# Patient Record
Sex: FEMALE | Race: WHITE
[De-identification: names, ages, dates, MRNs, and addresses within clinical notes are randomized per-mention and may not be internally consistent; named-entity substitution may affect disease eponyms.]

---

## 2017-06-29 ENCOUNTER — APPOINTMENT (OUTPATIENT)
Dept: DERMATOLOGY | Facility: CLINIC | Age: 67
End: 2017-06-29

## 2018-02-19 ENCOUNTER — EMERGENCY (EMERGENCY)
Facility: HOSPITAL | Age: 68
LOS: 1 days | Discharge: HOME | End: 2018-02-19
Attending: STUDENT IN AN ORGANIZED HEALTH CARE EDUCATION/TRAINING PROGRAM
Payer: MEDICARE

## 2018-02-19 VITALS
DIASTOLIC BLOOD PRESSURE: 68 MMHG | RESPIRATION RATE: 18 BRPM | OXYGEN SATURATION: 97 % | TEMPERATURE: 99 F | HEART RATE: 88 BPM | SYSTOLIC BLOOD PRESSURE: 115 MMHG

## 2018-02-19 VITALS — WEIGHT: 203.93 LBS | HEIGHT: 64 IN

## 2018-02-19 LAB
ALBUMIN SERPL ELPH-MCNC: 3.7 G/DL — SIGNIFICANT CHANGE UP (ref 3.3–5.2)
ALP SERPL-CCNC: 90 U/L — SIGNIFICANT CHANGE UP (ref 40–120)
ALT FLD-CCNC: 22 U/L — SIGNIFICANT CHANGE UP
ANION GAP SERPL CALC-SCNC: 17 MMOL/L — SIGNIFICANT CHANGE UP (ref 5–17)
AST SERPL-CCNC: 39 U/L — HIGH
BILIRUB SERPL-MCNC: 0.3 MG/DL — LOW (ref 0.4–2)
BUN SERPL-MCNC: 8 MG/DL — SIGNIFICANT CHANGE UP (ref 8–20)
CALCIUM SERPL-MCNC: 9.5 MG/DL — SIGNIFICANT CHANGE UP (ref 8.6–10.2)
CHLORIDE SERPL-SCNC: 93 MMOL/L — LOW (ref 98–107)
CK SERPL-CCNC: 113 U/L — SIGNIFICANT CHANGE UP (ref 25–170)
CO2 SERPL-SCNC: 24 MMOL/L — SIGNIFICANT CHANGE UP (ref 22–29)
CREAT SERPL-MCNC: 0.84 MG/DL — SIGNIFICANT CHANGE UP (ref 0.5–1.3)
D DIMER BLD IA.RAPID-MCNC: 275 NG/ML DDU — HIGH
EOSINOPHIL NFR BLD AUTO: 1 % — SIGNIFICANT CHANGE UP (ref 0–5)
FLUAV H1 2009 PAND RNA SPEC QL NAA+PROBE: DETECTED
GLUCOSE SERPL-MCNC: 116 MG/DL — HIGH (ref 70–115)
HCT VFR BLD CALC: 41.4 % — SIGNIFICANT CHANGE UP (ref 37–47)
HGB BLD-MCNC: 13.8 G/DL — SIGNIFICANT CHANGE UP (ref 12–16)
LYMPHOCYTES # BLD AUTO: 8 % — LOW (ref 20–55)
MCHC RBC-ENTMCNC: 31.5 PG — HIGH (ref 27–31)
MCHC RBC-ENTMCNC: 33.3 G/DL — SIGNIFICANT CHANGE UP (ref 32–36)
MCV RBC AUTO: 94.5 FL — SIGNIFICANT CHANGE UP (ref 81–99)
MONOCYTES NFR BLD AUTO: 11 % — HIGH (ref 3–10)
NEUTROPHILS NFR BLD AUTO: 79 % — HIGH (ref 37–73)
PLATELET # BLD AUTO: 230 K/UL — SIGNIFICANT CHANGE UP (ref 150–400)
POTASSIUM SERPL-MCNC: 5.5 MMOL/L — HIGH (ref 3.5–5.3)
POTASSIUM SERPL-SCNC: 5.5 MMOL/L — HIGH (ref 3.5–5.3)
PROT SERPL-MCNC: 8.2 G/DL — SIGNIFICANT CHANGE UP (ref 6.6–8.7)
RAPID RVP RESULT: DETECTED
RBC # BLD: 4.38 M/UL — LOW (ref 4.4–5.2)
RBC # FLD: 13.8 % — SIGNIFICANT CHANGE UP (ref 11–15.6)
SODIUM SERPL-SCNC: 134 MMOL/L — LOW (ref 135–145)
TROPONIN T SERPL-MCNC: <0.01 NG/ML — SIGNIFICANT CHANGE UP (ref 0–0.06)
WBC # BLD: 6.3 K/UL — SIGNIFICANT CHANGE UP (ref 4.8–10.8)
WBC # FLD AUTO: 6.3 K/UL — SIGNIFICANT CHANGE UP (ref 4.8–10.8)

## 2018-02-19 PROCEDURE — 87633 RESP VIRUS 12-25 TARGETS: CPT

## 2018-02-19 PROCEDURE — 82550 ASSAY OF CK (CPK): CPT

## 2018-02-19 PROCEDURE — 99285 EMERGENCY DEPT VISIT HI MDM: CPT

## 2018-02-19 PROCEDURE — 93010 ELECTROCARDIOGRAM REPORT: CPT

## 2018-02-19 PROCEDURE — 96374 THER/PROPH/DIAG INJ IV PUSH: CPT

## 2018-02-19 PROCEDURE — 71275 CT ANGIOGRAPHY CHEST: CPT | Mod: 26

## 2018-02-19 PROCEDURE — 71046 X-RAY EXAM CHEST 2 VIEWS: CPT

## 2018-02-19 PROCEDURE — 71046 X-RAY EXAM CHEST 2 VIEWS: CPT | Mod: 26

## 2018-02-19 PROCEDURE — 36415 COLL VENOUS BLD VENIPUNCTURE: CPT

## 2018-02-19 PROCEDURE — 93005 ELECTROCARDIOGRAM TRACING: CPT

## 2018-02-19 PROCEDURE — 87798 DETECT AGENT NOS DNA AMP: CPT

## 2018-02-19 PROCEDURE — 99284 EMERGENCY DEPT VISIT MOD MDM: CPT | Mod: 25

## 2018-02-19 PROCEDURE — 80053 COMPREHEN METABOLIC PANEL: CPT

## 2018-02-19 PROCEDURE — 87581 M.PNEUMON DNA AMP PROBE: CPT

## 2018-02-19 PROCEDURE — 84484 ASSAY OF TROPONIN QUANT: CPT

## 2018-02-19 PROCEDURE — 87486 CHLMYD PNEUM DNA AMP PROBE: CPT

## 2018-02-19 PROCEDURE — 85027 COMPLETE CBC AUTOMATED: CPT

## 2018-02-19 PROCEDURE — 85379 FIBRIN DEGRADATION QUANT: CPT

## 2018-02-19 PROCEDURE — 71275 CT ANGIOGRAPHY CHEST: CPT

## 2018-02-19 RX ORDER — ACETAMINOPHEN 500 MG
975 TABLET ORAL ONCE
Qty: 0 | Refills: 0 | Status: COMPLETED | OUTPATIENT
Start: 2018-02-19 | End: 2018-02-19

## 2018-02-19 RX ORDER — KETOROLAC TROMETHAMINE 30 MG/ML
30 SYRINGE (ML) INJECTION ONCE
Qty: 0 | Refills: 0 | Status: DISCONTINUED | OUTPATIENT
Start: 2018-02-19 | End: 2018-02-19

## 2018-02-19 RX ADMIN — Medication 75 MILLIGRAM(S): at 21:42

## 2018-02-19 RX ADMIN — Medication 30 MILLIGRAM(S): at 17:56

## 2018-02-19 RX ADMIN — Medication 975 MILLIGRAM(S): at 19:42

## 2018-02-19 NOTE — ED PROVIDER NOTE - PROGRESS NOTE DETAILS
as per sign-out from Dr. Patel patient with flu like illness presenting also wth pleuritic chest pain. Patient awaiting CT angio of chest. as per sign-out from Dr. Patel patient with flu like illness presenting also with pleuritic chest pain. Patient awaiting CT angio of chest. ct is as noted. ct is as noted. Discussed management of condition and concerning symptoms with patient and her best friends (over 40 yrs) at bedside

## 2018-02-19 NOTE — ED ADULT NURSE NOTE - OBJECTIVE STATEMENT
Patient A&OX3, c/o SOB, chest pain and upper back pain. Patient A&OX3, c/o SOB, chest pain and upper back pain. CM in place. NSR.

## 2018-02-19 NOTE — ED PROVIDER NOTE - OBJECTIVE STATEMENT
This patient is a 67 year old woman who presents to the ER c/o cough, chest pain, back pain, and myalgias.  The symptoms began 2 days ago with cough her symptoms worsened yesterday.   The back pain, chest pain, and body aches are worse with movement.  She denies fever, sick contacts, and recent travel.  Patient reports that her symptoms are similar to when she had pneumonia in the past.

## 2018-02-19 NOTE — ED ADULT NURSE REASSESSMENT NOTE - NS ED NURSE REASSESS COMMENT FT1
MD at pt bedside, test results explained, pt to be discharged, discharge instructions given and explained, including discharge medication purpose, dose, frequency and s/e, pt verbalized understanding of instructions, questions encouraged and answered appropriately, PIV d/c'd per facility protocol, pt tolerated well, DCD in place, pt safely discharged home.

## 2018-02-19 NOTE — ED ADULT NURSE REASSESSMENT NOTE - NS ED NURSE REASSESS COMMENT FT1
Bedside report recvd from off going RN, pt recvd lying on stretcher, A&Ox3, VSS, family at bedside providing support, pt c/o of back pain, reports chronic condition, worsened since cough started, POC discussed with pt, pt awaiting test at this time, pt and family verbalize understanding of plan and agree, pt safety maintained. Findings reported to MD.

## 2018-02-19 NOTE — ED PROVIDER NOTE - MEDICAL DECISION MAKING DETAILS
Cough, and myalgias temperature 100.8 in the ER.  Likely viral etiology will r/o PNA.  Will give tylenol, toradol, and check CXR.

## 2018-02-19 NOTE — ED PROVIDER NOTE - MUSCULOSKELETAL, MLM
Spine appears normal, range of motion is not limited, no muscle or joint tenderness.  No CVA tenderness.

## 2018-05-11 ENCOUNTER — APPOINTMENT (OUTPATIENT)
Dept: ORTHOPEDIC SURGERY | Facility: CLINIC | Age: 68
End: 2018-05-11
Payer: MEDICARE

## 2018-05-11 VITALS
DIASTOLIC BLOOD PRESSURE: 78 MMHG | HEART RATE: 91 BPM | HEIGHT: 62 IN | WEIGHT: 216 LBS | SYSTOLIC BLOOD PRESSURE: 128 MMHG | BODY MASS INDEX: 39.75 KG/M2

## 2018-05-11 PROCEDURE — 99214 OFFICE O/P EST MOD 30 MIN: CPT | Mod: 25

## 2018-05-11 PROCEDURE — 20610 DRAIN/INJ JOINT/BURSA W/O US: CPT | Mod: 50

## 2018-05-11 RX ORDER — QUETIAPINE FUMARATE 300 MG/1
300 TABLET ORAL
Qty: 30 | Refills: 0 | Status: ACTIVE | COMMUNITY
Start: 2018-01-29

## 2018-05-11 RX ORDER — CODEINE PHOSPHATE AND GUAIFENESIN 10; 100 MG/5ML; MG/5ML
100-10 LIQUID ORAL
Qty: 120 | Refills: 0 | Status: ACTIVE | COMMUNITY
Start: 2018-01-03

## 2018-05-11 RX ORDER — OMEPRAZOLE 40 MG/1
40 CAPSULE, DELAYED RELEASE ORAL
Qty: 30 | Refills: 0 | Status: ACTIVE | COMMUNITY
Start: 2018-04-09

## 2018-05-11 RX ORDER — DULOXETINE HYDROCHLORIDE 60 MG/1
60 CAPSULE, DELAYED RELEASE PELLETS ORAL
Qty: 30 | Refills: 0 | Status: ACTIVE | COMMUNITY
Start: 2017-12-27

## 2018-05-11 RX ORDER — METHYLPREDNISOLONE 4 MG/1
4 TABLET ORAL
Qty: 21 | Refills: 0 | Status: ACTIVE | COMMUNITY
Start: 2018-02-26

## 2018-05-11 RX ORDER — FLUTICASONE PROPIONATE 50 UG/1
50 SPRAY, METERED NASAL
Qty: 16 | Refills: 0 | Status: ACTIVE | COMMUNITY
Start: 2018-01-02

## 2018-05-11 RX ORDER — ALBUTEROL SULFATE 90 UG/1
108 (90 BASE) AEROSOL, METERED RESPIRATORY (INHALATION)
Qty: 8 | Refills: 0 | Status: ACTIVE | COMMUNITY
Start: 2018-01-02

## 2018-09-06 PROBLEM — F32.9 MAJOR DEPRESSIVE DISORDER, SINGLE EPISODE, UNSPECIFIED: Chronic | Status: ACTIVE | Noted: 2018-02-19

## 2018-09-06 PROBLEM — F41.9 ANXIETY DISORDER, UNSPECIFIED: Chronic | Status: ACTIVE | Noted: 2018-02-19

## 2018-09-06 PROBLEM — C50.919 MALIGNANT NEOPLASM OF UNSPECIFIED SITE OF UNSPECIFIED FEMALE BREAST: Chronic | Status: ACTIVE | Noted: 2018-02-19

## 2018-10-01 ENCOUNTER — APPOINTMENT (OUTPATIENT)
Dept: ORTHOPEDIC SURGERY | Facility: CLINIC | Age: 68
End: 2018-10-01
Payer: MEDICARE

## 2018-10-01 VITALS
HEART RATE: 81 BPM | WEIGHT: 216 LBS | BODY MASS INDEX: 39.75 KG/M2 | DIASTOLIC BLOOD PRESSURE: 70 MMHG | SYSTOLIC BLOOD PRESSURE: 125 MMHG | HEIGHT: 62 IN

## 2018-10-01 PROCEDURE — 99214 OFFICE O/P EST MOD 30 MIN: CPT | Mod: 25

## 2018-10-01 PROCEDURE — 20610 DRAIN/INJ JOINT/BURSA W/O US: CPT | Mod: 50

## 2018-10-15 RX ORDER — MELOXICAM 7.5 MG/1
7.5 TABLET ORAL
Qty: 30 | Refills: 0 | Status: ACTIVE | COMMUNITY
Start: 2018-04-18

## 2018-12-03 ENCOUNTER — APPOINTMENT (OUTPATIENT)
Dept: DERMATOLOGY | Facility: CLINIC | Age: 68
End: 2018-12-03

## 2019-10-25 ENCOUNTER — APPOINTMENT (OUTPATIENT)
Dept: DERMATOLOGY | Facility: CLINIC | Age: 69
End: 2019-10-25
Payer: MEDICARE

## 2019-10-25 PROCEDURE — 99214 OFFICE O/P EST MOD 30 MIN: CPT

## 2019-12-06 ENCOUNTER — APPOINTMENT (OUTPATIENT)
Dept: DERMATOLOGY | Facility: CLINIC | Age: 69
End: 2019-12-06
Payer: MEDICARE

## 2019-12-06 PROCEDURE — 99213 OFFICE O/P EST LOW 20 MIN: CPT | Mod: 25

## 2019-12-06 PROCEDURE — 17110 DESTRUCTION B9 LES UP TO 14: CPT

## 2020-01-01 ENCOUNTER — OUTPATIENT (OUTPATIENT)
Dept: OUTPATIENT SERVICES | Facility: HOSPITAL | Age: 70
LOS: 1 days | End: 2020-01-01

## 2020-01-29 ENCOUNTER — EMERGENCY (EMERGENCY)
Facility: HOSPITAL | Age: 70
LOS: 1 days | Discharge: HOME | End: 2020-01-29
Attending: EMERGENCY MEDICINE
Payer: MEDICARE

## 2020-01-29 VITALS
OXYGEN SATURATION: 96 % | WEIGHT: 220.02 LBS | HEART RATE: 86 BPM | TEMPERATURE: 97 F | RESPIRATION RATE: 18 BRPM | HEIGHT: 62 IN | DIASTOLIC BLOOD PRESSURE: 82 MMHG | SYSTOLIC BLOOD PRESSURE: 138 MMHG

## 2020-01-29 PROCEDURE — 99284 EMERGENCY DEPT VISIT MOD MDM: CPT

## 2020-01-29 PROCEDURE — 99284 EMERGENCY DEPT VISIT MOD MDM: CPT | Mod: 25

## 2020-01-29 PROCEDURE — 93971 EXTREMITY STUDY: CPT

## 2020-01-29 PROCEDURE — 96372 THER/PROPH/DIAG INJ SC/IM: CPT

## 2020-01-29 PROCEDURE — 93971 EXTREMITY STUDY: CPT | Mod: 26,RT

## 2020-01-29 RX ORDER — METHOCARBAMOL 500 MG/1
1500 TABLET, FILM COATED ORAL ONCE
Refills: 0 | Status: COMPLETED | OUTPATIENT
Start: 2020-01-29 | End: 2020-01-29

## 2020-01-29 RX ORDER — IBUPROFEN 200 MG
1 TABLET ORAL
Qty: 15 | Refills: 0
Start: 2020-01-29 | End: 2020-02-02

## 2020-01-29 RX ORDER — METHOCARBAMOL 500 MG/1
1 TABLET, FILM COATED ORAL
Qty: 15 | Refills: 0
Start: 2020-01-29 | End: 2020-02-02

## 2020-01-29 RX ORDER — KETOROLAC TROMETHAMINE 30 MG/ML
30 SYRINGE (ML) INJECTION ONCE
Refills: 0 | Status: DISCONTINUED | OUTPATIENT
Start: 2020-01-29 | End: 2020-01-29

## 2020-01-29 RX ADMIN — METHOCARBAMOL 1500 MILLIGRAM(S): 500 TABLET, FILM COATED ORAL at 16:27

## 2020-01-29 RX ADMIN — Medication 30 MILLIGRAM(S): at 16:27

## 2020-01-29 NOTE — ED ADULT TRIAGE NOTE - CHIEF COMPLAINT QUOTE
Patient arrived ambulatory to ED, awake alert, and oriented times 3, breathing unlabored.  Patient complaining of pain to right hip which started 1 month ago.  Patient states ortho was told to come to ED due to cyst to right hip.

## 2020-01-29 NOTE — ED STATDOCS - CLINICAL SUMMARY MEDICAL DECISION MAKING FREE TEXT BOX
Pt presenting with chronic right sided sciatic like pain, Pt now complaining of 5 days of right thigh pain. no CP no SOB pt concern for possible blood clot will get US and treat pain and reevaluate

## 2020-01-29 NOTE — ED STATDOCS - ATTENDING CONTRIBUTION TO CARE
I, Dr. Solitario, performed the initial face to face bedside interview with this patient regarding history of present illness, review of symptoms and relevant past medical, social and family history.  I completed an independent physical examination.  I was the initial provider who evaluated this patient. I have signed out the follow up of any pending tests (i.e. labs, radiological studies) to the ACP.  I have communicated the patient’s plan of care and disposition with the ACP.

## 2020-01-29 NOTE — ED STATDOCS - PROGRESS NOTE DETAILS
Pt moved form intake Room. Pt seen and evaluated by intake Physician. HPI, Physical examination performed by intake Physician . Note reviewed and followup examination performed by me consistent with initial assessment. Agrees with intake Physician plan and tests. Pt US normal and Pt made aware of result and will continue wit Mediation as discussed. F/U with PCP. Pt states that she feels a lot better with the Medication in the ED.

## 2020-01-29 NOTE — ED STATDOCS - CARE PROVIDER_API CALL
Maulik Verdugo)  Orthopaedic Surgery  [address], [facility] [address]  [place] [address]  Phone: [phone]  Fax: [phone]  Follow Up Time: 7-10 Days

## 2020-01-29 NOTE — ED STATDOCS - OBJECTIVE STATEMENT
68 y/o F pt with significant PMHx of anxiety, and sciatica presents to the ED c/o Chronic sciatic pain and increasing pain in her right thigh for the last 5 days(no trauma). She states that the vein looks swollen and is painful to the touch. Pt states that shes had severe sciatica pain that started a month ago and went to a pain management doctor where she received an epidural 3 weeks ago. She states that she went to another doctor who said she might have a blood clot. Pt takes Naproxen, Dilaudid, Cymbalta, clonopin. She also reported that she has a cyst on right hip.

## 2020-01-29 NOTE — ED STATDOCS - SKIN, MLM
TTP right medial aspect of thigh, no swelling or erythema no significant engorgement of vein noted. No calf swelling.

## 2020-01-29 NOTE — ED STATDOCS - PATIENT PORTAL LINK FT
You can access the FollowMyHealth Patient Portal offered by [facility] by registering at the following website: [email or web address]. By joining [employer]’s FollowMyHealth portal, you will also be able to view your health information using other applications (apps) compatible with our system.

## 2020-01-31 DIAGNOSIS — Z71.89 OTHER SPECIFIED COUNSELING: ICD-10-CM

## 2020-02-07 ENCOUNTER — APPOINTMENT (OUTPATIENT)
Dept: ORTHOPEDIC SURGERY | Facility: CLINIC | Age: 70
End: 2020-02-07
Payer: MEDICARE

## 2020-02-07 VITALS
BODY MASS INDEX: 39.75 KG/M2 | WEIGHT: 216 LBS | DIASTOLIC BLOOD PRESSURE: 81 MMHG | HEIGHT: 62 IN | SYSTOLIC BLOOD PRESSURE: 138 MMHG | HEART RATE: 99 BPM

## 2020-02-07 DIAGNOSIS — M16.11 UNILATERAL PRIMARY OSTEOARTHRITIS, RIGHT HIP: ICD-10-CM

## 2020-02-07 DIAGNOSIS — M17.0 BILATERAL PRIMARY OSTEOARTHRITIS OF KNEE: ICD-10-CM

## 2020-02-07 PROCEDURE — 73562 X-RAY EXAM OF KNEE 3: CPT | Mod: RT

## 2020-02-07 PROCEDURE — 73502 X-RAY EXAM HIP UNI 2-3 VIEWS: CPT | Mod: RT

## 2020-02-07 PROCEDURE — 99214 OFFICE O/P EST MOD 30 MIN: CPT

## 2020-02-07 NOTE — HISTORY OF PRESENT ILLNESS
[Worsening] : worsening [de-identified] : 69 year old female here for evaluation of right hip and right knee pain.   patient has known history of right knee OA, has had injections in past.  patient states developed right hip pain in December.  Patient was seeing pain management, had epidural x 3 with no relief. No hip injections yet. patient states WB and ROM increases pain.  patient states on Robaxin and Motrin for pain with minimal relief. Patient also taking Tramadol. She has attempted hyaluronic acid injections for her knee with relief.

## 2020-02-07 NOTE — DISCUSSION/SUMMARY
[Medication Risks Reviewed] : Medication risks reviewed [Surgical risks reviewed] : Surgical risks reviewed [de-identified] : 69 year old  female with severe bone-on-bone medial compartment osteoarthritis of the right knee with significant varus deformity and endstage right hip osteoarthritis. Based on imaging she is a candidate for right BEATRIZ and right TKA. I recommended that she pursue right BEATRIZ first. We discussed total hip replacement in detail, including the risks and anticipated recovery period. She can schedule right BEATRIZ.\par With regards to her right knee she will continue with conservative treatment. I ordered hyaluronic acid injections for her right knee. She will F/U when hyaluronic acid injections are available in office. \par \par Total hip arthroplasty: A hip replacement means a resurfacing of both surfaces of the hip, with metal, ceramic and/or plastic parts. The prosthetic parts are usually press fit into bone, and only rarely cemented into position. Patients are allowed to weight bear as tolerated in most cases. The postoperative motion is determined by multiple factors the most important of which is preoperative motion. In general, the better the motion pre operatively, the better the motion post operatively. The operation, pending medical clearance, generally requires a hospitalization of 3-4 days. In general, we prefer to perform the procedure under epidural or general anesthesia. Preoperatively we institute a nomogram for blood management which may include the administration of procrit. The operative procedure takes approximately 1-2 hours. The operation requires an oblique incision anywhere from 4-6 inches over the posterolateral hip region. Post operatively the patient is walking the day of or the day after surgery. The first couple of days are very painful and the pain medication will alleviate but not eliminate the pain. Thus the patient must really push hard to get their mobility back. Our goal for having the person go home is that they can walk with a walker or a cane. The walking aide is to be dispensed with once the patient is secure enough. In general, there is no cast or braces required with a routine hip replacement. In the long term, we do not encourage our patients to run for the sake of running; although, pending their pre operative status, we often allow patients to play doubles tennis or comparable activities. We also allow them to do gentle intermediate downhill skiing if they are truly an expert skier. Biking is encouraged as well as swimming. The follow up periods are usually at 3 weeks, 6 weeks, 3 months, and yearly intervals. Potential complications with total hip replacements include anaesthetic complications and death, infection (less than 1%), nerve damage, and in the case of a sciatic nerve injury, a permanent foot drop, (a flapping foot with ambulation that requires bracing). There are always areas of skin numbness but this is not an untoward effect nor do we consider it a complication. Other potential complications include dislocation of the hip component (less than 5%). In cases of recurrent dislocation revision surgery may be required. The loosening of either the acetabular or femoral component is much more infrequent. The components may wear, creating particulate debris, loosening and systemic complications. Specific concerns exist with all bearing surfaces such as metal sensitivity with metal on metal components, and the risk of fracture and squeaking with ceramic components. Major blood vessel damage is also extremely rare. Theoretically because of the anatomic proximity of the femoral artery, it could be lacerated with subsequent repairs required. Albeit unlikely, a disruption of the femoral artery could theoretically result in an amputation. Similarly, infection could theoretically result in an amputation if one were to grow out an organism that cannot be controlled with antibiotics. Most infections require removal of the prosthesis, placement of an antibiotic spacer, IV antibiotics for eradication, prior to reimplantation. General medical complications include phlebitis for which we prophylactically anticoagulate but it could still occur and fatal pulmonary embolus has been reported. Cardiovascular problems, such as a heart attack or ischemia are always a concern with such hemodynamic changes in the blood vascular system. There is a risk of leg length discrepancy and the need for a shoe lift. Other general complications are very rare but anything in medicine could theoretically happen. I think the patient understands the risk benefit ratio of total hip replacement and will think about whether they would like to pursue an operative or non-operative option.  I reviewed the plan of care as well as a model of a total hip implant equivalent to the one that will be used for their total hip joint replacement. The patient agreed to the plan of care as well as the use of implants for their hip total joint replacement.\par  \par A knee replacement means resurfacing of all 3 surfaces of the patella, the femur, and tibia with metal and plastic parts. The prosthetic parts are usually cemented into position and well outpatient range of motion from full extension to about 120° of flexion. The postoperative motion, however, is determined by multiple factors, the most important of which is preoperative motion. In general, the better motion preoperatively, the better the motion postoperatively. The operation, pending medical clearance, gently requires hospitalization of 1 to 2 days for one knee, 2 to 4 days for bilateral knee replacements. In general, we prefer to perform the procedure under spinal anesthesia with femoral nerve block and occasional single shot sciatic nerve block. We may ask a patient to give 2 units of blood for bilateral total knee arthroplasties, for one knee we institute a normogram which may include administration of preoperative Procrit. The operative procedure takes probably 1-2 hours. The operation requires a straight incision anywhere from 5-7 inches down from the knee. Post-operatively the patient will be walking the day of surgery. The first couple days are very painful and the pain medication will alleviate, but not eliminate the pain. The patient must really push hard to get range of motion. Our goal for having a person go home as that the range of motion is approximately 0-90° of flexion and that they can walk with a walker or cane. A walking aid is to be dispensed once the patient is secure enough. In general there, there is no cast or brace required with routine knee replacement. In the long term, we do not encourage our patients to run for the sake of running, although pending their preoperative status, we often allow patient to play doubles tennis or comparative activities. We also allowed them to do gentle intermediate downhill skiing if they are truly an expert skier. Biking is encouraged as well swimming. The followup periods are usually 3 weeks, 6 weeks, 3 months, and yearly intervals. Potential complications with total knee replacement included anesthetic complications and death, infection around 1%, nerve damage, by which means peroneal nerve palsy, footdrop or flapping foot with ambulation. This is particularly more apt to occur in the patient with a valgus or knock-knee deformity. The incidence can be quite high in this particular patient population. There will be areas of skin numbness, but this is not an untoward effect nor do we consider it a complication. Other potential complications include dislocation of the patella component, usually less than 2%; loosening of the tibial or femoral component is much more infrequent. Most often this occurs with infection or long-term use. Patient of extreme risk including markedly overweight patient's may be more prone to prosthetic wear. Major blood vessel damage is also extremely rare. Directly because of the anatomic proximity of the popliteal artery this could be lacerated with subsequent repair required. Be it unlikely, disruption of the popliteal artery could theoretically result in amputation. Similarly, infection could theoretically result in amputation if one were to grow out of an organism that cannot be controlled with antibiotics. General medical complications include phlebitis, for which we would prophylactically anticoagulate patients, but could still occur, and fatal pulmonary embolus which has been reported. Cardiovascular problems, such as heart attack or ischemia are always a concern with such hemodynamic changes in the blood vascular system. Other General complications are rare, but anything medicine could theoretically happen. I think the patient understands the risk benefit ratio of total knee replacement and will think about whether there like to pursue with an operation or nonoperative treatment program. I reviewed the plan of care as well as a model of a total knee implant equivalent to the one that will be used for their total knee joint replacement. The patient agreed to the plan of care as well as the use of implants for their knee total joint replacement.

## 2020-02-07 NOTE — ADDENDUM
[FreeTextEntry1] : I, Papo Ovalles, acted solely as a scribe for Dr. Maulik Verdugo on this date 02/07/2020.

## 2020-02-07 NOTE — PHYSICAL EXAM
[Antalgic] : antalgic [LE] : Sensory: Intact in bilateral lower extremities [Normal] : Alert and in no acute distress [ALL] : Biceps, brachioradialis, triceps, patellar, ankle and plantar 2+ and symmetric bilaterally [Obese] : obese [Acute Distress] : not in acute distress [Poor Appearance] : well-appearing [de-identified] : GENERAL APPEARANCE: Well nourished and hydrated, pleasant, alert, and oriented x 3. Appears their stated age. \par HEENT: Normocephalic, extraocular eye motion intact. Nasal septum midline. Oral cavity clear. External auditory canal clear. \par RESPIRATORY: Breath sounds clear and audible in all lobes. No wheezing, No accessory muscle use.\par CARDIOVASCULAR: No apparent abnormalities. No lower leg edema. No varicosities. Pedal pulses are palpable.\par NEUROLOGIC: Sensation is normal, no muscle weakness in the upper or lower extremities.\par DERMATOLOGIC: No apparent skin lesions, moist, warm, no rash.\par SPINE: Cervical spine appears normal and moves freely; thoracic spine appears normal and moves freely; lumbosacral spine appears normal and moves freely, normal, nontender.\par MUSCULOSKELETAL: Hands, wrists, and elbows are normal and move freely, shoulders are normal and move freely.  [de-identified] : Right hip exam shows able to straight leg raise with pain and difficulty, ROM is reduced,  60 degrees of flexion, she has pain with internal and external rotation.\par Right knee exam shows medial joint line tenderness, varus deformity, no effusion, flexion contracture present.  [de-identified] : 3V Xray of the right hip and pelvis done in office today and reviewed by Dr. Maulik Verdugo demonstrates endstage right hip osteoarthritis. \par 3V Xray of the right knee done in office today and reviewed by Dr. Maulik Verdugo demonstrates severe bone-on-bone medial compartment osteoarthritis of the right knee with significant varus deformity and periarticular osteophytes.

## 2020-02-24 ENCOUNTER — APPOINTMENT (OUTPATIENT)
Dept: ORTHOPEDIC SURGERY | Facility: CLINIC | Age: 70
End: 2020-02-24

## 2020-03-02 ENCOUNTER — APPOINTMENT (OUTPATIENT)
Dept: ORTHOPEDIC SURGERY | Facility: CLINIC | Age: 70
End: 2020-03-02

## 2020-03-09 ENCOUNTER — APPOINTMENT (OUTPATIENT)
Dept: ORTHOPEDIC SURGERY | Facility: CLINIC | Age: 70
End: 2020-03-09

## 2020-05-19 ENCOUNTER — APPOINTMENT (OUTPATIENT)
Dept: ORTHOPEDIC SURGERY | Facility: HOSPITAL | Age: 70
End: 2020-05-19

## 2020-06-10 ENCOUNTER — APPOINTMENT (OUTPATIENT)
Dept: ORTHOPEDIC SURGERY | Facility: CLINIC | Age: 70
End: 2020-06-10

## 2020-06-24 ENCOUNTER — EMERGENCY (EMERGENCY)
Facility: HOSPITAL | Age: 70
LOS: 1 days | Discharge: HOME | End: 2020-06-24
Attending: EMERGENCY MEDICINE
Payer: MEDICARE

## 2020-06-24 VITALS
TEMPERATURE: 98 F | OXYGEN SATURATION: 98 % | WEIGHT: 270.07 LBS | SYSTOLIC BLOOD PRESSURE: 139 MMHG | HEART RATE: 92 BPM | RESPIRATION RATE: 20 BRPM | HEIGHT: 67 IN | DIASTOLIC BLOOD PRESSURE: 58 MMHG

## 2020-06-24 LAB
ALBUMIN SERPL ELPH-MCNC: 3.6 G/DL — SIGNIFICANT CHANGE UP (ref 3.3–5.2)
ALP SERPL-CCNC: 156 U/L — HIGH (ref 40–120)
ALT FLD-CCNC: 47 U/L — HIGH
ANION GAP SERPL CALC-SCNC: 18 MMOL/L — HIGH (ref 5–17)
APPEARANCE UR: CLEAR — SIGNIFICANT CHANGE UP
AST SERPL-CCNC: 42 U/L — HIGH
BACTERIA # UR AUTO: ABNORMAL
BASOPHILS # BLD AUTO: 0.04 K/UL — SIGNIFICANT CHANGE UP (ref 0–0.2)
BASOPHILS NFR BLD AUTO: 0.3 % — SIGNIFICANT CHANGE UP (ref 0–2)
BILIRUB SERPL-MCNC: 0.5 MG/DL — SIGNIFICANT CHANGE UP (ref 0.4–2)
BILIRUB UR-MCNC: NEGATIVE — SIGNIFICANT CHANGE UP
BUN SERPL-MCNC: 14 MG/DL — SIGNIFICANT CHANGE UP (ref 8–20)
CALCIUM SERPL-MCNC: 9.5 MG/DL — SIGNIFICANT CHANGE UP (ref 8.6–10.2)
CHLORIDE SERPL-SCNC: 97 MMOL/L — LOW (ref 98–107)
CO2 SERPL-SCNC: 23 MMOL/L — SIGNIFICANT CHANGE UP (ref 22–29)
COLOR SPEC: YELLOW — SIGNIFICANT CHANGE UP
CREAT SERPL-MCNC: 0.72 MG/DL — SIGNIFICANT CHANGE UP (ref 0.5–1.3)
DIFF PNL FLD: NEGATIVE — SIGNIFICANT CHANGE UP
EOSINOPHIL # BLD AUTO: 0.4 K/UL — SIGNIFICANT CHANGE UP (ref 0–0.5)
EOSINOPHIL NFR BLD AUTO: 3.4 % — SIGNIFICANT CHANGE UP (ref 0–6)
EPI CELLS # UR: SIGNIFICANT CHANGE UP
GLUCOSE SERPL-MCNC: 101 MG/DL — HIGH (ref 70–99)
GLUCOSE UR QL: NEGATIVE MG/DL — SIGNIFICANT CHANGE UP
HCT VFR BLD CALC: 39.6 % — SIGNIFICANT CHANGE UP (ref 34.5–45)
HGB BLD-MCNC: 13.4 G/DL — SIGNIFICANT CHANGE UP (ref 11.5–15.5)
IMM GRANULOCYTES NFR BLD AUTO: 0.8 % — SIGNIFICANT CHANGE UP (ref 0–1.5)
KETONES UR-MCNC: ABNORMAL
LACTATE BLDV-MCNC: 1.3 MMOL/L — SIGNIFICANT CHANGE UP (ref 0.5–2)
LEUKOCYTE ESTERASE UR-ACNC: ABNORMAL
LIDOCAIN IGE QN: 12 U/L — LOW (ref 22–51)
LYMPHOCYTES # BLD AUTO: 2.78 K/UL — SIGNIFICANT CHANGE UP (ref 1–3.3)
LYMPHOCYTES # BLD AUTO: 23.6 % — SIGNIFICANT CHANGE UP (ref 13–44)
MCHC RBC-ENTMCNC: 31.5 PG — SIGNIFICANT CHANGE UP (ref 27–34)
MCHC RBC-ENTMCNC: 33.8 GM/DL — SIGNIFICANT CHANGE UP (ref 32–36)
MCV RBC AUTO: 93.2 FL — SIGNIFICANT CHANGE UP (ref 80–100)
MONOCYTES # BLD AUTO: 0.95 K/UL — HIGH (ref 0–0.9)
MONOCYTES NFR BLD AUTO: 8.1 % — SIGNIFICANT CHANGE UP (ref 2–14)
NEUTROPHILS # BLD AUTO: 7.54 K/UL — HIGH (ref 1.8–7.4)
NEUTROPHILS NFR BLD AUTO: 63.8 % — SIGNIFICANT CHANGE UP (ref 43–77)
NITRITE UR-MCNC: NEGATIVE — SIGNIFICANT CHANGE UP
PH UR: 8 — SIGNIFICANT CHANGE UP (ref 5–8)
PLATELET # BLD AUTO: 323 K/UL — SIGNIFICANT CHANGE UP (ref 150–400)
POTASSIUM SERPL-MCNC: 4.9 MMOL/L — SIGNIFICANT CHANGE UP (ref 3.5–5.3)
POTASSIUM SERPL-SCNC: 4.9 MMOL/L — SIGNIFICANT CHANGE UP (ref 3.5–5.3)
PROT SERPL-MCNC: 8 G/DL — SIGNIFICANT CHANGE UP (ref 6.6–8.7)
PROT UR-MCNC: NEGATIVE MG/DL — SIGNIFICANT CHANGE UP
RBC # BLD: 4.25 M/UL — SIGNIFICANT CHANGE UP (ref 3.8–5.2)
RBC # FLD: 13.3 % — SIGNIFICANT CHANGE UP (ref 10.3–14.5)
RBC CASTS # UR COMP ASSIST: NEGATIVE /HPF — SIGNIFICANT CHANGE UP (ref 0–4)
SODIUM SERPL-SCNC: 138 MMOL/L — SIGNIFICANT CHANGE UP (ref 135–145)
SP GR SPEC: 1.01 — SIGNIFICANT CHANGE UP (ref 1.01–1.02)
TROPONIN T SERPL-MCNC: <0.01 NG/ML — SIGNIFICANT CHANGE UP (ref 0–0.06)
TROPONIN T SERPL-MCNC: <0.01 NG/ML — SIGNIFICANT CHANGE UP (ref 0–0.06)
UROBILINOGEN FLD QL: NEGATIVE MG/DL — SIGNIFICANT CHANGE UP
WBC # BLD: 11.8 K/UL — HIGH (ref 3.8–10.5)
WBC # FLD AUTO: 11.8 K/UL — HIGH (ref 3.8–10.5)
WBC UR QL: SIGNIFICANT CHANGE UP

## 2020-06-24 PROCEDURE — 74177 CT ABD & PELVIS W/CONTRAST: CPT | Mod: 26

## 2020-06-24 PROCEDURE — 90792 PSYCH DIAG EVAL W/MED SRVCS: CPT

## 2020-06-24 PROCEDURE — 71275 CT ANGIOGRAPHY CHEST: CPT | Mod: 26

## 2020-06-24 PROCEDURE — 99285 EMERGENCY DEPT VISIT HI MDM: CPT | Mod: CS

## 2020-06-24 PROCEDURE — 93010 ELECTROCARDIOGRAM REPORT: CPT

## 2020-06-24 RX ORDER — SODIUM CHLORIDE 9 MG/ML
1000 INJECTION INTRAMUSCULAR; INTRAVENOUS; SUBCUTANEOUS ONCE
Refills: 0 | Status: COMPLETED | OUTPATIENT
Start: 2020-06-24 | End: 2020-06-24

## 2020-06-24 RX ORDER — ONDANSETRON 8 MG/1
4 TABLET, FILM COATED ORAL ONCE
Refills: 0 | Status: COMPLETED | OUTPATIENT
Start: 2020-06-24 | End: 2020-06-24

## 2020-06-24 RX ADMIN — ONDANSETRON 4 MILLIGRAM(S): 8 TABLET, FILM COATED ORAL at 19:14

## 2020-06-24 RX ADMIN — Medication 1 MILLIGRAM(S): at 19:14

## 2020-06-24 RX ADMIN — SODIUM CHLORIDE 1000 MILLILITER(S): 9 INJECTION INTRAMUSCULAR; INTRAVENOUS; SUBCUTANEOUS at 19:15

## 2020-06-24 NOTE — ED PROVIDER NOTE - OBJECTIVE STATEMENT
70 year old female with PMH anxiety, depression, and recent hip replacement surgery at [place] presents with diarrhea. The pt states that she has had poor PO intake after discharge from her surgery due to both nausea and the fact that she is walking very slowly with her walker and she is concerned about wetting herself. She reports that starting 2 days ago she began to have multiple episodes per day of waterry, diffuse diarrhea. She denies blood in the BMs. She does endorse mild lower abd cramping, not related to eating. Pt states that today she started to feel very ervous and anxious regarding her diarrhea, and started to feel her heart race. She denies any chest pain, SOB, cough, hemoptysis, fever, chills.

## 2020-06-24 NOTE — ED PROVIDER NOTE - CLINICAL SUMMARY MEDICAL DECISION MAKING FREE TEXT BOX
Pt with c/o diarrhea, but none here and CT neg. She reported palpitations no PE, surgical site is clean, NSR, it is likely anxiety. Pt medically cleared but will need psych eval

## 2020-06-24 NOTE — ED PROVIDER NOTE - PATIENT PORTAL LINK FT
You can access the FollowMyHealth Patient Portal offered by [facility] by registering at the following website: [email or web address]. By joining [employer]’s FollowMyHealth portal, you will also be able to view your health information using other applications (apps) compatible with our system.

## 2020-06-24 NOTE — ED ADULT NURSE NOTE - OBJECTIVE STATEMENT
Patient with diarrhea and abdominal pain since having soup last night.  Recent surgery for right hip repair.  Patient is highly anxious, hyperventilating and tearful.  Provided reassurance and emotional support. Patient reports multiple episodes of non bloody diarrhea.

## 2020-06-24 NOTE — ED ADULT NURSE NOTE - CHIEF COMPLAINT QUOTE
"I have diarrhea and belly pains and my heart is racing from my anxiety" c/o apolonia lower quadrant abd pain x2 days +diarrhea +nausea. Reports recent sx on Right hip this passed Tuesday at [place], denies sx complications denies fevers. MAEx4. Hx anxiety, pt appears anxious, able to be deescalated, RR even and unlabored

## 2020-06-24 NOTE — ED PROVIDER NOTE - CONSTITUTIONAL, MLM
normal... anxious appearing, awake, alert, oriented to person, place, time/situation and in no apparent distress.

## 2020-06-24 NOTE — ED PROVIDER NOTE - NSFOLLOWUPINSTRUCTIONS_ED_ALL_ED_FT
- Follow up with your doctor within 2-3 days.   - Follow up with your psychiatrist, call today to make an appointment.   - Bring results with you to the appointment.   - Return to the ED for any new or worsening symptoms.     Anxiety    Generalized anxiety disorder (MINDY) is a mental disorder. It is defined as anxiety that is not necessarily related to specific events or activities or is out of proportion to said events. Symptoms include restlessness, fatigue, difficulty concentrations, irritability and difficulty concentrating. It may interfere with life functions, including relationships, work, and school. If you were started on a medication, make sure to take exactly as prescribed and follow up with a psychiatrist.    SEEK IMMEDIATE MEDICAL CARE IF YOU HAVE ANY OF THE FOLLOWING SYMPTOMS: thoughts about hurting killing yourself, thoughts about hurting or killing somebody else, hallucinations, or worsening depression.

## 2020-06-24 NOTE — ED PROVIDER NOTE - PROGRESS NOTE DETAILS
Jose Juan: pt cleared medically. However upon planning for discharge the  contacted and stated that he did not feel comfortable with the pt's discharge. He states that she has always been an anxious woman, but that ever since her dc from her hip surgery she has been intermittently crying and aving recurrent panic attacks. He is requesting a psych eval before the pt returns home Ashley PASCUAL: patient seen by psychiatry and cleared by psychiatry. Has a psychiatrist outpatient, ready for dc. Discussed with , can come  the patient.

## 2020-06-24 NOTE — ED ADULT NURSE REASSESSMENT NOTE - NS ED NURSE REASSESS COMMENT FT1
assumed care of patient at this time. as per off-going RN and MD patient is medically cleared however  does not wish to have her come home due to a "chemical imbalance" and wishes for her to be kept at hospital. as per MD patient will stay overnight for a Psych consult in AM. patient sleeping comfortably at this time.

## 2020-06-24 NOTE — ED PROVIDER NOTE - CARE PLAN
Principal Discharge DX:	Anxiety  Secondary Diagnosis:	Diarrhea, unspecified type  Secondary Diagnosis:	Palpitations

## 2020-06-24 NOTE — ED ADULT TRIAGE NOTE - CHIEF COMPLAINT QUOTE
"I have diarrhea and belly pains and my heart is racing from my anxiety" c/o apolonia lower quadrant abd pain x2 days +diarrhea +nausea. Reports recent sx on Right hip this passed Tuesday at [place], denies sx complications denies fevers. MAEx4. Hx anxiety, pt appears anxious, able to be deescalated, RR even and unlabored

## 2020-06-25 VITALS
SYSTOLIC BLOOD PRESSURE: 125 MMHG | RESPIRATION RATE: 18 BRPM | DIASTOLIC BLOOD PRESSURE: 78 MMHG | HEART RATE: 86 BPM | OXYGEN SATURATION: 98 %

## 2020-06-25 DIAGNOSIS — F31.9 BIPOLAR DISORDER, UNSPECIFIED: ICD-10-CM

## 2020-06-25 DIAGNOSIS — F48.9 NONPSYCHOTIC MENTAL DISORDER, UNSPECIFIED: ICD-10-CM

## 2020-06-25 PROCEDURE — 71275 CT ANGIOGRAPHY CHEST: CPT

## 2020-06-25 PROCEDURE — 84484 ASSAY OF TROPONIN QUANT: CPT

## 2020-06-25 PROCEDURE — 83605 ASSAY OF LACTIC ACID: CPT

## 2020-06-25 PROCEDURE — 93005 ELECTROCARDIOGRAM TRACING: CPT

## 2020-06-25 PROCEDURE — 96375 TX/PRO/DX INJ NEW DRUG ADDON: CPT

## 2020-06-25 PROCEDURE — 36415 COLL VENOUS BLD VENIPUNCTURE: CPT

## 2020-06-25 PROCEDURE — 96374 THER/PROPH/DIAG INJ IV PUSH: CPT

## 2020-06-25 PROCEDURE — 81001 URINALYSIS AUTO W/SCOPE: CPT

## 2020-06-25 PROCEDURE — 84443 ASSAY THYROID STIM HORMONE: CPT

## 2020-06-25 PROCEDURE — 85027 COMPLETE CBC AUTOMATED: CPT

## 2020-06-25 PROCEDURE — 83690 ASSAY OF LIPASE: CPT

## 2020-06-25 PROCEDURE — 99284 EMERGENCY DEPT VISIT MOD MDM: CPT | Mod: 25

## 2020-06-25 PROCEDURE — 80053 COMPREHEN METABOLIC PANEL: CPT

## 2020-06-25 PROCEDURE — 84436 ASSAY OF TOTAL THYROXINE: CPT

## 2020-06-25 PROCEDURE — 74177 CT ABD & PELVIS W/CONTRAST: CPT

## 2020-06-25 RX ORDER — CLONAZEPAM 1 MG
1 TABLET ORAL ONCE
Refills: 0 | Status: DISCONTINUED | OUTPATIENT
Start: 2020-06-25 | End: 2020-06-25

## 2020-06-25 RX ORDER — QUETIAPINE FUMARATE 200 MG/1
100 TABLET, FILM COATED ORAL ONCE
Refills: 0 | Status: COMPLETED | OUTPATIENT
Start: 2020-06-25 | End: 2020-06-25

## 2020-06-25 RX ADMIN — QUETIAPINE FUMARATE 100 MILLIGRAM(S): 200 TABLET, FILM COATED ORAL at 08:43

## 2020-06-25 RX ADMIN — Medication 1 MILLIGRAM(S): at 08:43

## 2020-06-25 NOTE — ED ADULT NURSE REASSESSMENT NOTE - NS ED NURSE REASSESS COMMENT FT1
Pt. sleeping comfortably at this time, in NAD. Breathing spontaneous and unlabored. Primafit in place draining clear, yellow urine. Side rails up x 2 for safety. Call bell within reach. Pt. awaiting psych consult in AM. Pt. safety and comfort measures maintained.

## 2020-06-25 NOTE — ED BEHAVIORAL HEALTH ASSESSMENT NOTE - NS ED BHA MED ROS GASTROINTESTINAL
No complaints
I will STOP taking the medications listed below when I get home from the hospital:  None

## 2020-06-25 NOTE — ED ADULT NURSE REASSESSMENT NOTE - NS ED NURSE REASSESS COMMENT FT1
Pt. stated she needed to have a bowel movement, requesting bedpan. Pt. assisted onto bedpan. Pt. unable to have BM. No noted episodes of diarrhea in ED as of this time. Vital signs stable and as charted.

## 2020-06-25 NOTE — ED BEHAVIORAL HEALTH ASSESSMENT NOTE - DESCRIPTION
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

## 2020-06-25 NOTE — ED BEHAVIORAL HEALTH ASSESSMENT NOTE - SUMMARY
71 yo white  female, lives alone with adult child who lives in [place], PPH Bipoar Disorder, approx 4 prior psych admissions, last at [facility] for verenice, in tx at [facility] clinic with Dr Stein, last seen 3 weeks ago, no h/o suicide attempt, denies drug or alcohol use, med compliant, PMH THR surgery last Tuesday at [place], nodules on thyroid, elevated BMI bib EMS with c/o abdominal pain.  Pt endorsing anxiety and depression since just before surgery on Tuesday last (THR).  Pt c/o palpitations and anxiety, no precipitating factors reported other than surgey anxiety.  Pt denies SI/HI and no h/o suicide attempt or aggression.  Pt is fearful but does not know why.  Denies paranoia, manic symptoms, and sleeps well with meds.  Pt at max for age of Cymbalta 90 mg and takes Klonopin 1 mg tid and Seroquel 300 hs.  Pt noted to have multiple nodules on CT which may contribute to anxiety.  Spoke with therapist who reports Pt is chronically anxious and nervous with h/o catastrophizing.   No safety concerns reported.  No acute condition requiring psych admissions.  May take one extra Klonopin 1 mg prn and follow up with psychiatrist.

## 2020-06-25 NOTE — ED ADULT NURSE REASSESSMENT NOTE - NS ED NURSE REASSESS COMMENT FT1
Assumed pt care at 0730. pt tearful reporting she is scared and depressed, pt given scheduled 0000 meds this am as per MD verbal order, no acute s/s of respiratory distress noted or reported at this time, will continue to monitor

## 2020-06-25 NOTE — ED BEHAVIORAL HEALTH ASSESSMENT NOTE - HPI (INCLUDE ILLNESS QUALITY, SEVERITY, DURATION, TIMING, CONTEXT, MODIFYING FACTORS, ASSOCIATED SIGNS AND SYMPTOMS)
71 yo white  female, lives alone with adult child who lives in [place], PPH Bipoar Disorder, approx 4 prior psych admissions, last at [facility] for verenice, in tx at [facility] clinic with Dr Stein, last seen 3 weeks ago, no h/o suicide attempt, denies drug or alcohol use, med compliant, PMH THR surgery last Tuesday at [place], nodules on thyroid, elevated BMI bib EMS with c/o abdominal pain.  Pt endorsing anxiety and depression since just before surgery on Tuesday last (THR).  Pt c/o palpitations and anxiety, no precipitating factors reported other than surgey anxiety.  Pt denies SI/HI and no h/o suicide attempt or aggression.  Pt is fearful but does not know why.  Denies paranoia, manic symptoms, and sleeps well with meds.  Pt at max for age of Cymbalta 90 mg and takes Klonopin 1 mg tid and Seroquel 300 hs.  Pt noted to have multiple nodules on CT which may contribute to anxiety.  Spoke with therapist who reports Pt is chronically anxious and nervous with h/o catastrophizing.   No safety concerns reported.

## 2020-06-25 NOTE — ED BEHAVIORAL HEALTH ASSESSMENT NOTE - MEDICATIONS (PRESCRIPTIONS, DIRECTIONS)
Initial Dietitian Evaluation 2/2 to extended length of stay. 66M hx COPD, HTN, HLD, bx proven adenocarcinoma of the l lung p/w progressively worsening shortness of breath. Patient was admitted 1/12/19 with cough, SOB, found to have pleural effusion. 1L of effusion drained 3 weeks ago with inconclusive pathology. He then had US guided bx which showed adenocarcinoma of the lungs with PDL1 70% positive.Patient now presents with worsening shortness of breath on exertion and pain worse with deep inspiration at the left lower chest, left upper abdomen. Pt remains with fair/good po. No reported chewing, swallowing difficulties or any nausea, vomiting, diarrhea, constipation. Noted Pt's weight in previous 1/19 admit at 207# and current weight 193#. Family was unable to state weight prior to admission, however, feels Pt is likely losing. Pt is being followed up by out patient RD who has been encouraging liberalized diet to help facilitate weight maintenance.
same

## 2020-07-07 ENCOUNTER — APPOINTMENT (OUTPATIENT)
Dept: ORTHOPEDIC SURGERY | Facility: CLINIC | Age: 70
End: 2020-07-07

## 2020-08-12 ENCOUNTER — APPOINTMENT (OUTPATIENT)
Dept: ORTHOPEDIC SURGERY | Facility: CLINIC | Age: 70
End: 2020-08-12

## 2021-02-23 ENCOUNTER — APPOINTMENT (OUTPATIENT)
Dept: DERMATOLOGY | Facility: CLINIC | Age: 71
End: 2021-02-23

## 2021-07-09 ENCOUNTER — APPOINTMENT (OUTPATIENT)
Dept: ENDOCRINOLOGY | Facility: CLINIC | Age: 71
End: 2021-07-09
Payer: MEDICARE

## 2021-07-09 VITALS
DIASTOLIC BLOOD PRESSURE: 68 MMHG | OXYGEN SATURATION: 100 % | SYSTOLIC BLOOD PRESSURE: 124 MMHG | HEART RATE: 95 BPM | WEIGHT: 224 LBS | BODY MASS INDEX: 41.22 KG/M2 | HEIGHT: 62 IN

## 2021-07-09 DIAGNOSIS — R63.5 ABNORMAL WEIGHT GAIN: ICD-10-CM

## 2021-07-09 DIAGNOSIS — K59.00 CONSTIPATION, UNSPECIFIED: ICD-10-CM

## 2021-07-09 DIAGNOSIS — Z13.1 ENCOUNTER FOR SCREENING FOR DIABETES MELLITUS: ICD-10-CM

## 2021-07-09 DIAGNOSIS — Z86.79 PERSONAL HISTORY OF OTHER DISEASES OF THE CIRCULATORY SYSTEM: ICD-10-CM

## 2021-07-09 PROCEDURE — 99203 OFFICE O/P NEW LOW 30 MIN: CPT

## 2021-07-09 RX ORDER — MELOXICAM 15 MG/1
15 TABLET ORAL DAILY
Qty: 30 | Refills: 1 | Status: DISCONTINUED | COMMUNITY
Start: 2018-10-15 | End: 2021-07-09

## 2021-07-09 RX ORDER — AMOXICILLIN 500 MG/1
500 CAPSULE ORAL
Qty: 21 | Refills: 0 | Status: DISCONTINUED | COMMUNITY
Start: 2018-07-23 | End: 2021-07-09

## 2021-07-09 RX ORDER — CYCLOSPORINE 0.5 MG/ML
0.05 EMULSION OPHTHALMIC
Qty: 16 | Refills: 0 | Status: DISCONTINUED | COMMUNITY
Start: 2018-08-01 | End: 2021-07-09

## 2021-07-09 RX ORDER — AMOXICILLIN 500 MG/1
500 TABLET, FILM COATED ORAL
Qty: 15 | Refills: 0 | Status: DISCONTINUED | COMMUNITY
Start: 2018-08-06 | End: 2021-07-09

## 2021-07-09 RX ORDER — SULFAMETHOXAZOLE AND TRIMETHOPRIM 800; 160 MG/1; MG/1
800-160 TABLET ORAL
Qty: 10 | Refills: 0 | Status: DISCONTINUED | COMMUNITY
Start: 2018-04-09 | End: 2021-07-09

## 2021-07-09 RX ORDER — CEFDINIR 300 MG/1
300 CAPSULE ORAL
Qty: 20 | Refills: 0 | Status: DISCONTINUED | COMMUNITY
Start: 2018-01-02 | End: 2021-07-09

## 2021-07-09 RX ORDER — OSELTAMIVIR PHOSPHATE 75 MG/1
75 CAPSULE ORAL
Qty: 9 | Refills: 0 | Status: DISCONTINUED | COMMUNITY
Start: 2018-02-20 | End: 2021-07-09

## 2021-07-09 RX ORDER — NITROFURANTOIN (MONOHYDRATE/MACROCRYSTALS) 25; 75 MG/1; MG/1
100 CAPSULE ORAL
Qty: 14 | Refills: 0 | Status: DISCONTINUED | COMMUNITY
Start: 2017-12-01 | End: 2021-07-09

## 2021-07-09 RX ORDER — AZITHROMYCIN 250 MG/1
250 TABLET, FILM COATED ORAL
Qty: 6 | Refills: 0 | Status: DISCONTINUED | COMMUNITY
Start: 2018-02-26 | End: 2021-07-09

## 2021-07-09 RX ORDER — CEPHALEXIN 500 MG/1
500 CAPSULE ORAL
Qty: 14 | Refills: 0 | Status: DISCONTINUED | COMMUNITY
Start: 2018-08-09 | End: 2021-07-09

## 2021-07-09 RX ORDER — HYALURONATE SODIUM 20 MG/2 ML
20 SYRINGE (ML) INTRAARTICULAR
Qty: 1 | Refills: 0 | Status: DISCONTINUED | OUTPATIENT
Start: 2020-02-07 | End: 2021-07-09

## 2021-07-09 RX ORDER — IBUPROFEN 800 MG/1
800 TABLET, FILM COATED ORAL 3 TIMES DAILY
Qty: 60 | Refills: 0 | Status: DISCONTINUED | COMMUNITY
Start: 2020-02-07 | End: 2021-07-09

## 2021-07-09 RX ORDER — LEFLUNOMIDE 20 MG/1
20 TABLET, FILM COATED ORAL
Qty: 90 | Refills: 0 | Status: ACTIVE | COMMUNITY
Start: 2021-07-09

## 2021-07-09 RX ORDER — MIRABEGRON 25 MG/1
25 TABLET, FILM COATED, EXTENDED RELEASE ORAL
Qty: 30 | Refills: 0 | Status: DISCONTINUED | COMMUNITY
Start: 2017-12-05 | End: 2021-07-09

## 2021-07-09 NOTE — ED BEHAVIORAL HEALTH ASSESSMENT NOTE - NS ED BHA DEMOGRAPHICS MEDICAL RECORD REVIEWED YES RECORDS
4H: impaired joint mobility, motor function, muscle performance, and range of motion associated with joint arthroplasty
Hospital chart

## 2021-07-09 NOTE — HISTORY OF PRESENT ILLNESS
[FreeTextEntry1] : New patient presents from PCP because her neck is enlarged.\par \par PMH: being cared for by GYN by "something wrong with my ovaries", pain in ovarian area/constipate\par \par C.O hair loss, eyebrows falling out, constipated, bloated, has gained 15-20 lbs in one year\par Denies dysphagia, dysphonia, neck pain\par \par No updated labs\par \par No updated sonogram but states in the past she had a thyroid biopsy for an enlarged nodule , approx 10 years ago\par \par No FH of thyroid disease, diabetes

## 2021-07-09 NOTE — PHYSICAL EXAM
[Alert] : alert [Well Nourished] : well nourished [No Acute Distress] : no acute distress [Normal Sclera/Conjunctiva] : normal sclera/conjunctiva [Normal Hearing] : hearing was normal [No Accessory Muscle Use] : no accessory muscle use [Clear to Auscultation] : lungs were clear to auscultation bilaterally [Normal S1, S2] : normal S1 and S2 [Normal Rate] : heart rate was normal [No Edema] : no peripheral edema [Normal Gait] : normal gait [No Rash] : no rash [No Tremors] : no tremors [Oriented x3] : oriented to person, place, and time [de-identified] : neck enlarged

## 2021-07-09 NOTE — REVIEW OF SYSTEMS
[Fatigue] : fatigue [Recent Weight Gain (___ Lbs)] : recent weight gain: [unfilled] lbs [Constipation] : constipation [Polyuria] : polyuria [Pelvic Pain] : pelvic pain [Hair Loss] : hair loss [Dysphagia] : no dysphagia [Neck Pain] : no neck pain [Dysphonia] : no dysphonia [Chest Pain] : no chest pain [Shortness Of Breath] : no shortness of breath [Polydipsia] : no polydipsia

## 2021-07-09 NOTE — ASSESSMENT
[FreeTextEntry1] : Labs to be done to with TFTS, a1c, and hormonal panel to rule out hypothyroid, DM2, and high testosterone levels\par \par Thyroid sonogram also needed, pt has had an FNA in past, and no follow up for nodule in 10 years\par \par Unsure frequency of pt follow up at this time, will base off lab results/thyroid sonogram

## 2021-07-20 ENCOUNTER — NON-APPOINTMENT (OUTPATIENT)
Age: 71
End: 2021-07-20

## 2021-09-01 ENCOUNTER — APPOINTMENT (OUTPATIENT)
Dept: ENDOCRINOLOGY | Facility: CLINIC | Age: 71
End: 2021-09-01

## 2022-01-24 ENCOUNTER — INPATIENT (INPATIENT)
Facility: HOSPITAL | Age: 72
LOS: 3 days | Discharge: HOME | DRG: 204 | End: 2022-01-28
Attending: INTERNAL MEDICINE | Admitting: INTERNAL MEDICINE
Payer: MEDICARE

## 2022-01-24 VITALS
HEART RATE: 93 BPM | TEMPERATURE: 98 F | SYSTOLIC BLOOD PRESSURE: 115 MMHG | DIASTOLIC BLOOD PRESSURE: 86 MMHG | OXYGEN SATURATION: 96 % | RESPIRATION RATE: 18 BRPM | HEIGHT: 67 IN

## 2022-01-24 LAB
ALBUMIN SERPL ELPH-MCNC: 4 G/DL — SIGNIFICANT CHANGE UP (ref 3.3–5.2)
ALP SERPL-CCNC: 130 U/L — HIGH (ref 40–120)
ALT FLD-CCNC: 30 U/L — SIGNIFICANT CHANGE UP
ANION GAP SERPL CALC-SCNC: 18 MMOL/L — HIGH (ref 5–17)
AST SERPL-CCNC: 39 U/L — HIGH
BASOPHILS # BLD AUTO: 0.03 K/UL — SIGNIFICANT CHANGE UP (ref 0–0.2)
BASOPHILS NFR BLD AUTO: 0.2 % — SIGNIFICANT CHANGE UP (ref 0–2)
BILIRUB SERPL-MCNC: 0.5 MG/DL — SIGNIFICANT CHANGE UP (ref 0.4–2)
BUN SERPL-MCNC: 23.1 MG/DL — HIGH (ref 8–20)
CALCIUM SERPL-MCNC: 9.6 MG/DL — SIGNIFICANT CHANGE UP (ref 8.6–10.2)
CHLORIDE SERPL-SCNC: 98 MMOL/L — SIGNIFICANT CHANGE UP (ref 98–107)
CO2 SERPL-SCNC: 24 MMOL/L — SIGNIFICANT CHANGE UP (ref 22–29)
CREAT SERPL-MCNC: 1.69 MG/DL — HIGH (ref 0.5–1.3)
EOSINOPHIL # BLD AUTO: 0.02 K/UL — SIGNIFICANT CHANGE UP (ref 0–0.5)
EOSINOPHIL NFR BLD AUTO: 0.1 % — SIGNIFICANT CHANGE UP (ref 0–6)
GLUCOSE SERPL-MCNC: 134 MG/DL — HIGH (ref 70–99)
HCT VFR BLD CALC: 41.1 % — SIGNIFICANT CHANGE UP (ref 34.5–45)
HGB BLD-MCNC: 13.2 G/DL — SIGNIFICANT CHANGE UP (ref 11.5–15.5)
IMM GRANULOCYTES NFR BLD AUTO: 0.6 % — SIGNIFICANT CHANGE UP (ref 0–1.5)
LYMPHOCYTES # BLD AUTO: 1.31 K/UL — SIGNIFICANT CHANGE UP (ref 1–3.3)
LYMPHOCYTES # BLD AUTO: 8.7 % — LOW (ref 13–44)
MAGNESIUM SERPL-MCNC: 2.5 MG/DL — SIGNIFICANT CHANGE UP (ref 1.8–2.6)
MCHC RBC-ENTMCNC: 31.1 PG — SIGNIFICANT CHANGE UP (ref 27–34)
MCHC RBC-ENTMCNC: 32.1 GM/DL — SIGNIFICANT CHANGE UP (ref 32–36)
MCV RBC AUTO: 96.9 FL — SIGNIFICANT CHANGE UP (ref 80–100)
MONOCYTES # BLD AUTO: 1 K/UL — HIGH (ref 0–0.9)
MONOCYTES NFR BLD AUTO: 6.7 % — SIGNIFICANT CHANGE UP (ref 2–14)
NEUTROPHILS # BLD AUTO: 12.58 K/UL — HIGH (ref 1.8–7.4)
NEUTROPHILS NFR BLD AUTO: 83.7 % — HIGH (ref 43–77)
NT-PROBNP SERPL-SCNC: 45 PG/ML — SIGNIFICANT CHANGE UP (ref 0–300)
PLATELET # BLD AUTO: 249 K/UL — SIGNIFICANT CHANGE UP (ref 150–400)
POTASSIUM SERPL-MCNC: 3.7 MMOL/L — SIGNIFICANT CHANGE UP (ref 3.5–5.3)
POTASSIUM SERPL-SCNC: 3.7 MMOL/L — SIGNIFICANT CHANGE UP (ref 3.5–5.3)
PROT SERPL-MCNC: 7 G/DL — SIGNIFICANT CHANGE UP (ref 6.6–8.7)
RBC # BLD: 4.24 M/UL — SIGNIFICANT CHANGE UP (ref 3.8–5.2)
RBC # FLD: 13.7 % — SIGNIFICANT CHANGE UP (ref 10.3–14.5)
SODIUM SERPL-SCNC: 140 MMOL/L — SIGNIFICANT CHANGE UP (ref 135–145)
TROPONIN T SERPL-MCNC: <0.01 NG/ML — SIGNIFICANT CHANGE UP (ref 0–0.06)
WBC # BLD: 15.03 K/UL — HIGH (ref 3.8–10.5)
WBC # FLD AUTO: 15.03 K/UL — HIGH (ref 3.8–10.5)

## 2022-01-24 PROCEDURE — 99220: CPT

## 2022-01-24 PROCEDURE — 71045 X-RAY EXAM CHEST 1 VIEW: CPT | Mod: 26

## 2022-01-24 RX ORDER — CLONAZEPAM 1 MG
0.5 TABLET ORAL ONCE
Refills: 0 | Status: DISCONTINUED | OUTPATIENT
Start: 2022-01-24 | End: 2022-01-24

## 2022-01-24 RX ORDER — ONDANSETRON 8 MG/1
4 TABLET, FILM COATED ORAL ONCE
Refills: 0 | Status: COMPLETED | OUTPATIENT
Start: 2022-01-24 | End: 2022-01-24

## 2022-01-24 RX ORDER — MECLIZINE HCL 12.5 MG
25 TABLET ORAL ONCE
Refills: 0 | Status: COMPLETED | OUTPATIENT
Start: 2022-01-24 | End: 2022-01-24

## 2022-01-24 RX ORDER — SODIUM CHLORIDE 9 MG/ML
500 INJECTION INTRAMUSCULAR; INTRAVENOUS; SUBCUTANEOUS ONCE
Refills: 0 | Status: COMPLETED | OUTPATIENT
Start: 2022-01-24 | End: 2022-01-24

## 2022-01-24 RX ORDER — SODIUM CHLORIDE 9 MG/ML
1000 INJECTION INTRAMUSCULAR; INTRAVENOUS; SUBCUTANEOUS ONCE
Refills: 0 | Status: COMPLETED | OUTPATIENT
Start: 2022-01-24 | End: 2022-01-24

## 2022-01-24 RX ADMIN — Medication 1 MILLIGRAM(S): at 21:05

## 2022-01-24 RX ADMIN — Medication 25 MILLIGRAM(S): at 21:02

## 2022-01-24 RX ADMIN — ONDANSETRON 4 MILLIGRAM(S): 8 TABLET, FILM COATED ORAL at 20:20

## 2022-01-24 RX ADMIN — SODIUM CHLORIDE 1000 MILLILITER(S): 9 INJECTION INTRAMUSCULAR; INTRAVENOUS; SUBCUTANEOUS at 21:19

## 2022-01-24 RX ADMIN — SODIUM CHLORIDE 500 MILLILITER(S): 9 INJECTION INTRAMUSCULAR; INTRAVENOUS; SUBCUTANEOUS at 20:20

## 2022-01-24 RX ADMIN — Medication 0.5 MILLIGRAM(S): at 20:20

## 2022-01-24 NOTE — ED PROVIDER NOTE - OBJECTIVE STATEMENT
72 yo female history of HLD, Anxiety, Depression, Fibromyalgia presents s/p syncopal episode earlier today. She states that she "passed out gradually" when walking into her bedroom. She states that she did not hit her head during the incident. Patient states that she experienced a chest twinge during the incident along with palpitations, denies cp at this time. She endorses mild nausea, denies abd pain and vomiting. Patient states that she lost her best friend this week and has been experiencing a lot of stress and anxiety. Patient denies fever/chills, sore throat, cough, sob, LE swelling, dizziness, focal weakness/numbness/tingling in extremities.

## 2022-01-24 NOTE — ED PROVIDER NOTE - CLINICAL SUMMARY MEDICAL DECISION MAKING FREE TEXT BOX
70 yo female s/p syncopal episode. will obtain cardiac workup, check orthostatic vital signs. Symptom control w IVF, Zofran, Klonopin (patient's home medication for anxiety). Monitor and reassess.

## 2022-01-24 NOTE — ED PROVIDER NOTE - ATTENDING CONTRIBUTION TO CARE
The patient seen and examined    Dizziness  UTI    I, Cabrera Jones, performed the initial face to face bedside interview with this patient regarding history of present illness, review of symptoms and relevant past medical, social and family history.  I completed an independent physical examination.  I was the initial provider who evaluated this patient. I have signed out the follow up of any pending tests (i.e. labs, radiological studies) to the resident.  I have communicated the patient’s plan of care and disposition with the resident

## 2022-01-24 NOTE — ED ADULT NURSE NOTE - OBJECTIVE STATEMENT
PT reports to the ED for syncope earlier today, states she felt lightheaded, dizzy and chest discomfort during episode. PT states she did not hit her head, states her  caught her. Pt states she has been experiencing a lot of stress this week due to the loss of her best friend. PT denies chest pain at this time but states she is nauseous.

## 2022-01-24 NOTE — ED ADULT TRIAGE NOTE - CHIEF COMPLAINT QUOTE
BIBEMS from home s/p near syncopal episode that pt believes was brought on by anxiety. Pt states that she recently lost a friend and she has been feeling anxious and depressed. Got upset tonight and began to pain and feel light headed and almost synopsized at home. As per EMS, initial BP at home was 80s/50s. Also complaining of some nausea. AAOx4, FONTAINE in triage. 20G IV to left arm by EMS with IVF which improved BP to 115s/80s.

## 2022-01-25 LAB
ANION GAP SERPL CALC-SCNC: 12 MMOL/L — SIGNIFICANT CHANGE UP (ref 5–17)
APPEARANCE UR: CLEAR — SIGNIFICANT CHANGE UP
BACTERIA # UR AUTO: ABNORMAL
BILIRUB UR-MCNC: ABNORMAL
BUN SERPL-MCNC: 23.6 MG/DL — HIGH (ref 8–20)
CALCIUM SERPL-MCNC: 8.5 MG/DL — LOW (ref 8.6–10.2)
CHLORIDE SERPL-SCNC: 105 MMOL/L — SIGNIFICANT CHANGE UP (ref 98–107)
CO2 SERPL-SCNC: 20 MMOL/L — LOW (ref 22–29)
COLOR SPEC: YELLOW — SIGNIFICANT CHANGE UP
CREAT SERPL-MCNC: 0.96 MG/DL — SIGNIFICANT CHANGE UP (ref 0.5–1.3)
D DIMER BLD IA.RAPID-MCNC: 5090 NG/ML DDU — HIGH
DIFF PNL FLD: ABNORMAL
EPI CELLS # UR: SIGNIFICANT CHANGE UP
FLUAV AG NPH QL: SIGNIFICANT CHANGE UP
FLUBV AG NPH QL: SIGNIFICANT CHANGE UP
GLUCOSE SERPL-MCNC: 152 MG/DL — HIGH (ref 70–99)
GLUCOSE UR QL: NEGATIVE — SIGNIFICANT CHANGE UP
HCT VFR BLD CALC: 36.7 % — SIGNIFICANT CHANGE UP (ref 34.5–45)
HGB BLD-MCNC: 11.9 G/DL — SIGNIFICANT CHANGE UP (ref 11.5–15.5)
KETONES UR-MCNC: ABNORMAL
LACTATE BLDV-MCNC: 1.2 MMOL/L — SIGNIFICANT CHANGE UP (ref 0.5–2)
LEUKOCYTE ESTERASE UR-ACNC: ABNORMAL
MCHC RBC-ENTMCNC: 30.9 PG — SIGNIFICANT CHANGE UP (ref 27–34)
MCHC RBC-ENTMCNC: 32.4 GM/DL — SIGNIFICANT CHANGE UP (ref 32–36)
MCV RBC AUTO: 95.3 FL — SIGNIFICANT CHANGE UP (ref 80–100)
NITRITE UR-MCNC: POSITIVE
NT-PROBNP SERPL-SCNC: 230 PG/ML — SIGNIFICANT CHANGE UP (ref 0–300)
PH UR: 5 — SIGNIFICANT CHANGE UP (ref 5–8)
PLATELET # BLD AUTO: 231 K/UL — SIGNIFICANT CHANGE UP (ref 150–400)
POTASSIUM SERPL-MCNC: 4.1 MMOL/L — SIGNIFICANT CHANGE UP (ref 3.5–5.3)
POTASSIUM SERPL-SCNC: 4.1 MMOL/L — SIGNIFICANT CHANGE UP (ref 3.5–5.3)
PROT UR-MCNC: 100
RBC # BLD: 3.85 M/UL — SIGNIFICANT CHANGE UP (ref 3.8–5.2)
RBC # FLD: 13.7 % — SIGNIFICANT CHANGE UP (ref 10.3–14.5)
RBC CASTS # UR COMP ASSIST: ABNORMAL /HPF (ref 0–4)
RSV RNA NPH QL NAA+NON-PROBE: SIGNIFICANT CHANGE UP
SARS-COV-2 RNA SPEC QL NAA+PROBE: SIGNIFICANT CHANGE UP
SODIUM SERPL-SCNC: 136 MMOL/L — SIGNIFICANT CHANGE UP (ref 135–145)
SP GR SPEC: 1.02 — SIGNIFICANT CHANGE UP (ref 1.01–1.02)
TROPONIN T SERPL-MCNC: <0.01 NG/ML — SIGNIFICANT CHANGE UP (ref 0–0.06)
TROPONIN T SERPL-MCNC: <0.01 NG/ML — SIGNIFICANT CHANGE UP (ref 0–0.06)
UROBILINOGEN FLD QL: 8
WBC # BLD: 19.08 K/UL — HIGH (ref 3.8–10.5)
WBC # FLD AUTO: 19.08 K/UL — HIGH (ref 3.8–10.5)
WBC UR QL: ABNORMAL /HPF (ref 0–5)

## 2022-01-25 PROCEDURE — 70551 MRI BRAIN STEM W/O DYE: CPT | Mod: 26,MD

## 2022-01-25 PROCEDURE — 93010 ELECTROCARDIOGRAM REPORT: CPT

## 2022-01-25 PROCEDURE — 93880 EXTRACRANIAL BILAT STUDY: CPT | Mod: 26

## 2022-01-25 PROCEDURE — 71045 X-RAY EXAM CHEST 1 VIEW: CPT | Mod: 26

## 2022-01-25 PROCEDURE — 70450 CT HEAD/BRAIN W/O DYE: CPT | Mod: 26,MA

## 2022-01-25 RX ORDER — DULOXETINE HYDROCHLORIDE 30 MG/1
30 CAPSULE, DELAYED RELEASE ORAL DAILY
Refills: 0 | Status: DISCONTINUED | OUTPATIENT
Start: 2022-01-25 | End: 2022-01-26

## 2022-01-25 RX ORDER — ATORVASTATIN CALCIUM 80 MG/1
20 TABLET, FILM COATED ORAL AT BEDTIME
Refills: 0 | Status: DISCONTINUED | OUTPATIENT
Start: 2022-01-25 | End: 2022-01-28

## 2022-01-25 RX ORDER — KETOROLAC TROMETHAMINE 30 MG/ML
15 SYRINGE (ML) INJECTION ONCE
Refills: 0 | Status: DISCONTINUED | OUTPATIENT
Start: 2022-01-25 | End: 2022-01-25

## 2022-01-25 RX ORDER — QUETIAPINE FUMARATE 200 MG/1
400 TABLET, FILM COATED ORAL DAILY
Refills: 0 | Status: DISCONTINUED | OUTPATIENT
Start: 2022-01-25 | End: 2022-01-28

## 2022-01-25 RX ORDER — CEPHALEXIN 500 MG
500 CAPSULE ORAL EVERY 12 HOURS
Refills: 0 | Status: DISCONTINUED | OUTPATIENT
Start: 2022-01-25 | End: 2022-01-25

## 2022-01-25 RX ORDER — CEFTRIAXONE 500 MG/1
1000 INJECTION, POWDER, FOR SOLUTION INTRAMUSCULAR; INTRAVENOUS ONCE
Refills: 0 | Status: COMPLETED | OUTPATIENT
Start: 2022-01-25 | End: 2022-01-25

## 2022-01-25 RX ORDER — CLONAZEPAM 1 MG
0.5 TABLET ORAL
Refills: 0 | Status: DISCONTINUED | OUTPATIENT
Start: 2022-01-25 | End: 2022-01-26

## 2022-01-25 RX ORDER — MECLIZINE HCL 12.5 MG
25 TABLET ORAL EVERY 8 HOURS
Refills: 0 | Status: DISCONTINUED | OUTPATIENT
Start: 2022-01-25 | End: 2022-01-25

## 2022-01-25 RX ORDER — ONDANSETRON 8 MG/1
4 TABLET, FILM COATED ORAL ONCE
Refills: 0 | Status: COMPLETED | OUTPATIENT
Start: 2022-01-25 | End: 2022-01-25

## 2022-01-25 RX ORDER — CEFPODOXIME PROXETIL 100 MG
200 TABLET ORAL EVERY 12 HOURS
Refills: 0 | Status: DISCONTINUED | OUTPATIENT
Start: 2022-01-25 | End: 2022-01-26

## 2022-01-25 RX ORDER — ACETAMINOPHEN 500 MG
650 TABLET ORAL ONCE
Refills: 0 | Status: COMPLETED | OUTPATIENT
Start: 2022-01-25 | End: 2022-01-25

## 2022-01-25 RX ADMIN — Medication 0.5 MILLIGRAM(S): at 17:35

## 2022-01-25 RX ADMIN — Medication 200 MILLIGRAM(S): at 17:35

## 2022-01-25 RX ADMIN — Medication 15 MILLIGRAM(S): at 09:08

## 2022-01-25 RX ADMIN — ONDANSETRON 4 MILLIGRAM(S): 8 TABLET, FILM COATED ORAL at 09:09

## 2022-01-25 RX ADMIN — Medication 15 MILLIGRAM(S): at 09:20

## 2022-01-25 RX ADMIN — CEFTRIAXONE 100 MILLIGRAM(S): 500 INJECTION, POWDER, FOR SOLUTION INTRAMUSCULAR; INTRAVENOUS at 03:21

## 2022-01-25 RX ADMIN — ATORVASTATIN CALCIUM 20 MILLIGRAM(S): 80 TABLET, FILM COATED ORAL at 23:01

## 2022-01-25 RX ADMIN — Medication 650 MILLIGRAM(S): at 10:20

## 2022-01-25 RX ADMIN — Medication 650 MILLIGRAM(S): at 09:09

## 2022-01-25 NOTE — ED ADULT NURSE REASSESSMENT NOTE - NS ED NURSE REASSESS COMMENT FT1
Assumed care of the patient @1610 from RN Evelyn. Pt A&Ox4. Pt denies pain at this time. VSS afebrile. Pt awaiting MRI.  Patient in understanding of plan of care. Patient with no further questions for the RN. Resting in comfort. Call bell within reach and encouraged to use when assistance needed. Will continue to monitor.

## 2022-01-25 NOTE — ED CDU PROVIDER INITIAL DAY NOTE - OBJECTIVE STATEMENT
70 yo female history of HLD, Anxiety, Depression, Fibromyalgia, thyroid nodules, rheumatic fever as child, MVP, recent loss of best friend with increased stress lately, presenting to the ER with reports of onset of dizziness later in the afternoon yesterday 1/24 described as feeling like she herself was spinning, no better or worse with movements associated with nausea and episode of fainting. states that she dd not hit her head, felt slight chest discomfort and palpitations that resolved. no current chest pain or sob, reporting continued dizziness sensation. no associated numbness tingling to extremities. found to have uti on labs with elevated CRT, reports decreased po intake at home as of lately due to loss of friend, and some mild lower abd discomfort. no dysuria or increased frequency.   reports that she may have had vertigo years ago no hx of stroke no family hx of stroke   family hx CAD y. Patient denies fever/chills, sore throat, cough, sob, LE swelling, dizziness, focal weakness/numbness/tingling in extremities.  non smoker no illicit drug use   on serquel, klonipin and cymbalta.   minimal relief of symptoms with medications given in the ED   cardiologist  kirill in [facility], last visit 1 year ago with nst and echo

## 2022-01-25 NOTE — ED CDU PROVIDER INITIAL DAY NOTE - PHYSICAL EXAMINATION
Gen: tearful, anxious  Head: NC/AT  EYES no nystagmus MABLE   Neck: trachea midline  Resp:  No distress CTA   CARD RR no peripheral edema   abd soft nd nttp no rebound or guarding   Ext: no deformities  Neuro:  A&O appears non focal, unstable with ambulation   Skin:  Warm and dry as visualized  Psych:  Normal affect and mood

## 2022-01-25 NOTE — ED ADULT NURSE REASSESSMENT NOTE - NS ED NURSE REASSESS COMMENT FT1
Pt is alert and oriented. Pt denies sob, chest pain, nausea, vomiting, dizziness and pain. Pt resp are even and unlabored, skin color fahad for race. Pt updated on plan of care. pt educated on plan of care, pt able to successfully teach back plan of care to RN, RN will continue to reeducate pt during hospital stay.

## 2022-01-25 NOTE — ED CDU PROVIDER INITIAL DAY NOTE - ATTENDING CONTRIBUTION TO CARE
70 yo female with dizziness. I personally saw the patient with the PA, and completed the key components of the history and physical exam. I then discussed the management plan with the PA.

## 2022-01-25 NOTE — ED ADULT NURSE REASSESSMENT NOTE - NS ED NURSE REASSESS COMMENT FT1
vs taken after pt arrived from mri. Pt is alert and oriented. Pt o2 sat is 88% on RA. Pt denies sob or chest pain. FRANCIS Grey and  at bedside. Pt placed on 2 L NC. EKG performed as per  and given to provider. pt educated on plan of care, pt able to successfully teach back plan of care to RN, RN will continue to reeducate pt during hospital stay. Pt resting on cm with pulse ox as per provider.

## 2022-01-25 NOTE — ED ADULT NURSE REASSESSMENT NOTE - NS ED NURSE REASSESS COMMENT FT1
Patient received at 0730; awake; alert and oriented x4. Denies SOB, dizziness, cheat pain at the moment. No distress noted. VSS. Respirations unlabored. Report received at bedside. Cardiac monitor in place. Rhythm reviewed and report given to monitor tech. Call bell and personal items in reach. Continue to monitor patient and maintain safety IV patent and flushing without difiuclty, no signs of infiltration. Patient received at 0730; awake; alert and oriented x4. Denies SOB, dizziness, cheat pain at the moment. No distress noted. VS discussed with FRANCIS Dasilva, awaiting furhter orders Respirations unlabored. Report received at bedside. Cardiac monitor in place. Rhythm reviewed and report given to monitor tech. Call bell and personal items in reach. Continue to monitor patient and maintain safety IV patent and flushing without difficulty, no signs of infiltration.

## 2022-01-26 DIAGNOSIS — R42 DIZZINESS AND GIDDINESS: ICD-10-CM

## 2022-01-26 DIAGNOSIS — Z90.49 ACQUIRED ABSENCE OF OTHER SPECIFIED PARTS OF DIGESTIVE TRACT: Chronic | ICD-10-CM

## 2022-01-26 LAB
ALBUMIN SERPL ELPH-MCNC: 2.8 G/DL — LOW (ref 3.3–5.2)
ALP SERPL-CCNC: 115 U/L — SIGNIFICANT CHANGE UP (ref 40–120)
ALT FLD-CCNC: 31 U/L — SIGNIFICANT CHANGE UP
ANION GAP SERPL CALC-SCNC: 14 MMOL/L — SIGNIFICANT CHANGE UP (ref 5–17)
AST SERPL-CCNC: 51 U/L — HIGH
BASE EXCESS BLDA CALC-SCNC: 0.3 MMOL/L — SIGNIFICANT CHANGE UP (ref -2–3)
BILIRUB SERPL-MCNC: 0.6 MG/DL — SIGNIFICANT CHANGE UP (ref 0.4–2)
BLOOD GAS COMMENTS ARTERIAL: SIGNIFICANT CHANGE UP
BUN SERPL-MCNC: 17.9 MG/DL — SIGNIFICANT CHANGE UP (ref 8–20)
CALCIUM SERPL-MCNC: 8.5 MG/DL — LOW (ref 8.6–10.2)
CHLORIDE SERPL-SCNC: 104 MMOL/L — SIGNIFICANT CHANGE UP (ref 98–107)
CO2 SERPL-SCNC: 18 MMOL/L — LOW (ref 22–29)
CREAT SERPL-MCNC: 0.61 MG/DL — SIGNIFICANT CHANGE UP (ref 0.5–1.3)
GAS PNL BLDA: SIGNIFICANT CHANGE UP
GLUCOSE SERPL-MCNC: 131 MG/DL — HIGH (ref 70–99)
HCO3 BLDA-SCNC: 25 MMOL/L — SIGNIFICANT CHANGE UP (ref 21–28)
HCT VFR BLD CALC: 33.8 % — LOW (ref 34.5–45)
HGB BLD-MCNC: 10.9 G/DL — LOW (ref 11.5–15.5)
HOROWITZ INDEX BLDA+IHG-RTO: SIGNIFICANT CHANGE UP
MCHC RBC-ENTMCNC: 31.1 PG — SIGNIFICANT CHANGE UP (ref 27–34)
MCHC RBC-ENTMCNC: 32.2 GM/DL — SIGNIFICANT CHANGE UP (ref 32–36)
MCV RBC AUTO: 96.6 FL — SIGNIFICANT CHANGE UP (ref 80–100)
PCO2 BLDA: 37 MMHG — HIGH (ref 32–35)
PH BLDA: 7.43 — SIGNIFICANT CHANGE UP (ref 7.35–7.45)
PLATELET # BLD AUTO: 202 K/UL — SIGNIFICANT CHANGE UP (ref 150–400)
PO2 BLDA: 59 MMHG — LOW (ref 83–108)
POTASSIUM SERPL-MCNC: 4.8 MMOL/L — SIGNIFICANT CHANGE UP (ref 3.5–5.3)
POTASSIUM SERPL-SCNC: 4.8 MMOL/L — SIGNIFICANT CHANGE UP (ref 3.5–5.3)
PROT SERPL-MCNC: 6.3 G/DL — LOW (ref 6.6–8.7)
RBC # BLD: 3.5 M/UL — LOW (ref 3.8–5.2)
RBC # FLD: 14.1 % — SIGNIFICANT CHANGE UP (ref 10.3–14.5)
SAO2 % BLDA: 88.9 % — LOW (ref 94–98)
SODIUM SERPL-SCNC: 136 MMOL/L — SIGNIFICANT CHANGE UP (ref 135–145)
TSH SERPL-MCNC: 0.46 UIU/ML — SIGNIFICANT CHANGE UP (ref 0.27–4.2)
WBC # BLD: 17.77 K/UL — HIGH (ref 3.8–10.5)
WBC # FLD AUTO: 17.77 K/UL — HIGH (ref 3.8–10.5)

## 2022-01-26 PROCEDURE — 71275 CT ANGIOGRAPHY CHEST: CPT | Mod: 26,MD

## 2022-01-26 PROCEDURE — 99217: CPT

## 2022-01-26 PROCEDURE — 99223 1ST HOSP IP/OBS HIGH 75: CPT

## 2022-01-26 PROCEDURE — 76536 US EXAM OF HEAD AND NECK: CPT | Mod: 26

## 2022-01-26 PROCEDURE — 93970 EXTREMITY STUDY: CPT | Mod: 26

## 2022-01-26 RX ORDER — ONDANSETRON 8 MG/1
4 TABLET, FILM COATED ORAL EVERY 8 HOURS
Refills: 0 | Status: DISCONTINUED | OUTPATIENT
Start: 2022-01-26 | End: 2022-01-27

## 2022-01-26 RX ORDER — ENOXAPARIN SODIUM 100 MG/ML
40 INJECTION SUBCUTANEOUS DAILY
Refills: 0 | Status: DISCONTINUED | OUTPATIENT
Start: 2022-01-26 | End: 2022-01-28

## 2022-01-26 RX ORDER — AZITHROMYCIN 500 MG/1
500 TABLET, FILM COATED ORAL ONCE
Refills: 0 | Status: COMPLETED | OUTPATIENT
Start: 2022-01-26 | End: 2022-01-26

## 2022-01-26 RX ORDER — AZITHROMYCIN 500 MG/1
TABLET, FILM COATED ORAL
Refills: 0 | Status: DISCONTINUED | OUTPATIENT
Start: 2022-01-26 | End: 2022-01-27

## 2022-01-26 RX ORDER — AZITHROMYCIN 500 MG/1
500 TABLET, FILM COATED ORAL EVERY 24 HOURS
Refills: 0 | Status: DISCONTINUED | OUTPATIENT
Start: 2022-01-27 | End: 2022-01-27

## 2022-01-26 RX ORDER — LANOLIN ALCOHOL/MO/W.PET/CERES
3 CREAM (GRAM) TOPICAL AT BEDTIME
Refills: 0 | Status: DISCONTINUED | OUTPATIENT
Start: 2022-01-26 | End: 2022-01-28

## 2022-01-26 RX ORDER — DULOXETINE HYDROCHLORIDE 30 MG/1
90 CAPSULE, DELAYED RELEASE ORAL DAILY
Refills: 0 | Status: DISCONTINUED | OUTPATIENT
Start: 2022-01-26 | End: 2022-01-28

## 2022-01-26 RX ORDER — QUETIAPINE FUMARATE 200 MG/1
400 TABLET, FILM COATED ORAL ONCE
Refills: 0 | Status: COMPLETED | OUTPATIENT
Start: 2022-01-26 | End: 2022-01-26

## 2022-01-26 RX ORDER — CLONAZEPAM 1 MG
1 TABLET ORAL THREE TIMES A DAY
Refills: 0 | Status: DISCONTINUED | OUTPATIENT
Start: 2022-01-26 | End: 2022-01-28

## 2022-01-26 RX ORDER — CEFTRIAXONE 500 MG/1
1000 INJECTION, POWDER, FOR SOLUTION INTRAMUSCULAR; INTRAVENOUS EVERY 24 HOURS
Refills: 0 | Status: DISCONTINUED | OUTPATIENT
Start: 2022-01-26 | End: 2022-01-28

## 2022-01-26 RX ORDER — ACETAMINOPHEN 500 MG
650 TABLET ORAL EVERY 6 HOURS
Refills: 0 | Status: DISCONTINUED | OUTPATIENT
Start: 2022-01-26 | End: 2022-01-28

## 2022-01-26 RX ORDER — ALBUTEROL 90 UG/1
2.5 AEROSOL, METERED ORAL ONCE
Refills: 0 | Status: COMPLETED | OUTPATIENT
Start: 2022-01-26 | End: 2022-01-26

## 2022-01-26 RX ADMIN — Medication 0.5 MILLIGRAM(S): at 05:55

## 2022-01-26 RX ADMIN — ALBUTEROL 2.5 MILLIGRAM(S): 90 AEROSOL, METERED ORAL at 05:55

## 2022-01-26 RX ADMIN — Medication 200 MILLIGRAM(S): at 05:55

## 2022-01-26 RX ADMIN — AZITHROMYCIN 255 MILLIGRAM(S): 500 TABLET, FILM COATED ORAL at 14:20

## 2022-01-26 RX ADMIN — Medication 1 MILLIGRAM(S): at 14:35

## 2022-01-26 RX ADMIN — QUETIAPINE FUMARATE 400 MILLIGRAM(S): 200 TABLET, FILM COATED ORAL at 02:46

## 2022-01-26 RX ADMIN — DULOXETINE HYDROCHLORIDE 90 MILLIGRAM(S): 30 CAPSULE, DELAYED RELEASE ORAL at 14:35

## 2022-01-26 RX ADMIN — ATORVASTATIN CALCIUM 20 MILLIGRAM(S): 80 TABLET, FILM COATED ORAL at 22:35

## 2022-01-26 RX ADMIN — Medication 1 MILLIGRAM(S): at 21:42

## 2022-01-26 RX ADMIN — QUETIAPINE FUMARATE 400 MILLIGRAM(S): 200 TABLET, FILM COATED ORAL at 22:35

## 2022-01-26 NOTE — ED CDU PROVIDER SUBSEQUENT DAY NOTE - PROGRESS NOTE DETAILS
advised on results   resting comfortably in ed   abd soft nd nttp   no respiratory distress   still on 2L NC

## 2022-01-26 NOTE — ED CDU PROVIDER SUBSEQUENT DAY NOTE - HISTORY
hypoxia to 87-86%ra   cta without PE + atelectasis on CT and colitis without Gi losses and afebrile   placed on 2L NC and saturating 94% repeat trop negative   continue to monitor at this time

## 2022-01-26 NOTE — CONSULT NOTE ADULT - SUBJECTIVE AND OBJECTIVE BOX
[facility]                                   [address] [place] [address]                                                      PHONE: [phone]                                                         FAX: [phone]  [email or web address]  ---------------------------------------------------------------------------------------------------------------------------------    Reason for Consult: dizziness     HPI:  FAYE VILLANUEVA is an 71y Female with history of HLD, Anxiety, Depression, Fibromyalgia  who presents to [facility] due to dizziness.  Patient states that when walking into her bedroom, she felt very dizziness fall without any LOC. She states that she did not hit her head during the incident. Patient states that she experienced a dyspnea cough for couple of weeks without any chest pain. Work up thus far, MRI of brain negative for acute CVA, Carotid US stable, CTA negative for PE bibasilar streaky atelectasis, trop negative x three and BNP 45.  Patient very anxious currently and awaiting to be elevated by Psych.      PAST MEDICAL & SURGICAL HISTORY:  Depressed    Anxiety    Breast CA    HLD (hyperlipidemia)    S/P cholecystectomy        Cipro (Stomach Upset; Vomiting)  Demerol HCl (Stomach Upset; Vomiting)      MEDICATIONS  (STANDING):  atorvastatin 20 milliGRAM(s) Oral at bedtime  azithromycin  IVPB      azithromycin  IVPB 500 milliGRAM(s) IV Intermittent once  cefTRIAXone   IVPB 1000 milliGRAM(s) IV Intermittent every 24 hours  clonazePAM  Tablet 1 milliGRAM(s) Oral three times a day  DULoxetine 90 milliGRAM(s) Oral daily  QUEtiapine 400 milliGRAM(s) Oral daily    MEDICATIONS  (PRN):  acetaminophen     Tablet .. 650 milliGRAM(s) Oral every 6 hours PRN Temp greater or equal to 38C (100.4F), Mild Pain (1 - 3)  aluminum hydroxide/magnesium hydroxide/simethicone Suspension 30 milliLiter(s) Oral every 4 hours PRN Dyspepsia  melatonin 3 milliGRAM(s) Oral at bedtime PRN Insomnia  ondansetron Injectable 4 milliGRAM(s) IV Push every 8 hours PRN Nausea and/or Vomiting      Social History:  Cigarettes:      none               Alchohol:    none              Illicit Drug Abuse:  none     ROS: Negative other than as mentioned in HPI.    Vital Signs Last 24 Hrs  T(C): 37 (2022 11:03), Max: 37.7 (2022 15:30)  T(F): 98.6 (2022 11:03), Max: 99.8 (2022 15:30)  HR: 104 (2022 11:03) (90 - 109)  BP: 114/64 (2022 11:03) (114/64 - 166/73)  BP(mean): --  RR: 20 (2022 11:03) (17 - 20)  SpO2: 93% (2022 11:03) (86% - 99%)  ICU Vital Signs Last 24 Hrs  FAYE VILLANUEVA  I&O's Detail    I&O's Summary    Drug Dosing Weight  FAYE VILLANUEVA      PHYSICAL EXAM:  General: Appears well developed, alert and cooperative.  HEENT: Head; normocephalic, atraumatic.  Eyes: Pupils reactive, cornea wnl.  Neck: Supple, no nodes adenopathy, no NVD or carotid bruit or thyromegaly.  CARDIOVASCULAR: Normal S1 and S2, 1/6 murmur, rub, gallop or lift.   LUNGS: Decrease BS B/L at the lower bases   ABDOMEN: Soft, nontender without mass or organomegaly. bowel sounds normoactive.  EXTREMITIES: No clubbing, cyanosis or edema. Distal pulses wnl.   SKIN: warm and dry with normal turgor.  NEURO: Alert/oriented x 3/normal motor exam. No pathologic reflexes.    PSYCH: normal affect.        LABS:                        10.9   17.77 )-----------( 202      ( 2022 06:30 )             33.8     -    136  |  104  |  17.9  ----------------------------<  131<H>  4.8   |  18.0<L>  |  0.61    Ca    8.5<L>      2022 06:30  Mg     2.5         TPro  6.3<L>  /  Alb  2.8<L>  /  TBili  0.6  /  DBili  x   /  AST  51<H>  /  ALT  31  /  AlkPhos  115      FAYE VILLANUEVA  CARDIAC MARKERS ( 2022 20:52 )  x     / <0.01 ng/mL / x     / x     / x      CARDIAC MARKERS ( 2022 05:38 )  x     / <0.01 ng/mL / x     / x     / x      CARDIAC MARKERS ( 2022 20:20 )  x     / <0.01 ng/mL / x     / x     / x            Urinalysis Basic - ( 2022 00:57 )    Color: Yellow / Appearance: Clear / S.025 / pH: x  Gluc: x / Ketone: Small  / Bili: Moderate / Urobili: 8   Blood: x / Protein: 100 / Nitrite: Positive   Leuk Esterase: Moderate / RBC: 6-10 /HPF / WBC 6-10 /HPF   Sq Epi: x / Non Sq Epi: Few / Bacteria: Moderate        RADIOLOGY & ADDITIONAL STUDIES:    INTERPRETATION OF TELEMETRY (personally reviewed):    ECG:  < from: 12 Lead ECG (22 @ 19:28) >    Diagnosis Line *** Poor data quality, interpretation may be adversely affected  Normal sinus rhythm  Nonspecific T wave abnormality  Abnormal ECG    Confirmed by TAYLOR BRADLEY ([id number]) on 2022 5:42:07 AM    < end of copied text >      ECHO: Pending     < from: MR Head No Cont (22 @ 19:31) >  IMPRESSION: No evidence of acute hemorrhage mass mass effect or acute   territorial infarcts seen.    < end of copied text >        < from: US Duplex Carotid Arteries Complete, Bilateral (22 @ 06:48) >  IMPRESSION: No significant hemodynamic stenosis of either internal   carotid artery.    Measurement of carotid stenosis is based on velocity parameters that   correlate the residual internal carotid diameter with that of the more   distal vessel in accordance with a method such as the North American   Symptomatic Carotid Endarterectomy Trial (NASCET).    --- End of Report ---    < end of copied text >      < from: CT Angio Chest PE Protocol w/ IV Cont (22 @ 00:17) >  FINDINGS:    LUNGS AND AIRWAYS: Patent central airways.  Bibasilar streaky   atelectasis. A 2 mm pulmonary nodule in the right upper lobe.  PLEURA: No pleural effusion.  MEDIASTINUM AND RAMIN: No lymphadenopathy.  VESSELS: Pulmonary total opacification is adequate. There is no evidence   of pulmonary embolism. No aortic dissection. The great vessels are not   dilated. Mild atherosclerotic calcification.  HEART: Heart size is normal. No pericardial effusion.  CHEST WALL AND LOWER NECK: Redemonstration of an enlarged multinodular   thyroid goiter.  VISUALIZED UPPER ABDOMEN: Cholecystectomy. There is fat stranding in the   left upper quadrant centered around the colon suggesting colitis.    BONES: Within normal limits.    IMPRESSION:    No pulmonary embolism.  Bibasilar streaky atelectasis.    In the left upper quadrant, there is inflammatory change around the colon   suggesting colitis.  Redemonstration of an enlarged heterogeneous multinodular thyroid goiter.    < end of copied text >        Assessment and Plan:  In summary, FAYE VILLANUEVA is an 71y Female with past medical history significant for HLD, Anxiety, Depression, Fibromyalgia  who presents to [facility] due to dizziness.  Patient states that when walking into her bedroom, she felt very dizziness fall without any LOC. She states that she did not hit her head during the incident. Patient states that she experienced a dyspnea cough for couple of weeks without any chest pain. Work up thus far, MRI of brain negative for acute CVA, Carotid US stable, CTA negative for PE bibasilar streaky atelectasis, trop negative x three and BNP 45.  Patient very anxious currently and awaiting to be elevated by Psych.      Awaiting TTE to evaluate LV EF and rule out any significant valvular disease   Monitor on TELE   ABx for UTI/PNA   Awaiting lower extremely doppler   Awaiting US of thyroid

## 2022-01-26 NOTE — ED ADULT NURSE REASSESSMENT NOTE - NS ED NURSE REASSESS COMMENT FT1
Assumed care of the patient @0530. Pt A&Ox4. Pt on 2L NC 94% on RA 88%. Cardiac monitor in place NSR. Pt encouraged coughing and deep breathing.  Patient in understanding of plan of care. Patient with no further questions for the RN. Resting in comfort. Call bell within reach and encouraged to use when assistance needed. Will continue to monitor.

## 2022-01-26 NOTE — ED CDU PROVIDER SUBSEQUENT DAY NOTE - ATTENDING CONTRIBUTION TO CARE
Pt. awake and alert. abd soft nd nttp   no respiratory distress. Will continue to monitor the patient. I, Dr. Solitario, performed a face to face bedside interview with this patient regarding history of present illness, review of symptoms and relevant past medical, social and family history.  I completed an independent physical examination.  I have also reviewed the ACP's note(s) and discussed the plan with the ACP.

## 2022-01-26 NOTE — H&P ADULT - ASSESSMENT
70 yo female history of HLD, Anxiety, Depression, Fibromyalgia, presented to ED with syncopal vs near syncopal episode associated with dizziness and placed in CDU for serial trops, MR head/CThead, and carotid US, all WNL per report. Overnight pt developed an episode of hypoxia and found to have an elevated d-dimer. Pt has CTA chest w/o evidence of PE but showing atelectasis. Pt with persistent hypoxia this AM and (85%-89%) on RA and ABG showing O2 88%. RVP negative. Pt sating upper 90s on 2L NC. Pt to be admitted to hospitalist service for further management. In addition started on abx for UTI.     1) Acute hypoxic repository distress  - admit to medicine  - has atelectasis on CT, PE was ruled out  - on oxygen and continues pulse ox  - broaden abx to Rocephin and azithromycin to cover for PNA since has WBC 17 as well  - 2decho ordered to eval for possible pulmonary HTN    2) Syncope vs near syncope  - ct head/MRI both report negative  - placed on tele  - cardio Dr Crenshaw consulted  - 2D Echo ordered    3) Elevated D-dimer  - No PE per CT chest report  - LE u/s ordered to rule out DVT    4) UTI  - on abx  - urine cx ordered    5) Leukocytosis   - on abx for UTI/PNA  - urine cx and blood cx ordered  - monitor level  - pt denies any n/v or diarrhea or abdominal pain  - on exam abdomen is soft, nontender and active BS    6) Multinodular thyroid  - check TSH level  - thyroid u/s ordred    7) Depression with anxiety  - on Seroquel duloxetine & Klonopin    8) HLD  - on statin    9) Prophylactic measure  - DVT ppx: Lovenox SQ    above plan discussed with pt, understands and agrees with above plan.     home med list obtained from [facility] in [place].

## 2022-01-26 NOTE — ED CDU PROVIDER SUBSEQUENT DAY NOTE - MEDICAL DECISION MAKING DETAILS
hypoxia earlier evening with negative PE study + atelectasis on CT and colitis, afebrile. saturating well on 2L NC, no hx of HOMERO noted   will continue to monitor  anxiety component?

## 2022-01-26 NOTE — H&P ADULT - HISTORY OF PRESENT ILLNESS
70 yo female history of HLD, Anxiety, Depression, Fibromyalgia, presented to ED with syncopal vs near syncopal episode associated with dizziness and placed in CDU for serial trops, MR head/CThead, and carotid US, all WNL per report. Overnight pt developed an episode of hypoxia and found to have an elevated d-dimer. Pt has CTA chest w/o evidence of PE but showing atelectasis. Pt with persistent hypoxia this AM and (85%-89%) on RA and ABG showing O2 88%. RVP negative. Pt sating upper 90s on 2L NC. Pt to be admitted to hospitalist service for further management. In addition started on abx for UTI.

## 2022-01-26 NOTE — H&P ADULT - NSHPPHYSICALEXAM_GEN_ALL_CORE
PHYSICAL EXAM:  Vital Signs Last 24 Hrs  T(C): 37 (26 Jan 2022 11:03), Max: 37.7 (25 Jan 2022 15:30)  T(F): 98.6 (26 Jan 2022 11:03), Max: 99.8 (25 Jan 2022 15:30)  HR: 104 (26 Jan 2022 11:03) (90 - 109)  BP: 114/64 (26 Jan 2022 11:03) (114/64 - 166/73)  BP(mean): --  RR: 20 (26 Jan 2022 11:03) (17 - 20)  SpO2: 93% (26 Jan 2022 11:03) (86% - 99%)    GENERAL: NAD, well-groomed, well-developed  HEAD:  Atraumatic, Normocephalic  EYES: EOMI, PERRLA, conjunctiva and sclera clear  ENMT: No tonsillar erythema, exudates, or enlargement; Moist mucous membranes  NERVOUS SYSTEM:  Alert & Oriented X3, Good concentration; Motor Strength 5/5 B/L upper and lower extremities  CHEST/LUNG: decreased BS at bilaterally; No rales, rhonchi, wheezing  HEART: Regular rate and rhythm; No murmurs  ABDOMEN: Soft, Nontender, Nondistended; Bowel sounds present  EXTREMITIES:  2+ Peripheral Pulses, No clubbing, cyanosis, or edema

## 2022-01-26 NOTE — ED CDU PROVIDER DISPOSITION NOTE - ATTENDING CONTRIBUTION TO CARE
Pt. awake and alert. Pt with persistent hypoxia this AM and (85%-89%) and ABG showing O2 88%. RVP negative. Pt sating upper 90s on 2L NC. Pt to be admitted to hospitalist service for further management. I, Dr. Solitario, performed a face to face bedside interview with this patient regarding history of present illness, review of symptoms and relevant past medical, social and family history.  I completed an independent physical examination.  I have also reviewed the ACP's note(s) and discussed the plan with the ACP.

## 2022-01-26 NOTE — ED CDU PROVIDER DISPOSITION NOTE - CLINICAL COURSE
72 yo female history of HLD, Anxiety, Depression, Fibromyalgia, presented to ED with syncopal episode associated with dizziness and placed in CDU for serial trops, MR head, and carroitd US. No emergent findings on MR / US. Overnight pt developed an episode of hypoxia and found to have a d-dimer. Pt has CTA chest w/o evidence of PE but showing atelectasis. Pt with persistent hypoxia this AM and (85%-89%) and ABG showing O2 88%. RVP negative. Pt sating upper 90s on 2L NC. Pt to be admitted to hospitalist service for further management.

## 2022-01-26 NOTE — ED ADULT NURSE REASSESSMENT NOTE - COMFORT CARE
plan of care explained/po fluids offered/repositioned/side rails up/wait time explained/warm blanket provided

## 2022-01-27 LAB
ANION GAP SERPL CALC-SCNC: 12 MMOL/L — SIGNIFICANT CHANGE UP (ref 5–17)
BUN SERPL-MCNC: 13 MG/DL — SIGNIFICANT CHANGE UP (ref 8–20)
CALCIUM SERPL-MCNC: 8.5 MG/DL — LOW (ref 8.6–10.2)
CHLORIDE SERPL-SCNC: 106 MMOL/L — SIGNIFICANT CHANGE UP (ref 98–107)
CO2 SERPL-SCNC: 24 MMOL/L — SIGNIFICANT CHANGE UP (ref 22–29)
CREAT SERPL-MCNC: 0.57 MG/DL — SIGNIFICANT CHANGE UP (ref 0.5–1.3)
GLUCOSE SERPL-MCNC: 110 MG/DL — HIGH (ref 70–99)
HCT VFR BLD CALC: 31.8 % — LOW (ref 34.5–45)
HCV AB S/CO SERPL IA: 0.07 S/CO — SIGNIFICANT CHANGE UP (ref 0–0.99)
HCV AB SERPL-IMP: SIGNIFICANT CHANGE UP
HGB BLD-MCNC: 10.1 G/DL — LOW (ref 11.5–15.5)
MAGNESIUM SERPL-MCNC: 2.1 MG/DL — SIGNIFICANT CHANGE UP (ref 1.8–2.6)
MCHC RBC-ENTMCNC: 30.8 PG — SIGNIFICANT CHANGE UP (ref 27–34)
MCHC RBC-ENTMCNC: 31.8 GM/DL — LOW (ref 32–36)
MCV RBC AUTO: 97 FL — SIGNIFICANT CHANGE UP (ref 80–100)
PLATELET # BLD AUTO: 190 K/UL — SIGNIFICANT CHANGE UP (ref 150–400)
POTASSIUM SERPL-MCNC: 3.3 MMOL/L — LOW (ref 3.5–5.3)
POTASSIUM SERPL-SCNC: 3.3 MMOL/L — LOW (ref 3.5–5.3)
RBC # BLD: 3.28 M/UL — LOW (ref 3.8–5.2)
RBC # FLD: 13.9 % — SIGNIFICANT CHANGE UP (ref 10.3–14.5)
SODIUM SERPL-SCNC: 141 MMOL/L — SIGNIFICANT CHANGE UP (ref 135–145)
WBC # BLD: 14.46 K/UL — HIGH (ref 3.8–10.5)
WBC # FLD AUTO: 14.46 K/UL — HIGH (ref 3.8–10.5)

## 2022-01-27 PROCEDURE — 99232 SBSQ HOSP IP/OBS MODERATE 35: CPT

## 2022-01-27 PROCEDURE — 90792 PSYCH DIAG EVAL W/MED SRVCS: CPT

## 2022-01-27 RX ORDER — ONDANSETRON 8 MG/1
4 TABLET, FILM COATED ORAL EVERY 6 HOURS
Refills: 0 | Status: DISCONTINUED | OUTPATIENT
Start: 2022-01-27 | End: 2022-01-28

## 2022-01-27 RX ORDER — POTASSIUM CHLORIDE 20 MEQ
40 PACKET (EA) ORAL EVERY 4 HOURS
Refills: 0 | Status: COMPLETED | OUTPATIENT
Start: 2022-01-27 | End: 2022-01-27

## 2022-01-27 RX ADMIN — ONDANSETRON 4 MILLIGRAM(S): 8 TABLET, FILM COATED ORAL at 05:50

## 2022-01-27 RX ADMIN — Medication 1 MILLIGRAM(S): at 16:53

## 2022-01-27 RX ADMIN — DULOXETINE HYDROCHLORIDE 90 MILLIGRAM(S): 30 CAPSULE, DELAYED RELEASE ORAL at 12:48

## 2022-01-27 RX ADMIN — Medication 40 MILLIEQUIVALENT(S): at 18:45

## 2022-01-27 RX ADMIN — Medication 1 MILLIGRAM(S): at 05:50

## 2022-01-27 RX ADMIN — Medication 40 MILLIEQUIVALENT(S): at 10:26

## 2022-01-27 RX ADMIN — ATORVASTATIN CALCIUM 20 MILLIGRAM(S): 80 TABLET, FILM COATED ORAL at 21:45

## 2022-01-27 RX ADMIN — ONDANSETRON 4 MILLIGRAM(S): 8 TABLET, FILM COATED ORAL at 11:23

## 2022-01-27 RX ADMIN — Medication 1 MILLIGRAM(S): at 21:45

## 2022-01-27 RX ADMIN — CEFTRIAXONE 100 MILLIGRAM(S): 500 INJECTION, POWDER, FOR SOLUTION INTRAMUSCULAR; INTRAVENOUS at 05:50

## 2022-01-27 RX ADMIN — QUETIAPINE FUMARATE 400 MILLIGRAM(S): 200 TABLET, FILM COATED ORAL at 21:45

## 2022-01-27 RX ADMIN — ENOXAPARIN SODIUM 40 MILLIGRAM(S): 100 INJECTION SUBCUTANEOUS at 21:46

## 2022-01-27 NOTE — BH CONSULTATION LIAISON ASSESSMENT NOTE - SUBSTANCE USE
Pt seen and examined at bedside.      INTERVAL HPI/OVERNIGHT EVENTS:  Pt reporting HAs somewhat improved, refusing MRI    Review of Systems: 12 point review of systems otherwise negative    MEDICATIONS  (STANDING):  dexamethasone     Tablet 4 milliGRAM(s) Oral every 8 hours  dextrose 5%. 1000 milliLiter(s) (50 mL/Hr) IV Continuous <Continuous>  dextrose 50% Injectable 12.5 Gram(s) IV Push once  dextrose 50% Injectable 25 Gram(s) IV Push once  dextrose 50% Injectable 25 Gram(s) IV Push once  enoxaparin Injectable 40 milliGRAM(s) SubCutaneous every 24 hours  fluconAZOLE   Tablet 100 milliGRAM(s) Oral daily  folic acid 1 milliGRAM(s) Oral daily  insulin glargine Injectable (LANTUS) 20 Unit(s) SubCutaneous at bedtime  insulin lispro (HumaLOG) corrective regimen sliding scale   SubCutaneous Before meals and at bedtime  insulin lispro Injectable (HumaLOG) 7 Unit(s) SubCutaneous three times a day before meals  lidocaine   Patch 1 Patch Transdermal daily  pantoprazole    Tablet 40 milliGRAM(s) Oral before breakfast  senna 1 Tablet(s) Oral daily    MEDICATIONS  (PRN):  acetaminophen   Tablet 650 milliGRAM(s) Oral every 6 hours PRN For Temp greater than 38 C (100.4 F)  acetaminophen   Tablet. 650 milliGRAM(s) Oral every 6 hours PRN Mild Pain (1 - 3)  dextrose Gel 1 Dose(s) Oral once PRN Blood Glucose LESS THAN 70 milliGRAM(s)/deciliter  glucagon  Injectable 1 milliGRAM(s) IntraMuscular once PRN Glucose LESS THAN 70 milligrams/deciliter  ondansetron    Tablet 4 milliGRAM(s) Oral every 6 hours PRN Nausea and/or Vomiting  oxyCODONE    IR 5 milliGRAM(s) Oral every 4 hours PRN Breakthrough pain  polyethylene glycol 3350 17 Gram(s) Oral daily PRN Constipation      Allergies    penicillins (Hives)    Intolerances        Vital Signs Last 24 Hrs  T(C): 36.8 (01 Apr 2018 11:14), Max: 36.8 (31 Mar 2018 21:00)  T(F): 98.2 (01 Apr 2018 11:14), Max: 98.3 (31 Mar 2018 21:00)  HR: 119 (01 Apr 2018 11:14) (108 - 119)  BP: 142/87 (01 Apr 2018 11:14) (131/74 - 153/84)  BP(mean): --  RR: 16 (01 Apr 2018 11:14) (15 - 16)  SpO2: 98% (01 Apr 2018 11:14) (96% - 98%)  CAPILLARY BLOOD GLUCOSE      POCT Blood Glucose.: 225 mg/dL (01 Apr 2018 12:06)  POCT Blood Glucose.: 232 mg/dL (01 Apr 2018 08:33)  POCT Blood Glucose.: 345 mg/dL (31 Mar 2018 21:27)  POCT Blood Glucose.: 261 mg/dL (31 Mar 2018 17:30)      04-01 @ 07:01  -  04-01 @ 17:23  --------------------------------------------------------  IN: 100 mL / OUT: 0 mL / NET: 100 mL          Gen: NAD  CV: RRR, no murmurs  Pulm: CTAB  Abd: soft, NTND  Ext: no ILEANA    LABS:                        7.5    22.2  )-----------( 400      ( 01 Apr 2018 06:42 )             25.4     04-01    137  |  96  |  24<H>  ----------------------------<  230<H>  4.2   |  26  |  0.68    Ca    9.0      01 Apr 2018 06:42  Mg     1.9     04-01                RADIOLOGY & ADDITIONAL TESTS:    ---------------------------------------------------------------------------  I personally reviewed: [  ]EKG   [  ]CXR    [  ] CT    [  ]Other  --------------------------------------------------------------------------- None known

## 2022-01-27 NOTE — BH CONSULTATION LIAISON ASSESSMENT NOTE - CURRENT MEDICATION
MEDICATIONS  (STANDING):  atorvastatin 20 milliGRAM(s) Oral at bedtime  cefTRIAXone   IVPB 1000 milliGRAM(s) IV Intermittent every 24 hours  clonazePAM  Tablet 1 milliGRAM(s) Oral three times a day  DULoxetine 90 milliGRAM(s) Oral daily  enoxaparin Injectable 40 milliGRAM(s) SubCutaneous daily  potassium chloride    Tablet ER 40 milliEquivalent(s) Oral every 4 hours  QUEtiapine 400 milliGRAM(s) Oral daily    MEDICATIONS  (PRN):  acetaminophen     Tablet .. 650 milliGRAM(s) Oral every 6 hours PRN Temp greater or equal to 38C (100.4F), Mild Pain (1 - 3)  aluminum hydroxide/magnesium hydroxide/simethicone Suspension 30 milliLiter(s) Oral every 4 hours PRN Dyspepsia  melatonin 3 milliGRAM(s) Oral at bedtime PRN Insomnia  ondansetron Injectable 4 milliGRAM(s) IV Push every 6 hours PRN Nausea and/or Vomiting

## 2022-01-27 NOTE — PROGRESS NOTE ADULT - SUBJECTIVE AND OBJECTIVE BOX
[facility]                                   [address] [place] [address]                                                      PHONE: [phone]                                                         FAX: [phone]  [email or web address]  ---------------------------------------------------------------------------------------------------------------------------------    Overnight events/patient complaints:  NAD feeling well today     Cipro (Stomach Upset; Vomiting)  Demerol HCl (Stomach Upset; Vomiting)    MEDICATIONS  (STANDING):  atorvastatin 20 milliGRAM(s) Oral at bedtime  azithromycin  IVPB 500 milliGRAM(s) IV Intermittent every 24 hours  azithromycin  IVPB      cefTRIAXone   IVPB 1000 milliGRAM(s) IV Intermittent every 24 hours  clonazePAM  Tablet 1 milliGRAM(s) Oral three times a day  DULoxetine 90 milliGRAM(s) Oral daily  enoxaparin Injectable 40 milliGRAM(s) SubCutaneous daily  potassium chloride    Tablet ER 40 milliEquivalent(s) Oral every 4 hours  QUEtiapine 400 milliGRAM(s) Oral daily    MEDICATIONS  (PRN):  acetaminophen     Tablet .. 650 milliGRAM(s) Oral every 6 hours PRN Temp greater or equal to 38C (100.4F), Mild Pain (1 - 3)  aluminum hydroxide/magnesium hydroxide/simethicone Suspension 30 milliLiter(s) Oral every 4 hours PRN Dyspepsia  melatonin 3 milliGRAM(s) Oral at bedtime PRN Insomnia  ondansetron Injectable 4 milliGRAM(s) IV Push every 8 hours PRN Nausea and/or Vomiting      Vital Signs Last 24 Hrs  T(C): 36.5 (27 Jan 2022 09:30), Max: 37 (26 Jan 2022 11:03)  T(F): 97.7 (27 Jan 2022 09:30), Max: 98.6 (26 Jan 2022 11:03)  HR: 101 (27 Jan 2022 09:30) (88 - 104)  BP: 109/69 (27 Jan 2022 09:30) (105/66 - 135/68)  BP(mean): --  RR: 16 (27 Jan 2022 09:30) (16 - 20)  SpO2: 96% (27 Jan 2022 09:30) (93% - 96%)  ICU Vital Signs Last 24 Hrs  FAYE VILLANUEVA  I&O's Detail    26 Jan 2022 07:01  -  27 Jan 2022 07:00  --------------------------------------------------------  IN:    Oral Fluid: 480 mL  Total IN: 480 mL    OUT:  Total OUT: 0 mL    Total NET: 480 mL        I&O's Summary    26 Jan 2022 07:01  -  27 Jan 2022 07:00  --------------------------------------------------------  IN: 480 mL / OUT: 0 mL / NET: 480 mL      Drug Dosing Weight  FAYE VILLANUEVA      PHYSICAL EXAM:  General: Appears well developed, alert and cooperative.  HEENT: Head; normocephalic, atraumatic.  Eyes: Pupils reactive, cornea wnl.  Neck: Supple, no nodes adenopathy, no NVD or carotid bruit or thyromegaly.  CARDIOVASCULAR: Normal S1 and S2, No murmur, rub, gallop or lift.   LUNGS: + rhonchi B/L without wheeze. Normal breath sounds bilaterally.  ABDOMEN: Soft, nontender without mass or organomegaly. bowel sounds normoactive.  EXTREMITIES: No clubbing, cyanosis or edema. Distal pulses wnl.   SKIN: warm and dry with normal turgor.  NEURO: Alert/oriented x 3/normal motor exam. No pathologic reflexes.    PSYCH: normal affect.        LABS:                        10.1   14.46 )-----------( 190      ( 27 Jan 2022 05:57 )             31.8     01-27    141  |  106  |  13.0  ----------------------------<  110<H>  3.3<L>   |  24.0  |  0.57    Ca    8.5<L>      27 Jan 2022 05:57  Mg     2.1     01-27    TPro  6.3<L>  /  Alb  2.8<L>  /  TBili  0.6  /  DBili  x   /  AST  51<H>  /  ALT  31  /  AlkPhos  115  01-26    FAYE VILLANUEVA  CARDIAC MARKERS ( 25 Jan 2022 20:52 )  x     / <0.01 ng/mL / x     / x     / x                RADIOLOGY & ADDITIONAL STUDIES:    INTERPRETATION OF TELEMETRY (personally reviewed):          INTERPRETATION OF TELEMETRY (personally reviewed):    ECG:  < from: 12 Lead ECG (01.24.22 @ 19:28) >    Diagnosis Line *** Poor data quality, interpretation may be adversely affected  Normal sinus rhythm  Nonspecific T wave abnormality  Abnormal ECG    Confirmed by TAYLOR BRADLEY ([id number]) on 1/25/2022 5:42:07 AM    < end of copied text >      ECHO:       < from: TTE Echo Complete w/o Contrast w/ Doppler (01.26.22 @ 11:59) >      Summary:   1. Left ventricular ejection fraction, by visual estimation, is 55 to   60%.   2. Normal global left ventricular systolic function.   3. There is a significant pericardial fat pad present.   4. Trivial pericardial effusion.   5. Trace mitral valve regurgitation.   6. Sclerotic aortic valve with normal opening.    MD Jacinto Electronically signed on 1/26/2022 at 9:07:48 PM    < end of copied text >      < from: MR Head No Cont (01.25.22 @ 19:31) >  IMPRESSION: No evidence of acute hemorrhage mass mass effect or acute   territorial infarcts seen.    < end of copied text >        < from: US Duplex Carotid Arteries Complete, Bilateral (01.25.22 @ 06:48) >  IMPRESSION: No significant hemodynamic stenosis of either internal   carotid artery.    Measurement of carotid stenosis is based on velocity parameters that   correlate the residual internal carotid diameter with that of the more   distal vessel in accordance with a method such as the [place] American   Symptomatic Carotid Endarterectomy Trial (NASCET).    --- End of Report ---    < end of copied text >      < from: CT Angio Chest PE Protocol w/ IV Cont (01.26.22 @ 00:17) >  FINDINGS:    LUNGS AND AIRWAYS: Patent central airways.  Bibasilar streaky   atelectasis. A 2 mm pulmonary nodule in the right upper lobe.  PLEURA: No pleural effusion.  MEDIASTINUM AND RAMIN: No lymphadenopathy.  VESSELS: Pulmonary total opacification is adequate. There is no evidence   of pulmonary embolism. No aortic dissection. The great vessels are not   dilated. Mild atherosclerotic calcification.  HEART: Heart size is normal. No pericardial effusion.  CHEST WALL AND LOWER NECK: Redemonstration of an enlarged multinodular   thyroid goiter.  VISUALIZED UPPER ABDOMEN: Cholecystectomy. There is fat stranding in the   left upper quadrant centered around the colon suggesting colitis.    BONES: Within normal limits.    IMPRESSION:    No pulmonary embolism.  Bibasilar streaky atelectasis.    In the left upper quadrant, there is inflammatory change around the colon   suggesting colitis.  Redemonstration of an enlarged heterogeneous multinodular thyroid goiter.    < end of copied text >        Assessment and Plan:  In summary, FAYE VILLANUEVA is an 71y Female with past medical history significant for HLD, Anxiety, Depression, Fibromyalgia  who presents to [facility] due to dizziness.  Patient states that when walking into her bedroom, she felt very dizziness fall without any LOC. She states that she did not hit her head during the incident. Patient states that she experienced a dyspnea cough for couple of weeks without any chest pain. Work up thus far, MRI of brain negative for acute CVA, Carotid US stable, CTA negative for PE bibasilar streaky atelectasis, trop negative x three and BNP 45.  Patient very anxious currently and awaiting to be elevated by Psych.        TTE stable see above   TELE stable   Doppler negative for DVT     stable from CV standpoint       please recall if needed     
CC: Near syncope and UTI ?PNA? (27 Jan 2022 10:01)    HPI:  72 yo female history of HLD, Anxiety, Depression, Fibromyalgia, presented to ED with syncopal vs near syncopal episode associated with dizziness and placed in CDU for serial trops, MR head/CThead, and carotid US, all WNL per report. Overnight pt developed an episode of hypoxia and found to have an elevated d-dimer. Pt has CTA chest w/o evidence of PE but showing atelectasis. Pt with persistent hypoxia this AM and (85%-89%) on RA and ABG showing O2 88%. RVP negative. Pt sating upper 90s on 2L NC. Pt to be admitted to hospitalist service for further management. In addition started on abx for UTI.  (26 Jan 2022 11:28)    INTERVAL HPI/OVERNIGHT EVENTS: Patient seen and examined sitting up in the chair.  Patient complaining of GI upset and nausea with Zithromax.  Patient has intermittent dizziness.  Patient denies any headache, SOB, CP, dysuria.  Other ROS reviewed and are negative.     Vital Signs Last 24 Hrs  T(C): 36.5 (27 Jan 2022 09:30), Max: 36.7 (26 Jan 2022 22:00)  T(F): 97.7 (27 Jan 2022 09:30), Max: 98 (26 Jan 2022 22:00)  HR: 101 (27 Jan 2022 09:30) (88 - 101)  BP: 109/69 (27 Jan 2022 09:30) (105/66 - 135/68)  BP(mean): --  RR: 16 (27 Jan 2022 09:30) (16 - 20)  SpO2: 96% (27 Jan 2022 09:30) (94% - 96%)  I&O's Detail    26 Jan 2022 07:01  -  27 Jan 2022 07:00  --------------------------------------------------------  IN:    Oral Fluid: 480 mL  Total IN: 480 mL    OUT:  Total OUT: 0 mL    Total NET: 480 mL      27 Jan 2022 07:01  -  27 Jan 2022 14:34  --------------------------------------------------------  IN:    Oral Fluid: 240 mL  Total IN: 240 mL    OUT:    Voided (mL): 300 mL  Total OUT: 300 mL    Total NET: -60 mL      PHYSICAL EXAM:  GENERAL: NAD  HEAD:  Atraumatic, Normocephalic  NECK: Supple, No JVD, Normal thyroid  NERVOUS SYSTEM:  Alert & Oriented X3, Good concentration; Motor Strength 5/5 B/L upper and lower extremities  CHEST/LUNG: Clear to auscultation bilaterally  HEART: Regular rate and rhythm; No murmurs, rubs, or gallops  ABDOMEN: Soft, Nontender, Nondistended; Bowel sounds present  EXTREMITIES:  2+ Peripheral Pulses, No clubbing, cyanosis, or edema      CARDIAC MARKERS ( 25 Jan 2022 20:52 )  x     / <0.01 ng/mL / x     / x     / x                                10.1   14.46 )-----------( 190      ( 27 Jan 2022 05:57 )             31.8     27 Jan 2022 05:57    141    |  106    |  13.0   ----------------------------<  110    3.3     |  24.0   |  0.57     Ca    8.5        27 Jan 2022 05:57  Mg     2.1       27 Jan 2022 05:57    TPro  6.3    /  Alb  2.8    /  TBili  0.6    /  DBili  x      /  AST  51     /  ALT  31     /  AlkPhos  115    26 Jan 2022 06:30        LIVER FUNCTIONS - ( 26 Jan 2022 06:30 )  Alb: 2.8 g/dL / Pro: 6.3 g/dL / ALK PHOS: 115 U/L / ALT: 31 U/L / AST: 51 U/L / GGT: x               MEDICATIONS  (STANDING):  atorvastatin 20 milliGRAM(s) Oral at bedtime  cefTRIAXone   IVPB 1000 milliGRAM(s) IV Intermittent every 24 hours  clonazePAM  Tablet 1 milliGRAM(s) Oral three times a day  DULoxetine 90 milliGRAM(s) Oral daily  enoxaparin Injectable 40 milliGRAM(s) SubCutaneous daily  potassium chloride    Tablet ER 40 milliEquivalent(s) Oral every 4 hours  QUEtiapine 400 milliGRAM(s) Oral daily    MEDICATIONS  (PRN):  acetaminophen     Tablet .. 650 milliGRAM(s) Oral every 6 hours PRN Temp greater or equal to 38C (100.4F), Mild Pain (1 - 3)  aluminum hydroxide/magnesium hydroxide/simethicone Suspension 30 milliLiter(s) Oral every 4 hours PRN Dyspepsia  melatonin 3 milliGRAM(s) Oral at bedtime PRN Insomnia  ondansetron Injectable 4 milliGRAM(s) IV Push every 6 hours PRN Nausea and/or Vomiting      RADIOLOGY & ADDITIONAL TESTS:  < from: US Thyroid + Parathyroid (01.26.22 @ 13:49) >  ACC: [id number] EXAM:  US THYROID AND PARATHYROID                          PROCEDURE DATE:  01/26/2022          INTERPRETATION:  CLINICAL INFORMATION: Enlarged thyroid noted on CT   evaluation performed 1/26/2022.    COMPARISON: None available.    TECHNIQUE: Sonography of the thyroid.    FINDINGS: Heterogeneous echotexture of the thyroid parenchyma noted.    Right Lobe: 4.6 cm x  1.8 cm x 1.3 cm.  Scattered cystic and hyperechoic foci noted with a dominant 1.4 cm   complex nodule within the mid-lower pole aspect of the right thyroid   lobule.    Left Lobe: 4.4 cm x 1.6 cm x 1.5 cm. No discrete space-occupying lesion   identified.    Isthmus: 26 mm. There is a dominant 4.1 cm vascular complex nodule   identified within the isthmic region.    IMPRESSION: Heterogeneous echotexture of the thyroid parenchyma. There is   a dominant 4.1 cm vascular complex nodule identified within the isthmic   region. Additional 1.4 cm complex nodule midlower pole aspect right   thyroid lobule.        --- End of Report ---            CELESTE HORNER MD; Attending Radiologist  This document has been electronically signed. Jan 26 2022  4:17PM    < end of copied text >  
CC: Near syncope and UTI ?PNA? (27 Jan 2022 10:01)    INTERVAL HPI/OVERNIGHT EVENTS:  no acute events overnight  patient feels well  no further vertigo symptoms  doesn't feel SOB, cough, chest pain    Vital Signs Last 24 Hrs  T(C): 36.5 (27 Jan 2022 09:30), Max: 36.7 (26 Jan 2022 22:00)  T(F): 97.7 (27 Jan 2022 09:30), Max: 98 (26 Jan 2022 22:00)  HR: 101 (27 Jan 2022 09:30) (88 - 101)  BP: 109/69 (27 Jan 2022 09:30) (105/66 - 135/68)  BP(mean): --  RR: 16 (27 Jan 2022 09:30) (16 - 20)  SpO2: 96% (27 Jan 2022 09:30) (94% - 96%)    PHYSICAL EXAM:  General: in no acute distress  Eyes: PERRLA, EOMI; conjunctiva and sclera clear  Head: Normocephalic; atraumatic  ENMT: No nasal discharge; airway clear  Neck: Supple; non tender; no masses  Respiratory: No wheezes, rales or rhonchi  Cardiovascular: Regular rate and rhythm. S1 and S2 Normal; No murmurs, gallops or rubs  Gastrointestinal: Soft non-tender non-distended; Normal bowel sounds  Genitourinary: No costovertebral angle tenderness  Extremities: Normal range of motion, No clubbing, cyanosis or edema  Vascular: Peripheral pulses palpable 2+ bilaterally  Musculoskeletal: Normal gait, tone, without deformities  Psychiatric: Cooperative and appropriate    I&O's Detail    26 Jan 2022 07:01  -  27 Jan 2022 07:00  --------------------------------------------------------  IN:    Oral Fluid: 480 mL  Total IN: 480 mL    OUT:  Total OUT: 0 mL    Total NET: 480 mL    27 Jan 2022 07:01  -  27 Jan 2022 14:37  --------------------------------------------------------  IN:    Oral Fluid: 240 mL  Total IN: 240 mL    OUT:    Voided (mL): 300 mL  Total OUT: 300 mL    Total NET: -60 mL    CARDIAC MARKERS ( 25 Jan 2022 20:52 )  x     / <0.01 ng/mL / x     / x     / x                 10.1   14.46 )-----------( 190      ( 27 Jan 2022 05:57 )             31.8     27 Jan 2022 05:57    141    |  106    |  13.0   ----------------------------<  110    3.3     |  24.0   |  0.57     Ca    8.5        27 Jan 2022 05:57  Mg     2.1       27 Jan 2022 05:57    TPro  6.3    /  Alb  2.8    /  TBili  0.6    /  DBili  x      /  AST  51     /  ALT  31     /  AlkPhos  115    26 Jan 2022 06:30    CAPILLARY BLOOD GLUCOSE    LIVER FUNCTIONS - ( 26 Jan 2022 06:30 )  Alb: 2.8 g/dL / Pro: 6.3 g/dL / ALK PHOS: 115 U/L / ALT: 31 U/L / AST: 51 U/L / GGT: x           MEDICATIONS  (STANDING):  atorvastatin 20 milliGRAM(s) Oral at bedtime  cefTRIAXone   IVPB 1000 milliGRAM(s) IV Intermittent every 24 hours  clonazePAM  Tablet 1 milliGRAM(s) Oral three times a day  DULoxetine 90 milliGRAM(s) Oral daily  enoxaparin Injectable 40 milliGRAM(s) SubCutaneous daily  potassium chloride    Tablet ER 40 milliEquivalent(s) Oral every 4 hours  QUEtiapine 400 milliGRAM(s) Oral daily    MEDICATIONS  (PRN):  acetaminophen     Tablet .. 650 milliGRAM(s) Oral every 6 hours PRN Temp greater or equal to 38C (100.4F), Mild Pain (1 - 3)  aluminum hydroxide/magnesium hydroxide/simethicone Suspension 30 milliLiter(s) Oral every 4 hours PRN Dyspepsia  melatonin 3 milliGRAM(s) Oral at bedtime PRN Insomnia  ondansetron Injectable 4 milliGRAM(s) IV Push every 6 hours PRN Nausea and/or Vomiting      RADIOLOGY & ADDITIONAL TESTS:

## 2022-01-27 NOTE — PROGRESS NOTE ADULT - ASSESSMENT
72 yo female history of HLD, Anxiety, Depression, Fibromyalgia, presented to ED with syncopal vs near syncopal episode associated with dizziness and placed in CDU for serial trops, MR head/CThead, and carotid US, all WNL per report. Overnight pt developed an episode of hypoxia and found to have an elevated d-dimer. Pt has CTA chest w/o evidence of PE but showing atelectasis. Pt with persistent hypoxia this AM and (85%-89%) on RA and ABG showing O2 88%. RVP negative. Pt sating upper 90s on 2L NC. Pt to be admitted to hospitalist service for further management. In addition started on abx for UTI.     1) Acute hypoxic repository distress  - has atelectasis on CT, PE was ruled out  - on oxygen and continues pulse ox  - DC azithromycin  - echo without p HTN or heart failure  - incentive spirometer and OOB encouraged  - tapered O2    2) Syncope vs near syncope - patient reports that sypmtoms similar to vertigo  - ct head/MRI both report negative  - placed on tele  - can DC tele    3) Elevated D-dimer  - No PE per CT chest report  - LE u/s ordered to rule out DVT    4) UTI  - on abx  - continue ceftriaxone x 3 days    5) Leukocytosis   - on abx for UTI  - blood cx negative  - monitor level  - pt denies any n/v or diarrhea or abdominal pain  - on exam abdomen is soft, nontender and active BS    6) Multinodular thyroid  - outpatient ENT eval    7) Depression with anxiety  - on Seroquel duloxetine & Klonopin    8) HLD  - on statin    9) Prophylactic measure  - DVT ppx: Lovenox SQ    DISPO: plan to DC home once off O2
70 yo female history of HLD, Anxiety, Depression, Fibromyalgia, presented to ED with syncopal vs near syncopal episode associated with dizziness and placed in CDU for serial trops, MR head/CThead, and carotid US, all WNL per report. Overnight pt developed an episode of hypoxia and found to have an elevated d-dimer. Pt has CTA chest w/o evidence of PE but showing atelectasis. Pt with persistent hypoxia this AM and (85%-89%) on RA and ABG showing O2 88%. RVP negative. Pt sating upper 90s on 2L NC. Pt to be admitted to hospitalist service for further management. In addition started on abx for UTI.     1) Acute hypoxic respiratory distress  - has atelectasis on CT, PE was ruled out  - on oxygen and continous pulse ox - will wean off  - Continue Rocephin, discontinued azithromycin as no clinical PNA  - WBC improving  - TTE with preserved EF 55-60%    2) Syncope vs near syncope  - ct head/MRI both report negative  - cardio consult appreciated  - TTE with preserved EF 55-60%  - Orthostatics negative    3) Elevated D-dimer  - No PE per CT chest report  - LE dopplers negative for DVT    4) UTI  - on abx  - urine cx ordered, but not sent    5) Leukocytosis   - on abx for UTI - improving  - urine cx not sent, blood cx negative  - monitor level    6) Multinodular thyroid  - TSH level 0.46  - thyroid u/s reviewed - will need outpatient f/u with endocrine.    7) Depression with anxiety  - on Seroquel duloxetine & Klonopin    8) HLD  - on statin    9) Prophylactic measure  - DVT ppx: Lovenox SQ

## 2022-01-27 NOTE — BH CONSULTATION LIAISON ASSESSMENT NOTE - NSBHCHARTREVIEWLAB_PSY_A_CORE FT
Basic Metabolic Panel in AM (01.27.22 @ 05:57)   Sodium, Serum: 141 mmol/L   Potassium, Serum: 3.3 mmol/L   Chloride, Serum: 106 mmol/L   Carbon Dioxide, Serum: 24.0 mmol/L   Anion Gap, Serum: 12 mmol/L   Blood Urea Nitrogen, Serum: 13.0 mg/dL   Creatinine, Serum: 0.57 mg/dL   Glucose, Serum: 110 mg/dL   Calcium, Total Serum: 8.5 mg/dL   eGFR if Non : 95: Interpretative comment   The units for eGFR are mL/min/1.73M2 (normalized body surface area). The   eGFR is calculated from a serum creatinine using the CKD-EPI equation.   Other variables required for calculation are race, age and sex. Among   patients with chronic kidney disease (CKD), the eGFR is useful in   determining the stage of disease according to KDOQI CKD classification.   All eGFR results are reported numerically with the following   interpretation.

## 2022-01-27 NOTE — PATIENT PROFILE ADULT - FALL HARM RISK - HARM RISK INTERVENTIONS

## 2022-01-27 NOTE — BH CONSULTATION LIAISON ASSESSMENT NOTE - DESCRIPTION
Ventricular Rate 100 BPM    Atrial Rate 100 BPM    P-R Interval 150 ms    QRS Duration 90 ms    Q-T Interval 326 ms    QTC Calculation(Bazett) 420 ms    P Axis 52 degrees    R Axis -2 degrees    T Axis -10 degrees    Diagnosis Line *** Poor data quality, interpretation may be adversely affected  Normal sinus rhythm  Cannot rule out Anterior infarct , age undetermined  Abnormal ECG    Confirmed by TAYLOR BRADLEY ([id number]) on 1/26/2022 6:03:32 PM

## 2022-01-27 NOTE — BH CONSULTATION LIAISON ASSESSMENT NOTE - NSBHCHARTREVIEWVS_PSY_A_CORE FT
Vital Signs Last 24 Hrs  T(C): 36.5 (27 Jan 2022 09:30), Max: 36.7 (26 Jan 2022 22:00)  T(F): 97.7 (27 Jan 2022 09:30), Max: 98 (26 Jan 2022 22:00)  HR: 101 (27 Jan 2022 09:30) (88 - 101)  BP: 109/69 (27 Jan 2022 09:30) (105/66 - 135/68)  BP(mean): --  RR: 16 (27 Jan 2022 09:30) (16 - 20)  SpO2: 96% (27 Jan 2022 09:30) (94% - 96%)

## 2022-01-27 NOTE — BH CONSULTATION LIAISON ASSESSMENT NOTE - SUMMARY
72 yo wDF, lives with ex , one adult son in 50'2, disabled, PPH Bipolar with remote psych admission in her 40's at [facility], no prior suicide attempt, SIB, no drug or alcohol use abuse,  PMH HLD,  Fibromyalgia,  THR, bib EMS for   syncopal vs near syncopal episode  referred to psych for depression.  Attempted to contact PA who placed consult for details of request.  Pt reports she was seen crying and explained how her good friend  2 days ago from ovarian cancer.  Pt reports she is in tx at [facility] and has been stable on current meds and did not want them changed, Seroquel, Klonopin and Cymbalta.  Pt in therapy weekly and  recently changed providers when her former therapist retired.  Denies SI/HI and no h/o suicide attempt or aggression.  Pt denies AH/VH but claims she has poor sleep without Seroquel.  Pt is fearful however she is too sedated on Seroquel at hs and is afraid of falling.  Discussed lowering but did not want to change.  Pt advised to discussed with out pt provider for meds adjustment.  Would consider lowering dose  of Seroquel to 300hs if Pt agreeable.  No other changes are recommended.  Pt to follow with out Pt provider after discharge.

## 2022-01-27 NOTE — BH CONSULTATION LIAISON ASSESSMENT NOTE - NSBHREFERDETAILS_PSY_A_CORE_FT
History of Present Illness:  Reason for Admission: Near syncope and UTI ?PNA?  History of Present Illness:   72 yo female history of  presented to ED with syncopal vs near syncopal episode associated with dizziness and placedHLD, Anxiety, Depression, Fibromyalgia, in CDU for serial trops, MR head/CThead, and carotid US, all WNL per report. Overnight pt developed an episode of hypoxia and found to have an elevated d-dimer. Pt has CTA chest w/o evidence of PE but showing atelectasis. Pt with persistent hypoxia this AM and (85%-89%) on RA and ABG showing O2 88%. RVP negative. Pt sating upper 90s on 2L NC. Pt to be admitted to hospitalist service for further management. In addition started on abx for UTI.   Psych consult requested for depression.

## 2022-01-28 ENCOUNTER — TRANSCRIPTION ENCOUNTER (OUTPATIENT)
Age: 72
End: 2022-01-28

## 2022-01-28 VITALS
OXYGEN SATURATION: 91 % | TEMPERATURE: 98 F | DIASTOLIC BLOOD PRESSURE: 77 MMHG | HEART RATE: 108 BPM | RESPIRATION RATE: 18 BRPM | SYSTOLIC BLOOD PRESSURE: 128 MMHG

## 2022-01-28 PROCEDURE — 70450 CT HEAD/BRAIN W/O DYE: CPT | Mod: MA

## 2022-01-28 PROCEDURE — 85379 FIBRIN DEGRADATION QUANT: CPT

## 2022-01-28 PROCEDURE — 85027 COMPLETE CBC AUTOMATED: CPT

## 2022-01-28 PROCEDURE — 84443 ASSAY THYROID STIM HORMONE: CPT

## 2022-01-28 PROCEDURE — 81001 URINALYSIS AUTO W/SCOPE: CPT

## 2022-01-28 PROCEDURE — 71045 X-RAY EXAM CHEST 1 VIEW: CPT

## 2022-01-28 PROCEDURE — 80053 COMPREHEN METABOLIC PANEL: CPT

## 2022-01-28 PROCEDURE — 93306 TTE W/DOPPLER COMPLETE: CPT

## 2022-01-28 PROCEDURE — 86803 HEPATITIS C AB TEST: CPT

## 2022-01-28 PROCEDURE — 83735 ASSAY OF MAGNESIUM: CPT

## 2022-01-28 PROCEDURE — 82803 BLOOD GASES ANY COMBINATION: CPT

## 2022-01-28 PROCEDURE — 93970 EXTREMITY STUDY: CPT

## 2022-01-28 PROCEDURE — G0378: CPT

## 2022-01-28 PROCEDURE — 76536 US EXAM OF HEAD AND NECK: CPT

## 2022-01-28 PROCEDURE — 93005 ELECTROCARDIOGRAM TRACING: CPT

## 2022-01-28 PROCEDURE — 87040 BLOOD CULTURE FOR BACTERIA: CPT

## 2022-01-28 PROCEDURE — 96376 TX/PRO/DX INJ SAME DRUG ADON: CPT

## 2022-01-28 PROCEDURE — 84484 ASSAY OF TROPONIN QUANT: CPT

## 2022-01-28 PROCEDURE — 83605 ASSAY OF LACTIC ACID: CPT

## 2022-01-28 PROCEDURE — 71275 CT ANGIOGRAPHY CHEST: CPT | Mod: MD

## 2022-01-28 PROCEDURE — 87637 SARSCOV2&INF A&B&RSV AMP PRB: CPT

## 2022-01-28 PROCEDURE — 83880 ASSAY OF NATRIURETIC PEPTIDE: CPT

## 2022-01-28 PROCEDURE — 96374 THER/PROPH/DIAG INJ IV PUSH: CPT

## 2022-01-28 PROCEDURE — 99285 EMERGENCY DEPT VISIT HI MDM: CPT

## 2022-01-28 PROCEDURE — 36415 COLL VENOUS BLD VENIPUNCTURE: CPT

## 2022-01-28 PROCEDURE — 70551 MRI BRAIN STEM W/O DYE: CPT | Mod: MD

## 2022-01-28 PROCEDURE — 99239 HOSP IP/OBS DSCHRG MGMT >30: CPT

## 2022-01-28 PROCEDURE — 80048 BASIC METABOLIC PNL TOTAL CA: CPT

## 2022-01-28 PROCEDURE — 94640 AIRWAY INHALATION TREATMENT: CPT

## 2022-01-28 PROCEDURE — 85025 COMPLETE CBC W/AUTO DIFF WBC: CPT

## 2022-01-28 PROCEDURE — 96375 TX/PRO/DX INJ NEW DRUG ADDON: CPT

## 2022-01-28 PROCEDURE — 93880 EXTRACRANIAL BILAT STUDY: CPT

## 2022-01-28 RX ADMIN — CEFTRIAXONE 100 MILLIGRAM(S): 500 INJECTION, POWDER, FOR SOLUTION INTRAMUSCULAR; INTRAVENOUS at 06:18

## 2022-01-28 RX ADMIN — Medication 30 MILLILITER(S): at 10:54

## 2022-01-28 RX ADMIN — Medication 1 MILLIGRAM(S): at 06:19

## 2022-01-28 NOTE — DISCHARGE NOTE NURSING/CASE MANAGEMENT/SOCIAL WORK - PATIENT PORTAL LINK FT
You can access the FollowMyHealth Patient Portal offered by [facility] by registering at the following website: [email or web address]. By joining [employer]’s FollowMyHealth portal, you will also be able to view your health information using other applications (apps) compatible with our system.

## 2022-01-28 NOTE — DISCHARGE NOTE PROVIDER - NSDCMRMEDTOKEN_GEN_ALL_CORE_FT
dicyclomine 10 mg oral capsule: 1 cap(s) orally 3 times a day  DULoxetine 30 mg oral delayed release capsule: 3 cap(s) orally once a day  KlonoPIN 1 mg oral tablet: 1 tab(s) orally 3 times a day  Lipitor 20 mg oral tablet: 1 tab(s) orally once a day  SEROquel 400 mg oral tablet: 1 tab(s) orally once a day  tiZANidine 4 mg oral capsule: 1 cap(s) orally 3 times a day

## 2022-01-28 NOTE — DISCHARGE NOTE NURSING/CASE MANAGEMENT/SOCIAL WORK - NSDCPEFALRISK_GEN_ALL_CORE
For information on Fall & Injury Prevention, visit: [email or web address] OR  [email or web address] OR  https://www.cdc.gov/steadi/patient.html

## 2022-01-28 NOTE — DISCHARGE NOTE PROVIDER - NSDCCPCAREPLAN_GEN_ALL_CORE_FT
PRINCIPAL DISCHARGE DIAGNOSIS  Diagnosis: Dizziness  Assessment and Plan of Treatment: Follow up with priamry doctor.      SECONDARY DISCHARGE DIAGNOSES  Diagnosis: Acute UTI  Assessment and Plan of Treatment: Completed antibiotic course.    Diagnosis: Hypoxia  Assessment and Plan of Treatment: Resolved.

## 2022-01-28 NOTE — DISCHARGE NOTE PROVIDER - HOSPITAL COURSE
72 yo female history of HLD, Anxiety, Depression, Fibromyalgia, presented to ED with syncopal vs near syncopal episode associated with dizziness and placed in CDU for serial trops, MR head/CT head, and carotid US, all WNL per report. Overnight pt developed an episode of hypoxia and found to have an elevated d-dimer. Pt had CTA chest w/o evidence of PE but showing atelectasis. Pt with persistent hypoxia (85%-89%) on RA and ABG showing O2 88%. RVP negative. Pt sating upper 90s on 2L NC. Pt admitted to hospitalist service for further management. In addition started on abx for UTI. Patient had TTE with no pulm htn or heart failure noted.  Incentive spirometry encouraged.  O2 tapered off.  LE dopplers negative for DVT.  Completed 3days of antibiotics.  Blood cultures negative.  CT noted nodular thyroid.  US with the same.  Patient will need outpatient follow up with ENT.  Patient also followed by psych for anxiety and recommended outpatient follow up with primary psychiatrist.  Patient stable for discharge to home with outpatient follow up with primary doctor, ENT and psychiatrist.      Vital Signs Last 24 Hrs  T(C): 36.4 (28 Jan 2022 09:30), Max: 36.8 (28 Jan 2022 05:45)  T(F): 97.6 (28 Jan 2022 09:30), Max: 98.3 (28 Jan 2022 05:45)  HR: 108 (28 Jan 2022 09:30) (88 - 110)  BP: 139/74 (28 Jan 2022 09:30) (122/73 - 148/84)  BP(mean): --  RR: 16 (28 Jan 2022 09:30) (16 - 20)  SpO2: 96% (28 Jan 2022 09:30) (94% - 96%)    PHYSICAL EXAM:  GENERAL: NAD  HEAD:  Atraumatic, Normocephalic  NECK: Supple, No JVD, Normal thyroid  NERVOUS SYSTEM:  Alert & Oriented X3, Good concentration; Motor Strength 5/5 B/L upper and lower extremities  CHEST/LUNG: Clear to auscultation bilaterally  HEART: Regular rate and rhythm; No murmurs, rubs, or gallops  ABDOMEN: Soft, Nontender, Nondistended; Bowel sounds present  EXTREMITIES:  2+ Peripheral Pulses, No clubbing, cyanosis, or edema

## 2022-01-30 LAB
CULTURE RESULTS: SIGNIFICANT CHANGE UP
CULTURE RESULTS: SIGNIFICANT CHANGE UP
SPECIMEN SOURCE: SIGNIFICANT CHANGE UP
SPECIMEN SOURCE: SIGNIFICANT CHANGE UP

## 2022-04-20 ENCOUNTER — INPATIENT (INPATIENT)
Facility: HOSPITAL | Age: 72
LOS: 9 days | Discharge: NURSING, REHAB OR LONG-TERM CARE | DRG: 392 | End: 2022-04-30
Attending: STUDENT IN AN ORGANIZED HEALTH CARE EDUCATION/TRAINING PROGRAM | Admitting: STUDENT IN AN ORGANIZED HEALTH CARE EDUCATION/TRAINING PROGRAM
Payer: MEDICARE

## 2022-04-20 VITALS
TEMPERATURE: 98 F | HEIGHT: 67 IN | OXYGEN SATURATION: 100 % | SYSTOLIC BLOOD PRESSURE: 108 MMHG | DIASTOLIC BLOOD PRESSURE: 64 MMHG | HEART RATE: 93 BPM | WEIGHT: 207.9 LBS | RESPIRATION RATE: 18 BRPM

## 2022-04-20 DIAGNOSIS — Z90.49 ACQUIRED ABSENCE OF OTHER SPECIFIED PARTS OF DIGESTIVE TRACT: Chronic | ICD-10-CM

## 2022-04-20 DIAGNOSIS — F43.22 ADJUSTMENT DISORDER WITH ANXIETY: ICD-10-CM

## 2022-04-20 PROBLEM — E78.5 HYPERLIPIDEMIA, UNSPECIFIED: Chronic | Status: ACTIVE | Noted: 2022-01-26

## 2022-04-20 LAB
ANION GAP SERPL CALC-SCNC: 18 MMOL/L — HIGH (ref 5–17)
APTT BLD: 30.8 SEC — SIGNIFICANT CHANGE UP (ref 27.5–35.5)
BASOPHILS # BLD AUTO: 0.03 K/UL — SIGNIFICANT CHANGE UP (ref 0–0.2)
BASOPHILS NFR BLD AUTO: 0.5 % — SIGNIFICANT CHANGE UP (ref 0–2)
BUN SERPL-MCNC: 16.9 MG/DL — SIGNIFICANT CHANGE UP (ref 8–20)
CALCIUM SERPL-MCNC: 9 MG/DL — SIGNIFICANT CHANGE UP (ref 8.6–10.2)
CHLORIDE SERPL-SCNC: 99 MMOL/L — SIGNIFICANT CHANGE UP (ref 98–107)
CO2 SERPL-SCNC: 21 MMOL/L — LOW (ref 22–29)
CREAT SERPL-MCNC: 0.77 MG/DL — SIGNIFICANT CHANGE UP (ref 0.5–1.3)
EGFR: 83 ML/MIN/1.73M2 — SIGNIFICANT CHANGE UP
EOSINOPHIL # BLD AUTO: 0.01 K/UL — SIGNIFICANT CHANGE UP (ref 0–0.5)
EOSINOPHIL NFR BLD AUTO: 0.2 % — SIGNIFICANT CHANGE UP (ref 0–6)
GLUCOSE SERPL-MCNC: 112 MG/DL — HIGH (ref 70–99)
HCT VFR BLD CALC: 41.8 % — SIGNIFICANT CHANGE UP (ref 34.5–45)
HGB BLD-MCNC: 13.8 G/DL — SIGNIFICANT CHANGE UP (ref 11.5–15.5)
IMM GRANULOCYTES NFR BLD AUTO: 0.6 % — SIGNIFICANT CHANGE UP (ref 0–1.5)
INR BLD: 1.14 RATIO — SIGNIFICANT CHANGE UP (ref 0.88–1.16)
LYMPHOCYTES # BLD AUTO: 0.66 K/UL — LOW (ref 1–3.3)
LYMPHOCYTES # BLD AUTO: 10.1 % — LOW (ref 13–44)
MCHC RBC-ENTMCNC: 30.3 PG — SIGNIFICANT CHANGE UP (ref 27–34)
MCHC RBC-ENTMCNC: 33 GM/DL — SIGNIFICANT CHANGE UP (ref 32–36)
MCV RBC AUTO: 91.9 FL — SIGNIFICANT CHANGE UP (ref 80–100)
MONOCYTES # BLD AUTO: 0.87 K/UL — SIGNIFICANT CHANGE UP (ref 0–0.9)
MONOCYTES NFR BLD AUTO: 13.3 % — SIGNIFICANT CHANGE UP (ref 2–14)
NEUTROPHILS # BLD AUTO: 4.91 K/UL — SIGNIFICANT CHANGE UP (ref 1.8–7.4)
NEUTROPHILS NFR BLD AUTO: 75.3 % — SIGNIFICANT CHANGE UP (ref 43–77)
PLATELET # BLD AUTO: 237 K/UL — SIGNIFICANT CHANGE UP (ref 150–400)
POTASSIUM SERPL-MCNC: 3.5 MMOL/L — SIGNIFICANT CHANGE UP (ref 3.5–5.3)
POTASSIUM SERPL-SCNC: 3.5 MMOL/L — SIGNIFICANT CHANGE UP (ref 3.5–5.3)
PROTHROM AB SERPL-ACNC: 13.3 SEC — SIGNIFICANT CHANGE UP (ref 10.5–13.4)
RBC # BLD: 4.55 M/UL — SIGNIFICANT CHANGE UP (ref 3.8–5.2)
RBC # FLD: 13.9 % — SIGNIFICANT CHANGE UP (ref 10.3–14.5)
SODIUM SERPL-SCNC: 138 MMOL/L — SIGNIFICANT CHANGE UP (ref 135–145)
TROPONIN T SERPL-MCNC: <0.01 NG/ML — SIGNIFICANT CHANGE UP (ref 0–0.06)
WBC # BLD: 6.52 K/UL — SIGNIFICANT CHANGE UP (ref 3.8–10.5)
WBC # FLD AUTO: 6.52 K/UL — SIGNIFICANT CHANGE UP (ref 3.8–10.5)

## 2022-04-20 PROCEDURE — 99220: CPT

## 2022-04-20 PROCEDURE — 70450 CT HEAD/BRAIN W/O DYE: CPT | Mod: 26,MA

## 2022-04-20 PROCEDURE — 74177 CT ABD & PELVIS W/CONTRAST: CPT | Mod: 26,MA

## 2022-04-20 RX ORDER — ASPIRIN/CALCIUM CARB/MAGNESIUM 324 MG
81 TABLET ORAL DAILY
Refills: 0 | Status: DISCONTINUED | OUTPATIENT
Start: 2022-04-20 | End: 2022-04-30

## 2022-04-20 RX ORDER — ERYTHROMYCIN BASE 5 MG/GRAM
1 OINTMENT (GRAM) OPHTHALMIC (EYE)
Refills: 0 | Status: DISCONTINUED | OUTPATIENT
Start: 2022-04-20 | End: 2022-04-30

## 2022-04-20 RX ORDER — ATORVASTATIN CALCIUM 80 MG/1
20 TABLET, FILM COATED ORAL AT BEDTIME
Refills: 0 | Status: DISCONTINUED | OUTPATIENT
Start: 2022-04-20 | End: 2022-04-30

## 2022-04-20 RX ORDER — MECLIZINE HCL 12.5 MG
25 TABLET ORAL THREE TIMES A DAY
Refills: 0 | Status: DISCONTINUED | OUTPATIENT
Start: 2022-04-20 | End: 2022-04-30

## 2022-04-20 RX ORDER — ONDANSETRON 8 MG/1
4 TABLET, FILM COATED ORAL ONCE
Refills: 0 | Status: COMPLETED | OUTPATIENT
Start: 2022-04-20 | End: 2022-04-20

## 2022-04-20 RX ORDER — SODIUM CHLORIDE 9 MG/ML
1000 INJECTION INTRAMUSCULAR; INTRAVENOUS; SUBCUTANEOUS ONCE
Refills: 0 | Status: COMPLETED | OUTPATIENT
Start: 2022-04-20 | End: 2022-04-20

## 2022-04-20 RX ORDER — CLONAZEPAM 1 MG
1 TABLET ORAL THREE TIMES A DAY
Refills: 0 | Status: DISCONTINUED | OUTPATIENT
Start: 2022-04-20 | End: 2022-04-27

## 2022-04-20 RX ORDER — DIAZEPAM 5 MG
2 TABLET ORAL ONCE
Refills: 0 | Status: DISCONTINUED | OUTPATIENT
Start: 2022-04-20 | End: 2022-04-20

## 2022-04-20 RX ORDER — QUETIAPINE FUMARATE 200 MG/1
400 TABLET, FILM COATED ORAL AT BEDTIME
Refills: 0 | Status: DISCONTINUED | OUTPATIENT
Start: 2022-04-20 | End: 2022-04-30

## 2022-04-20 RX ORDER — ALBUTEROL 90 UG/1
2 AEROSOL, METERED ORAL EVERY 6 HOURS
Refills: 0 | Status: DISCONTINUED | OUTPATIENT
Start: 2022-04-20 | End: 2022-04-30

## 2022-04-20 RX ORDER — MECLIZINE HCL 12.5 MG
25 TABLET ORAL ONCE
Refills: 0 | Status: COMPLETED | OUTPATIENT
Start: 2022-04-20 | End: 2022-04-20

## 2022-04-20 RX ORDER — MIDAZOLAM HYDROCHLORIDE 1 MG/ML
2 INJECTION, SOLUTION INTRAMUSCULAR; INTRAVENOUS ONCE
Refills: 0 | Status: DISCONTINUED | OUTPATIENT
Start: 2022-04-20 | End: 2022-04-21

## 2022-04-20 RX ORDER — DULOXETINE HYDROCHLORIDE 30 MG/1
30 CAPSULE, DELAYED RELEASE ORAL ONCE
Refills: 0 | Status: COMPLETED | OUTPATIENT
Start: 2022-04-20 | End: 2022-04-20

## 2022-04-20 RX ORDER — DULOXETINE HYDROCHLORIDE 30 MG/1
30 CAPSULE, DELAYED RELEASE ORAL DAILY
Refills: 0 | Status: DISCONTINUED | OUTPATIENT
Start: 2022-04-20 | End: 2022-04-30

## 2022-04-20 RX ADMIN — Medication 2 MILLIGRAM(S): at 10:21

## 2022-04-20 RX ADMIN — Medication 25 MILLIGRAM(S): at 12:00

## 2022-04-20 RX ADMIN — Medication 1 MILLIGRAM(S): at 21:26

## 2022-04-20 RX ADMIN — Medication 0.5 MILLIGRAM(S): at 14:32

## 2022-04-20 RX ADMIN — QUETIAPINE FUMARATE 400 MILLIGRAM(S): 200 TABLET, FILM COATED ORAL at 21:26

## 2022-04-20 RX ADMIN — DULOXETINE HYDROCHLORIDE 30 MILLIGRAM(S): 30 CAPSULE, DELAYED RELEASE ORAL at 10:12

## 2022-04-20 RX ADMIN — SODIUM CHLORIDE 1000 MILLILITER(S): 9 INJECTION INTRAMUSCULAR; INTRAVENOUS; SUBCUTANEOUS at 12:01

## 2022-04-20 RX ADMIN — ATORVASTATIN CALCIUM 20 MILLIGRAM(S): 80 TABLET, FILM COATED ORAL at 21:26

## 2022-04-20 RX ADMIN — Medication 25 MILLIGRAM(S): at 21:26

## 2022-04-20 RX ADMIN — ONDANSETRON 4 MILLIGRAM(S): 8 TABLET, FILM COATED ORAL at 14:32

## 2022-04-20 NOTE — ED PROVIDER NOTE - PATIENT PORTAL LINK FT
You can access the FollowMyHealth Patient Portal offered by [facility] by registering at the following website: [email or web address]. By joining [employer]’s FollowMyHealth portal, you will also be able to view your health information using other applications (apps) compatible with our system.

## 2022-04-20 NOTE — ED BEHAVIORAL HEALTH ASSESSMENT NOTE - SUMMARY
Patient is a 72 year old, male; lives with her ex  ( in May 2003); single; noncaregiver;  retired on social security; with past psychiatric history of depression and anxiety, currently in treatment at [facility] with psychiatrist (Dr. Garcia?)  and therapist (Navya) ;  two prior psychiatric  hospitalizations (last time over 10 years ago) ; no known suicide attempts; no known history of violence or arrests; no active substance abuse or known history of complicated withdrawal; PMH of a arthritis, and fibromyalgia ; brought in by EMS; called by self ; presenting with dizziness, nausea and vomiting for two days.  She disclosed also feeling depressed and psychiatric evaluation was requested. Patient expressed appropriate feelings of mourning for friend who passed away in January.   She is currently engaged and in treatment.  She does not exhibit symptoms of a depressive episode. She does report anxiety related to physical condition, consistent with adjustment disorder with anxiety. She responded well to support.  She denies any S/H I/I/P, depression, or psychosis.  She is appropriate for outpatient follow up at [facility].  Will clear for outpatient follow up

## 2022-04-20 NOTE — CHART NOTE - NSCHARTNOTEFT_GEN_A_CORE
MIRIAM Note: SW met with pt and pt's ex- at bedside to discuss d/c. Pt's ex- reports he has been here from [place] assisting with pt's care, however for last few days pt unable to walk, stating he cannot take pt home in this condition. Also expressed concern for pt's psych condition, stating pt may be "over medicated". SW made pt and ex- aware of  reccs to f/u with [facility] in [facility], pt reports she is happy with the provider and does not wish to switch providers and accept intake at [facility] at this time. Pt and ex- with multiple questions/concerns in regards to medical status and why pt cannot walk. ED provider aware and report PT as well as  will be consulted once more in AM for dispo reccs. MIRIAM following

## 2022-04-20 NOTE — ED BEHAVIORAL HEALTH ASSESSMENT NOTE - OTHER PAST PSYCHIATRIC HISTORY (INCLUDE DETAILS REGARDING ONSET, COURSE OF ILLNESS, INPATIENT/OUTPATIENT TREATMENT)
Reports h/o two prior psychiatric hospitalizations due to depression. Last time was over 10 years ago. Currently engaged in treatment at [facility].

## 2022-04-20 NOTE — ED PROVIDER NOTE - NSFOLLOWUPINSTRUCTIONS_ED_ALL_ED_FT
Zofran one tablet under the tongue three times a day for nausea  Continue your other medicaitons  Call your therapist and ask for another appointment this week for your anxiety  Follow up with an Ears, nose, throat doctor for your vertigo.   Call Dr Bella's office for an appointment

## 2022-04-20 NOTE — ED CDU PROVIDER INITIAL DAY NOTE - PROGRESS NOTE DETAILS
pt's anxiety continues to escalate  ex  Sagar at bedside . Pt had diarrhea all last night - new information although abd benign on initial exam. Will check CTa/p with iv contrast and reevaluate. Received meds for dizzy, nausea, anxiety. Need repeat psych eval  BH called by me ex  Sagar

## 2022-04-20 NOTE — ED CDU PROVIDER INITIAL DAY NOTE - EYES, MLM
Alert-The patient is alert, awake and responds to voice. The patient is oriented to time, place, and person. The triage nurse is able to obtain subjective information. Clear bilaterally, pupils equal, round and reactive to light.

## 2022-04-20 NOTE — ED BEHAVIORAL HEALTH ASSESSMENT NOTE - DETAILS
NA self Please go to Nearest ER or call 911, If you notice any of the followin) Agitation, Aggression or Anxiety,    2) Suicidal or homicidal thoughts 3) Worsening of symptoms or 4) Side effects of medications

## 2022-04-20 NOTE — ED BEHAVIORAL HEALTH ASSESSMENT NOTE - RISK ASSESSMENT
Low Acute Suicide Risk Low: Patient reports loss of long time friend, but has good support, engaged in treatment, no S/H I/I/P, no prior suicide attempt, no substance use, remains future oriented,

## 2022-04-20 NOTE — ED PROVIDER NOTE - CLINICAL SUMMARY MEDICAL DECISION MAKING FREE TEXT BOX
72 year old female with dizziness, N/V. Will check, labs, CT head, EKG. give gentle IV fluid hydration, IV meds for symptomatic relief and re-assess.

## 2022-04-20 NOTE — ED ADULT NURSE NOTE - OBJECTIVE STATEMENT
patient c/o dizziness and anxiety with chest pressure for about 3 days, patient has hx of anxiety and psychiatric history.

## 2022-04-20 NOTE — ED PROVIDER NOTE - OBJECTIVE STATEMENT
73 y/o female with PMHx of anxiety, depression, fibromyalgia, breast cancer, and hyperlipidemia who presents via EMS with c/o dizziness. Pt was in usual state of health until two days ago when sudden onset of dizziness, N/V began. Pt states room was spinning when symptoms started. Symptoms somewhat improved now. Pt also c/o mild left sided CP. Denies SOB or diaphoresis.

## 2022-04-20 NOTE — ED PROVIDER NOTE - PROGRESS NOTE DETAILS
pt canme to ER c/o dizzy .n.v. CT labs neg  When I advised pt whe was safe for dc she became hysterical and stated she felt anxious and worried, but could not tell me what she was worried about.  kalyan offered and accepted. Dr Ortega saw pt and after evaluation, pt dos not require inpatient admission. Pt again advised she was safe for discharge and became very upset, hyperventilating and c/oing of nausea. Meds ordered Zofran s/l and low dose ativan.  Ex  Sagar coming up to hospital, as per sister Charline . Will await his arrival to further address pt's discharge and RN to medicate for symptomatic relief. pt's anxiety continues to escalate  ex  Sagar at bedside . Pt had diarrhea all last night - new information although abd benign on initial exam. Will check CTa/p with iv contrast and reevaluate. Received meds for dizzy, nausea, anxiety. Need repeat psych eval  BH called by me pt calmer. CT sig enteritis. Pt c/o too dizzy to walk. Still anxious. Ex  cannot manage her at home in this condition. Will place in VIRTUAL OBSERVATION for MR head tomorrow for dizziness. PT eval for functionality and SW for placement vs help at home.

## 2022-04-20 NOTE — ED CDU PROVIDER INITIAL DAY NOTE - MEDICAL DECISION MAKING DETAILS
pt with 1st c/o dizzy with NV - eval for vertigo completed > Neg CT. Nl labs recvd vailum antivert  still c/o dizzy. MR ordered  2nd complaint diarrhea since last night - NOT revealed in original hx. Ex  who  lives with her states she had diarrhea all night and today.  3rd issue - severe anxiety BH to reeval in a.m.  4th - difficulty manage at home. Dizzy so difficulty with ambulation. TULIO v Home Health Aide

## 2022-04-20 NOTE — ED BEHAVIORAL HEALTH ASSESSMENT NOTE - SAFETY PLAN ADDT'L DETAILS
Education provided regarding environmental safety / lethal means restriction/Provision of National Suicide Prevention Lifeline 1-432-106-TALK (1116)

## 2022-04-20 NOTE — ED BEHAVIORAL HEALTH ASSESSMENT NOTE - HPI (INCLUDE ILLNESS QUALITY, SEVERITY, DURATION, TIMING, CONTEXT, MODIFYING FACTORS, ASSOCIATED SIGNS AND SYMPTOMS)
Patient is a 72 year old, male; lives with her ex  ( in May 2003); single; noncaregiver;  retired on social security; with past psychiatric history of depression and anxiety, currently in treatment at [facility] with psychiatrist (Dr. Garcia?)  and therapist (Navya) ;  two prior psychiatric  hospitalizations (last time over 10 years ago) ; no known suicide attempts; no known history of violence or arrests; no active substance abuse or known history of complicated withdrawal; PMH of a arthritis, and fibromyalgia ; brought in by EMS; called by self ; presenting with dizziness, nausea and vomiting for two days.  She disclosed also feeling depressed and psychiatric evaluation was requested. Patient is a 72 year old, male; lives with her ex  ( in May 2003); single; noncaregiver;  retired on social security; with past psychiatric history of depression and anxiety, currently in treatment at [facility] with psychiatrist (Dr. Garcia?)  and therapist (Navya) ;  two prior psychiatric  hospitalizations (last time over 10 years ago) ; no known suicide attempts; no known history of violence or arrests; no active substance abuse or known history of complicated withdrawal; PMH of a arthritis, and fibromyalgia ; brought in by EMS; called by self ; presenting with dizziness, nausea and vomiting for two days.  She disclosed also feeling depressed and psychiatric evaluation was requested.     Patient reported that she has been feeling dizzy with nausea for the last couple of days which triggered her presentation to ER. CT head and labwork were unremarkable. Patient was given Meclizine and Diazepam, and expressed feeling depressed.        Patient was cooperative and pleasant during interview.  She reported that a good friend of her that she knew since age 18 had  in January and she has been grieving.  She denies consistently depressed mood. She feels she has good friends that are supportive and has been in communication with family of her friend.  She states she has not taken Valiium for years.  She has been compliant w/ her medications and treatment.   She reports problems sleeping the last two days due to her nausea and stomach pain, but no problems prior to that. Her concerns about going back home were centered around her physical symptoms the last two days.        Concerning other psychiatric symptoms, pt d continues to enjoy  pleasurable activities.  She continues shopping and socializing.  She feels that she has good rapport with her therapist. Pt denies any suicidal ideation, intent or plan as well as any aggressive or violent behavior towards others. Pt denies any episodes of bizarre happiness, unusual energy, unusual nightime excitation or other common symptoms of verenice. Pt denies hearing voices or seeing things.  No delusions were elicited.  Patient reports some anxiety related to her physical symptoms but no panic attacks.       Writer attempted to call [facility] for colllateral. Left message on voicemail to call writer back.

## 2022-04-21 DIAGNOSIS — R42 DIZZINESS AND GIDDINESS: ICD-10-CM

## 2022-04-21 LAB
ANION GAP SERPL CALC-SCNC: 17 MMOL/L — SIGNIFICANT CHANGE UP (ref 5–17)
BASOPHILS # BLD AUTO: 0.02 K/UL — SIGNIFICANT CHANGE UP (ref 0–0.2)
BASOPHILS NFR BLD AUTO: 0.3 % — SIGNIFICANT CHANGE UP (ref 0–2)
BUN SERPL-MCNC: 17.1 MG/DL — SIGNIFICANT CHANGE UP (ref 8–20)
CALCIUM SERPL-MCNC: 8.9 MG/DL — SIGNIFICANT CHANGE UP (ref 8.6–10.2)
CHLORIDE SERPL-SCNC: 100 MMOL/L — SIGNIFICANT CHANGE UP (ref 98–107)
CO2 SERPL-SCNC: 20 MMOL/L — LOW (ref 22–29)
CREAT SERPL-MCNC: 0.72 MG/DL — SIGNIFICANT CHANGE UP (ref 0.5–1.3)
EGFR: 90 ML/MIN/1.73M2 — SIGNIFICANT CHANGE UP
EOSINOPHIL # BLD AUTO: 0 K/UL — SIGNIFICANT CHANGE UP (ref 0–0.5)
EOSINOPHIL NFR BLD AUTO: 0 % — SIGNIFICANT CHANGE UP (ref 0–6)
GLUCOSE SERPL-MCNC: 136 MG/DL — HIGH (ref 70–99)
HCT VFR BLD CALC: 40.1 % — SIGNIFICANT CHANGE UP (ref 34.5–45)
HGB BLD-MCNC: 13.3 G/DL — SIGNIFICANT CHANGE UP (ref 11.5–15.5)
IMM GRANULOCYTES NFR BLD AUTO: 0.3 % — SIGNIFICANT CHANGE UP (ref 0–1.5)
LYMPHOCYTES # BLD AUTO: 1.24 K/UL — SIGNIFICANT CHANGE UP (ref 1–3.3)
LYMPHOCYTES # BLD AUTO: 21 % — SIGNIFICANT CHANGE UP (ref 13–44)
MCHC RBC-ENTMCNC: 30.6 PG — SIGNIFICANT CHANGE UP (ref 27–34)
MCHC RBC-ENTMCNC: 33.2 GM/DL — SIGNIFICANT CHANGE UP (ref 32–36)
MCV RBC AUTO: 92.4 FL — SIGNIFICANT CHANGE UP (ref 80–100)
MONOCYTES # BLD AUTO: 0.9 K/UL — SIGNIFICANT CHANGE UP (ref 0–0.9)
MONOCYTES NFR BLD AUTO: 15.2 % — HIGH (ref 2–14)
NEUTROPHILS # BLD AUTO: 3.73 K/UL — SIGNIFICANT CHANGE UP (ref 1.8–7.4)
NEUTROPHILS NFR BLD AUTO: 63.2 % — SIGNIFICANT CHANGE UP (ref 43–77)
PLATELET # BLD AUTO: 243 K/UL — SIGNIFICANT CHANGE UP (ref 150–400)
POTASSIUM SERPL-MCNC: 3.2 MMOL/L — LOW (ref 3.5–5.3)
POTASSIUM SERPL-SCNC: 3.2 MMOL/L — LOW (ref 3.5–5.3)
RAPID RVP RESULT: SIGNIFICANT CHANGE UP
RBC # BLD: 4.34 M/UL — SIGNIFICANT CHANGE UP (ref 3.8–5.2)
RBC # FLD: 14.1 % — SIGNIFICANT CHANGE UP (ref 10.3–14.5)
SARS-COV-2 RNA SPEC QL NAA+PROBE: SIGNIFICANT CHANGE UP
SODIUM SERPL-SCNC: 137 MMOL/L — SIGNIFICANT CHANGE UP (ref 135–145)
WBC # BLD: 5.91 K/UL — SIGNIFICANT CHANGE UP (ref 3.8–10.5)
WBC # FLD AUTO: 5.91 K/UL — SIGNIFICANT CHANGE UP (ref 3.8–10.5)

## 2022-04-21 PROCEDURE — 99217: CPT

## 2022-04-21 PROCEDURE — 12345: CPT | Mod: NC

## 2022-04-21 PROCEDURE — 99223 1ST HOSP IP/OBS HIGH 75: CPT

## 2022-04-21 RX ORDER — QUETIAPINE FUMARATE 200 MG/1
1 TABLET, FILM COATED ORAL
Qty: 0 | Refills: 0 | DISCHARGE

## 2022-04-21 RX ORDER — MORPHINE SULFATE 50 MG/1
2 CAPSULE, EXTENDED RELEASE ORAL EVERY 6 HOURS
Refills: 0 | Status: DISCONTINUED | OUTPATIENT
Start: 2022-04-21 | End: 2022-04-26

## 2022-04-21 RX ORDER — ONDANSETRON 8 MG/1
4 TABLET, FILM COATED ORAL EVERY 8 HOURS
Refills: 0 | Status: DISCONTINUED | OUTPATIENT
Start: 2022-04-21 | End: 2022-04-30

## 2022-04-21 RX ORDER — LANOLIN ALCOHOL/MO/W.PET/CERES
3 CREAM (GRAM) TOPICAL AT BEDTIME
Refills: 0 | Status: DISCONTINUED | OUTPATIENT
Start: 2022-04-21 | End: 2022-04-30

## 2022-04-21 RX ORDER — SODIUM CHLORIDE 9 MG/ML
1000 INJECTION, SOLUTION INTRAVENOUS
Refills: 0 | Status: DISCONTINUED | OUTPATIENT
Start: 2022-04-21 | End: 2022-04-30

## 2022-04-21 RX ORDER — ENOXAPARIN SODIUM 100 MG/ML
40 INJECTION SUBCUTANEOUS EVERY 24 HOURS
Refills: 0 | Status: DISCONTINUED | OUTPATIENT
Start: 2022-04-21 | End: 2022-04-30

## 2022-04-21 RX ORDER — ACETAMINOPHEN 500 MG
650 TABLET ORAL EVERY 6 HOURS
Refills: 0 | Status: DISCONTINUED | OUTPATIENT
Start: 2022-04-21 | End: 2022-04-30

## 2022-04-21 RX ORDER — TIZANIDINE 4 MG/1
1 TABLET ORAL
Qty: 0 | Refills: 0 | DISCHARGE

## 2022-04-21 RX ORDER — CLONAZEPAM 1 MG
1 TABLET ORAL
Qty: 0 | Refills: 0 | DISCHARGE

## 2022-04-21 RX ORDER — POTASSIUM CHLORIDE 20 MEQ
40 PACKET (EA) ORAL ONCE
Refills: 0 | Status: COMPLETED | OUTPATIENT
Start: 2022-04-21 | End: 2022-04-21

## 2022-04-21 RX ORDER — ATORVASTATIN CALCIUM 80 MG/1
1 TABLET, FILM COATED ORAL
Qty: 0 | Refills: 0 | DISCHARGE

## 2022-04-21 RX ADMIN — ONDANSETRON 4 MILLIGRAM(S): 8 TABLET, FILM COATED ORAL at 17:41

## 2022-04-21 RX ADMIN — Medication 81 MILLIGRAM(S): at 13:28

## 2022-04-21 RX ADMIN — Medication 25 MILLIGRAM(S): at 20:43

## 2022-04-21 RX ADMIN — Medication 25 MILLIGRAM(S): at 05:18

## 2022-04-21 RX ADMIN — SODIUM CHLORIDE 100 MILLILITER(S): 9 INJECTION, SOLUTION INTRAVENOUS at 05:35

## 2022-04-21 RX ADMIN — Medication 1 APPLICATION(S): at 17:15

## 2022-04-21 RX ADMIN — Medication 1 MILLIGRAM(S): at 20:43

## 2022-04-21 RX ADMIN — ONDANSETRON 4 MILLIGRAM(S): 8 TABLET, FILM COATED ORAL at 03:17

## 2022-04-21 RX ADMIN — DULOXETINE HYDROCHLORIDE 30 MILLIGRAM(S): 30 CAPSULE, DELAYED RELEASE ORAL at 13:28

## 2022-04-21 RX ADMIN — Medication 1 MILLIGRAM(S): at 13:28

## 2022-04-21 RX ADMIN — ONDANSETRON 4 MILLIGRAM(S): 8 TABLET, FILM COATED ORAL at 11:00

## 2022-04-21 RX ADMIN — Medication 40 MILLIEQUIVALENT(S): at 11:00

## 2022-04-21 RX ADMIN — MIDAZOLAM HYDROCHLORIDE 2 MILLIGRAM(S): 1 INJECTION, SOLUTION INTRAMUSCULAR; INTRAVENOUS at 00:16

## 2022-04-21 RX ADMIN — MORPHINE SULFATE 2 MILLIGRAM(S): 50 CAPSULE, EXTENDED RELEASE ORAL at 05:18

## 2022-04-21 RX ADMIN — MORPHINE SULFATE 2 MILLIGRAM(S): 50 CAPSULE, EXTENDED RELEASE ORAL at 05:50

## 2022-04-21 RX ADMIN — Medication 650 MILLIGRAM(S): at 20:44

## 2022-04-21 RX ADMIN — Medication 25 MILLIGRAM(S): at 13:27

## 2022-04-21 RX ADMIN — Medication 1 APPLICATION(S): at 05:36

## 2022-04-21 RX ADMIN — ATORVASTATIN CALCIUM 20 MILLIGRAM(S): 80 TABLET, FILM COATED ORAL at 20:44

## 2022-04-21 RX ADMIN — Medication 1 MILLIGRAM(S): at 05:17

## 2022-04-21 RX ADMIN — Medication 3 MILLIGRAM(S): at 20:44

## 2022-04-21 RX ADMIN — ENOXAPARIN SODIUM 40 MILLIGRAM(S): 100 INJECTION SUBCUTANEOUS at 05:17

## 2022-04-21 RX ADMIN — QUETIAPINE FUMARATE 400 MILLIGRAM(S): 200 TABLET, FILM COATED ORAL at 20:44

## 2022-04-21 RX ADMIN — Medication 650 MILLIGRAM(S): at 21:44

## 2022-04-21 NOTE — H&P ADULT - HISTORY OF PRESENT ILLNESS
71yo F with PMHx of anxiety, depression, fibromyalgia, breast cancer, HLD presented to ED c/o abdominal pian with associated n/v and diarrhea, dizziness x 2days. Pt states she was in a good state of health until 2 days ago symptoms onset after she had strawberry shortcake. Pt has been with generalized abdominal pain, n/v unable to eat or drink in the past 2 days. She feels lightheaded and dizzy, denies fever or chills. In the Ed afebrile, no leukocytosis, labs pertinent for AG acidosis, CTH unremarkable, CT a/p with mild generalized prominence of fluid-filled small bowel loops consistent with a nonspecific enteritis. Some fluid in the colon suggests a diarrheal state.

## 2022-04-21 NOTE — PHYSICAL THERAPY INITIAL EVALUATION ADULT - PERTINENT HX OF CURRENT PROBLEM, REHAB EVAL
73 y/o female with PMHx of anxiety, depression, fibromyalgia, breast cancer, and hyperlipidemia who presents via EMS with c/o dizziness. Pt was in usual state of health until two days ago when sudden onset of dizziness, N/V began.

## 2022-04-21 NOTE — PROGRESS NOTE ADULT - ASSESSMENT
71yo F with PMHx of anxiety, depression, fibromyalgia, breast cancer, HLD presented to ED c/o abdominal pian with associated n/v and diarrhea, dizziness x 2days.     Acute Gastroenteritis  with abdominal pain n/v, diarrhea, likely viral induced  -afebrile, no leukocytosis  -CT a/p with nonspecific enteritis with a diarrheal state. No bowel obstruction.  -clear liquid diet to full liquid  -zofran, pain control  -f/u GI PCR   -IVF hydration    AG acidosis   -likely due to dehydration from vomiting   -cont with IVF hydration   -trend    Hypokalemia in setting of diarrhea and emesis   replete    Dizziness   -vertigo vs orthostatic from dehydration  -CTH unremarkable  -MRI pending     Depression/anxiety   -cont with Seroquel, Klonopin    HLD   statin    DVT ppx  -lovenox         Dispo:   Anticipate DC in 1-2 days

## 2022-04-21 NOTE — PATIENT PROFILE ADULT - FALL HARM RISK - HARM RISK INTERVENTIONS

## 2022-04-21 NOTE — ED CDU PROVIDER DISPOSITION NOTE - CLINICAL COURSE
73 y/o female with PMHx of anxiety, depression, fibromyalgia, breast cancer, and hyperlipidemia who presents via EMS with c/o dizziness. Pt was in usual state of health until two days ago when sudden onset of dizziness, N/V began. Pt unable to walk for the past couple of days due to dizziness unable to walk here. Pt with diarrhea found to have enterities, will admit fo rfurther management

## 2022-04-21 NOTE — H&P ADULT - ASSESSMENT
71yo F with PMHx of anxiety, depression, fibromyalgia, breast cancer, HLD presented to ED c/o abdominal pian with associated n/v and diarrhea, dizziness x 2days.     Gastroenteritis  -with abdominal pain n/v, diarrhea   -likely viral induced  -afebrile, no leukocytosis  -CT a/p with nonspecific enteritis with a diarrheal state. No bowel obstruction.  -clear liquid diet  -zofran, pain control prn with morphine   -check GI PCR   -IVF hydration    AG acidosis   -likely due to dehydration from vomiting   -cont with IVF hydration   -check repeat lab    Dizziness   -vertigo vs orthostatic from dehydration  -CTH unremarkable  -MRI pending     Depression/anxiety   -cont with Seroquel, Klonopin    HLD   statin    DVT ppx  -lovenox

## 2022-04-21 NOTE — H&P ADULT - NSHPPHYSICALEXAM_GEN_ALL_CORE
T(C): 36.9 (04-21-22 @ 04:27), Max: 36.9 (04-21-22 @ 04:27)  HR: 106 (04-21-22 @ 04:27) (90 - 106)  BP: 158/91 (04-21-22 @ 04:27) (108/64 - 158/91)  RR: 18 (04-21-22 @ 04:27) (18 - 18)  SpO2: 94% (04-21-22 @ 04:27) (94% - 100%)    GENERAL: patient appears well, no acute distress, appropriate, pleasant  EYES: sclera clear, no exudates  ENMT: oropharynx clear without erythema, no exudates, moist mucous membranes  NECK: supple, soft, no thyromegaly noted  LUNGS: good air entry bilaterally, clear to auscultation, symmetric breath sounds, no wheezing or rhonchi appreciated  HEART: soft S1/S2, regular rate and rhythm, no murmurs noted, no lower extremity edema  GASTROINTESTINAL: abdomen is soft, nontender, nondistended, normoactive bowel sounds, no palpable masses  INTEGUMENT: good skin turgor, warm skin, appears well perfused  MUSCULOSKELETAL: no clubbing or cyanosis, no obvious deformity  NEUROLOGIC: awake, alert, oriented x3, good muscle tone in 4 extremities, no obvious sensory deficits  PSYCHIATRIC: mood is good, affect is congruent, linear and logical thought process  HEME/LYMPH: no palpable supraclavicular nodules, no obvious ecchymosis or petechiae

## 2022-04-21 NOTE — ED CDU PROVIDER SUBSEQUENT DAY NOTE - MEDICAL DECISION MAKING DETAILS
pt found to have enteritis, continues to have dizziness unable to ambulate, admit for further management

## 2022-04-21 NOTE — PHYSICAL THERAPY INITIAL EVALUATION ADULT - ADDITIONAL COMMENTS
Pt. reports she lives in a condo with her ex  who is at home and able to assist her as needed. [address] with one rail and no stairs inside. Pt. reports being independent PTA and owning a shower chair, SAC, and RW.

## 2022-04-22 ENCOUNTER — TRANSCRIPTION ENCOUNTER (OUTPATIENT)
Age: 72
End: 2022-04-22

## 2022-04-22 LAB
ANION GAP SERPL CALC-SCNC: 16 MMOL/L — SIGNIFICANT CHANGE UP (ref 5–17)
BASOPHILS # BLD AUTO: 0.02 K/UL — SIGNIFICANT CHANGE UP (ref 0–0.2)
BASOPHILS NFR BLD AUTO: 0.2 % — SIGNIFICANT CHANGE UP (ref 0–2)
BUN SERPL-MCNC: 13.8 MG/DL — SIGNIFICANT CHANGE UP (ref 8–20)
CALCIUM SERPL-MCNC: 8.5 MG/DL — LOW (ref 8.6–10.2)
CHLORIDE SERPL-SCNC: 104 MMOL/L — SIGNIFICANT CHANGE UP (ref 98–107)
CO2 SERPL-SCNC: 20 MMOL/L — LOW (ref 22–29)
CREAT SERPL-MCNC: 0.63 MG/DL — SIGNIFICANT CHANGE UP (ref 0.5–1.3)
EGFR: 95 ML/MIN/1.73M2 — SIGNIFICANT CHANGE UP
EOSINOPHIL # BLD AUTO: 0.06 K/UL — SIGNIFICANT CHANGE UP (ref 0–0.5)
EOSINOPHIL NFR BLD AUTO: 0.7 % — SIGNIFICANT CHANGE UP (ref 0–6)
GLUCOSE SERPL-MCNC: 106 MG/DL — HIGH (ref 70–99)
HCT VFR BLD CALC: 36.1 % — SIGNIFICANT CHANGE UP (ref 34.5–45)
HGB BLD-MCNC: 11.6 G/DL — SIGNIFICANT CHANGE UP (ref 11.5–15.5)
IMM GRANULOCYTES NFR BLD AUTO: 0.4 % — SIGNIFICANT CHANGE UP (ref 0–1.5)
LYMPHOCYTES # BLD AUTO: 1.99 K/UL — SIGNIFICANT CHANGE UP (ref 1–3.3)
LYMPHOCYTES # BLD AUTO: 24 % — SIGNIFICANT CHANGE UP (ref 13–44)
MAGNESIUM SERPL-MCNC: 2 MG/DL — SIGNIFICANT CHANGE UP (ref 1.6–2.6)
MCHC RBC-ENTMCNC: 30.2 PG — SIGNIFICANT CHANGE UP (ref 27–34)
MCHC RBC-ENTMCNC: 32.1 GM/DL — SIGNIFICANT CHANGE UP (ref 32–36)
MCV RBC AUTO: 94 FL — SIGNIFICANT CHANGE UP (ref 80–100)
MONOCYTES # BLD AUTO: 0.96 K/UL — HIGH (ref 0–0.9)
MONOCYTES NFR BLD AUTO: 11.6 % — SIGNIFICANT CHANGE UP (ref 2–14)
NEUTROPHILS # BLD AUTO: 5.24 K/UL — SIGNIFICANT CHANGE UP (ref 1.8–7.4)
NEUTROPHILS NFR BLD AUTO: 63.1 % — SIGNIFICANT CHANGE UP (ref 43–77)
PHOSPHATE SERPL-MCNC: 3.5 MG/DL — SIGNIFICANT CHANGE UP (ref 2.4–4.7)
PLATELET # BLD AUTO: 201 K/UL — SIGNIFICANT CHANGE UP (ref 150–400)
POTASSIUM SERPL-MCNC: 3.4 MMOL/L — LOW (ref 3.5–5.3)
POTASSIUM SERPL-SCNC: 3.4 MMOL/L — LOW (ref 3.5–5.3)
RBC # BLD: 3.84 M/UL — SIGNIFICANT CHANGE UP (ref 3.8–5.2)
RBC # FLD: 14 % — SIGNIFICANT CHANGE UP (ref 10.3–14.5)
SODIUM SERPL-SCNC: 140 MMOL/L — SIGNIFICANT CHANGE UP (ref 135–145)
WBC # BLD: 8.3 K/UL — SIGNIFICANT CHANGE UP (ref 3.8–10.5)
WBC # FLD AUTO: 8.3 K/UL — SIGNIFICANT CHANGE UP (ref 3.8–10.5)

## 2022-04-22 PROCEDURE — 99232 SBSQ HOSP IP/OBS MODERATE 35: CPT

## 2022-04-22 RX ORDER — POTASSIUM CHLORIDE 20 MEQ
40 PACKET (EA) ORAL EVERY 4 HOURS
Refills: 0 | Status: COMPLETED | OUTPATIENT
Start: 2022-04-22 | End: 2022-04-22

## 2022-04-22 RX ORDER — POTASSIUM CHLORIDE 20 MEQ
40 PACKET (EA) ORAL ONCE
Refills: 0 | Status: COMPLETED | OUTPATIENT
Start: 2022-04-22 | End: 2022-04-22

## 2022-04-22 RX ORDER — POTASSIUM CHLORIDE 20 MEQ
10 PACKET (EA) ORAL
Refills: 0 | Status: COMPLETED | OUTPATIENT
Start: 2022-04-22 | End: 2022-04-22

## 2022-04-22 RX ORDER — MECLIZINE HCL 12.5 MG
1 TABLET ORAL
Qty: 21 | Refills: 6
Start: 2022-04-22 | End: 2022-06-09

## 2022-04-22 RX ORDER — MECLIZINE HCL 12.5 MG
1 TABLET ORAL
Qty: 0 | Refills: 0 | DISCHARGE

## 2022-04-22 RX ADMIN — Medication 25 MILLIGRAM(S): at 13:09

## 2022-04-22 RX ADMIN — Medication 40 MILLIEQUIVALENT(S): at 10:36

## 2022-04-22 RX ADMIN — Medication 1 APPLICATION(S): at 05:21

## 2022-04-22 RX ADMIN — ATORVASTATIN CALCIUM 20 MILLIGRAM(S): 80 TABLET, FILM COATED ORAL at 21:06

## 2022-04-22 RX ADMIN — Medication 81 MILLIGRAM(S): at 13:09

## 2022-04-22 RX ADMIN — Medication 1 MILLIGRAM(S): at 13:08

## 2022-04-22 RX ADMIN — Medication 1 APPLICATION(S): at 17:40

## 2022-04-22 RX ADMIN — Medication 1 MILLIGRAM(S): at 21:07

## 2022-04-22 RX ADMIN — Medication 40 MILLIEQUIVALENT(S): at 17:40

## 2022-04-22 RX ADMIN — DULOXETINE HYDROCHLORIDE 30 MILLIGRAM(S): 30 CAPSULE, DELAYED RELEASE ORAL at 13:09

## 2022-04-22 RX ADMIN — Medication 1 MILLIGRAM(S): at 05:21

## 2022-04-22 RX ADMIN — ENOXAPARIN SODIUM 40 MILLIGRAM(S): 100 INJECTION SUBCUTANEOUS at 05:21

## 2022-04-22 RX ADMIN — Medication 25 MILLIGRAM(S): at 05:21

## 2022-04-22 RX ADMIN — Medication 40 MILLIEQUIVALENT(S): at 13:10

## 2022-04-22 RX ADMIN — QUETIAPINE FUMARATE 400 MILLIGRAM(S): 200 TABLET, FILM COATED ORAL at 22:12

## 2022-04-22 RX ADMIN — Medication 25 MILLIGRAM(S): at 21:07

## 2022-04-22 NOTE — DISCHARGE NOTE PROVIDER - CARE PROVIDER_API CALL
Primary care medical doctor,   Phone: (   )    -  Fax: (   )    -  Follow Up Time: 1 week   Primary care medical doctor,   Phone: (   )    -  Fax: (   )    -  Follow Up Time: 1 week    Saroj Minor)  Gastroenterology; Internal Medicine  [address] [facility], [place] [address]  Phone: [phone]  Fax: [phone]  Follow Up Time:

## 2022-04-22 NOTE — DISCHARGE NOTE PROVIDER - NSDCCPCAREPLAN_GEN_ALL_CORE_FT
PRINCIPAL DISCHARGE DIAGNOSIS  Diagnosis: Gastroenteritis  Assessment and Plan of Treatment: Improved, diet advanced as tolerated. Cat scan done, no obstruction seen. Please follow up with your primary care physician and GI within 1 week.      SECONDARY DISCHARGE DIAGNOSES  Diagnosis: Anxiety  Assessment and Plan of Treatment: Continue Seroquel and Klonopin as prescribed. Please follow up with your primary care physician within 1 week.    Diagnosis: Dizziness  Assessment and Plan of Treatment: Could be due to vertigo vs orthostatic from dehydration. Cat scan of the head unremarkable. Seen by PT and anticipating home with outpatient vestibular rehab. Please follow up with your primary care physician within 1 week.    Diagnosis: HLD (hyperlipidemia)  Assessment and Plan of Treatment: Continue medications as prescribed. Please follow up with your primary care physician within 1 week.    Diagnosis: History of metabolic acidosis with increased anion gap  Assessment and Plan of Treatment: Improved on IVF hydration. Please follow up with your primary care physician within 1 week.     PRINCIPAL DISCHARGE DIAGNOSIS  Diagnosis: Gastroenteritis  Assessment and Plan of Treatment: - Improved, diet advanced as tolerated  - Please follow up with your primary care physician and GI within 1 week.      SECONDARY DISCHARGE DIAGNOSES  Diagnosis: Anxiety  Assessment and Plan of Treatment: - Continue Seroquel and Klonopin as prescribed.   - Please follow up with your primary care physician within 1 week.    Diagnosis: Dizziness  Assessment and Plan of Treatment: - Improved  - Seen by PT and anticipating home with outpatient vestibular rehab. Please follow up with your primary care physician within 1 week.    Diagnosis: HLD (hyperlipidemia)  Assessment and Plan of Treatment: - Continue medications as prescribed.- Please follow up with your primary care physician within 1 week.    Diagnosis: History of metabolic acidosis with increased anion gap  Assessment and Plan of Treatment: - Improved on IVF hydration.   - Please follow up with your primary care physician within 1 week.     PRINCIPAL DISCHARGE DIAGNOSIS  Diagnosis: Gastroenteritis  Assessment and Plan of Treatment: - Improved, diet advanced as tolerated  - Please follow up with your primary care physician and GI within 1 week.      SECONDARY DISCHARGE DIAGNOSES  Diagnosis: Anxiety  Assessment and Plan of Treatment: - Continue Seroquel and Klonopin as prescribed.   - Please follow up with your primary care physician within 1 week.    Diagnosis: Dizziness  Assessment and Plan of Treatment: - Improved  - Seen by PT and anticipating home with outpatient vestibular rehab. Please follow up with your primary care physician within 1 week.    Diagnosis: HLD (hyperlipidemia)  Assessment and Plan of Treatment: - Continue medications as prescribed  - Please follow up with your primary care physician within 1 week.    Diagnosis: History of metabolic acidosis with increased anion gap  Assessment and Plan of Treatment: - Improved on IVF hydration.   - Please follow up with your primary care physician within 1 week.     PRINCIPAL DISCHARGE DIAGNOSIS  Diagnosis: Gastroenteritis  Assessment and Plan of Treatment: - Improved, diet advanced as tolerated  - Please follow up with your primary care physician and GI within 1 week.      SECONDARY DISCHARGE DIAGNOSES  Diagnosis: Anxiety  Assessment and Plan of Treatment: - Continue Seroquel and Klonopin as prescribed.   - Please follow up with your primary care physician within 1 week.    Diagnosis: Dizziness  Assessment and Plan of Treatment: - Improved  - Seen by PT and anticipating home with outpatient vestibular rehab. Please follow up with your primary care physician within 1 week.    Diagnosis: HLD (hyperlipidemia)  Assessment and Plan of Treatment: - Continue medications as prescribed  - Please follow up with your primary care physician within 1 week.    Diagnosis: History of metabolic acidosis with increased anion gap  Assessment and Plan of Treatment: - Improved on IVF hydration.   - Please follow up with your primary care physician within 1 week.    Diagnosis: Irritable bowel  Assessment and Plan of Treatment: continue Dicyclomine  follow up with GI

## 2022-04-22 NOTE — DISCHARGE NOTE PROVIDER - ATTENDING DISCHARGE PHYSICAL EXAMINATION:
VITALS:   T(C): 36.6 (04-22-22 @ 07:28), Max: 36.8 (04-21-22 @ 15:35)  HR: 91 (04-22-22 @ 07:28) (74 - 104)  BP: 166/83 (04-22-22 @ 07:28) (125/68 - 166/83)  RR: 18 (04-22-22 @ 07:28) (18 - 20)  SpO2: 94% (04-22-22 @ 07:28) (91% - 94%)    GENERAL: NAD, lying in bed comfortably  HEAD:  Atraumatic, Normocephalic  EYES: EOMI, PERRLA, conjunctiva and sclera clear  ENT: Moist mucous membranes  NECK: Supple, No JVD  CHEST/LUNG: Clear to auscultation bilaterally; No rales, rhonchi, wheezing, or rubs. Unlabored respirations  HEART: Regular rate and rhythm; No murmurs, rubs, or gallops  ABDOMEN: BSx4; Soft, nontender, nondistended  EXTREMITIES:  2+ Peripheral Pulses, brisk capillary refill. No clubbing, cyanosis, or edema  NERVOUS SYSTEM:  A&Ox3, no focal deficits   SKIN: No rashes or lesions  PSYCH: Normal affect, euthymic mood alert, not in distress  lungs: b/l air entry  cvs: regular  abdo: soft, bs+  ext: no edema, tenderness Vital Signs Last 24 Hrs  T(C): 36.9 (30 Apr 2022 04:23), Max: 36.9 (29 Apr 2022 16:33)  T(F): 98.4 (30 Apr 2022 04:23), Max: 98.4 (29 Apr 2022 16:33)  HR: 98 (30 Apr 2022 04:23) (90 - 98)  BP: 148/82 (30 Apr 2022 04:23) (136/76 - 148/82)  BP(mean): --  RR: 18 (30 Apr 2022 04:23) (18 - 18)  SpO2: 94% (30 Apr 2022 04:23) (91% - 94%)    GENERAL: laying comfortably in bed, NAD  HEENT: moist mucosa, NC/AT, clear conjunctiva, nonicteric sclera  LUNGS: CTA b/l, no wheezing, rhonchi, rales  HEART: RRR, normal s1, s2  ABDOMEN: NT/ND, +BS  EXT: no edema  PSYCH: normal affect  SKIN: no lesions

## 2022-04-22 NOTE — PROGRESS NOTE ADULT - ASSESSMENT
73yo F with PMHx of anxiety, depression, fibromyalgia, breast cancer, HLD presented to ED c/o abdominal pian with associated n/v and diarrhea, dizziness x 2days.     Acute Gastroenteritis  with abdominal pain n/v, diarrhea, likely viral induced  -afebrile, no leukocytosis  -CT a/p with nonspecific enteritis with a diarrheal state. No bowel obstruction.  -zofran, pain control  -IVF hydration  Advance diet  AG acidosis   -likely due to dehydration from vomiting   -cont with IVF hydration   -trend    Hypokalemia in setting of diarrhea and emesis   replete    Dizziness   -vertigo vs orthostatic from dehydration  -CTH unremarkable  -MRI deferred as dizziness improved, no focal neurologic deficits. Has known chronic dizziness and prescribed meclizine at home.     Depression/anxiety   -cont with Seroquel, Klonopin    HLD   statin    DVT ppx  -lovenox         Dispo:   Patient was planned for discharge given resolution of symptoms.  Ex  Jesus was contacted per request and listed numerous reasons that he believes patient should remain in hospital including that she is not taking her anxiety meds at home, was having vomiting and diarrhea, has psychiatric diagnoses, and lives on 2nd floor and believes she cannot climb the stairs or take care of herself.  CM updated regarding this conversation and will have PT re-assess.

## 2022-04-22 NOTE — DISCHARGE NOTE PROVIDER - DETAILS OF MALNUTRITION DIAGNOSIS/DIAGNOSES
This patient has been assessed with a concern for Malnutrition and was treated during this hospitalization for the following Nutrition diagnosis/diagnoses:     -  04/27/2022: Moderate protein-calorie malnutrition

## 2022-04-22 NOTE — DISCHARGE NOTE PROVIDER - HOSPITAL COURSE
Pt is a 73yo F with PMHx of anxiety, depression, fibromyalgia, breast cancer, HLD presented to ED c/o abdominal pian with associated n/v and diarrhea, dizziness x 2days. Admitted for Acute Gastroenteritis. Afebrile, no leukocytosis. CT a/p with nonspecific enteritis with a diarrheal state. No bowel obstruction. Tolerated advancement in diet. GI PCR pending. AG acidosis improved on IVF hydration. Noted to have hypokalemia and supplemented.     For dizziness which could be due to vertigo vs orthostatic from dehydration. CTH unremarkable. MRI ordered but not competed. Seen by PT and anticipating home with outpatient vestibular rehab.  Dc home if tolerating her diet       Pt is a 71yo F with PMHx of anxiety, depression, fibromyalgia, breast cancer, HLD presented to ED c/o abdominal pian with associated n/v and diarrhea, dizziness x 2days. Admitted for Acute Gastroenteritis. Afebrile, no leukocytosis. CT a/p with nonspecific enteritis with a diarrheal state. No bowel obstruction. Patient's nausea. vomittng, diarrhea resolved. Tolerated advancement in diet. GI PCR pending. AG acidosis improved on IVF hydration. Noted to have hypokalemia and supplemented.    Psychiatry was consulted in the ED for significant anxiety. Evaluated and recommended to cont home meds.    For dizziness which could be due to vertigo vs orthostatic from dehydration. CTH unremarkable. MRI ordered but not competed. Dizziness improved. No focal neurologic findings. Patient is deferring MRI brain to outpatient.  Seen by PT and anticipating home with outpatient vestibular rehab.   To follow up with PCP. Patient is medically stable for discharge.       Pt is a 71yo F with PMHx of anxiety, depression, fibromyalgia, breast cancer, HLD presented to ED c/o abdominal pain with associated n/v and diarrhea, dizziness x 2days. Pt admitted for acute gastroenteritis. Pt has remained afebrile, no leukocytosis. CTAP with nonspecific enteritis with a diarrheal state. Negative for bowel obstruction. Patient's nausea. vomittng, diarrhea resolved. Tolerated advancement in diet. AG acidosis improved on IVF hydration. Noted to have hypokalemia and supplemented. Psychiatry was consulted in the ED for significant anxiety. Evaluated and recommended to cont home meds. Pt noted to have dizziness which could be due to vertigo vs orthostatic from dehydration. CTH unremarkable. MRI negative. Dizziness improved. No focal neurologic findings. Pt given script for outpatient vestibular rehab. Pt medically stable for discharge with appropriate outpatient follow up pending CTAP.    All electrolyte abnormalities were monitored carefully and repleted as necessary during this hospitalization. At the time of discharge patient was hemodynamically stable and amenable to all terms of discharge.    Pt is a 73yo F with PMHx of anxiety, depression, fibromyalgia, breast cancer, HLD presented to ED c/o abdominal pain with associated n/v and diarrhea, dizziness x 2days. Pt admitted for acute gastroenteritis.. Pt has remained afebrile, no leukocytosis. CTAP with nonspecific enteritis with a diarrheal state. Negative for bowel obstruction. Patient's nausea. vomittng, diarrhea resolved. Tolerated advancement in diet. AG acidosis improved on IVF hydration. However hten noted with worsening abdominal pain, repeat CT a/p with SBO. Continue on IVF, having BMS. Seen by surgical team, per them no clinical findings to suggest SBO. Noted to have hypokalemia and supplemented. Psychiatry was consulted in the ED for significant anxiety. Evaluated and recommended to cont home meds. Pt noted to have dizziness which could be due to vertigo vs orthostatic from dehydration. CTH unremarkable. MRI negative. Dizziness improved. No focal neurologic findings. Pt given script for outpatient vestibular rehab. All electrolyte abnormalities were monitored carefully and repleted as necessary during this hospitalization. At the time of discharge patient was hemodynamically stable and amenable to all terms of discharge.  Pt medically stable for discharge with appropriate outpatient follow up.

## 2022-04-22 NOTE — DISCHARGE NOTE PROVIDER - NSDCMRMEDTOKEN_GEN_ALL_CORE_FT
albuterol 90 mcg/inh inhalation aerosol: 2 puff(s) inhaled every 6 hours, As Needed  atorvastatin 20 mg oral tablet: 1 tab(s) orally once a day  clonazePAM 1 mg oral tablet: 1 tab(s) orally 3 times a day LAST FILLED: 3/6/22  diclofenac 1% topical gel: Apply topically to affected area 4 times a day, As Needed for pain  dicyclomine 10 mg oral capsule: 1 cap(s) orally 4 times a day before meals and at bedtime  DULoxetine 30 mg oral delayed release capsule: 3 cap(s) orally once a day  leflunomide 10 mg oral tablet: 1 tab(s) orally once a day  meclizine 12.5 mg oral tablet: 1 tab(s) orally 3 times a day, As Needed for dizziness and nausea  meloxicam 7.5 mg oral tablet: 1 tab(s) orally once a day, As Needed for pain  QUEtiapine 400 mg oral tablet: 1 tab(s) orally once a day (at bedtime)  Restasis 0.05% ophthalmic emulsion: 1 drop(s) in each eye every 12 hours  tiZANidine 4 mg oral tablet: 1 tab(s) orally 3 times a day, As Needed   albuterol 90 mcg/inh inhalation aerosol: 2 puff(s) inhaled every 6 hours, As Needed  atorvastatin 20 mg oral tablet: 1 tab(s) orally once a day  clonazePAM 1 mg oral tablet: 1 tab(s) orally 3 times a day LAST FILLED: 3/6/22  diclofenac 1% topical gel: Apply topically to affected area 4 times a day, As Needed for pain  dicyclomine 10 mg oral capsule: 1 cap(s) orally 4 times a day before meals and at bedtime  DULoxetine 30 mg oral delayed release capsule: 3 cap(s) orally once a day  leflunomide 10 mg oral tablet: 1 tab(s) orally once a day  meclizine 12.5 mg oral tablet: 1 tab(s) orally 3 times a day, As Needed for dizziness and nausea  meloxicam 7.5 mg oral tablet: 1 tab(s) orally once a day, As Needed for pain  Physical Therapy: Vestibular Rehabilitation : Vestibular Rehabilitation for Vertigo  3 times weekly for 4 weeks  QUEtiapine 400 mg oral tablet: 1 tab(s) orally once a day (at bedtime)  Restasis 0.05% ophthalmic emulsion: 1 drop(s) in each eye every 12 hours  tiZANidine 4 mg oral tablet: 1 tab(s) orally 3 times a day, As Needed   albuterol 90 mcg/inh inhalation aerosol: 2 puff(s) inhaled every 6 hours, As Needed  atorvastatin 20 mg oral tablet: 1 tab(s) orally once a day  clonazePAM 1 mg oral tablet: 1 tab(s) orally 3 times a day LAST FILLED: 3/6/22  diclofenac 1% topical gel: Apply topically to affected area 4 times a day, As Needed for pain  dicyclomine 10 mg oral capsule: 1 cap(s) orally 3 times a day (before meals)  DULoxetine 30 mg oral delayed release capsule: 3 cap(s) orally once a day  leflunomide 10 mg oral tablet: 1 tab(s) orally once a day  meclizine 12.5 mg oral tablet: 1 tab(s) orally 3 times a day, As Needed for dizziness and nausea  meloxicam 7.5 mg oral tablet: 1 tab(s) orally once a day, As Needed for pain  Physical Therapy: Vestibular Rehabilitation : Vestibular Rehabilitation for Vertigo  3 times weekly for 4 weeks  QUEtiapine 400 mg oral tablet: 1 tab(s) orally once a day (at bedtime)  Restasis 0.05% ophthalmic emulsion: 1 drop(s) in each eye every 12 hours  tiZANidine 4 mg oral tablet: 1 tab(s) orally 3 times a day, As Needed

## 2022-04-22 NOTE — DISCHARGE NOTE PROVIDER - CARE PROVIDERS DIRECT ADDRESSES
,DirectAddress_Unknown ,DirectAddress_Unknown,[email or web address]

## 2022-04-22 NOTE — DISCHARGE NOTE PROVIDER - PROVIDER TOKENS
FREE:[LAST:[Primary care medical doctor],PHONE:[(   )    -],FAX:[(   )    -],FOLLOWUP:[1 week]] FREE:[LAST:[Primary care medical doctor],PHONE:[(   )    -],FAX:[(   )    -],FOLLOWUP:[1 week]],PROVIDER:[TOKEN:[[id number]]]

## 2022-04-23 LAB
ANION GAP SERPL CALC-SCNC: 12 MMOL/L — SIGNIFICANT CHANGE UP (ref 5–17)
BASOPHILS # BLD AUTO: 0.03 K/UL — SIGNIFICANT CHANGE UP (ref 0–0.2)
BASOPHILS NFR BLD AUTO: 0.4 % — SIGNIFICANT CHANGE UP (ref 0–2)
BUN SERPL-MCNC: 10.9 MG/DL — SIGNIFICANT CHANGE UP (ref 8–20)
CALCIUM SERPL-MCNC: 8.6 MG/DL — SIGNIFICANT CHANGE UP (ref 8.6–10.2)
CHLORIDE SERPL-SCNC: 105 MMOL/L — SIGNIFICANT CHANGE UP (ref 98–107)
CO2 SERPL-SCNC: 22 MMOL/L — SIGNIFICANT CHANGE UP (ref 22–29)
CREAT SERPL-MCNC: 0.61 MG/DL — SIGNIFICANT CHANGE UP (ref 0.5–1.3)
EGFR: 96 ML/MIN/1.73M2 — SIGNIFICANT CHANGE UP
EOSINOPHIL # BLD AUTO: 0.08 K/UL — SIGNIFICANT CHANGE UP (ref 0–0.5)
EOSINOPHIL NFR BLD AUTO: 1 % — SIGNIFICANT CHANGE UP (ref 0–6)
GLUCOSE SERPL-MCNC: 106 MG/DL — HIGH (ref 70–99)
HCT VFR BLD CALC: 35.5 % — SIGNIFICANT CHANGE UP (ref 34.5–45)
HGB BLD-MCNC: 11.4 G/DL — LOW (ref 11.5–15.5)
IMM GRANULOCYTES NFR BLD AUTO: 0.4 % — SIGNIFICANT CHANGE UP (ref 0–1.5)
LYMPHOCYTES # BLD AUTO: 2.19 K/UL — SIGNIFICANT CHANGE UP (ref 1–3.3)
LYMPHOCYTES # BLD AUTO: 28.5 % — SIGNIFICANT CHANGE UP (ref 13–44)
MAGNESIUM SERPL-MCNC: 1.9 MG/DL — SIGNIFICANT CHANGE UP (ref 1.8–2.6)
MCHC RBC-ENTMCNC: 30.5 PG — SIGNIFICANT CHANGE UP (ref 27–34)
MCHC RBC-ENTMCNC: 32.1 GM/DL — SIGNIFICANT CHANGE UP (ref 32–36)
MCV RBC AUTO: 94.9 FL — SIGNIFICANT CHANGE UP (ref 80–100)
MONOCYTES # BLD AUTO: 0.81 K/UL — SIGNIFICANT CHANGE UP (ref 0–0.9)
MONOCYTES NFR BLD AUTO: 10.5 % — SIGNIFICANT CHANGE UP (ref 2–14)
NEUTROPHILS # BLD AUTO: 4.55 K/UL — SIGNIFICANT CHANGE UP (ref 1.8–7.4)
NEUTROPHILS NFR BLD AUTO: 59.2 % — SIGNIFICANT CHANGE UP (ref 43–77)
PHOSPHATE SERPL-MCNC: 3 MG/DL — SIGNIFICANT CHANGE UP (ref 2.4–4.7)
PLATELET # BLD AUTO: 179 K/UL — SIGNIFICANT CHANGE UP (ref 150–400)
POTASSIUM SERPL-MCNC: 4 MMOL/L — SIGNIFICANT CHANGE UP (ref 3.5–5.3)
POTASSIUM SERPL-SCNC: 4 MMOL/L — SIGNIFICANT CHANGE UP (ref 3.5–5.3)
RBC # BLD: 3.74 M/UL — LOW (ref 3.8–5.2)
RBC # FLD: 13.6 % — SIGNIFICANT CHANGE UP (ref 10.3–14.5)
SODIUM SERPL-SCNC: 139 MMOL/L — SIGNIFICANT CHANGE UP (ref 135–145)
WBC # BLD: 7.69 K/UL — SIGNIFICANT CHANGE UP (ref 3.8–10.5)
WBC # FLD AUTO: 7.69 K/UL — SIGNIFICANT CHANGE UP (ref 3.8–10.5)

## 2022-04-23 PROCEDURE — 99233 SBSQ HOSP IP/OBS HIGH 50: CPT

## 2022-04-23 PROCEDURE — 70551 MRI BRAIN STEM W/O DYE: CPT | Mod: 26

## 2022-04-23 RX ORDER — ONDANSETRON 8 MG/1
4 TABLET, FILM COATED ORAL EVERY 6 HOURS
Refills: 0 | Status: DISCONTINUED | OUTPATIENT
Start: 2022-04-23 | End: 2022-04-30

## 2022-04-23 RX ORDER — MORPHINE SULFATE 50 MG/1
1 CAPSULE, EXTENDED RELEASE ORAL ONCE
Refills: 0 | Status: DISCONTINUED | OUTPATIENT
Start: 2022-04-23 | End: 2022-04-23

## 2022-04-23 RX ADMIN — Medication 1 MILLIGRAM(S): at 05:07

## 2022-04-23 RX ADMIN — Medication 1 APPLICATION(S): at 05:07

## 2022-04-23 RX ADMIN — ATORVASTATIN CALCIUM 20 MILLIGRAM(S): 80 TABLET, FILM COATED ORAL at 22:01

## 2022-04-23 RX ADMIN — Medication 25 MILLIGRAM(S): at 22:02

## 2022-04-23 RX ADMIN — QUETIAPINE FUMARATE 400 MILLIGRAM(S): 200 TABLET, FILM COATED ORAL at 22:00

## 2022-04-23 RX ADMIN — MORPHINE SULFATE 1 MILLIGRAM(S): 50 CAPSULE, EXTENDED RELEASE ORAL at 10:28

## 2022-04-23 RX ADMIN — Medication 81 MILLIGRAM(S): at 14:19

## 2022-04-23 RX ADMIN — Medication 1 MILLIGRAM(S): at 22:02

## 2022-04-23 RX ADMIN — ONDANSETRON 4 MILLIGRAM(S): 8 TABLET, FILM COATED ORAL at 02:22

## 2022-04-23 RX ADMIN — Medication 25 MILLIGRAM(S): at 05:07

## 2022-04-23 RX ADMIN — Medication 1 MILLIGRAM(S): at 14:17

## 2022-04-23 RX ADMIN — ENOXAPARIN SODIUM 40 MILLIGRAM(S): 100 INJECTION SUBCUTANEOUS at 05:07

## 2022-04-23 RX ADMIN — Medication 1 APPLICATION(S): at 19:06

## 2022-04-23 RX ADMIN — MORPHINE SULFATE 1 MILLIGRAM(S): 50 CAPSULE, EXTENDED RELEASE ORAL at 10:13

## 2022-04-23 NOTE — PROGRESS NOTE ADULT - ASSESSMENT
73yo F with PMHx of anxiety, depression, fibromyalgia, breast cancer, HLD presented to ED c/o abdominal pian with associated n/v and diarrhea, dizziness x 2days.     #Acute Gastroenteritis  - with abdominal pain n/v, diarrhea, likely viral induced  - with worsening pain today  - morphine IVx1  - check CT abdomen pelvis with IV contrast  - diet advanced yesterday - continue as tolerates    #Hypokalemia in setting of diarrhea and emesis   - repleted  - repeat in the AM    #Dizziness   - vertigo vs orthostatic from dehydration  - CTH unremarkable  - MRI deferred as dizziness improved, no focal neurologic deficits. Has known chronic dizziness and prescribed meclizine at home.     #Depression/anxiety   - cont with Seroquel, Klonopin    #HLD   - statin    #DVT ppx  - lovenox         #Dispo:   Patient was planned for discharge yesterday given resolution of symptoms.  Ex  Jesus was contacted per request and listed numerous reasons that he believes patient should remain in hospital including that she is not taking her anxiety meds at home, was having vomiting and diarrhea, has psychiatric diagnoses, and lives on 2nd floor and believes she cannot climb the stairs or take care of herself.  CM updated regarding this conversation and will have PT re-assess.

## 2022-04-24 LAB
ANION GAP SERPL CALC-SCNC: 13 MMOL/L — SIGNIFICANT CHANGE UP (ref 5–17)
BUN SERPL-MCNC: 8.7 MG/DL — SIGNIFICANT CHANGE UP (ref 8–20)
CALCIUM SERPL-MCNC: 8.8 MG/DL — SIGNIFICANT CHANGE UP (ref 8.6–10.2)
CHLORIDE SERPL-SCNC: 103 MMOL/L — SIGNIFICANT CHANGE UP (ref 98–107)
CO2 SERPL-SCNC: 24 MMOL/L — SIGNIFICANT CHANGE UP (ref 22–29)
CREAT SERPL-MCNC: 0.64 MG/DL — SIGNIFICANT CHANGE UP (ref 0.5–1.3)
EGFR: 95 ML/MIN/1.73M2 — SIGNIFICANT CHANGE UP
GLUCOSE SERPL-MCNC: 100 MG/DL — HIGH (ref 70–99)
POTASSIUM SERPL-MCNC: 3.5 MMOL/L — SIGNIFICANT CHANGE UP (ref 3.5–5.3)
POTASSIUM SERPL-SCNC: 3.5 MMOL/L — SIGNIFICANT CHANGE UP (ref 3.5–5.3)
SODIUM SERPL-SCNC: 140 MMOL/L — SIGNIFICANT CHANGE UP (ref 135–145)

## 2022-04-24 PROCEDURE — 99232 SBSQ HOSP IP/OBS MODERATE 35: CPT

## 2022-04-24 RX ADMIN — Medication 1 MILLIGRAM(S): at 22:30

## 2022-04-24 RX ADMIN — Medication 30 MILLILITER(S): at 18:11

## 2022-04-24 RX ADMIN — Medication 1 MILLIGRAM(S): at 05:07

## 2022-04-24 RX ADMIN — DULOXETINE HYDROCHLORIDE 30 MILLIGRAM(S): 30 CAPSULE, DELAYED RELEASE ORAL at 11:13

## 2022-04-24 RX ADMIN — ENOXAPARIN SODIUM 40 MILLIGRAM(S): 100 INJECTION SUBCUTANEOUS at 05:07

## 2022-04-24 RX ADMIN — Medication 25 MILLIGRAM(S): at 22:31

## 2022-04-24 RX ADMIN — Medication 81 MILLIGRAM(S): at 11:13

## 2022-04-24 RX ADMIN — Medication 1 APPLICATION(S): at 05:08

## 2022-04-24 RX ADMIN — Medication 25 MILLIGRAM(S): at 05:08

## 2022-04-24 RX ADMIN — Medication 1 APPLICATION(S): at 18:11

## 2022-04-24 RX ADMIN — Medication 25 MILLIGRAM(S): at 11:13

## 2022-04-24 RX ADMIN — QUETIAPINE FUMARATE 400 MILLIGRAM(S): 200 TABLET, FILM COATED ORAL at 22:30

## 2022-04-24 RX ADMIN — ATORVASTATIN CALCIUM 20 MILLIGRAM(S): 80 TABLET, FILM COATED ORAL at 22:30

## 2022-04-24 RX ADMIN — Medication 1 MILLIGRAM(S): at 11:13

## 2022-04-24 NOTE — PROGRESS NOTE ADULT - ASSESSMENT
72 yr old female with anxiety, depression, breast cancer, fibromyalgia, hyperlipidemia presented with complaints of abdominal pain, nausea, vomiting, diarrhea and dizziness. On admission labs revealed mild anion gap metabolic acidosis, CT abdomen with enteritis. IVF were initiated 72 yr old female with anxiety, depression, breast cancer, fibromyalgia, hyperlipidemia presented with complaints of abdominal pain, nausea, vomiting, diarrhea and dizziness. On admission labs revealed mild anion gap metabolic acidosis, CT abdomen with enteritis. IVF were initiated.    1. Abdominal pain, enteritis;  Repeat imaging with contrast pending.  continue IVF    2. Anxiety/depression:  Continue Klonopin, Duloxetine, Seroquel.    3. Hyperlipidemia:  Continue statin.    4. DVT ppx:  Continue heparin.    Discussed with patient and RN.

## 2022-04-25 LAB
ANION GAP SERPL CALC-SCNC: 15 MMOL/L — SIGNIFICANT CHANGE UP (ref 5–17)
BUN SERPL-MCNC: 12.3 MG/DL — SIGNIFICANT CHANGE UP (ref 8–20)
CALCIUM SERPL-MCNC: 9.3 MG/DL — SIGNIFICANT CHANGE UP (ref 8.6–10.2)
CHLORIDE SERPL-SCNC: 98 MMOL/L — SIGNIFICANT CHANGE UP (ref 98–107)
CO2 SERPL-SCNC: 25 MMOL/L — SIGNIFICANT CHANGE UP (ref 22–29)
CREAT SERPL-MCNC: 0.77 MG/DL — SIGNIFICANT CHANGE UP (ref 0.5–1.3)
EGFR: 83 ML/MIN/1.73M2 — SIGNIFICANT CHANGE UP
GLUCOSE SERPL-MCNC: 116 MG/DL — HIGH (ref 70–99)
HCT VFR BLD CALC: 41.7 % — SIGNIFICANT CHANGE UP (ref 34.5–45)
HGB BLD-MCNC: 13.4 G/DL — SIGNIFICANT CHANGE UP (ref 11.5–15.5)
MAGNESIUM SERPL-MCNC: 2.2 MG/DL — SIGNIFICANT CHANGE UP (ref 1.6–2.6)
MCHC RBC-ENTMCNC: 30.2 PG — SIGNIFICANT CHANGE UP (ref 27–34)
MCHC RBC-ENTMCNC: 32.1 GM/DL — SIGNIFICANT CHANGE UP (ref 32–36)
MCV RBC AUTO: 94.1 FL — SIGNIFICANT CHANGE UP (ref 80–100)
PHOSPHATE SERPL-MCNC: 3.1 MG/DL — SIGNIFICANT CHANGE UP (ref 2.4–4.7)
PLATELET # BLD AUTO: 267 K/UL — SIGNIFICANT CHANGE UP (ref 150–400)
POTASSIUM SERPL-MCNC: 4.1 MMOL/L — SIGNIFICANT CHANGE UP (ref 3.5–5.3)
POTASSIUM SERPL-SCNC: 4.1 MMOL/L — SIGNIFICANT CHANGE UP (ref 3.5–5.3)
RBC # BLD: 4.43 M/UL — SIGNIFICANT CHANGE UP (ref 3.8–5.2)
RBC # FLD: 13.4 % — SIGNIFICANT CHANGE UP (ref 10.3–14.5)
SODIUM SERPL-SCNC: 138 MMOL/L — SIGNIFICANT CHANGE UP (ref 135–145)
WBC # BLD: 8.88 K/UL — SIGNIFICANT CHANGE UP (ref 3.8–10.5)
WBC # FLD AUTO: 8.88 K/UL — SIGNIFICANT CHANGE UP (ref 3.8–10.5)

## 2022-04-25 PROCEDURE — 99221 1ST HOSP IP/OBS SF/LOW 40: CPT

## 2022-04-25 PROCEDURE — 74177 CT ABD & PELVIS W/CONTRAST: CPT | Mod: 26

## 2022-04-25 PROCEDURE — 99232 SBSQ HOSP IP/OBS MODERATE 35: CPT

## 2022-04-25 RX ADMIN — Medication 25 MILLIGRAM(S): at 21:37

## 2022-04-25 RX ADMIN — MORPHINE SULFATE 2 MILLIGRAM(S): 50 CAPSULE, EXTENDED RELEASE ORAL at 16:42

## 2022-04-25 RX ADMIN — ATORVASTATIN CALCIUM 20 MILLIGRAM(S): 80 TABLET, FILM COATED ORAL at 21:37

## 2022-04-25 RX ADMIN — Medication 1 APPLICATION(S): at 17:02

## 2022-04-25 RX ADMIN — Medication 1 MILLIGRAM(S): at 21:36

## 2022-04-25 RX ADMIN — Medication 25 MILLIGRAM(S): at 05:21

## 2022-04-25 RX ADMIN — ONDANSETRON 4 MILLIGRAM(S): 8 TABLET, FILM COATED ORAL at 16:27

## 2022-04-25 RX ADMIN — Medication 1 MILLIGRAM(S): at 05:21

## 2022-04-25 RX ADMIN — ONDANSETRON 4 MILLIGRAM(S): 8 TABLET, FILM COATED ORAL at 20:15

## 2022-04-25 RX ADMIN — QUETIAPINE FUMARATE 400 MILLIGRAM(S): 200 TABLET, FILM COATED ORAL at 21:37

## 2022-04-25 RX ADMIN — ENOXAPARIN SODIUM 40 MILLIGRAM(S): 100 INJECTION SUBCUTANEOUS at 05:21

## 2022-04-25 RX ADMIN — Medication 1 APPLICATION(S): at 05:21

## 2022-04-25 NOTE — CONSULT NOTE ADULT - SUBJECTIVE AND OBJECTIVE BOX
ACUTE CARE SURGERY CONSULT    ACS consulted for CT finding of "proximal/mid small bowel obstruction with mild mesenteric edema" in patient admitted for enteritis. HPI as below. Repeat CT performed today as patient had persisting, yet improved, abdominal pain. Patient corroborates having epigastric pain with diarrhea and vertigo a few days prior to admission. Since admission, pain has improved and now is mild. Has been tolerating a diet since admission, solids since 4/22, without any issues. Denies nausea or vomiting. Has not had BM since large diarrhea episode on day of admission. Continues to pass flatus. Of note, patient reports history of IBS and self-discontinuing dicyclomine 1-2 weeks prior to admission.         HPI:  73yo F with PMHx of anxiety, depression, fibromyalgia, breast cancer, HLD presented to ED c/o abdominal pian with associated n/v and diarrhea, dizziness x 2days. Pt states she was in a good state of health until 2 days ago symptoms onset after she had strawberry shortcake. Pt has been with generalized abdominal pain, n/v unable to eat or drink in the past 2 days. She feels lightheaded and dizzy, denies fever or chills. In the Ed afebrile, no leukocytosis, labs pertinent for AG acidosis, CTH unremarkable, CT a/p with mild generalized prominence of fluid-filled small bowel loops consistent with a nonspecific enteritis. Some fluid in the colon suggests a diarrheal state.  (21 Apr 2022 04:42)      PAST MEDICAL HISTORY:  Depressed    Anxiety    Breast CA    HLD (hyperlipidemia)        PAST SURGICAL HISTORY:  No significant past surgical history    S/P cholecystectomy        ALLERGIES:  Cipro (Stomach Upset; Vomiting)  Demerol HCl (Stomach Upset; Vomiting)      FAMILY HISTORY: Noncontributory    SOCIAL HISTORY: Denies tobacco, EtOH, illicit substance use.     HOME MEDICATIONS:    MEDICATIONS  (STANDING):  aspirin  chewable 81 milliGRAM(s) Oral daily  atorvastatin 20 milliGRAM(s) Oral at bedtime  clonazePAM  Tablet 1 milliGRAM(s) Oral three times a day  DULoxetine 30 milliGRAM(s) Oral daily  enoxaparin Injectable 40 milliGRAM(s) SubCutaneous every 24 hours  erythromycin   Ointment 1 Application(s) Both EYES two times a day  lactated ringers. 1000 milliLiter(s) (100 mL/Hr) IV Continuous <Continuous>  meclizine 25 milliGRAM(s) Oral three times a day  QUEtiapine 400 milliGRAM(s) Oral at bedtime    MEDICATIONS  (PRN):  acetaminophen     Tablet .. 650 milliGRAM(s) Oral every 6 hours PRN Temp greater or equal to 38C (100.4F), Mild Pain (1 - 3)  ALBUTerol    90 MICROgram(s) HFA Inhaler 2 Puff(s) Inhalation every 6 hours PRN Bronchospasm  aluminum hydroxide/magnesium hydroxide/simethicone Suspension 30 milliLiter(s) Oral every 4 hours PRN Dyspepsia  melatonin 3 milliGRAM(s) Oral at bedtime PRN Insomnia  morphine  - Injectable 2 milliGRAM(s) IV Push every 6 hours PRN Moderate Pain (4 - 6)  ondansetron   Disintegrating Tablet 4 milliGRAM(s) Oral every 6 hours PRN Nausea and/or Vomiting  ondansetron Injectable 4 milliGRAM(s) IV Push every 8 hours PRN Nausea and/or Vomiting      VITALS & I/Os:  Vital Signs Last 24 Hrs  T(C): 36.6 (25 Apr 2022 12:25), Max: 37.1 (24 Apr 2022 19:23)  T(F): 97.8 (25 Apr 2022 12:25), Max: 98.7 (24 Apr 2022 19:23)  HR: 103 (25 Apr 2022 12:25) (88 - 103)  BP: 117/66 (25 Apr 2022 12:25) (117/66 - 143/82)  BP(mean): --  RR: 18 (25 Apr 2022 12:25) (18 - 18)  SpO2: 91% (25 Apr 2022 12:25) (91% - 93%)  CAPILLARY BLOOD GLUCOSE          I&O's Summary    24 Apr 2022 07:01  -  25 Apr 2022 07:00  --------------------------------------------------------  IN: 0 mL / OUT: 150 mL / NET: -150 mL        GENERAL: Alert, sitting up in no acute distress  HEENT: EOMI. Trachea midline.  RESPIRATORY: Unlabored, no conversational dyspnea  CARDIOVASCULAR: RRR.   GASTROINTESTINAL: Abdomen soft,  ND, -R/-G. Mildly tender to palpation at epigastrium.   NEUROLOGIC: Cranial nerves II-XII grossly intact. No focal neurological deficits.  INTEGUMENTARY: No overt rashes or lesions, petechia or purpura. Good turgor. No edema.  MUSCULOSKELETAL: No cyanosis or clubbing. No gross deformities.       LABS:    04-24    140  |  103  |  8.7  ----------------------------<  100<H>  3.5   |  24.0  |  0.64    Ca    8.8      24 Apr 2022 05:33      Lactate: acetaminophen     Tablet .. 650 milliGRAM(s) Oral every 6 hours PRN  ALBUTerol    90 MICROgram(s) HFA Inhaler 2 Puff(s) Inhalation every 6 hours PRN  aluminum hydroxide/magnesium hydroxide/simethicone Suspension 30 milliLiter(s) Oral every 4 hours PRN  aspirin  chewable 81 milliGRAM(s) Oral daily  atorvastatin 20 milliGRAM(s) Oral at bedtime  clonazePAM  Tablet 1 milliGRAM(s) Oral three times a day  DULoxetine 30 milliGRAM(s) Oral daily  enoxaparin Injectable 40 milliGRAM(s) SubCutaneous every 24 hours  erythromycin   Ointment 1 Application(s) Both EYES two times a day  lactated ringers. 1000 milliLiter(s) IV Continuous <Continuous>  meclizine 25 milliGRAM(s) Oral three times a day  melatonin 3 milliGRAM(s) Oral at bedtime PRN  morphine  - Injectable 2 milliGRAM(s) IV Push every 6 hours PRN  ondansetron   Disintegrating Tablet 4 milliGRAM(s) Oral every 6 hours PRN  ondansetron Injectable 4 milliGRAM(s) IV Push every 8 hours PRN  QUEtiapine 400 milliGRAM(s) Oral at bedtime         IMAGING:  CT A/P:  IMPRESSION:  Proximal/mid small bowel obstruction with mild mesenteric edema, new since 4/20/2022.    CRISTO MAE MD; Attending Radiologist  This addendum was electronically signed on: Apr 25 2022 11:54AM.

## 2022-04-25 NOTE — PROGRESS NOTE ADULT - ASSESSMENT
72 yr old female with anxiety, depression, breast cancer, fibromyalgia, hyperlipidemia presented with complaints of abdominal pain, nausea, vomiting, diarrhea and dizziness. On admission labs revealed mild anion gap metabolic acidosis, CT abdomen with enteritis. IVF were initiated. Patient reported continued abdominal pain, CT abdomen was ordered, revealed small bowel obstruction and mesenteric edema.     1. Small bowel obstruction:  NPO  Monitor lytes  Surgery consult.    2. Anxiety/depression:  Continue Klonopin, Duloxetine, Seroquel.    3. Hyperlipidemia:  Continue statin.    4. DVT ppx:  Continue heparin.    Discussed with patient and RN.

## 2022-04-25 NOTE — CONSULT NOTE ADULT - ASSESSMENT
71yo F with PMHx of anxiety, depression, fibromyalgia, breast cancer, HLD, and IBS presented to ED c/o abdominal pian with associated n/v and diarrhea, dizziness x 2days, admitted for management of enteritis. Repeat CT with finding of "proximal/mid small bowel obstruction with mild mesenteric edema." Patient has mild abdominal pain which has improved since admission, has been tolerating a diet since admission, has no history of nausea or vomiting, and is passing flatus. No clinical findings to suggest SBO. No surgical intervention indicated at this time.     #Abd pain  - Mild, improved since admission. No clinical signs of obstruction. No surgical intervention indicated.  - Consider f/u with PCP regarding IBS management and medications. Patient self-discontinued dicyclomine 1-2 weeks prior to admission.  - Resume diet as tolerated.  - Re-consult as needed.     Plan discussed with Dr. Monge who agrees.  Consulted at 12:00, seen at 12:30.  71yo F with PMHx of anxiety, depression, fibromyalgia, breast cancer, HLD, and IBS presented to ED c/o abdominal pian with associated n/v and diarrhea, dizziness x 2days, admitted for management of enteritis. Repeat CT with finding of "proximal/mid small bowel obstruction with mild mesenteric edema." Patient has mild abdominal pain which has improved since admission, has been tolerating a diet since admission, has no history of nausea or vomiting, and is passing flatus. No clinical findings to suggest SBO. No surgical intervention indicated at this time.     #Abd pain  - Mild, improved since admission. No clinical signs of obstruction. No surgical intervention indicated.  - Consider f/u with PCP regarding IBS management and medications. Patient self-discontinued dicyclomine 1-2 weeks prior to admission.  - Resume diet as tolerated.  - Re-consult as needed.     Plan discussed with Dr. Fraser who agrees.  Consulted at 12:00, seen at 12:30.

## 2022-04-26 LAB
ANION GAP SERPL CALC-SCNC: 14 MMOL/L — SIGNIFICANT CHANGE UP (ref 5–17)
BASOPHILS # BLD AUTO: 0.04 K/UL — SIGNIFICANT CHANGE UP (ref 0–0.2)
BASOPHILS NFR BLD AUTO: 0.4 % — SIGNIFICANT CHANGE UP (ref 0–2)
BUN SERPL-MCNC: 11.7 MG/DL — SIGNIFICANT CHANGE UP (ref 8–20)
CALCIUM SERPL-MCNC: 9.2 MG/DL — SIGNIFICANT CHANGE UP (ref 8.6–10.2)
CHLORIDE SERPL-SCNC: 98 MMOL/L — SIGNIFICANT CHANGE UP (ref 98–107)
CO2 SERPL-SCNC: 23 MMOL/L — SIGNIFICANT CHANGE UP (ref 22–29)
CREAT SERPL-MCNC: 0.61 MG/DL — SIGNIFICANT CHANGE UP (ref 0.5–1.3)
EGFR: 96 ML/MIN/1.73M2 — SIGNIFICANT CHANGE UP
EOSINOPHIL # BLD AUTO: 0.08 K/UL — SIGNIFICANT CHANGE UP (ref 0–0.5)
EOSINOPHIL NFR BLD AUTO: 0.8 % — SIGNIFICANT CHANGE UP (ref 0–6)
GLUCOSE SERPL-MCNC: 115 MG/DL — HIGH (ref 70–99)
HCT VFR BLD CALC: 39 % — SIGNIFICANT CHANGE UP (ref 34.5–45)
HGB BLD-MCNC: 12.5 G/DL — SIGNIFICANT CHANGE UP (ref 11.5–15.5)
IMM GRANULOCYTES NFR BLD AUTO: 0.4 % — SIGNIFICANT CHANGE UP (ref 0–1.5)
LYMPHOCYTES # BLD AUTO: 2.31 K/UL — SIGNIFICANT CHANGE UP (ref 1–3.3)
LYMPHOCYTES # BLD AUTO: 23.1 % — SIGNIFICANT CHANGE UP (ref 13–44)
MAGNESIUM SERPL-MCNC: 2 MG/DL — SIGNIFICANT CHANGE UP (ref 1.6–2.6)
MCHC RBC-ENTMCNC: 30.3 PG — SIGNIFICANT CHANGE UP (ref 27–34)
MCHC RBC-ENTMCNC: 32.1 GM/DL — SIGNIFICANT CHANGE UP (ref 32–36)
MCV RBC AUTO: 94.4 FL — SIGNIFICANT CHANGE UP (ref 80–100)
MONOCYTES # BLD AUTO: 0.99 K/UL — HIGH (ref 0–0.9)
MONOCYTES NFR BLD AUTO: 9.9 % — SIGNIFICANT CHANGE UP (ref 2–14)
NEUTROPHILS # BLD AUTO: 6.55 K/UL — SIGNIFICANT CHANGE UP (ref 1.8–7.4)
NEUTROPHILS NFR BLD AUTO: 65.4 % — SIGNIFICANT CHANGE UP (ref 43–77)
PHOSPHATE SERPL-MCNC: 4.5 MG/DL — SIGNIFICANT CHANGE UP (ref 2.4–4.7)
PLATELET # BLD AUTO: 245 K/UL — SIGNIFICANT CHANGE UP (ref 150–400)
POTASSIUM SERPL-MCNC: 4.2 MMOL/L — SIGNIFICANT CHANGE UP (ref 3.5–5.3)
POTASSIUM SERPL-SCNC: 4.2 MMOL/L — SIGNIFICANT CHANGE UP (ref 3.5–5.3)
RBC # BLD: 4.13 M/UL — SIGNIFICANT CHANGE UP (ref 3.8–5.2)
RBC # FLD: 13.3 % — SIGNIFICANT CHANGE UP (ref 10.3–14.5)
SODIUM SERPL-SCNC: 135 MMOL/L — SIGNIFICANT CHANGE UP (ref 135–145)
WBC # BLD: 10.01 K/UL — SIGNIFICANT CHANGE UP (ref 3.8–10.5)
WBC # FLD AUTO: 10.01 K/UL — SIGNIFICANT CHANGE UP (ref 3.8–10.5)

## 2022-04-26 PROCEDURE — 99232 SBSQ HOSP IP/OBS MODERATE 35: CPT

## 2022-04-26 RX ADMIN — Medication 1 APPLICATION(S): at 05:48

## 2022-04-26 RX ADMIN — ENOXAPARIN SODIUM 40 MILLIGRAM(S): 100 INJECTION SUBCUTANEOUS at 05:48

## 2022-04-26 RX ADMIN — Medication 1 MILLIGRAM(S): at 05:49

## 2022-04-26 RX ADMIN — ONDANSETRON 4 MILLIGRAM(S): 8 TABLET, FILM COATED ORAL at 21:21

## 2022-04-26 RX ADMIN — Medication 25 MILLIGRAM(S): at 21:20

## 2022-04-26 RX ADMIN — DULOXETINE HYDROCHLORIDE 30 MILLIGRAM(S): 30 CAPSULE, DELAYED RELEASE ORAL at 13:02

## 2022-04-26 RX ADMIN — Medication 25 MILLIGRAM(S): at 13:02

## 2022-04-26 RX ADMIN — ONDANSETRON 4 MILLIGRAM(S): 8 TABLET, FILM COATED ORAL at 00:44

## 2022-04-26 RX ADMIN — Medication 1 MILLIGRAM(S): at 21:21

## 2022-04-26 RX ADMIN — QUETIAPINE FUMARATE 400 MILLIGRAM(S): 200 TABLET, FILM COATED ORAL at 21:21

## 2022-04-26 RX ADMIN — Medication 25 MILLIGRAM(S): at 05:49

## 2022-04-26 RX ADMIN — ONDANSETRON 4 MILLIGRAM(S): 8 TABLET, FILM COATED ORAL at 13:02

## 2022-04-26 RX ADMIN — ATORVASTATIN CALCIUM 20 MILLIGRAM(S): 80 TABLET, FILM COATED ORAL at 21:20

## 2022-04-26 RX ADMIN — MORPHINE SULFATE 2 MILLIGRAM(S): 50 CAPSULE, EXTENDED RELEASE ORAL at 00:43

## 2022-04-26 RX ADMIN — Medication 1 APPLICATION(S): at 17:31

## 2022-04-26 RX ADMIN — Medication 1 MILLIGRAM(S): at 13:02

## 2022-04-26 RX ADMIN — Medication 81 MILLIGRAM(S): at 13:02

## 2022-04-26 NOTE — PROGRESS NOTE ADULT - ASSESSMENT
72 yr old female with anxiety, depression, breast cancer, fibromyalgia, hyperlipidemia presented with complaints of abdominal pain, nausea, vomiting, diarrhea and dizziness. On admission labs revealed mild anion gap metabolic acidosis, CT abdomen with enteritis. IVF were initiated. Patient reported continued abdominal pain, CT abdomen was ordered, revealed small bowel obstruction and mesenteric edema.     1. Small bowel obstruction:  surgical eval noted  patient refusing to eat, states she has pain  exam benign  attempt clears today.  CT with no other acute pathology  prn antiemetics  no narcotis.    2. Anxiety/depression:  Continue Klonopin, Duloxetine, Seroquel.    3. Hyperlipidemia:  Continue statin.    4. DVT ppx:  Continue heparin.    Discussed with patient and RN.   Discharge planning pending when she can tolerate diet.

## 2022-04-27 LAB
ANION GAP SERPL CALC-SCNC: 12 MMOL/L — SIGNIFICANT CHANGE UP (ref 5–17)
BUN SERPL-MCNC: 11.6 MG/DL — SIGNIFICANT CHANGE UP (ref 8–20)
CALCIUM SERPL-MCNC: 8.6 MG/DL — SIGNIFICANT CHANGE UP (ref 8.6–10.2)
CHLORIDE SERPL-SCNC: 99 MMOL/L — SIGNIFICANT CHANGE UP (ref 98–107)
CO2 SERPL-SCNC: 24 MMOL/L — SIGNIFICANT CHANGE UP (ref 22–29)
CREAT SERPL-MCNC: 0.65 MG/DL — SIGNIFICANT CHANGE UP (ref 0.5–1.3)
EGFR: 95 ML/MIN/1.73M2 — SIGNIFICANT CHANGE UP
GLUCOSE SERPL-MCNC: 118 MG/DL — HIGH (ref 70–99)
POTASSIUM SERPL-MCNC: 3.7 MMOL/L — SIGNIFICANT CHANGE UP (ref 3.5–5.3)
POTASSIUM SERPL-SCNC: 3.7 MMOL/L — SIGNIFICANT CHANGE UP (ref 3.5–5.3)
SODIUM SERPL-SCNC: 135 MMOL/L — SIGNIFICANT CHANGE UP (ref 135–145)

## 2022-04-27 PROCEDURE — 99232 SBSQ HOSP IP/OBS MODERATE 35: CPT

## 2022-04-27 RX ADMIN — Medication 10 MILLIGRAM(S): at 15:24

## 2022-04-27 RX ADMIN — ONDANSETRON 4 MILLIGRAM(S): 8 TABLET, FILM COATED ORAL at 05:31

## 2022-04-27 RX ADMIN — Medication 10 MILLIGRAM(S): at 11:37

## 2022-04-27 RX ADMIN — QUETIAPINE FUMARATE 400 MILLIGRAM(S): 200 TABLET, FILM COATED ORAL at 22:10

## 2022-04-27 RX ADMIN — Medication 1 APPLICATION(S): at 15:25

## 2022-04-27 RX ADMIN — Medication 1 APPLICATION(S): at 05:31

## 2022-04-27 RX ADMIN — ATORVASTATIN CALCIUM 20 MILLIGRAM(S): 80 TABLET, FILM COATED ORAL at 22:11

## 2022-04-27 RX ADMIN — DULOXETINE HYDROCHLORIDE 30 MILLIGRAM(S): 30 CAPSULE, DELAYED RELEASE ORAL at 11:37

## 2022-04-27 RX ADMIN — Medication 81 MILLIGRAM(S): at 11:37

## 2022-04-27 RX ADMIN — Medication 1 MILLIGRAM(S): at 15:22

## 2022-04-27 RX ADMIN — Medication 25 MILLIGRAM(S): at 15:21

## 2022-04-27 RX ADMIN — ONDANSETRON 4 MILLIGRAM(S): 8 TABLET, FILM COATED ORAL at 15:26

## 2022-04-27 RX ADMIN — ENOXAPARIN SODIUM 40 MILLIGRAM(S): 100 INJECTION SUBCUTANEOUS at 05:31

## 2022-04-27 RX ADMIN — Medication 1 MILLIGRAM(S): at 05:31

## 2022-04-27 RX ADMIN — Medication 25 MILLIGRAM(S): at 22:11

## 2022-04-27 RX ADMIN — Medication 1 MILLIGRAM(S): at 22:10

## 2022-04-27 RX ADMIN — Medication 25 MILLIGRAM(S): at 05:31

## 2022-04-27 NOTE — DIETITIAN INITIAL EVALUATION ADULT - ADD RECOMMEND
1) Consider changing diet order to low fat/low fiber, as pt continues with GI distress 2) Recommend trialing Ensure Enlive 1x/day to optimize PO intake and provide an additional 350kcal, 20g protein per serving  3) Recommend Multivitamin, if no medical contradictions 4)  1) Consider changing diet order to low fat/low fiber, as pt continues with GI distress 2) Recommend trialing Ensure Enlive 1x/day to optimize PO intake and provide an additional 350kcal, 20g protein per serving  3) Recommend Multivitamin, if no medical contradictions

## 2022-04-27 NOTE — DIETITIAN INITIAL EVALUATION ADULT - ETIOLOGY
related to inability to meet sufficient protein-energy needs in setting of GI distress and persistent lack of appetite

## 2022-04-27 NOTE — DIETITIAN INITIAL EVALUATION ADULT - ORAL INTAKE PTA/DIET HISTORY
Pt seen at bedside reported GI distress 2 days PTA, she reported persistent nausea, vomiting, and diarrhea. Prior to GI distress, pt reports consuming more than 3 meals/day which consisted of pizza, candy and chinese food. Pt confirms NKFA.

## 2022-04-27 NOTE — DIETITIAN INITIAL EVALUATION ADULT - PERSON TAUGHT/METHOD
Discussed importance of consuming adequate protein intake at meals to optimize nutrition status during hospitals stay. Verbally discussed low fiber/low fat food sources. Written materials left at bedside per pt preference. Pt made aware RD remains available./verbal instruction/patient instructed

## 2022-04-27 NOTE — DIETITIAN INITIAL EVALUATION ADULT - NSFNSGIASSESSMENTFT_GEN_A_CORE
English
Pt continues with GI distress, at time of visit pt needed to receive care due to diarrhea episodes.

## 2022-04-27 NOTE — DIETITIAN INITIAL EVALUATION ADULT - NSPROEDAABILITYLEARN_GEN_A_NUR
From: Shaunna Yee  To: Franchesca Monsivais NP  Sent: 8/30/2017 8:24 AM CDT  Subject: RX refill    Tori I need my Pravastine refilled I forgot to [facility] you [facility] Thank you Shaunna  
Last seen 8-28-17  Last filled pravastatin 12-1-16 qty 90 with 2 refills  Last lipid panel done 11-30-16  Ok to fill?  
Refill x 1- How are her GI symptoms/ Can place order for fasting lipid panel Can also offer cancer lung cancer CT.  
none

## 2022-04-27 NOTE — DIETITIAN INITIAL EVALUATION ADULT - NSFNSNUTRCHEWSWALLOWFT_GEN_A_CORE
Pt currently denies difficulty chewing/ swallowing of foods at this time, however reports she is unable to tolerate foods.

## 2022-04-27 NOTE — DIETITIAN INITIAL EVALUATION ADULT - REASON
Limited NFPE performed as pt needed to receive care during time of visit. Visually noted mild orbital and temple depletion.

## 2022-04-27 NOTE — PROGRESS NOTE ADULT - ASSESSMENT
72 yr old female with anxiety, depression, breast cancer, fibromyalgia, hyperlipidemia presented with complaints of abdominal pain, nausea, vomiting, diarrhea and dizziness. On admission labs revealed mild anion gap metabolic acidosis, CT abdomen with enteritis. IVF were initiated. Patient reported continued abdominal pain, CT abdomen was ordered, revealed small bowel obstruction and mesenteric edema.     1. Small bowel obstruction, resolving:  Advance diet  Dicyclomine  Zofran prn  having BM    2. Anxiety/depression:  Continue Klonopin, Duloxetine, Seroquel.    3. Hyperlipidemia:  Continue statin.    4. DVT ppx:  Continue heparin.    Discussed with patient and RN.   Spoke with patient's ex  Jesus at bedside, concerned about her safety at home as she will be alone at home after he returns to [place]. PT follow up requested for stair assessment and for safe discharge planning. Jesus cell : [phone]

## 2022-04-27 NOTE — DIETITIAN INITIAL EVALUATION ADULT - ENERGY INTAKE
Poor (<50%) Pt currently placed on clear liquid diet, per observation consumed <25% of breakfast tray this morning (4/27). Encouraged pt to consume high protein gelatin and Ensure clear if unable to tolerate other foods, as it provides extra calories and protein. During visit pt consumed ~4oz of Ensure clear. Pt reports poor appetite and PO intake since admission, consuming <50% of meals provided in-house due to GI distress.

## 2022-04-27 NOTE — DIETITIAN INITIAL EVALUATION ADULT - NUTRITON FOCUSED PHYSICAL EXAM
What Is The Reason For Today's Visit?: Full Body Skin Examination
What Is The Reason For Today's Visit? (Being Monitored For X): concerning skin lesions on an annual basis
How Severe Are Your Spot(S)?: moderate
no...

## 2022-04-27 NOTE — DIETITIAN NUTRITION RISK NOTIFICATION - TREATMENT: THE FOLLOWING DIET HAS BEEN RECOMMENDED
Diet, DASH/TLC:   Sodium & Cholesterol Restricted  Fiber/Residue Restricted (LOWFIBER)  Supplement Feeding Modality:  Oral  Ensure Enlive Cans or Servings Per Day:  1       Frequency:  Daily (04-27-22 @ 11:24) [Active]

## 2022-04-27 NOTE — DIETITIAN INITIAL EVALUATION ADULT - OTHER INFO
As per chart "The pt is a 73yo F with PMHx of anxiety, depression, fibromyalgia, breast cancer, HLD presented to ED c/o abdominal pian with associated n/v and diarrhea, dizziness x 2days. Pt states she was in a good state of health until 2 days ago symptoms onset after she had strawberry shortcake. Pt has been with generalized abdominal pain, n/v unable to eat or drink in the past 2 days. She feels lightheaded and dizzy, denies fever or chills. In the Ed afebrile, no leukocytosis, labs pertinent for AG acidosis, CTH unremarkable, CT a/p with mild generalized prominence of fluid-filled small bowel loops consistent with a nonspecific enteritis. Some fluid in the colon suggests a diarrheal state. IVF were initiated. CT abdomen was ordered, revealed small bowel obstruction and mesenteric edema."    Weight: Pt reports a usual body weight of 208lbs, with no significant weight changes. Upon admission pt with a weight of 207.8 lbs (4/20). Will continue to monitor weights and trend as available.

## 2022-04-28 PROCEDURE — 74018 RADEX ABDOMEN 1 VIEW: CPT | Mod: 26

## 2022-04-28 PROCEDURE — 99232 SBSQ HOSP IP/OBS MODERATE 35: CPT

## 2022-04-28 RX ORDER — ALPRAZOLAM 0.25 MG
0.25 TABLET ORAL ONCE
Refills: 0 | Status: DISCONTINUED | OUTPATIENT
Start: 2022-04-28 | End: 2022-04-28

## 2022-04-28 RX ADMIN — Medication 0.25 MILLIGRAM(S): at 14:45

## 2022-04-28 RX ADMIN — QUETIAPINE FUMARATE 400 MILLIGRAM(S): 200 TABLET, FILM COATED ORAL at 22:26

## 2022-04-28 RX ADMIN — Medication 10 MILLIGRAM(S): at 18:31

## 2022-04-28 RX ADMIN — Medication 10 MILLIGRAM(S): at 10:25

## 2022-04-28 RX ADMIN — ONDANSETRON 4 MILLIGRAM(S): 8 TABLET, FILM COATED ORAL at 10:25

## 2022-04-28 RX ADMIN — Medication 10 MILLIGRAM(S): at 11:41

## 2022-04-28 RX ADMIN — Medication 1 APPLICATION(S): at 05:52

## 2022-04-28 RX ADMIN — DULOXETINE HYDROCHLORIDE 30 MILLIGRAM(S): 30 CAPSULE, DELAYED RELEASE ORAL at 14:46

## 2022-04-28 RX ADMIN — ATORVASTATIN CALCIUM 20 MILLIGRAM(S): 80 TABLET, FILM COATED ORAL at 22:27

## 2022-04-28 RX ADMIN — Medication 1 APPLICATION(S): at 18:30

## 2022-04-28 RX ADMIN — Medication 25 MILLIGRAM(S): at 22:26

## 2022-04-28 RX ADMIN — ENOXAPARIN SODIUM 40 MILLIGRAM(S): 100 INJECTION SUBCUTANEOUS at 05:52

## 2022-04-28 RX ADMIN — Medication 81 MILLIGRAM(S): at 11:41

## 2022-04-28 RX ADMIN — Medication 25 MILLIGRAM(S): at 05:52

## 2022-04-28 RX ADMIN — Medication 25 MILLIGRAM(S): at 14:48

## 2022-04-28 NOTE — PROGRESS NOTE ADULT - ASSESSMENT
72 yr old female with anxiety, depression, breast cancer, fibromyalgia, hyperlipidemia presented with complaints of abdominal pain, nausea, vomiting, diarrhea and dizziness. On admission labs revealed mild anion gap metabolic acidosis, CT abdomen with enteritis. IVF were initiated. Patient reported continued abdominal pain, CT abdomen was ordered, revealed small bowel obstruction and mesenteric edema.     1. Small bowel obstruction, resolved:  diet advanced  check abdo x ray  zofran prn  Dicyclomine.    2. Anxiety/depression:  Continue Klonopin, Duloxetine, Seroquel.    3. Hyperlipidemia:  Continue statin.    4. DVT ppx:  Continue heparin.    Discussed with patient and RN.   Discharge planning to [facility], [place].  Stable for discharge if xray wnl. 72 yr old female with anxiety, depression, breast cancer, fibromyalgia, hyperlipidemia presented with complaints of abdominal pain, nausea, vomiting, diarrhea and dizziness. On admission labs revealed mild anion gap metabolic acidosis, CT abdomen with enteritis. IVF were initiated. Patient reported continued abdominal pain, CT abdomen was ordered, revealed small bowel obstruction and mesenteric edema.     1. Small bowel obstruction, resolved:  diet advanced  check abdo x ray  zofran prn  Dicyclomine.    2. Anxiety/depression:  Continue Klonopin, Duloxetine, Seroquel.    3. Hyperlipidemia:  Continue statin.    4. DVT ppx:  Continue heparin.    Discussed with patient and RN.   Discharge planning to [facility].  Stable for discharge if xray wnl.  called to update Jesus  [phone], no answer.

## 2022-04-29 LAB — SARS-COV-2 RNA SPEC QL NAA+PROBE: SIGNIFICANT CHANGE UP

## 2022-04-29 PROCEDURE — 99232 SBSQ HOSP IP/OBS MODERATE 35: CPT

## 2022-04-29 RX ADMIN — Medication 10 MILLIGRAM(S): at 08:58

## 2022-04-29 RX ADMIN — Medication 81 MILLIGRAM(S): at 12:22

## 2022-04-29 RX ADMIN — Medication 10 MILLIGRAM(S): at 12:23

## 2022-04-29 RX ADMIN — ENOXAPARIN SODIUM 40 MILLIGRAM(S): 100 INJECTION SUBCUTANEOUS at 06:16

## 2022-04-29 RX ADMIN — QUETIAPINE FUMARATE 400 MILLIGRAM(S): 200 TABLET, FILM COATED ORAL at 22:03

## 2022-04-29 RX ADMIN — Medication 25 MILLIGRAM(S): at 06:16

## 2022-04-29 RX ADMIN — Medication 25 MILLIGRAM(S): at 22:03

## 2022-04-29 RX ADMIN — ATORVASTATIN CALCIUM 20 MILLIGRAM(S): 80 TABLET, FILM COATED ORAL at 22:03

## 2022-04-29 RX ADMIN — DULOXETINE HYDROCHLORIDE 30 MILLIGRAM(S): 30 CAPSULE, DELAYED RELEASE ORAL at 12:23

## 2022-04-29 RX ADMIN — Medication 25 MILLIGRAM(S): at 15:36

## 2022-04-29 RX ADMIN — Medication 1 APPLICATION(S): at 06:16

## 2022-04-29 NOTE — PROGRESS NOTE ADULT - NUTRITIONAL ASSESSMENT
This patient has been assessed with a concern for Malnutrition and has been determined to have a diagnosis/diagnoses of Moderate protein-calorie malnutrition.    This patient is being managed with:   Diet DASH/TLC-  Sodium & Cholesterol Restricted  Fiber/Residue Restricted (LOWFIBER)  Supplement Feeding Modality:  Oral  Ensure Enlive Cans or Servings Per Day:  1       Frequency:  Daily  Entered: Apr 27 2022 11:24AM    
This patient has been assessed with a concern for Malnutrition and has been determined to have a diagnosis/diagnoses of Moderate protein-calorie malnutrition.    This patient is being managed with:   Diet DASH/TLC-  Sodium & Cholesterol Restricted  Fiber/Residue Restricted (LOWFIBER)  Supplement Feeding Modality:  Oral  Ensure Enlive Cans or Servings Per Day:  1       Frequency:  Daily  Entered: Apr 27 2022 11:24AM

## 2022-04-29 NOTE — PROGRESS NOTE ADULT - REASON FOR ADMISSION
gastroenteritis

## 2022-04-29 NOTE — PROGRESS NOTE ADULT - ASSESSMENT
72 yr old female with anxiety, depression, breast cancer, fibromyalgia, hyperlipidemia presented with complaints of abdominal pain, nausea, vomiting, diarrhea and dizziness. On admission labs revealed mild anion gap metabolic acidosis, CT abdomen with enteritis. IVF were initiated. Patient reported continued abdominal pain, CT abdomen was ordered, revealed small bowel obstruction and mesenteric edema. No intervention per surgery. Diet advanced. She was evaluated by PT, needs [facility].     1. Small bowel obstruction, resolved:  tolerating diet  no nausea  continue Dicyclomine for IBS.  outpatient GI follow up.    2. Anxiety/depression:  Continue Klonopin, Duloxetine, Seroquel.    3. Hyperlipidemia:  Continue statin.    4. DVT ppx:  Continue heparin.    Discussed with patient and RN.   Discharge planning to [facility].   72 yr old female with anxiety, depression, breast cancer, fibromyalgia, hyperlipidemia presented with complaints of abdominal pain, nausea, vomiting, diarrhea and dizziness. On admission labs revealed mild anion gap metabolic acidosis, CT abdomen with enteritis. IVF were initiated. Patient reported continued abdominal pain, CT abdomen was ordered, revealed small bowel obstruction and mesenteric edema. No intervention per surgery. Diet advanced. She was evaluated by PT, needs [facility].     1. Small bowel obstruction, resolved:  tolerating diet  no nausea  continue Dicyclomine for IBS.  outpatient GI follow up.    2. Anxiety/depression:  Continue Klonopin, Duloxetine, Seroquel.    3. Hyperlipidemia:  Continue statin.    4. DVT ppx:  Continue heparin.    Discussed with patient and RN.   Discharge planning to [facility].  Called to update ex , Jesus, no answer.

## 2022-04-29 NOTE — PROGRESS NOTE ADULT - SUBJECTIVE AND OBJECTIVE BOX
[facility] Division of Hospital Medicine    Chief Complaint:    N/v/d. vertigo    SUBJECTIVE / OVERNIGHT EVENTS:  Patient states that she has improvement of nausea and dizziness.   Last episode of emesis or diarrhea yesterday.   Tolerating PO    Patient denies chest pain, SOB, abd pain, N/V, fever, chills, dysuria or any other complaints. All remainder ROS negative.     MEDICATIONS  (STANDING):  aspirin  chewable 81 milliGRAM(s) Oral daily  atorvastatin 20 milliGRAM(s) Oral at bedtime  clonazePAM  Tablet 1 milliGRAM(s) Oral three times a day  DULoxetine 30 milliGRAM(s) Oral daily  enoxaparin Injectable 40 milliGRAM(s) SubCutaneous every 24 hours  erythromycin   Ointment 1 Application(s) Both EYES two times a day  lactated ringers. 1000 milliLiter(s) (100 mL/Hr) IV Continuous <Continuous>  meclizine 25 milliGRAM(s) Oral three times a day  potassium chloride    Tablet ER 40 milliEquivalent(s) Oral every 4 hours  QUEtiapine 400 milliGRAM(s) Oral at bedtime    MEDICATIONS  (PRN):  acetaminophen     Tablet .. 650 milliGRAM(s) Oral every 6 hours PRN Temp greater or equal to 38C (100.4F), Mild Pain (1 - 3)  ALBUTerol    90 MICROgram(s) HFA Inhaler 2 Puff(s) Inhalation every 6 hours PRN Bronchospasm  aluminum hydroxide/magnesium hydroxide/simethicone Suspension 30 milliLiter(s) Oral every 4 hours PRN Dyspepsia  melatonin 3 milliGRAM(s) Oral at bedtime PRN Insomnia  morphine  - Injectable 2 milliGRAM(s) IV Push every 6 hours PRN Moderate Pain (4 - 6)  ondansetron Injectable 4 milliGRAM(s) IV Push every 8 hours PRN Nausea and/or Vomiting        I&O's Summary      PHYSICAL EXAM:  Vital Signs Last 24 Hrs  T(C): 36.8 (22 Apr 2022 12:02), Max: 36.8 (21 Apr 2022 15:35)  T(F): 98.2 (22 Apr 2022 12:02), Max: 98.3 (21 Apr 2022 15:35)  HR: 88 (22 Apr 2022 12:02) (74 - 104)  BP: 164/76 (22 Apr 2022 12:02) (125/68 - 166/83)  BP(mean): --  RR: 18 (22 Apr 2022 12:02) (18 - 20)  SpO2: 94% (22 Apr 2022 12:02) (91% - 94%)        CONSTITUTIONAL: NAD, well-developed  ENMT: Moist oral mucosa, no pharyngeal injection or exudates; normal dentition  RESPIRATORY: Normal respiratory effort; lungs are clear to auscultation bilaterally  CARDIOVASCULAR: Regular rate and rhythm, normal S1 and S2, no murmur/rub/gallop; Peripheral pulses are 2+ bilaterally  ABDOMEN: Nontender to palpation, normoactive bowel sounds, no rebound/guarding;   MUSCLOSKELETAL:  No clubbing or cyanosis of digits; no joint swelling or tenderness to palpation  PSYCH: A+O to person, place, and time; anxious affect  NEUROLOGY: CN 2-12 are intact and symmetric; no gross sensory deficits;   SKIN: No rashes; no palpable lesions    LABS:                        11.6   8.30  )-----------( 201      ( 22 Apr 2022 07:10 )             36.1     04-22    140  |  104  |  13.8  ----------------------------<  106<H>  3.4<L>   |  20.0<L>  |  0.63    Ca    8.5<L>      22 Apr 2022 07:10  Phos  3.5     04-22  Mg     2.0     04-22                CAPILLARY BLOOD GLUCOSE            RADIOLOGY & ADDITIONAL TESTS:  Results Reviewed:   Imaging Personally Reviewed:  Electrocardiogram Personally Reviewed:                                          
CC: gastroenteritis (22 Apr 2022 14:40)    HPI:  73yo F with PMHx of anxiety, depression, fibromyalgia, breast cancer, HLD presented to ED c/o abdominal pian with associated n/v and diarrhea, dizziness x 2days. Pt states she was in a good state of health until 2 days ago symptoms onset after she had strawberry shortcake. Pt has been with generalized abdominal pain, n/v unable to eat or drink in the past 2 days. She feels lightheaded and dizzy, denies fever or chills. In the Ed afebrile, no leukocytosis, labs pertinent for AG acidosis, CTH unremarkable, CT a/p with mild generalized prominence of fluid-filled small bowel loops consistent with a nonspecific enteritis. Some fluid in the colon suggests a diarrheal state.  (21 Apr 2022 04:42)    INTERVAL HPI/OVERNIGHT EVENTS:    Vital Signs Last 24 Hrs  T(C): 36.5 (23 Apr 2022 10:30), Max: 36.9 (22 Apr 2022 21:30)  T(F): 97.7 (23 Apr 2022 10:30), Max: 98.5 (22 Apr 2022 21:30)  HR: 91 (23 Apr 2022 10:30) (87 - 100)  BP: 139/83 (23 Apr 2022 10:30) (133/75 - 167/90)  BP(mean): --  RR: 18 (23 Apr 2022 10:30) (18 - 18)  SpO2: 93% (23 Apr 2022 10:30) (92% - 100%)  PHYSICAL EXAM:  General: in no acute distress  Eyes: PERRLA, EOMI; conjunctiva and sclera clear  Head: Normocephalic; atraumatic  ENMT: No nasal discharge; airway clear  Neck: Supple; non tender; no masses  Respiratory: No wheezes, rales or rhonchi  Cardiovascular: Regular rate and rhythm. S1 and S2 Normal; No murmurs, gallops or rubs  Gastrointestinal: Soft non-tender non-distended; Normal bowel sounds  Genitourinary: No costovertebral angle tenderness  Extremities: Normal range of motion, No clubbing, cyanosis or edema  Vascular: Peripheral pulses palpable 2+ bilaterally  Neurological: Alert and oriented x4  Skin: Warm and dry. No acute rash  Lymph Nodes: No acute cervical adenopathy  Musculoskeletal: Normal gait, tone, without deformities  Psychiatric: Cooperative and appropriate    I&O's Detail                                11.4   7.69  )-----------( 179      ( 23 Apr 2022 07:27 )             35.5     23 Apr 2022 07:27    139    |  105    |  10.9   ----------------------------<  106    4.0     |  22.0   |  0.61     Ca    8.6        23 Apr 2022 07:27  Phos  3.0       23 Apr 2022 07:27  Mg     1.9       23 Apr 2022 07:27        CAPILLARY BLOOD GLUCOSE              MEDICATIONS  (STANDING):  aspirin  chewable 81 milliGRAM(s) Oral daily  atorvastatin 20 milliGRAM(s) Oral at bedtime  clonazePAM  Tablet 1 milliGRAM(s) Oral three times a day  DULoxetine 30 milliGRAM(s) Oral daily  enoxaparin Injectable 40 milliGRAM(s) SubCutaneous every 24 hours  erythromycin   Ointment 1 Application(s) Both EYES two times a day  lactated ringers. 1000 milliLiter(s) (100 mL/Hr) IV Continuous <Continuous>  meclizine 25 milliGRAM(s) Oral three times a day  QUEtiapine 400 milliGRAM(s) Oral at bedtime    MEDICATIONS  (PRN):  acetaminophen     Tablet .. 650 milliGRAM(s) Oral every 6 hours PRN Temp greater or equal to 38C (100.4F), Mild Pain (1 - 3)  ALBUTerol    90 MICROgram(s) HFA Inhaler 2 Puff(s) Inhalation every 6 hours PRN Bronchospasm  aluminum hydroxide/magnesium hydroxide/simethicone Suspension 30 milliLiter(s) Oral every 4 hours PRN Dyspepsia  melatonin 3 milliGRAM(s) Oral at bedtime PRN Insomnia  morphine  - Injectable 2 milliGRAM(s) IV Push every 6 hours PRN Moderate Pain (4 - 6)  ondansetron   Disintegrating Tablet 4 milliGRAM(s) Oral every 6 hours PRN Nausea and/or Vomiting  ondansetron Injectable 4 milliGRAM(s) IV Push every 8 hours PRN Nausea and/or Vomiting      RADIOLOGY & ADDITIONAL TESTS:
[facility] Division of Hospital Medicine    Chief Complaint:    Nausea, vomitting, diarrhea    SUBJECTIVE / OVERNIGHT EVENTS:  Patient admitted overnight emesis and diarrhea frequency improving. still with significant general malaise and nausea.   Patient denies chest pain, SOB, abd pain, N/V, fever, chills, dysuria or any other complaints. All remainder ROS negative.     MEDICATIONS  (STANDING):  aspirin  chewable 81 milliGRAM(s) Oral daily  atorvastatin 20 milliGRAM(s) Oral at bedtime  clonazePAM  Tablet 1 milliGRAM(s) Oral three times a day  DULoxetine 30 milliGRAM(s) Oral daily  enoxaparin Injectable 40 milliGRAM(s) SubCutaneous every 24 hours  erythromycin   Ointment 1 Application(s) Both EYES two times a day  lactated ringers. 1000 milliLiter(s) (100 mL/Hr) IV Continuous <Continuous>  meclizine 25 milliGRAM(s) Oral three times a day  QUEtiapine 400 milliGRAM(s) Oral at bedtime    MEDICATIONS  (PRN):  acetaminophen     Tablet .. 650 milliGRAM(s) Oral every 6 hours PRN Temp greater or equal to 38C (100.4F), Mild Pain (1 - 3)  ALBUTerol    90 MICROgram(s) HFA Inhaler 2 Puff(s) Inhalation every 6 hours PRN Bronchospasm  aluminum hydroxide/magnesium hydroxide/simethicone Suspension 30 milliLiter(s) Oral every 4 hours PRN Dyspepsia  melatonin 3 milliGRAM(s) Oral at bedtime PRN Insomnia  morphine  - Injectable 2 milliGRAM(s) IV Push every 6 hours PRN Moderate Pain (4 - 6)  ondansetron Injectable 4 milliGRAM(s) IV Push every 8 hours PRN Nausea and/or Vomiting        I&O's Summary      PHYSICAL EXAM:  Vital Signs Last 24 Hrs  T(C): 36.5 (21 Apr 2022 11:22), Max: 36.9 (21 Apr 2022 04:27)  T(F): 97.7 (21 Apr 2022 11:22), Max: 98.5 (21 Apr 2022 04:27)  HR: 91 (21 Apr 2022 11:22) (90 - 106)  BP: 128/67 (21 Apr 2022 11:22) (122/75 - 158/91)  BP(mean): --  RR: 18 (21 Apr 2022 11:22) (18 - 18)  SpO2: 93% (21 Apr 2022 11:22) (91% - 97%)        CONSTITUTIONAL: elderly, uncomfortable appearing  ENMT: Moist oral mucosa, no pharyngeal injection or exudates; normal dentition  RESPIRATORY: Normal respiratory effort; lungs are clear to auscultation bilaterally  CARDIOVASCULAR: Regular rate and rhythm, normal S1 and S2, no murmur/rub/gallop; Peripheral pulses are 2+ bilaterally  ABDOMEN: mildly tender to palpation, normoactive bowel sounds, no rebound/guarding;   MUSCLOSKELETAL:  No clubbing or cyanosis of digits; no joint swelling or tenderness to palpation  PSYCH: A+O to person, place, and time; affect appropriate  NEUROLOGY: CN 2-12 are intact and symmetric; no gross sensory deficits;   SKIN: No rashes; no palpable lesions    LABS:                        13.3   5.91  )-----------( 243      ( 21 Apr 2022 07:23 )             40.1     04-21    137  |  100  |  17.1  ----------------------------<  136<H>  3.2<L>   |  20.0<L>  |  0.72    Ca    8.9      21 Apr 2022 07:23      PT/INR - ( 20 Apr 2022 10:18 )   PT: 13.3 sec;   INR: 1.14 ratio         PTT - ( 20 Apr 2022 10:18 )  PTT:30.8 sec  CARDIAC MARKERS ( 20 Apr 2022 10:18 )  x     / <0.01 ng/mL / x     / x     / x              CAPILLARY BLOOD GLUCOSE            RADIOLOGY & ADDITIONAL TESTS:  Results Reviewed:   Imaging Personally Reviewed:  Electrocardiogram Personally Reviewed:                                          
INTERVAL HPI/OVERNIGHT EVENTS:    CC: small bowel obstruction,   depression, anxiety, breast cancer, fibromyalgia      No overnight events  tolerating clears  having BM      Vital Signs Last 24 Hrs  T(C): 37.2 (27 Apr 2022 09:55), Max: 37.2 (27 Apr 2022 09:55)  T(F): 98.9 (27 Apr 2022 09:55), Max: 98.9 (27 Apr 2022 09:55)  HR: 99 (27 Apr 2022 09:55) (87 - 100)  BP: 121/78 (27 Apr 2022 09:55) (117/76 - 140/72)  BP(mean): --  RR: 18 (27 Apr 2022 09:55) (18 - 18)  SpO2: 91% (27 Apr 2022 09:55) (87% - 94%)    PHYSICAL EXAM:    GENERAL: alert, not in distress  CHEST/LUNG: b/l air entry  HEART: reg  ABDOMEN: soft, bs+, non tender  EXTREMITIES:  no edema, tenderness    MEDICATIONS  (STANDING):  aspirin  chewable 81 milliGRAM(s) Oral daily  atorvastatin 20 milliGRAM(s) Oral at bedtime  clonazePAM  Tablet 1 milliGRAM(s) Oral three times a day  dicyclomine 10 milliGRAM(s) Oral three times a day before meals  DULoxetine 30 milliGRAM(s) Oral daily  enoxaparin Injectable 40 milliGRAM(s) SubCutaneous every 24 hours  erythromycin   Ointment 1 Application(s) Both EYES two times a day  lactated ringers. 1000 milliLiter(s) (100 mL/Hr) IV Continuous <Continuous>  meclizine 25 milliGRAM(s) Oral three times a day  QUEtiapine 400 milliGRAM(s) Oral at bedtime    MEDICATIONS  (PRN):  acetaminophen     Tablet .. 650 milliGRAM(s) Oral every 6 hours PRN Temp greater or equal to 38C (100.4F), Mild Pain (1 - 3)  ALBUTerol    90 MICROgram(s) HFA Inhaler 2 Puff(s) Inhalation every 6 hours PRN Bronchospasm  aluminum hydroxide/magnesium hydroxide/simethicone Suspension 30 milliLiter(s) Oral every 4 hours PRN Dyspepsia  melatonin 3 milliGRAM(s) Oral at bedtime PRN Insomnia  ondansetron   Disintegrating Tablet 4 milliGRAM(s) Oral every 6 hours PRN Nausea and/or Vomiting  ondansetron Injectable 4 milliGRAM(s) IV Push every 8 hours PRN Nausea and/or Vomiting      Allergies    Cipro (Stomach Upset; Vomiting)  Demerol HCl (Stomach Upset; Vomiting)    Intolerances          LABS:                          12.5   10.01 )-----------( 245      ( 26 Apr 2022 06:47 )             39.0     04-27    135  |  99  |  11.6  ----------------------------<  118<H>  3.7   |  24.0  |  0.65    Ca    8.6      27 Apr 2022 08:54  Phos  4.5     04-26  Mg     2.0     04-26            RADIOLOGY & ADDITIONAL TESTS:  
INTERVAL HPI/OVERNIGHT EVENTS:    CC: small bowel obstruction,   depression, anxiety, breast cancer, fibromyalgia    No overnight events  reports abdominal pain  no nausea, vomiting.     Vital Signs Last 24 Hrs  T(C): 36.6 (25 Apr 2022 12:25), Max: 37.1 (24 Apr 2022 19:23)  T(F): 97.8 (25 Apr 2022 12:25), Max: 98.7 (24 Apr 2022 19:23)  HR: 103 (25 Apr 2022 12:25) (88 - 103)  BP: 117/66 (25 Apr 2022 12:25) (117/66 - 143/82)  BP(mean): --  RR: 18 (25 Apr 2022 12:25) (18 - 18)  SpO2: 91% (25 Apr 2022 12:25) (91% - 93%)    PHYSICAL EXAM:    GENERAL: alert, not in distress  CHEST/LUNG: b/l air entry  HEART: reg  ABDOMEN: soft, mild epigastric tenderness  EXTREMITIES:  no edema, tenderness    MEDICATIONS  (STANDING):  aspirin  chewable 81 milliGRAM(s) Oral daily  atorvastatin 20 milliGRAM(s) Oral at bedtime  clonazePAM  Tablet 1 milliGRAM(s) Oral three times a day  DULoxetine 30 milliGRAM(s) Oral daily  enoxaparin Injectable 40 milliGRAM(s) SubCutaneous every 24 hours  erythromycin   Ointment 1 Application(s) Both EYES two times a day  lactated ringers. 1000 milliLiter(s) (100 mL/Hr) IV Continuous <Continuous>  meclizine 25 milliGRAM(s) Oral three times a day  QUEtiapine 400 milliGRAM(s) Oral at bedtime    MEDICATIONS  (PRN):  acetaminophen     Tablet .. 650 milliGRAM(s) Oral every 6 hours PRN Temp greater or equal to 38C (100.4F), Mild Pain (1 - 3)  ALBUTerol    90 MICROgram(s) HFA Inhaler 2 Puff(s) Inhalation every 6 hours PRN Bronchospasm  aluminum hydroxide/magnesium hydroxide/simethicone Suspension 30 milliLiter(s) Oral every 4 hours PRN Dyspepsia  melatonin 3 milliGRAM(s) Oral at bedtime PRN Insomnia  morphine  - Injectable 2 milliGRAM(s) IV Push every 6 hours PRN Moderate Pain (4 - 6)  ondansetron   Disintegrating Tablet 4 milliGRAM(s) Oral every 6 hours PRN Nausea and/or Vomiting  ondansetron Injectable 4 milliGRAM(s) IV Push every 8 hours PRN Nausea and/or Vomiting      Allergies    Cipro (Stomach Upset; Vomiting)  Demerol HCl (Stomach Upset; Vomiting)    Intolerances          LABS:      04-24    140  |  103  |  8.7  ----------------------------<  100<H>  3.5   |  24.0  |  0.64    Ca    8.8      24 Apr 2022 05:33            RADIOLOGY & ADDITIONAL TESTS:  
INTERVAL HPI/OVERNIGHT EVENTS:    CC: small bowel obstruction,   depression, anxiety, breast cancer, fibromyalgia    No overnight events  tolerating diet  occasional abdominal discomfort, moving bowels  no nausea, no episodes.      Vital Signs Last 24 Hrs  T(C): 36.6 (28 Apr 2022 10:39), Max: 36.8 (28 Apr 2022 04:32)  T(F): 97.9 (28 Apr 2022 10:39), Max: 98.2 (28 Apr 2022 04:32)  HR: 99 (28 Apr 2022 10:39) (92 - 99)  BP: 106/67 (28 Apr 2022 10:39) (106/67 - 134/82)  BP(mean): --  RR: 19 (28 Apr 2022 10:39) (18 - 19)  SpO2: 90% (28 Apr 2022 10:39) (90% - 93%)    PHYSICAL EXAM:    GENERAL: alert, not in distress  CHEST/LUNG: b/l air entry  HEART: reg  ABDOMEN: soft, bs+, non tender  EXTREMITIES:  no edema, tenderness    MEDICATIONS  (STANDING):  aspirin  chewable 81 milliGRAM(s) Oral daily  atorvastatin 20 milliGRAM(s) Oral at bedtime  dicyclomine 10 milliGRAM(s) Oral three times a day before meals  DULoxetine 30 milliGRAM(s) Oral daily  enoxaparin Injectable 40 milliGRAM(s) SubCutaneous every 24 hours  erythromycin   Ointment 1 Application(s) Both EYES two times a day  lactated ringers. 1000 milliLiter(s) (100 mL/Hr) IV Continuous <Continuous>  meclizine 25 milliGRAM(s) Oral three times a day  QUEtiapine 400 milliGRAM(s) Oral at bedtime    MEDICATIONS  (PRN):  acetaminophen     Tablet .. 650 milliGRAM(s) Oral every 6 hours PRN Temp greater or equal to 38C (100.4F), Mild Pain (1 - 3)  ALBUTerol    90 MICROgram(s) HFA Inhaler 2 Puff(s) Inhalation every 6 hours PRN Bronchospasm  aluminum hydroxide/magnesium hydroxide/simethicone Suspension 30 milliLiter(s) Oral every 4 hours PRN Dyspepsia  melatonin 3 milliGRAM(s) Oral at bedtime PRN Insomnia  ondansetron   Disintegrating Tablet 4 milliGRAM(s) Oral every 6 hours PRN Nausea and/or Vomiting  ondansetron Injectable 4 milliGRAM(s) IV Push every 8 hours PRN Nausea and/or Vomiting      Allergies    Cipro (Stomach Upset; Vomiting)  Demerol HCl (Stomach Upset; Vomiting)    Intolerances          LABS:      04-27    135  |  99  |  11.6  ----------------------------<  118<H>  3.7   |  24.0  |  0.65    Ca    8.6      27 Apr 2022 08:54            RADIOLOGY & ADDITIONAL TESTS:  
INTERVAL HPI/OVERNIGHT EVENTS:    CC: small bowel obstruction,   depression, anxiety, breast cancer, fibromyalgia    Reports she was vomiting last night and has pain in her abdomen  discussed CT findings and surgical eval recs  rec clears to see if she can tolerate but she does not want to eat as she has pain.     Vital Signs Last 24 Hrs  T(C): 36.9 (26 Apr 2022 09:21), Max: 37.2 (25 Apr 2022 17:24)  T(F): 98.4 (26 Apr 2022 09:21), Max: 98.9 (25 Apr 2022 17:24)  HR: 100 (26 Apr 2022 09:21) (90 - 103)  BP: 124/76 (26 Apr 2022 09:21) (111/79 - 164/85)  BP(mean): --  RR: 18 (26 Apr 2022 09:21) (18 - 20)  SpO2: 92% (26 Apr 2022 09:21) (90% - 93%)    PHYSICAL EXAM:    GENERAL: alert, not in distress  CHEST/LUNG: b/l air entry  HEART: reg  ABDOMEN: soft, non tender, BS+  EXTREMITIES:  no edema, tenderness    MEDICATIONS  (STANDING):  aspirin  chewable 81 milliGRAM(s) Oral daily  atorvastatin 20 milliGRAM(s) Oral at bedtime  clonazePAM  Tablet 1 milliGRAM(s) Oral three times a day  DULoxetine 30 milliGRAM(s) Oral daily  enoxaparin Injectable 40 milliGRAM(s) SubCutaneous every 24 hours  erythromycin   Ointment 1 Application(s) Both EYES two times a day  lactated ringers. 1000 milliLiter(s) (100 mL/Hr) IV Continuous <Continuous>  meclizine 25 milliGRAM(s) Oral three times a day  QUEtiapine 400 milliGRAM(s) Oral at bedtime    MEDICATIONS  (PRN):  acetaminophen     Tablet .. 650 milliGRAM(s) Oral every 6 hours PRN Temp greater or equal to 38C (100.4F), Mild Pain (1 - 3)  ALBUTerol    90 MICROgram(s) HFA Inhaler 2 Puff(s) Inhalation every 6 hours PRN Bronchospasm  aluminum hydroxide/magnesium hydroxide/simethicone Suspension 30 milliLiter(s) Oral every 4 hours PRN Dyspepsia  melatonin 3 milliGRAM(s) Oral at bedtime PRN Insomnia  ondansetron   Disintegrating Tablet 4 milliGRAM(s) Oral every 6 hours PRN Nausea and/or Vomiting  ondansetron Injectable 4 milliGRAM(s) IV Push every 8 hours PRN Nausea and/or Vomiting      Allergies    Cipro (Stomach Upset; Vomiting)  Demerol HCl (Stomach Upset; Vomiting)    Intolerances          LABS:                          12.5   10.01 )-----------( 245      ( 26 Apr 2022 06:47 )             39.0     04-26    135  |  98  |  11.7  ----------------------------<  115<H>  4.2   |  23.0  |  0.61    Ca    9.2      26 Apr 2022 06:47  Phos  4.5     04-26  Mg     2.0     04-26            RADIOLOGY & ADDITIONAL TESTS:  
INTERVAL HPI/OVERNIGHT EVENTS:    CC: abdominal pain, depression, anxiety, breast cancer, fibromyalgia    Chart and course reviewed.  No diarrhea  denies nausea and vomiting  has been having some abdominal pain and CT abdomen with contrast was ordered yesterday    Vital Signs Last 24 Hrs  T(C): 36.9 (24 Apr 2022 11:06), Max: 37.1 (23 Apr 2022 20:15)  T(F): 98.5 (24 Apr 2022 11:06), Max: 98.7 (23 Apr 2022 20:15)  HR: 99 (24 Apr 2022 11:06) (85 - 99)  BP: 136/81 (24 Apr 2022 11:06) (133/80 - 161/89)  BP(mean): --  RR: 16 (24 Apr 2022 11:06) (16 - 18)  SpO2: 90% (24 Apr 2022 11:06) (90% - 94%)    PHYSICAL EXAM:    GENERAL: not in distress, anxious  CHEST/LUNG: b/l air entry  HEART: reg  ABDOMEN: soft, non tender  EXTREMITIES:  no edema, tenderness    MEDICATIONS  (STANDING):  aspirin  chewable 81 milliGRAM(s) Oral daily  atorvastatin 20 milliGRAM(s) Oral at bedtime  clonazePAM  Tablet 1 milliGRAM(s) Oral three times a day  DULoxetine 30 milliGRAM(s) Oral daily  enoxaparin Injectable 40 milliGRAM(s) SubCutaneous every 24 hours  erythromycin   Ointment 1 Application(s) Both EYES two times a day  lactated ringers. 1000 milliLiter(s) (100 mL/Hr) IV Continuous <Continuous>  meclizine 25 milliGRAM(s) Oral three times a day  QUEtiapine 400 milliGRAM(s) Oral at bedtime    MEDICATIONS  (PRN):  acetaminophen     Tablet .. 650 milliGRAM(s) Oral every 6 hours PRN Temp greater or equal to 38C (100.4F), Mild Pain (1 - 3)  ALBUTerol    90 MICROgram(s) HFA Inhaler 2 Puff(s) Inhalation every 6 hours PRN Bronchospasm  aluminum hydroxide/magnesium hydroxide/simethicone Suspension 30 milliLiter(s) Oral every 4 hours PRN Dyspepsia  melatonin 3 milliGRAM(s) Oral at bedtime PRN Insomnia  morphine  - Injectable 2 milliGRAM(s) IV Push every 6 hours PRN Moderate Pain (4 - 6)  ondansetron   Disintegrating Tablet 4 milliGRAM(s) Oral every 6 hours PRN Nausea and/or Vomiting  ondansetron Injectable 4 milliGRAM(s) IV Push every 8 hours PRN Nausea and/or Vomiting      Allergies    Cipro (Stomach Upset; Vomiting)  Demerol HCl (Stomach Upset; Vomiting)    Intolerances          LABS:                          11.4   7.69  )-----------( 179      ( 23 Apr 2022 07:27 )             35.5     04-24    140  |  103  |  8.7  ----------------------------<  100<H>  3.5   |  24.0  |  0.64    Ca    8.8      24 Apr 2022 05:33  Phos  3.0     04-23  Mg     1.9     04-23            RADIOLOGY & ADDITIONAL TESTS:  
INTERVAL HPI/OVERNIGHT EVENTS:    CC: small bowel obstruction resolved,   depression, anxiety, breast cancer, fibromyalgia, IBS    No overnight events  tolerating diet  no nausea  explained need for outpatient GI follow up for irritable bowel    Vital Signs Last 24 Hrs  T(C): 36.8 (29 Apr 2022 11:04), Max: 36.8 (28 Apr 2022 16:32)  T(F): 98.3 (29 Apr 2022 11:04), Max: 98.3 (29 Apr 2022 11:04)  HR: 90 (29 Apr 2022 11:04) (88 - 93)  BP: 138/76 (29 Apr 2022 11:04) (127/70 - 138/76)  BP(mean): --  RR: 18 (29 Apr 2022 11:04) (18 - 18)  SpO2: 91% (29 Apr 2022 11:04) (91% - 95%)    PHYSICAL EXAM:    GENERAL: alert, not in distress  CHEST/LUNG: b/l air entry  HEART: reg  ABDOMEN: soft, bs+, non tender  EXTREMITIES:  no edema, tenderness    MEDICATIONS  (STANDING):  aspirin  chewable 81 milliGRAM(s) Oral daily  atorvastatin 20 milliGRAM(s) Oral at bedtime  dicyclomine 10 milliGRAM(s) Oral three times a day before meals  DULoxetine 30 milliGRAM(s) Oral daily  enoxaparin Injectable 40 milliGRAM(s) SubCutaneous every 24 hours  erythromycin   Ointment 1 Application(s) Both EYES two times a day  lactated ringers. 1000 milliLiter(s) (100 mL/Hr) IV Continuous <Continuous>  meclizine 25 milliGRAM(s) Oral three times a day  QUEtiapine 400 milliGRAM(s) Oral at bedtime    MEDICATIONS  (PRN):  acetaminophen     Tablet .. 650 milliGRAM(s) Oral every 6 hours PRN Temp greater or equal to 38C (100.4F), Mild Pain (1 - 3)  ALBUTerol    90 MICROgram(s) HFA Inhaler 2 Puff(s) Inhalation every 6 hours PRN Bronchospasm  aluminum hydroxide/magnesium hydroxide/simethicone Suspension 30 milliLiter(s) Oral every 4 hours PRN Dyspepsia  melatonin 3 milliGRAM(s) Oral at bedtime PRN Insomnia  ondansetron   Disintegrating Tablet 4 milliGRAM(s) Oral every 6 hours PRN Nausea and/or Vomiting  ondansetron Injectable 4 milliGRAM(s) IV Push every 8 hours PRN Nausea and/or Vomiting      Allergies    Cipro (Stomach Upset; Vomiting)  Demerol HCl (Stomach Upset; Vomiting)    Intolerances          LABS:                  RADIOLOGY & ADDITIONAL TESTS:

## 2022-04-30 ENCOUNTER — TRANSCRIPTION ENCOUNTER (OUTPATIENT)
Age: 72
End: 2022-04-30

## 2022-04-30 VITALS
RESPIRATION RATE: 18 BRPM | TEMPERATURE: 99 F | DIASTOLIC BLOOD PRESSURE: 78 MMHG | SYSTOLIC BLOOD PRESSURE: 125 MMHG | OXYGEN SATURATION: 95 % | HEART RATE: 91 BPM

## 2022-04-30 PROCEDURE — 85610 PROTHROMBIN TIME: CPT

## 2022-04-30 PROCEDURE — 80048 BASIC METABOLIC PNL TOTAL CA: CPT

## 2022-04-30 PROCEDURE — 84100 ASSAY OF PHOSPHORUS: CPT

## 2022-04-30 PROCEDURE — 36415 COLL VENOUS BLD VENIPUNCTURE: CPT

## 2022-04-30 PROCEDURE — 70551 MRI BRAIN STEM W/O DYE: CPT | Mod: MA

## 2022-04-30 PROCEDURE — 74177 CT ABD & PELVIS W/CONTRAST: CPT

## 2022-04-30 PROCEDURE — 93005 ELECTROCARDIOGRAM TRACING: CPT

## 2022-04-30 PROCEDURE — 85027 COMPLETE CBC AUTOMATED: CPT

## 2022-04-30 PROCEDURE — 70450 CT HEAD/BRAIN W/O DYE: CPT | Mod: MA

## 2022-04-30 PROCEDURE — 0225U NFCT DS DNA&RNA 21 SARSCOV2: CPT

## 2022-04-30 PROCEDURE — U0003: CPT

## 2022-04-30 PROCEDURE — 99284 EMERGENCY DEPT VISIT MOD MDM: CPT

## 2022-04-30 PROCEDURE — 99239 HOSP IP/OBS DSCHRG MGMT >30: CPT

## 2022-04-30 PROCEDURE — G0378: CPT

## 2022-04-30 PROCEDURE — 84484 ASSAY OF TROPONIN QUANT: CPT

## 2022-04-30 PROCEDURE — 74018 RADEX ABDOMEN 1 VIEW: CPT

## 2022-04-30 PROCEDURE — 85025 COMPLETE CBC W/AUTO DIFF WBC: CPT

## 2022-04-30 PROCEDURE — U0005: CPT

## 2022-04-30 PROCEDURE — 97116 GAIT TRAINING THERAPY: CPT

## 2022-04-30 PROCEDURE — 83735 ASSAY OF MAGNESIUM: CPT

## 2022-04-30 PROCEDURE — 97530 THERAPEUTIC ACTIVITIES: CPT

## 2022-04-30 PROCEDURE — 85730 THROMBOPLASTIN TIME PARTIAL: CPT

## 2022-04-30 PROCEDURE — 96374 THER/PROPH/DIAG INJ IV PUSH: CPT

## 2022-04-30 RX ADMIN — Medication 81 MILLIGRAM(S): at 12:12

## 2022-04-30 RX ADMIN — Medication 25 MILLIGRAM(S): at 14:00

## 2022-04-30 RX ADMIN — DULOXETINE HYDROCHLORIDE 30 MILLIGRAM(S): 30 CAPSULE, DELAYED RELEASE ORAL at 12:12

## 2022-04-30 RX ADMIN — Medication 10 MILLIGRAM(S): at 12:12

## 2022-04-30 NOTE — DISCHARGE NOTE NURSING/CASE MANAGEMENT/SOCIAL WORK - PATIENT PORTAL LINK FT
You can access the FollowMyHealth Patient Portal offered by [facility] by registering at the following website: [email or web address]. By joining [facility]’s FollowMyHealth portal, you will also be able to view your health information using other applications (apps) compatible with our system.

## 2022-05-31 ENCOUNTER — APPOINTMENT (OUTPATIENT)
Dept: UROLOGY | Facility: CLINIC | Age: 72
End: 2022-05-31

## 2022-06-01 NOTE — PATIENT PROFILE ADULT - PATIENT'S SEXUAL ORIENTATION
Make sure you followup with your primary care physician or the referral physician which has been provided for you call for appointment     Make sure you take all prescriptions if you have been prescribed any here in this emergency department to ensure the appropriate treatment     Go to the nearest Medical Center immediately for any new or worsening symptoms or concerns   Heterosexual

## 2022-06-27 DIAGNOSIS — K56.609 UNSPECIFIED INTESTINAL OBSTRUCTION, UNSPECIFIED AS TO PARTIAL VERSUS COMPLETE OBSTRUCTION: ICD-10-CM

## 2022-06-27 RX ORDER — LEFLUNOMIDE 10 MG/1
10 TABLET, FILM COATED ORAL
Qty: 90 | Refills: 0 | Status: ACTIVE | COMMUNITY
Start: 2022-02-03

## 2022-06-27 RX ORDER — CEFPODOXIME PROXETIL 100 MG/1
100 TABLET, FILM COATED ORAL
Qty: 14 | Refills: 0 | Status: ACTIVE | COMMUNITY
Start: 2022-02-12

## 2022-06-27 RX ORDER — TIZANIDINE 4 MG/1
4 TABLET ORAL
Qty: 90 | Refills: 0 | Status: ACTIVE | COMMUNITY
Start: 2022-06-02

## 2022-06-27 RX ORDER — PREDNISONE 10 MG/1
10 TABLET ORAL
Qty: 10 | Refills: 0 | Status: ACTIVE | COMMUNITY
Start: 2022-06-24

## 2022-06-27 RX ORDER — DICLOFENAC SODIUM 1% 10 MG/G
1 GEL TOPICAL
Qty: 100 | Refills: 0 | Status: ACTIVE | COMMUNITY
Start: 2022-04-04

## 2022-06-27 RX ORDER — ATORVASTATIN CALCIUM 20 MG/1
20 TABLET, FILM COATED ORAL
Qty: 90 | Refills: 0 | Status: ACTIVE | COMMUNITY
Start: 2021-11-30

## 2022-06-27 RX ORDER — BUPROPION HYDROCHLORIDE 300 MG/1
300 TABLET, EXTENDED RELEASE ORAL
Qty: 30 | Refills: 0 | Status: ACTIVE | COMMUNITY
Start: 2022-06-15

## 2022-06-27 RX ORDER — DICYCLOMINE HYDROCHLORIDE 10 MG/1
10 CAPSULE ORAL
Qty: 90 | Refills: 0 | Status: ACTIVE | COMMUNITY
Start: 2022-06-02

## 2022-06-27 RX ORDER — MECLIZINE HYDROCHLORIDE 12.5 MG/1
12.5 TABLET ORAL
Qty: 90 | Refills: 0 | Status: ACTIVE | COMMUNITY
Start: 2022-06-02

## 2022-06-27 RX ORDER — DICLOFENAC SODIUM 20 MG/G
2 SOLUTION TOPICAL
Qty: 112 | Refills: 0 | Status: ACTIVE | COMMUNITY
Start: 2021-12-16

## 2022-06-28 ENCOUNTER — APPOINTMENT (OUTPATIENT)
Dept: GASTROENTEROLOGY | Facility: CLINIC | Age: 72
End: 2022-06-28

## 2022-07-15 ENCOUNTER — APPOINTMENT (OUTPATIENT)
Dept: DERMATOLOGY | Facility: CLINIC | Age: 72
End: 2022-07-15

## 2022-08-03 ENCOUNTER — APPOINTMENT (OUTPATIENT)
Dept: ENDOCRINOLOGY | Facility: CLINIC | Age: 72
End: 2022-08-03

## 2022-08-04 ENCOUNTER — APPOINTMENT (OUTPATIENT)
Dept: DERMATOLOGY | Facility: CLINIC | Age: 72
End: 2022-08-04

## 2022-09-02 ENCOUNTER — APPOINTMENT (OUTPATIENT)
Dept: DERMATOLOGY | Facility: CLINIC | Age: 72
End: 2022-09-02

## 2022-09-06 ENCOUNTER — APPOINTMENT (OUTPATIENT)
Dept: GASTROENTEROLOGY | Facility: CLINIC | Age: 72
End: 2022-09-06

## 2022-09-14 ENCOUNTER — APPOINTMENT (OUTPATIENT)
Dept: ENDOCRINOLOGY | Facility: CLINIC | Age: 72
End: 2022-09-14

## 2022-09-14 VITALS
HEART RATE: 95 BPM | DIASTOLIC BLOOD PRESSURE: 72 MMHG | SYSTOLIC BLOOD PRESSURE: 142 MMHG | OXYGEN SATURATION: 99 % | HEIGHT: 62 IN | BODY MASS INDEX: 38.28 KG/M2 | WEIGHT: 208 LBS

## 2022-09-14 PROCEDURE — 99215 OFFICE O/P EST HI 40 MIN: CPT

## 2022-09-21 NOTE — ED ADULT NURSE REASSESSMENT NOTE - PUPIL SIZE LEFT
2 mm
2 mm
A 64 y/o male with Pmhx of prostate cancer presenting to the ED with abdominal pain. His symptoms started 1 week ago with left sided flank pain radiating to groin area and dysuria, over the past two days he's been having suprapubic pain and difficulty urinating. Urine stream is weak with scant amount coming out. He denies any hematuria, fevers, nausea, or vomiting.
A 64 y/o male with Pmhx of prostate cancer presenting to the ED with abdominal pain. His symptoms started 1 week ago with left sided flank pain radiating to groin area and dysuria, over the past two days he's been having suprapubic pain and difficulty urinating. Urine stream is weak with scant amount coming out. He denies any hematuria, fevers, nausea, or vomiting.  reed pt with prostate ca now with urinary retention

## 2022-09-29 ENCOUNTER — FORM ENCOUNTER (OUTPATIENT)
Age: 72
End: 2022-09-29

## 2022-09-29 ENCOUNTER — APPOINTMENT (OUTPATIENT)
Dept: GASTROENTEROLOGY | Facility: CLINIC | Age: 72
End: 2022-09-29

## 2022-09-29 VITALS
WEIGHT: 207 LBS | BODY MASS INDEX: 38.09 KG/M2 | OXYGEN SATURATION: 96 % | TEMPERATURE: 98.6 F | HEIGHT: 62 IN | RESPIRATION RATE: 18 BRPM | DIASTOLIC BLOOD PRESSURE: 74 MMHG | SYSTOLIC BLOOD PRESSURE: 131 MMHG | HEART RATE: 94 BPM

## 2022-09-29 DIAGNOSIS — K29.70 GASTRITIS, UNSPECIFIED, W/OUT BLEEDING: ICD-10-CM

## 2022-09-29 DIAGNOSIS — K58.9 IRRITABLE BOWEL SYNDROME W/OUT DIARRHEA: ICD-10-CM

## 2022-09-29 DIAGNOSIS — R10.31 RIGHT LOWER QUADRANT PAIN: ICD-10-CM

## 2022-09-29 DIAGNOSIS — R19.7 DIARRHEA, UNSPECIFIED: ICD-10-CM

## 2022-09-29 PROCEDURE — 99204 OFFICE O/P NEW MOD 45 MIN: CPT

## 2022-09-29 NOTE — HISTORY OF PRESENT ILLNESS
[FreeTextEntry1] : FAYE VILLANUEVA is a 72 year year old female with a PMH significant for Asthma, high cholesterol, anxiety and depression.\par \par She presents today with complaints of RLQ pain for the last 3-4 months. She describes it as a nagging pain. Pain is sometimes relieved after having a bm. She reports she has bms regularly however notices she will have diarrhea after having some dairy products. She was prescribed Dicyclomine 10 mg by her PCP which she takes 3 times daily. She reports that her pain is relieved by the dicyclomine. Denies rectal bleeding, melena, dark stools, unintentional weight loss, nausea, vomiting, change in appetite, hematemesis, GERD, or dysphagia. Pt states she was recently hosptalized for depression.\par \par According to pt, last colonoscopy was 2 years ago and normal. We do not have those records to review. \par \par Denies any significant pulmonary or cardiac conditions.

## 2022-09-29 NOTE — ADDENDUM
[FreeTextEntry1] : I, Tawny Hathaway NP, acted as scribe for ARIS Zarate for this patient encounter.

## 2022-09-29 NOTE — PHYSICAL EXAM

## 2022-09-29 NOTE — ASSESSMENT
[FreeTextEntry1] : FAYE VILLANUEVA is a 72 year year old female with a PMH significant for Asthma, high cholesterol, anxiety and depression.\par \par Pt complains of RLQ pain for the last 3-4 months, relieved by bms, and treated with Dicyclomine with positive relief of symptoms. Most likely IBS. Given recent hospitalization for depression, symptoms could be stress related. Pt also complains of diarrhea with dairy products. \par \par Continue Dicyclomine 10mg up to 4 times daily PRN\par Can increase to 20 mg 4 times daily PRN if symptoms unrelieved\par Avoid dairy products\par Can take Lactaid 30 min before consuming dairy products\par Request colonoscopy report from Dr. Reyes for our records\par Follow up in 3 months.\par \par I evaluated this patient with my NP and agree with the above assessment and management plan.  Patient is status post recent colonoscopy and does not require another at this time as long as we can obtain a copy of her previous colonoscopy report.  We will treat her symptoms symptomatically with dicyclomine as needed and Lactaid tablets prior to dairy products.  A lot of her lower GI symptoms suggest possible lactose intolerance and a lactose restricted diet was recommended and explained to the patient today who appeared to understand the above instructions, information, and management plan.

## 2022-10-06 ENCOUNTER — NON-APPOINTMENT (OUTPATIENT)
Age: 72
End: 2022-10-06

## 2022-10-07 ENCOUNTER — NON-APPOINTMENT (OUTPATIENT)
Age: 72
End: 2022-10-07

## 2022-10-13 ENCOUNTER — NON-APPOINTMENT (OUTPATIENT)
Age: 72
End: 2022-10-13

## 2022-10-18 ENCOUNTER — NON-APPOINTMENT (OUTPATIENT)
Age: 72
End: 2022-10-18

## 2022-12-09 ENCOUNTER — APPOINTMENT (OUTPATIENT)
Dept: GASTROENTEROLOGY | Facility: CLINIC | Age: 72
End: 2022-12-09

## 2022-12-09 PROCEDURE — SNS01: CPT

## 2022-12-12 ENCOUNTER — OFFICE (OUTPATIENT)
Dept: URBAN - METROPOLITAN AREA CLINIC 94 | Facility: CLINIC | Age: 72
Setting detail: OPHTHALMOLOGY
End: 2022-12-12
Payer: MEDICARE

## 2022-12-12 DIAGNOSIS — H43.822: ICD-10-CM

## 2022-12-12 DIAGNOSIS — H35.343: ICD-10-CM

## 2022-12-12 DIAGNOSIS — H35.372: ICD-10-CM

## 2022-12-12 DIAGNOSIS — H43.813: ICD-10-CM

## 2022-12-12 DIAGNOSIS — H35.363: ICD-10-CM

## 2022-12-12 PROCEDURE — 92134 CPTRZ OPH DX IMG PST SGM RTA: CPT | Performed by: SPECIALIST

## 2022-12-12 PROCEDURE — 92014 COMPRE OPH EXAM EST PT 1/>: CPT | Performed by: SPECIALIST

## 2022-12-12 PROCEDURE — 92235 FLUORESCEIN ANGRPH MLTIFRAME: CPT | Performed by: SPECIALIST

## 2022-12-12 ASSESSMENT — KERATOMETRY
OD_AXISANGLE_DEGREES: 081
OD_K1POWER_DIOPTERS: 42.25
METHOD_AUTO_MANUAL: AUTO
OS_K1POWER_DIOPTERS: 42.00
OS_AXISANGLE_DEGREES: 003
OS_K2POWER_DIOPTERS: 42.50
OD_K2POWER_DIOPTERS: 42.50

## 2022-12-12 ASSESSMENT — SPHEQUIV_DERIVED
OD_SPHEQUIV: -1.375
OS_SPHEQUIV: 0.375

## 2022-12-12 ASSESSMENT — LID EXAM ASSESSMENTS
OS_BLEPHARITIS: 1+
OD_BLEPHARITIS: 1+

## 2022-12-12 ASSESSMENT — VISUAL ACUITY
OD_BCVA: 20/30
OS_BCVA: 20/25-1

## 2022-12-12 ASSESSMENT — REFRACTION_AUTOREFRACTION
OD_CYLINDER: -1.25
OD_AXIS: 097
OS_CYLINDER: -0.75
OD_SPHERE: -0.75
OS_SPHERE: +0.75
OS_AXIS: 088

## 2022-12-12 ASSESSMENT — SUPERFICIAL PUNCTATE KERATITIS (SPK)
OD_SPK: 1+
OS_SPK: T

## 2022-12-12 ASSESSMENT — AXIALLENGTH_DERIVED
OS_AL: 23.9091
OD_AL: 24.5806

## 2022-12-12 ASSESSMENT — TEAR BREAK UP TIME (TBUT)
OD_TBUT: 1+
OS_TBUT: 1+

## 2023-01-25 NOTE — ED BEHAVIORAL HEALTH ASSESSMENT NOTE - NS ED BHA REVIEW OF ED CHART VITAL SIGNS REVIEWED
Physician Documentation                                                                           

 [facility]                                                                 

Name: Omkar Chung                                                                                  

Age: 65 yrs                                                                                       

Sex: Male                                                                                         

: 1957                                                                                   

MRN: [id number]                                                                                   

Arrival Date: 2023                                                                          

Time: 09:00                                                                                       

Account#: [id number]                                                                            

Bed 2                                                                                             

Private MD:                                                                                       

ED Physician Javi Cheek                                                                     

HPI:                                                                                              

                                                                                             

11:32 This 65 yrs old Male presents to ER via EMS with complaints of CPR.                     rt  

11:32 Unable to obtain HPI due to patient is in a persistent vegetative state. Patient with   rt  

      history of end-stage COPD, reported stage IV colon cancer presents to the ED in             

      respiratory and cardiac arrest. The patient reportedly had hematemesis starting             

      yesterday. This is reportedly worsened. The patient was found by his wife to be             

      unresponsive, EMS was called, patient was noted to be in asystole with fixed dilated        

      pupils, CPR was initiated, patient brought for further treatment. Estimated downtime is     

      about 1 hour but is unclear how long the patient has been unresponsive for.                 

                                                                                                  

Historical:                                                                                       

- Allergies:                                                                                      

11:06 No Known Allergies;                                                                     ph  

- PMHx:                                                                                           

11:06 Cirrhosis; colon cancer; COPD; DM type 2; Hernia;                                       ph  

                                                                                                  

- Immunization history:: Adult Immunizations unknown.                                             

- Social history:: Smoking status: unknown.                                                       

- Family history:: not pertinent.                                                                 

                                                                                                  

                                                                                                  

ROS:                                                                                              

11:34 Unable to obtain ROS due to patient is in a persistent vegetative state.                rt  

                                                                                                  

Exam:                                                                                             

11:34 MS/ Extremity:  Pulses equal, no cyanosis.  Neurovascular intact.  Full, normal range   rt  

      of motion.                                                                                  

11:34 Constitutional: The patient appears Unresponsive, CPR in progress                           

11:34 Eyes: Fixed, dilated pupils, pupils are midline.                                            

11:34 ENT: Sheldon tube in place.                                                                    

11:34 Cardiovascular: Pulseless.                                                                  

11:34 Respiratory: Apneic.                                                                        

11:34 Abdomen/GI: Distended.                                                                      

11:34 Skin: Jaundiced.                                                                            

11:34 Neuro: GCS 3.                                                                               

11:34 Psych: Not assessable.                                                                      

11:34 ECG was reviewed by the Attending Physician.                                            rt  

                                                                                                  

Vital Signs:                                                                                      

08:59  / 94; Pulse 59; Resp 16 A; Temp 95.7;                                            ph  

09:15 Pulse 42; Resp 8;                                                                       ph  

09:15 unable to obtain BP or Spo2                                                             ph  

                                                                                                  

Procedures:                                                                                       

11:34 CPR: See CPR flow sheet. Initial patient assessment: unresponsive, The presenting       rt  

      cardiac rhythm is asystole. respirations assisted with BVM, Meds given: See Meds list.      

      regained rhythm.                                                                            

                                                                                                  

MDM:                                                                                              

09:03 Patient medically screened.                                                             rt  

11:34 Differential diagnosis: arrythmia, cardiac arrest, Hemorrhagic shock. Data reviewed:    rt  

      vital signs, nurses notes. I considered the following discharge prescriptions or            

      medication management in the emergency department Medications were administered in the      

      Emergency Department. See MAR. Historians other than the Patient: Spouse/Significant        

      Other: Long discussion regarding prognosis, goals of care. The wife states that the         

      patient would not wish to be placed on a ventilator or have resuscitative efforts be        

      performed. We agreed to remove Combitube, not intubate the patient. Comfort only            

      measures were initiated.. Care significantly affected by the following chronic              

      conditions: Cancer, COPD. ED course: Patient presents to the ED in cardiac arrest. The      

      patient did regain pulses. After discussion with the family, comfort only measures were     

      started. After brief period of time, the patient  peacefully. Postmortem care        

      was provided..                                                                              

                                                                                                  

                                                                                             

09:04 Order name: ABO/RH typing; Complete Time: 09:35                                         EDMS

                                                                                             

09:04 Order name: Antibody Screen; Complete Time: 09:35                                       EDMS

                                                                                             

09:16 Order name: Glucose, Ancillary Testing; Complete Time: 09:35                            EDMS

                                                                                                  

EC:34 Rate is 61 beats/min. Rhythm is regular, agonal. QRS Axis is Normal. QT interval is     rt  

      normal. No Q waves. Interpreted by me.                                                      

                                                                                                  

Administered Medications:                                                                         

09:01 Drug: ProTONIX (pantoprazole) 80 mg Route: IVP; Site: Other;                            ph  

11:11 Follow up: Response: No adverse reaction                                                ph  

                                                                                                  

                                                                                                  

Point of Care Testing:                                                                            

      Blood Glucose:                                                                              

09:04 Blood Glucose: 222 mg/dL;                                                               ph  

      Ranges:                                                                                     

      Critical Glucose Levels:Adult <50 mg/dl or >400 mg/dl  <40 mg/dl or >180 mg/dl       

Disposition:                                                                                      

11:34 Critical Care:.                                                                         rt  

                                                                                                  

Disposition Summary:                                                                              

23 09:58                                                                                    

Patient                                                                                    

      Location:  Home                                                                  rt  

      Pronouncing Physician: Javi Cheek                                                 rt  

      Time of Death: 09:54 2023                                                         rt  

      Diagnosis                                                                                   

        - Cardiopulmonary Arrest                                                              rt  

        - Hematemesis                                                                         rt  

Critical care time excluding procedures:                                                          

11:34 Critical care time: Bedside Care: 30 minutes, Family Intervention: 10 minutes. Total    rt  

      time: 40 minutes                                                                            

                                                                                                  

Signatures:                                                                                       

Dispatcher [employer] INDIRA Hudson Dania, RN                      RN   Javi Carrasquillo MD MD   rt                                                   

                                                                                                  

Corrections: (The following items were deleted from the chart)                                    

: 09:04 RBC Leukored Pheresis ordered. EDMS                                               EDMS

: 09:04 RBC Leukoreduced (Pheresis 2) ordered. EDMS                                       EDMS

                                                                                                  

************************************************************************************************** Yes

## 2023-02-17 LAB — TSH SERPL-ACNC: 0.23

## 2023-02-20 ENCOUNTER — APPOINTMENT (OUTPATIENT)
Dept: ENDOCRINOLOGY | Facility: CLINIC | Age: 73
End: 2023-02-20
Payer: MEDICARE

## 2023-02-20 ENCOUNTER — FORM ENCOUNTER (OUTPATIENT)
Age: 73
End: 2023-02-20

## 2023-02-20 VITALS
OXYGEN SATURATION: 95 % | BODY MASS INDEX: 38.64 KG/M2 | HEART RATE: 64 BPM | DIASTOLIC BLOOD PRESSURE: 76 MMHG | HEIGHT: 62 IN | SYSTOLIC BLOOD PRESSURE: 120 MMHG | WEIGHT: 210 LBS

## 2023-02-20 DIAGNOSIS — Z13.29 ENCOUNTER FOR SCREENING FOR OTHER SUSPECTED ENDOCRINE DISORDER: ICD-10-CM

## 2023-02-20 PROCEDURE — 99214 OFFICE O/P EST MOD 30 MIN: CPT

## 2023-02-24 ENCOUNTER — NON-APPOINTMENT (OUTPATIENT)
Age: 73
End: 2023-02-24

## 2023-03-03 LAB — TSH SERPL-ACNC: 1.03

## 2023-03-06 ENCOUNTER — APPOINTMENT (OUTPATIENT)
Dept: ENDOCRINOLOGY | Facility: CLINIC | Age: 73
End: 2023-03-06
Payer: MEDICARE

## 2023-03-06 VITALS
DIASTOLIC BLOOD PRESSURE: 76 MMHG | BODY MASS INDEX: 36.8 KG/M2 | WEIGHT: 200 LBS | SYSTOLIC BLOOD PRESSURE: 140 MMHG | HEIGHT: 62 IN | HEART RATE: 91 BPM

## 2023-03-06 DIAGNOSIS — E04.1 NONTOXIC SINGLE THYROID NODULE: ICD-10-CM

## 2023-03-06 PROCEDURE — 99214 OFFICE O/P EST MOD 30 MIN: CPT

## 2023-03-21 ENCOUNTER — APPOINTMENT (OUTPATIENT)
Dept: UROGYNECOLOGY | Facility: CLINIC | Age: 73
End: 2023-03-21

## 2023-05-09 ENCOUNTER — APPOINTMENT (OUTPATIENT)
Dept: UROGYNECOLOGY | Facility: CLINIC | Age: 73
End: 2023-05-09

## 2023-05-15 ENCOUNTER — OFFICE (OUTPATIENT)
Dept: URBAN - METROPOLITAN AREA CLINIC 94 | Facility: CLINIC | Age: 73
Setting detail: OPHTHALMOLOGY
End: 2023-05-15
Payer: MEDICARE

## 2023-05-15 DIAGNOSIS — H43.813: ICD-10-CM

## 2023-05-15 DIAGNOSIS — H43.822: ICD-10-CM

## 2023-05-15 DIAGNOSIS — H35.372: ICD-10-CM

## 2023-05-15 DIAGNOSIS — H35.363: ICD-10-CM

## 2023-05-15 DIAGNOSIS — H35.343: ICD-10-CM

## 2023-05-15 PROCEDURE — 92235 FLUORESCEIN ANGRPH MLTIFRAME: CPT | Performed by: SPECIALIST

## 2023-05-15 PROCEDURE — 92014 COMPRE OPH EXAM EST PT 1/>: CPT | Performed by: SPECIALIST

## 2023-05-15 PROCEDURE — 92134 CPTRZ OPH DX IMG PST SGM RTA: CPT | Performed by: SPECIALIST

## 2023-05-15 ASSESSMENT — TONOMETRY
OS_IOP_MMHG: 16
OD_IOP_MMHG: 19

## 2023-05-15 ASSESSMENT — AXIALLENGTH_DERIVED
OS_AL: 23.9091
OD_AL: 24.5806

## 2023-05-15 ASSESSMENT — REFRACTION_AUTOREFRACTION
OS_CYLINDER: -0.75
OS_SPHERE: +0.75
OD_CYLINDER: -1.25
OD_SPHERE: -0.75
OS_AXIS: 088
OD_AXIS: 097

## 2023-05-15 ASSESSMENT — VISUAL ACUITY
OS_BCVA: 20/30-1
OD_BCVA: 20/40

## 2023-05-15 ASSESSMENT — KERATOMETRY
OD_K1POWER_DIOPTERS: 42.25
OS_K1POWER_DIOPTERS: 42.00
METHOD_AUTO_MANUAL: AUTO
OD_AXISANGLE_DEGREES: 081
OS_K2POWER_DIOPTERS: 42.50
OS_AXISANGLE_DEGREES: 003
OD_K2POWER_DIOPTERS: 42.50

## 2023-05-15 ASSESSMENT — TEAR BREAK UP TIME (TBUT)
OS_TBUT: 1+
OD_TBUT: 1+

## 2023-05-15 ASSESSMENT — SUPERFICIAL PUNCTATE KERATITIS (SPK)
OD_SPK: 1+
OS_SPK: T

## 2023-05-15 ASSESSMENT — CONFRONTATIONAL VISUAL FIELD TEST (CVF)
OS_FINDINGS: FULL
OD_FINDINGS: FULL

## 2023-05-15 ASSESSMENT — SPHEQUIV_DERIVED
OD_SPHEQUIV: -1.375
OS_SPHEQUIV: 0.375

## 2023-05-15 ASSESSMENT — LID EXAM ASSESSMENTS
OS_BLEPHARITIS: 1+
OD_BLEPHARITIS: 1+

## 2023-06-02 NOTE — ED ADULT TRIAGE NOTE - NSWEIGHTCALCTOOLDRUG_GEN_A_CORE
Post Treatment: After treatment, the machine was reset to initial settings, and the applicator was removed from the skin.  used

## 2023-08-23 PROBLEM — F32.A DEPRESSION: Status: ACTIVE | Noted: 2023-08-23

## 2023-08-23 PROBLEM — E04.9 ENLARGED THYROID: Status: ACTIVE | Noted: 2023-08-23

## 2023-08-23 PROBLEM — Z78.9 CAFFEINE USE: Status: ACTIVE | Noted: 2023-08-23

## 2023-08-23 PROBLEM — Z87.09 HISTORY OF BRONCHITIS: Status: RESOLVED | Noted: 2023-08-23 | Resolved: 2023-08-23

## 2023-08-23 PROBLEM — Z82.3 FAMILY HISTORY OF CEREBROVASCULAR ACCIDENT (CVA): Status: ACTIVE | Noted: 2023-08-23

## 2023-08-23 PROBLEM — Z83.6 FAMILY HISTORY OF PNEUMONIA: Status: ACTIVE | Noted: 2023-08-23

## 2023-08-23 PROBLEM — Z87.01 HISTORY OF PNEUMONIA: Status: RESOLVED | Noted: 2023-08-23 | Resolved: 2023-08-23

## 2023-08-23 RX ORDER — OLANZAPINE 20 MG/1
TABLET ORAL
Refills: 0 | Status: ACTIVE | COMMUNITY

## 2023-08-23 NOTE — OB HISTORY
[Vaginal ___] : [unfilled] vaginal delivery(s) [Last Pap Smear ___] : date of last pap smear was on [unfilled] [Abnormal Pap Smear] : normal pap smear [Taking Estrogens] : is not taking estrogen replacement [Sexually Active] : is not sexually active

## 2023-08-23 NOTE — REASON FOR VISIT
[Questionnaire Received] : Patient questionnaire received [Urinary Incontinence] : urinary incontinence [Urinary Urgency] : urinary urgency

## 2023-08-29 ENCOUNTER — APPOINTMENT (OUTPATIENT)
Dept: UROGYNECOLOGY | Facility: CLINIC | Age: 73
End: 2023-08-29

## 2023-08-29 DIAGNOSIS — F32.A DEPRESSION, UNSPECIFIED: ICD-10-CM

## 2023-08-29 DIAGNOSIS — Z87.01 PERSONAL HISTORY OF PNEUMONIA (RECURRENT): ICD-10-CM

## 2023-08-29 DIAGNOSIS — Z83.6 FAMILY HISTORY OF OTHER DISEASES OF THE RESPIRATORY SYSTEM: ICD-10-CM

## 2023-08-29 DIAGNOSIS — Z87.09 PERSONAL HISTORY OF OTHER DISEASES OF THE RESPIRATORY SYSTEM: ICD-10-CM

## 2023-08-29 DIAGNOSIS — E04.9 NONTOXIC GOITER, UNSPECIFIED: ICD-10-CM

## 2023-08-29 DIAGNOSIS — Z78.9 OTHER SPECIFIED HEALTH STATUS: ICD-10-CM

## 2023-08-29 DIAGNOSIS — Z82.3 FAMILY HISTORY OF STROKE: ICD-10-CM

## 2023-08-30 ENCOUNTER — APPOINTMENT (OUTPATIENT)
Dept: UROLOGY | Facility: CLINIC | Age: 73
End: 2023-08-30
Payer: MEDICARE

## 2023-08-30 ENCOUNTER — NON-APPOINTMENT (OUTPATIENT)
Age: 73
End: 2023-08-30

## 2023-08-30 VITALS
HEIGHT: 62 IN | SYSTOLIC BLOOD PRESSURE: 123 MMHG | OXYGEN SATURATION: 91 % | HEART RATE: 88 BPM | WEIGHT: 210 LBS | DIASTOLIC BLOOD PRESSURE: 78 MMHG | BODY MASS INDEX: 38.64 KG/M2

## 2023-08-30 PROCEDURE — 51798 US URINE CAPACITY MEASURE: CPT

## 2023-08-30 PROCEDURE — 99205 OFFICE O/P NEW HI 60 MIN: CPT

## 2023-08-30 NOTE — ASSESSMENT
[FreeTextEntry1] : 73F w depression, bipolar disorder, HLD, and asthma presents for urinary incontinence. This is likely multifactorial and at least partially related to poor mobility and limited assistance with getting to the bathroom in time, especially at night. I explained that it is also likely affected by her consumption of tea and ginger ale as well as her constipation. We had a long discussion about management options and agreed to begin w lifestyle modifications and medication simultaneously.  -UA today -Stop tea and sweetened beverages -I recommended that we start oxybutynin (5 mg ER daily) and miralax. I will communicate this to her rehab facility and also request that she receive additional assistance with getting to the restroom at night. -RCT in 3 months

## 2023-08-30 NOTE — HISTORY OF PRESENT ILLNESS
[FreeTextEntry1] : 73F w depression, bipolar disorder, HLD, and asthma presents for urinary incontinence.  2018, began having daytime UUI. She thinks she was started on oxybutynin for this, but she is not sure. She is not taking any OAB meds currently.  7/17/23, admitted to [place] w back pain and leg weakness. Ultimately underwent back surgery for herniated disc (L 2-3). Nocturnal enuresis began during this hospitalization and actually worsened after the surgery.  8/17/23, Discharged from [place] to rehab facility where she is currently living.   Today, PVR 12 ml. She reports UUI > BRUCE and nocturnal enuresis worse than daytime enuresis. She often wakes up in the middle of the night w urgency and voids before the MA at the rehab center answers her call. Denies dysuria and hematuria. She drinks 1 cup of tea and several cans of ginger ale per day. She has 3-4 firm BMs/week.

## 2023-08-31 LAB
APPEARANCE: CLEAR
BILIRUBIN URINE: NEGATIVE
BLOOD URINE: NEGATIVE
COLOR: YELLOW
GLUCOSE QUALITATIVE U: NEGATIVE MG/DL
KETONES URINE: NEGATIVE MG/DL
LEUKOCYTE ESTERASE URINE: NEGATIVE
NITRITE URINE: NEGATIVE
PH URINE: 5.5
PROTEIN URINE: NORMAL MG/DL
SPECIFIC GRAVITY URINE: 1.03
UROBILINOGEN URINE: 0.2 MG/DL

## 2023-09-20 ENCOUNTER — APPOINTMENT (OUTPATIENT)
Dept: UROLOGY | Facility: CLINIC | Age: 73
End: 2023-09-20
Payer: MEDICARE

## 2023-09-20 VITALS — HEART RATE: 86 BPM | DIASTOLIC BLOOD PRESSURE: 81 MMHG | SYSTOLIC BLOOD PRESSURE: 131 MMHG

## 2023-09-20 PROCEDURE — 99215 OFFICE O/P EST HI 40 MIN: CPT

## 2023-09-28 NOTE — ED BEHAVIORAL HEALTH ASSESSMENT NOTE - IMPULSE CONTROL
Occupational Therapy Visit    Visit Type: Daily Treatment Note  Visit: 7  Referring Provider: Parisa Casanova PA-C  Medical Diagnosis (from order): Diagnosis Information    Diagnosis  S53.442D (ICD-10-CM) - Tear of ulnar collateral ligament of elbow, left, subsequent encounter         SUBJECTIVE                                                                                                               Patient reports her arm feels good, she has no pain.     Pain / Symptoms  - Pain rating (out of 10): Current: 0      OBJECTIVE                                                                                                                                        Treatment    Cryotherapy (05229): Cold Pack  - Location: left elbow and wrist   - Duration: 10 minutes    Results: decreased pain  Reaction: no adverse reaction to treatment      Therapeutic Exercise  Therapeutic Exercises:   Passive wrist flexion/extension and radial/ulnar deviation   Passive elbow flexion and extension   Active wrist flexion/extension and radial/ulnar deviation 2 pounds 20x   Mallet supination and pronation 2 pounds 20x   Bicep curls 25x 2 pounds   Hammer curls 2 pounds 25x      (all deferred)   Wrist extension stretch 10x 5 second hold   Wrist flexion stretch 10x 5 second hold   Active assisted range of motion elbow flexion and extension 2x10   Active assisted range of motion towel slides clockwise and counter clockwise 3x10   Active supination and pronation 15x  Wall towel slides elbow and shoulder flexion and extension 20x   Yellow flexbar supination and pronation and wrist extension/flexion 2x10            Manual Therapy   Soft tissue mobilization to the left upper extremity all aspects (avoiding steri strips). -deferred     Therapeutic Activity  Promoting functional, dynamic tasks from everyday living to improve pushing, pinching, grasping, bending, lifting, carrying, catching and throwing:    Left upper extremity:   Gentle towel  squeezes 20x-deferred    Towel walks 1x10 -deferred   Blue clothespins 2x   Pink putty squeezes 2x10   Pink putty pinching 3 point and tip pinch 3 rows   Pink putty pulls wrist pronated and supinated 1x   Pink putty item retrieval 4 minutes (8/14)     Neuromuscular Re-Education  Promoting improved quality of motion , improved posture and postural awareness, improved proprioception, improved coordination  and improved kinesthetic sense:     Left upper extremity:   Cup and washers 5 at a time 1x   Easy pegboard 1x (promoting lateral reaching) -deferred   Cone stacking and organizing- lateral reaching, cross body reaching, overhead reaching 4x  -deferred   Silver chinese balls 2 minutes   Tranquillity wrist maze 2 minutes   Active assisted elbow extension with jules 10x 5 second hold     Skilled input: verbal instruction/cues, tactile instruction/cues and as detailed above    Writer verbally educated and received verbal consent for hand placement, positioning of patient, and techniques to be performed today from patient for clothing adjustments for techniques, hand placement and palpation for techniques and therapist position for techniques as described above and how they are pertinent to the patient's plan of care.  Home Exercise Program  Patient issued written home exercise program. Patient was offered home exercise program to be given via text message and e-mail with online access providing exercise description, videos, and pictures to assist.  Patient encouraged to hold on any exercises if unsure how to complete correctly and to follow up next visit with further questions. Patient demonstrated understanding of all exercises and was given all materials necessary to complete it independently.     Access Code: [email or web address]  URL: [email or web address]  Date: 08/24/2023  Prepared by: Alessia Dooley    Program Notes  Ice elbow 2 times a day for 10 minutes Towel slides elbow flexion and extension 2x10 Towel  slides elbow circles (CW/CCW) 2x10     Exercises  - Seated Elbow Flexion and Extension AROM  - 2 x daily - 7 x weekly - 2 sets - 10 reps  - Seated Elbow Flexion Shoulder Internal Rotation AAROM at Table with Towel  - 2 x daily - 7 x weekly - 2 sets - 10 reps    Updated home exercise program: 9/14/23  - Seated Wrist Extension Stretch  - 2 x daily - 7 x weekly - 1 sets - 10 reps - 5 hold  - Seated Wrist Flexion Stretch  - 2 x daily - 7 x weekly - 1 sets - 10 reps - 5 hold    Updated home exercise program 9/19/23  - Standing Single Arm Elbow Flexion with Resistance  - 2 x daily - 7 x weekly - 2 sets - 10 reps - 5 hold  - Standing Elbow Extension with Anchored Resistance  - 2 x daily - 7 x weekly - 2 sets - 10 reps - 5 hold  - Putty Squeezes  - 2 x daily - 7 x weekly - 3 sets  - Tip Pinch with Putty  - 2 x daily - 7 x weekly - 3 sets      ASSESSMENT                                                                                                            New strengthening exercises added to treatment today. Patient with no pain while performing these exercises, facilitating ease with resisted puling, gripping, and pinching, indicating an increase in left upper extremity activity tolerance. Next session will continue to increase elbow and wrist range of motion and strengthen the extremity for functional performance.   Education:   - Results of above outlined education: Verbalizes understanding and Demonstrates understanding    PLAN                                                                                                                           Suggestions for next session as indicated: Progress per plan of care       Therapy procedure time and total treatment time can be found documented on the Time Entry flowsheet   Normal

## 2023-11-16 ENCOUNTER — APPOINTMENT (OUTPATIENT)
Dept: ENDOCRINOLOGY | Facility: CLINIC | Age: 73
End: 2023-11-16

## 2023-11-21 ENCOUNTER — EMERGENCY (EMERGENCY)
Facility: HOSPITAL | Age: 73
LOS: 1 days | Discharge: HOME | End: 2023-11-21
Attending: STUDENT IN AN ORGANIZED HEALTH CARE EDUCATION/TRAINING PROGRAM
Payer: MEDICARE

## 2023-11-21 VITALS
HEART RATE: 99 BPM | RESPIRATION RATE: 28 BRPM | DIASTOLIC BLOOD PRESSURE: 65 MMHG | WEIGHT: 214.95 LBS | OXYGEN SATURATION: 99 % | HEIGHT: 62 IN | SYSTOLIC BLOOD PRESSURE: 133 MMHG | TEMPERATURE: 98 F

## 2023-11-21 DIAGNOSIS — F41.1 GENERALIZED ANXIETY DISORDER: ICD-10-CM

## 2023-11-21 DIAGNOSIS — Z90.49 ACQUIRED ABSENCE OF OTHER SPECIFIED PARTS OF DIGESTIVE TRACT: Chronic | ICD-10-CM

## 2023-11-21 LAB
ALBUMIN SERPL ELPH-MCNC: 3.5 G/DL — SIGNIFICANT CHANGE UP (ref 3.3–5.2)
ALBUMIN SERPL ELPH-MCNC: 3.5 G/DL — SIGNIFICANT CHANGE UP (ref 3.3–5.2)
ALP SERPL-CCNC: 97 U/L — SIGNIFICANT CHANGE UP (ref 40–120)
ALP SERPL-CCNC: 97 U/L — SIGNIFICANT CHANGE UP (ref 40–120)
ALT FLD-CCNC: 18 U/L — SIGNIFICANT CHANGE UP
ALT FLD-CCNC: 18 U/L — SIGNIFICANT CHANGE UP
ANION GAP SERPL CALC-SCNC: 15 MMOL/L — SIGNIFICANT CHANGE UP (ref 5–17)
ANION GAP SERPL CALC-SCNC: 15 MMOL/L — SIGNIFICANT CHANGE UP (ref 5–17)
APTT BLD: 30.3 SEC — SIGNIFICANT CHANGE UP (ref 24.5–35.6)
APTT BLD: 30.3 SEC — SIGNIFICANT CHANGE UP (ref 24.5–35.6)
AST SERPL-CCNC: 18 U/L — SIGNIFICANT CHANGE UP
AST SERPL-CCNC: 18 U/L — SIGNIFICANT CHANGE UP
BASOPHILS # BLD AUTO: 0.06 K/UL — SIGNIFICANT CHANGE UP (ref 0–0.2)
BASOPHILS # BLD AUTO: 0.06 K/UL — SIGNIFICANT CHANGE UP (ref 0–0.2)
BASOPHILS NFR BLD AUTO: 0.6 % — SIGNIFICANT CHANGE UP (ref 0–2)
BASOPHILS NFR BLD AUTO: 0.6 % — SIGNIFICANT CHANGE UP (ref 0–2)
BILIRUB SERPL-MCNC: 0.5 MG/DL — SIGNIFICANT CHANGE UP (ref 0.4–2)
BILIRUB SERPL-MCNC: 0.5 MG/DL — SIGNIFICANT CHANGE UP (ref 0.4–2)
BUN SERPL-MCNC: 12.4 MG/DL — SIGNIFICANT CHANGE UP (ref 8–20)
BUN SERPL-MCNC: 12.4 MG/DL — SIGNIFICANT CHANGE UP (ref 8–20)
CALCIUM SERPL-MCNC: 8.2 MG/DL — LOW (ref 8.4–10.5)
CALCIUM SERPL-MCNC: 8.2 MG/DL — LOW (ref 8.4–10.5)
CHLORIDE SERPL-SCNC: 106 MMOL/L — SIGNIFICANT CHANGE UP (ref 96–108)
CHLORIDE SERPL-SCNC: 106 MMOL/L — SIGNIFICANT CHANGE UP (ref 96–108)
CK SERPL-CCNC: 121 U/L — SIGNIFICANT CHANGE UP (ref 25–170)
CK SERPL-CCNC: 121 U/L — SIGNIFICANT CHANGE UP (ref 25–170)
CO2 SERPL-SCNC: 19 MMOL/L — LOW (ref 22–29)
CO2 SERPL-SCNC: 19 MMOL/L — LOW (ref 22–29)
CREAT SERPL-MCNC: 0.73 MG/DL — SIGNIFICANT CHANGE UP (ref 0.5–1.3)
CREAT SERPL-MCNC: 0.73 MG/DL — SIGNIFICANT CHANGE UP (ref 0.5–1.3)
D DIMER BLD IA.RAPID-MCNC: 253 NG/ML DDU — HIGH
D DIMER BLD IA.RAPID-MCNC: 253 NG/ML DDU — HIGH
EGFR: 88 ML/MIN/1.73M2 — SIGNIFICANT CHANGE UP
EGFR: 88 ML/MIN/1.73M2 — SIGNIFICANT CHANGE UP
EOSINOPHIL # BLD AUTO: 0.09 K/UL — SIGNIFICANT CHANGE UP (ref 0–0.5)
EOSINOPHIL # BLD AUTO: 0.09 K/UL — SIGNIFICANT CHANGE UP (ref 0–0.5)
EOSINOPHIL NFR BLD AUTO: 1 % — SIGNIFICANT CHANGE UP (ref 0–6)
EOSINOPHIL NFR BLD AUTO: 1 % — SIGNIFICANT CHANGE UP (ref 0–6)
GLUCOSE SERPL-MCNC: 93 MG/DL — SIGNIFICANT CHANGE UP (ref 70–99)
GLUCOSE SERPL-MCNC: 93 MG/DL — SIGNIFICANT CHANGE UP (ref 70–99)
HCT VFR BLD CALC: 40.3 % — SIGNIFICANT CHANGE UP (ref 34.5–45)
HCT VFR BLD CALC: 40.3 % — SIGNIFICANT CHANGE UP (ref 34.5–45)
HGB BLD-MCNC: 13.6 G/DL — SIGNIFICANT CHANGE UP (ref 11.5–15.5)
HGB BLD-MCNC: 13.6 G/DL — SIGNIFICANT CHANGE UP (ref 11.5–15.5)
IMM GRANULOCYTES NFR BLD AUTO: 0.2 % — SIGNIFICANT CHANGE UP (ref 0–0.9)
IMM GRANULOCYTES NFR BLD AUTO: 0.2 % — SIGNIFICANT CHANGE UP (ref 0–0.9)
INR BLD: 1.07 RATIO — SIGNIFICANT CHANGE UP (ref 0.85–1.18)
INR BLD: 1.07 RATIO — SIGNIFICANT CHANGE UP (ref 0.85–1.18)
LYMPHOCYTES # BLD AUTO: 2.34 K/UL — SIGNIFICANT CHANGE UP (ref 1–3.3)
LYMPHOCYTES # BLD AUTO: 2.34 K/UL — SIGNIFICANT CHANGE UP (ref 1–3.3)
LYMPHOCYTES # BLD AUTO: 25.2 % — SIGNIFICANT CHANGE UP (ref 13–44)
LYMPHOCYTES # BLD AUTO: 25.2 % — SIGNIFICANT CHANGE UP (ref 13–44)
MCHC RBC-ENTMCNC: 30.8 PG — SIGNIFICANT CHANGE UP (ref 27–34)
MCHC RBC-ENTMCNC: 30.8 PG — SIGNIFICANT CHANGE UP (ref 27–34)
MCHC RBC-ENTMCNC: 33.7 GM/DL — SIGNIFICANT CHANGE UP (ref 32–36)
MCHC RBC-ENTMCNC: 33.7 GM/DL — SIGNIFICANT CHANGE UP (ref 32–36)
MCV RBC AUTO: 91.2 FL — SIGNIFICANT CHANGE UP (ref 80–100)
MCV RBC AUTO: 91.2 FL — SIGNIFICANT CHANGE UP (ref 80–100)
MONOCYTES # BLD AUTO: 0.97 K/UL — HIGH (ref 0–0.9)
MONOCYTES # BLD AUTO: 0.97 K/UL — HIGH (ref 0–0.9)
MONOCYTES NFR BLD AUTO: 10.5 % — SIGNIFICANT CHANGE UP (ref 2–14)
MONOCYTES NFR BLD AUTO: 10.5 % — SIGNIFICANT CHANGE UP (ref 2–14)
NEUTROPHILS # BLD AUTO: 5.79 K/UL — SIGNIFICANT CHANGE UP (ref 1.8–7.4)
NEUTROPHILS # BLD AUTO: 5.79 K/UL — SIGNIFICANT CHANGE UP (ref 1.8–7.4)
NEUTROPHILS NFR BLD AUTO: 62.5 % — SIGNIFICANT CHANGE UP (ref 43–77)
NEUTROPHILS NFR BLD AUTO: 62.5 % — SIGNIFICANT CHANGE UP (ref 43–77)
PLATELET # BLD AUTO: 314 K/UL — SIGNIFICANT CHANGE UP (ref 150–400)
PLATELET # BLD AUTO: 314 K/UL — SIGNIFICANT CHANGE UP (ref 150–400)
POTASSIUM SERPL-MCNC: 3.3 MMOL/L — LOW (ref 3.5–5.3)
POTASSIUM SERPL-MCNC: 3.3 MMOL/L — LOW (ref 3.5–5.3)
POTASSIUM SERPL-SCNC: 3.3 MMOL/L — LOW (ref 3.5–5.3)
POTASSIUM SERPL-SCNC: 3.3 MMOL/L — LOW (ref 3.5–5.3)
PROT SERPL-MCNC: 6.1 G/DL — LOW (ref 6.6–8.7)
PROT SERPL-MCNC: 6.1 G/DL — LOW (ref 6.6–8.7)
PROTHROM AB SERPL-ACNC: 11.9 SEC — SIGNIFICANT CHANGE UP (ref 9.5–13)
PROTHROM AB SERPL-ACNC: 11.9 SEC — SIGNIFICANT CHANGE UP (ref 9.5–13)
RBC # BLD: 4.42 M/UL — SIGNIFICANT CHANGE UP (ref 3.8–5.2)
RBC # BLD: 4.42 M/UL — SIGNIFICANT CHANGE UP (ref 3.8–5.2)
RBC # FLD: 13.4 % — SIGNIFICANT CHANGE UP (ref 10.3–14.5)
RBC # FLD: 13.4 % — SIGNIFICANT CHANGE UP (ref 10.3–14.5)
SODIUM SERPL-SCNC: 140 MMOL/L — SIGNIFICANT CHANGE UP (ref 135–145)
SODIUM SERPL-SCNC: 140 MMOL/L — SIGNIFICANT CHANGE UP (ref 135–145)
TROPONIN T, HIGH SENSITIVITY RESULT: 13 NG/L — SIGNIFICANT CHANGE UP (ref 0–51)
TROPONIN T, HIGH SENSITIVITY RESULT: 13 NG/L — SIGNIFICANT CHANGE UP (ref 0–51)
WBC # BLD: 9.27 K/UL — SIGNIFICANT CHANGE UP (ref 3.8–10.5)
WBC # BLD: 9.27 K/UL — SIGNIFICANT CHANGE UP (ref 3.8–10.5)
WBC # FLD AUTO: 9.27 K/UL — SIGNIFICANT CHANGE UP (ref 3.8–10.5)
WBC # FLD AUTO: 9.27 K/UL — SIGNIFICANT CHANGE UP (ref 3.8–10.5)

## 2023-11-21 PROCEDURE — 90792 PSYCH DIAG EVAL W/MED SRVCS: CPT

## 2023-11-21 PROCEDURE — 71045 X-RAY EXAM CHEST 1 VIEW: CPT | Mod: 26

## 2023-11-21 PROCEDURE — 99223 1ST HOSP IP/OBS HIGH 75: CPT

## 2023-11-21 PROCEDURE — 93010 ELECTROCARDIOGRAM REPORT: CPT

## 2023-11-21 RX ORDER — SODIUM CHLORIDE 9 MG/ML
3 INJECTION INTRAMUSCULAR; INTRAVENOUS; SUBCUTANEOUS ONCE
Refills: 0 | Status: COMPLETED | OUTPATIENT
Start: 2023-11-21 | End: 2023-11-21

## 2023-11-21 RX ORDER — CLONAZEPAM 1 MG
1 TABLET ORAL EVERY 8 HOURS
Refills: 0 | Status: DISCONTINUED | OUTPATIENT
Start: 2023-11-21 | End: 2023-11-22

## 2023-11-21 RX ORDER — CLONAZEPAM 1 MG
1 TABLET ORAL ONCE
Refills: 0 | Status: DISCONTINUED | OUTPATIENT
Start: 2023-11-21 | End: 2023-11-21

## 2023-11-21 RX ORDER — QUETIAPINE FUMARATE 200 MG/1
100 TABLET, FILM COATED ORAL AT BEDTIME
Refills: 0 | Status: DISCONTINUED | OUTPATIENT
Start: 2023-11-21 | End: 2023-11-22

## 2023-11-21 RX ADMIN — QUETIAPINE FUMARATE 100 MILLIGRAM(S): 200 TABLET, FILM COATED ORAL at 21:45

## 2023-11-21 RX ADMIN — Medication 1 MILLIGRAM(S): at 20:58

## 2023-11-21 RX ADMIN — SODIUM CHLORIDE 3 MILLILITER(S): 9 INJECTION INTRAMUSCULAR; INTRAVENOUS; SUBCUTANEOUS at 16:16

## 2023-11-21 RX ADMIN — Medication 1 MILLIGRAM(S): at 16:15

## 2023-11-21 NOTE — ED ADULT NURSE REASSESSMENT NOTE - NS ED NURSE REASSESS COMMENT FT1
Pt received laying on stretcher, A&Ox3, breathing with ease, screaming and yelling, denies pain. Md made aware, medication ordered. will continue to monitor and assess.

## 2023-11-21 NOTE — ED ADULT NURSE REASSESSMENT NOTE - NS ED NURSE REASSESS COMMENT FT1
Pt is resting in bed comfortably at this time, no apparent distress noted at this time. pt safety maintained. pt c/o right thumb pain. pt medicated as per order. pt resting in bed and elevating hand at this time.  pt updated on plan of care.

## 2023-11-21 NOTE — ED PROVIDER NOTE - OBJECTIVE STATEMENT
73-year-old female status post discharge from [facility] rehab after initially having lumbar discectomy with ? fusion  in June with 2-month hospital stay  and then transferred to [facility] in mid August.  .  Patient subsequently discharged to [facility] for rehab on August 17 (last Thursday).   patient with history of asthma and complaining of shortness of breath.  It appears the patient was on Klonopin 1 mg twice daily while she was in the rehab and subsequently not discharged on Klonopin.  As per patient's caretaker patient is supposed to be following up with psychiatry as an outpatient,  but patient is on Seroquel currently.  patient reportedly ambulatory with walker but sounds like she has been in bed for prolonged amounts of time.  Patient denies any associated chest pain, nausea, vomiting, diaphoresis or syncope.  Positive history of asthma for which patient uses albuterol MDI as needed

## 2023-11-21 NOTE — ED PROVIDER NOTE - PROGRESS NOTE DETAILS
Psych consult appreciated patient becoming anxious again with pursed lip breathing like prior presentation.  We will put patient on standing Klonopin 1 mg every 8 hours as needed and placed on observation for psych reeval in the morning as planned

## 2023-11-21 NOTE — ED BEHAVIORAL HEALTH ASSESSMENT NOTE - HPI (INCLUDE ILLNESS QUALITY, SEVERITY, DURATION, TIMING, CONTEXT, MODIFYING FACTORS, ASSOCIATED SIGNS AND SYMPTOMS)
Patient is a 73 year old, female; domiciled with her adult son and ex-;  (2003); noncaregiver; unemployed on SSI; past psychiatric history of depression and anxiety; previously under the care of Dr. Cleary at [facility]; 2 reported prior psychiatric hospitalizations at [facility] over 10 years ago; no known suicide attempts; no known history of violence or arrests; no active substance abuse or known history of complicated withdrawal; PMH of arthritis, fibromyalgia; discharged from [facility] on 11/17/23; brought in by EMS; called by ex- (with whom she lives); presenting with anxiety. Pt last seen by  at [facility] 4/20/2022 where she was cleared psychiatrically.     Upon assessment, pt presents with anxious mood and affect stating that she had a "panic attack" prior to arrival where she was "hyperventilating" without any particular triggering event. Pt states that she has suffered from anxiety and depression since 1993 when she and her  ; states she was last employed in 2020. Pt denies any SI/HI/AH/VH/paranoia and states she does not want to be in the hospital but would like to have her medications restarted. Pt states that she was previously on Klonopin prescribed by Dr. Cleary (spelling?); last rx written in July 2023. Pt now requesting Klonopin because "that's what works." Pt does not present with acute signs of psychosis, is anxious but generally well related with writer and answers questions appropriately.     Collateral collected from ex-, Sagar, (with whom pt lives) states that the pt has been suffering from acute anxiety for an extended period of time and states that today she was "out of control." Sagar states that the pt is not safe to return home tonight and adds that she has an intake appt with a new outpatient clinic on 12/18. He adds that she is being followed by a , Ria Lynch [phone] and is requesting we speak with Ria prior to making any decisions regarding her disposition.

## 2023-11-21 NOTE — ED CDU PROVIDER INITIAL DAY NOTE - OBJECTIVE STATEMENT
73-year-old female status post discharge from [facility] rehab after initially having lumbar discectomy with ? fusion  in June with 2-month hospital stay  and then transferred to [facility] in mid August.  .  Patient subsequently discharged to [facility] for rehab on August 17 (last Thursday).   patient with history of asthma and complaining of shortness of breath.  It appears the patient was on Klonopin 1 mg twice daily while she was in the rehab and subsequently not discharged on Klonopin.  As per patient's caretaker patient is supposed to be following up with psychiatry as an outpatient,  but patient is on Seroquel currently.  patient reportedly ambulatory with walker but sounds like she has been in bed for prolonged amounts of time.  Patient denies any associated chest pain, nausea, vomiting, diaphoresis or syncope.  Positive history of asthma for which patient uses albuterol MDI as needed

## 2023-11-21 NOTE — ED PROVIDER NOTE - IV ALTEPLASE EXCL ABS HIDDEN
Chief Complaint   Patient presents with     Blood Draw     Pt is here for a lab draw for her afternoon appt. Labs drawn via her right arm     Edelmira Orozco MA     show

## 2023-11-21 NOTE — ED BEHAVIORAL HEALTH ASSESSMENT NOTE - DESCRIPTION
arthritis & fibromyalgia No acute events, no stat invol meds. Pt was given 1 dose of Klonopin PO voluntarily.     ICU Vital Signs Last 24 Hrs  T(C): 37.1 (21 Nov 2023 17:02), Max: 37.1 (21 Nov 2023 17:02)  T(F): 98.8 (21 Nov 2023 17:02), Max: 98.8 (21 Nov 2023 17:02)  HR: 87 (21 Nov 2023 17:02) (87 - 99)  BP: 153/79 (21 Nov 2023 17:02) (133/65 - 153/79)  BP(mean): --  ABP: --  ABP(mean): --  RR: 20 (21 Nov 2023 17:02) (20 - 28)  SpO2: 97% (21 Nov 2023 17:02) (97% - 99%)    O2 Parameters below as of 21 Nov 2023 17:02  Patient On (Oxygen Delivery Method): nasal cannula Completed HS,  around 2003,  has one adult son who lives in [place] and is . Has worked as a hairdresser and other jobs.  Raised by both parents.  Had 3 sisters (one passed away) and one brother (passed away)

## 2023-11-21 NOTE — ED PROVIDER NOTE - CLINICAL SUMMARY MEDICAL DECISION MAKING FREE TEXT BOX
73-year-old female status post discharge from [facility] rehab after initially having lumbar discectomy with ? fusion  in June with 2-month hospital stay  and then transferred to [facility] in mid August.  .  Patient subsequently discharged to [facility] for rehab on August 17 (last Thursday).   patient with history of asthma and complaining of shortness of breath.  It appears the patient was on Klonopin 1 mg twice daily while she was in the rehab and subsequently not discharged on Klonopin.  As per patient's caretaker patient is supposed to be following up with psychiatry as an outpatient,  but patient is on Seroquel currently.  patient reportedly ambulatory with walker but sounds like she has been in bed for prolonged amounts of time.  Patient denies any associated chest pain, nausea, vomiting, diaphoresis or syncope.  Positive history of asthma for which patient uses albuterol MDI as needed. EKG sinus with no acute changes.    Patient's signs and symptoms questionable anxiety driven especially with withdrawal of Klonopin recently.  Will check labs, CXR and re-eval after meds and consult psych once medically stable

## 2023-11-21 NOTE — ED BEHAVIORAL HEALTH ASSESSMENT NOTE - RISK ASSESSMENT
RF: Acute anxiety, not connected to treatment  PF: Domiciled with supportive family, no substance abuse or aggression

## 2023-11-21 NOTE — ED ADULT NURSE REASSESSMENT NOTE - NS ED NURSE REASSESS COMMENT FT1
Assumed care of pt at 19:15 as stated in report from NATALIE foley Charting as noted. Patient A&O x4, denies pain denies CP/SOB. Updated on the plan of care. Call bell within reach, bed locked in lowest position. IV site flushed w/ NS. No redness, swelling or pain noted to site. No signs of acute distress noted, safety maintained. Pt remains on CM in NSR, report given to NATALIE North

## 2023-11-21 NOTE — ED PROVIDER NOTE - ASSOCIATED PAIN OR INJURY
no discrete location documentation necessary Purse String (Intermediate) Text: Given the location of the defect and the characteristics of the surrounding skin a purse string intermediate closure was deemed most appropriate.  Undermining was performed circumferentially around the surgical defect.  A purse string suture was then placed and tightened.

## 2023-11-21 NOTE — ED BEHAVIORAL HEALTH ASSESSMENT NOTE - SUMMARY
73 year old, female; domiciled with her adult son and ex-;  (2003); noncaregiver; unemployed on SSI; past psychiatric history of depression and anxiety; previously under the care of Dr. Cleary at [facility]; 2 reported prior psychiatric hospitalizations at [facility] and [place] over 10 years ago; no known suicide attempts; no known history of violence or arrests; no active substance abuse or known history of complicated withdrawal; PMH of arthritis, fibromyalgia; discharged from [facility] on 11/17/23; brought in by EMS; called by ex- (with whom she lives); presenting with anxiety. Pt last seen by  at [facility] 4/20/2022 where she was cleared psychiatrically.     Upon assessment, pt presents anxious, claiming she had a panic attack earlier today where she was hyperventilating but is unable to identity any triggering factor. Collateral from ex- endorses safety concerns with the pt returning home and is requesting staff speak with pt's  prior to determining disposition. Pt will be held for re-evaluation pending collateral.

## 2023-11-21 NOTE — ED BEHAVIORAL HEALTH ASSESSMENT NOTE - NSBHATTESTAPPBILLTIME_PSY_A_CORE
I attest my time as ELEANOR is greater than 50% of the total combined time spent on qualifying patient care activities. I have reviewed and verified the documentation.

## 2023-11-22 VITALS
HEART RATE: 84 BPM | SYSTOLIC BLOOD PRESSURE: 166 MMHG | RESPIRATION RATE: 18 BRPM | TEMPERATURE: 98 F | OXYGEN SATURATION: 97 % | DIASTOLIC BLOOD PRESSURE: 79 MMHG

## 2023-11-22 DIAGNOSIS — F41.9 ANXIETY DISORDER, UNSPECIFIED: ICD-10-CM

## 2023-11-22 PROCEDURE — 99213 OFFICE O/P EST LOW 20 MIN: CPT

## 2023-11-22 PROCEDURE — 80053 COMPREHEN METABOLIC PANEL: CPT

## 2023-11-22 PROCEDURE — 85730 THROMBOPLASTIN TIME PARTIAL: CPT

## 2023-11-22 PROCEDURE — 36415 COLL VENOUS BLD VENIPUNCTURE: CPT

## 2023-11-22 PROCEDURE — 93005 ELECTROCARDIOGRAM TRACING: CPT

## 2023-11-22 PROCEDURE — 99285 EMERGENCY DEPT VISIT HI MDM: CPT | Mod: 25

## 2023-11-22 PROCEDURE — 85379 FIBRIN DEGRADATION QUANT: CPT

## 2023-11-22 PROCEDURE — 84484 ASSAY OF TROPONIN QUANT: CPT

## 2023-11-22 PROCEDURE — G0378: CPT

## 2023-11-22 PROCEDURE — 99239 HOSP IP/OBS DSCHRG MGMT >30: CPT

## 2023-11-22 PROCEDURE — 71045 X-RAY EXAM CHEST 1 VIEW: CPT

## 2023-11-22 PROCEDURE — 85610 PROTHROMBIN TIME: CPT

## 2023-11-22 PROCEDURE — 85025 COMPLETE CBC W/AUTO DIFF WBC: CPT

## 2023-11-22 PROCEDURE — 82550 ASSAY OF CK (CPK): CPT

## 2023-11-22 RX ORDER — MELOXICAM 15 MG/1
1 TABLET ORAL
Qty: 0 | Refills: 0 | DISCHARGE

## 2023-11-22 RX ORDER — ALBUTEROL 90 UG/1
2 AEROSOL, METERED ORAL
Qty: 0 | Refills: 0 | DISCHARGE

## 2023-11-22 RX ORDER — TIZANIDINE 4 MG/1
1 TABLET ORAL
Qty: 0 | Refills: 0 | DISCHARGE

## 2023-11-22 RX ORDER — DULOXETINE HYDROCHLORIDE 30 MG/1
60 CAPSULE, DELAYED RELEASE ORAL
Refills: 0 | Status: DISCONTINUED | OUTPATIENT
Start: 2023-11-22 | End: 2023-11-29

## 2023-11-22 RX ORDER — CLONAZEPAM 1 MG
1 TABLET ORAL
Qty: 0 | Refills: 0 | DISCHARGE

## 2023-11-22 RX ORDER — QUETIAPINE FUMARATE 200 MG/1
1 TABLET, FILM COATED ORAL
Qty: 0 | Refills: 0 | DISCHARGE

## 2023-11-22 RX ORDER — CLONAZEPAM 1 MG
1 TABLET ORAL
Qty: 14 | Refills: 0
Start: 2023-11-22 | End: 2023-12-05

## 2023-11-22 RX ORDER — LOSARTAN POTASSIUM 100 MG/1
50 TABLET, FILM COATED ORAL DAILY
Refills: 0 | Status: DISCONTINUED | OUTPATIENT
Start: 2023-11-22 | End: 2023-11-29

## 2023-11-22 RX ORDER — CLONAZEPAM 1 MG
1 TABLET ORAL ONCE
Refills: 0 | Status: DISCONTINUED | OUTPATIENT
Start: 2023-11-22 | End: 2023-11-22

## 2023-11-22 RX ORDER — CLONAZEPAM 1 MG
1 TABLET ORAL
Qty: 14 | Refills: 0
Start: 2023-11-22

## 2023-11-22 RX ORDER — ATORVASTATIN CALCIUM 80 MG/1
1 TABLET, FILM COATED ORAL
Qty: 0 | Refills: 0 | DISCHARGE

## 2023-11-22 RX ORDER — GABAPENTIN 400 MG/1
800 CAPSULE ORAL AT BEDTIME
Refills: 0 | Status: DISCONTINUED | OUTPATIENT
Start: 2023-11-22 | End: 2023-11-29

## 2023-11-22 RX ORDER — CYCLOSPORINE 0.5 MG/ML
1 EMULSION OPHTHALMIC
Qty: 0 | Refills: 0 | DISCHARGE

## 2023-11-22 RX ORDER — DICLOFENAC SODIUM 30 MG/G
1 GEL TOPICAL
Qty: 0 | Refills: 0 | DISCHARGE

## 2023-11-22 RX ORDER — POTASSIUM CHLORIDE 20 MEQ
40 PACKET (EA) ORAL ONCE
Refills: 0 | Status: DISCONTINUED | OUTPATIENT
Start: 2023-11-22 | End: 2023-11-29

## 2023-11-22 RX ORDER — QUETIAPINE FUMARATE 200 MG/1
225 TABLET, FILM COATED ORAL AT BEDTIME
Refills: 0 | Status: DISCONTINUED | OUTPATIENT
Start: 2023-11-22 | End: 2023-11-29

## 2023-11-22 RX ORDER — DULOXETINE HYDROCHLORIDE 30 MG/1
3 CAPSULE, DELAYED RELEASE ORAL
Qty: 0 | Refills: 0 | DISCHARGE

## 2023-11-22 RX ORDER — GABAPENTIN 400 MG/1
400 CAPSULE ORAL
Refills: 0 | Status: DISCONTINUED | OUTPATIENT
Start: 2023-11-22 | End: 2023-11-29

## 2023-11-22 RX ADMIN — Medication 1 MILLIGRAM(S): at 06:54

## 2023-11-22 RX ADMIN — Medication 1 MILLIGRAM(S): at 11:10

## 2023-11-22 NOTE — CHART NOTE - NSCHARTNOTEFT_GEN_A_CORE
MIRIAM Note: SW received handoff pt is T&R, needs assist w/ transport home due to difficultly ambulating up steps. MIRIAM spoke to pt's  Sagar Browne [phone] who is requesting SW confirm pt's meds were sent to [facility] and asking for ambulette be arranged for pt via [employer], confirmed address on f/s is correct, pt has walker and wheelchair at home so crew will need to bring wheelchair and assist pt into house. MIRIAM arranged transport via [employer] res#[id number], awaiting for trip to be assigned provider at this time. MIRIAM spoke to , ROXY Salazar w/ a pt but will send meds when she is finished. MIRIAM spoke to  once more, provided him w/ [employer] res# and gave callback to  should there be any issues w/ transport this evening, No further SW services identified

## 2023-11-22 NOTE — PHARMACOTHERAPY INTERVENTION NOTE - COMMENTS
Called pharmacy to obtain medication list. Of note, patient on total 225 mg of quetiapine nightly.    Outpatient Medication Review updated.    HOME MEDICATIONS:  Albuterol (Eqv-ProAir HFA) 90 mcg/inh inhalation aerosol: 1 puff(s) inhaled every 6 hours as needed (22 Nov 2023 11:28)  DULoxetine 60 mg oral delayed release capsule: 1 cap(s) orally 2 times a day (22 Nov 2023 11:28)  gabapentin 400 mg oral tablet: 1 tab(s) orally 2 times a day (22 Nov 2023 11:28)  gabapentin 800 mg oral tablet: 1 tab(s) orally once a day (at bedtime) (22 Nov 2023 11:28)  leflunomide 10 mg oral tablet: 1 tab(s) orally once a day (22 Nov 2023 11:25)  losartan 50 mg oral tablet: 1 tab(s) orally once a day (22 Nov 2023 11:29)  QUEtiapine 100 mg oral tablet: 2 tab(s) orally once a day (at bedtime) (22 Nov 2023 11:29)  QUEtiapine 25 mg oral tablet: 1 tab(s) orally once a day (at bedtime) (22 Nov 2023 11:29)

## 2023-11-22 NOTE — ED CDU PROVIDER DISPOSITION NOTE - PATIENT PORTAL LINK FT
You can access the FollowMyHealth Patient Portal offered by [facility] by registering at the following website: [email or web address]. By joining [employer]’s FollowMyHealth portal, you will also be able to view your health information using other applications (apps) compatible with our system.

## 2023-11-22 NOTE — ED CDU PROVIDER DISPOSITION NOTE - CLINICAL COURSE
Pt BIBEMS for agitation/anxiety after not getting her klonopin prescription after being discharged from [facility]. patient seen and held by psych for observation. no acutely delirius or paranoid. no acute risk to self or others. NP clarified with  will take her back with a new RX for klonopin. has psych apt in December. patient medically cleared for DC

## 2023-11-22 NOTE — CHART NOTE - NSCHARTNOTEFT_GEN_A_CORE
MIRIAM Note:  consult being worked on. Msg left  for pts SWer @ her O/P  clinic [phone]. SW will follow

## 2023-11-22 NOTE — ED ADULT NURSE REASSESSMENT NOTE - NS ED NURSE REASSESS COMMENT FT1
Assumed care of pt at 06:15 as stated in report from NATALIE hua. Charting as noted. Patient A&O x4, denies pain/discomfort, denies CP/SOB. Updated on the plan of care. Call bell within reach, bed locked in lowest position. IV site flushed w/ NS. No redness, swelling or pain noted to site. No signs of acute distress noted, safety maintained.

## 2023-11-22 NOTE — ED ADULT NURSE REASSESSMENT NOTE - NS ED NURSE REASSESS COMMENT FT1
Pt groaning asking to use the bathroom. Pt assisted to the bathroom by RN in wheelchair. Pt also requested to make a phone to her sister, pt spoke with sister over the phone. Pt assisted back to bed, no complaints at this time. Safety maintained.

## 2023-11-22 NOTE — ED BEHAVIORAL HEALTH PROGRESS NOTE - SUMMARY
73 year old, female; domiciled with her adult son and ex-;  (2003); noncaregiver; unemployed on SSI; past psychiatric history of depression and anxiety; previously under the care of Dr. Cleary at [facility]; 2 reported prior psychiatric hospitalizations at [facility] and [place] over 10 years ago; no known suicide attempts; no known history of violence or arrests; no active substance abuse or known history of complicated withdrawal; PMH of arthritis, fibromyalgia; discharged from [facility] on 11/17/23; brought in by EMS; called by ex- (with whom she lives); presenting with anxiety. Pt last seen by  at [facility] 4/20/2022 where she was cleared psychiatrically.     Upon assessment, pt presents anxious, claiming she had a panic attack earlier today where she was hyperventilating but is unable to identity any triggering factor. Collateral from ex- endorses safety concerns with the pt returning home and is requesting staff speak with pt's  prior to determining disposition. Pt will be held for re-evaluation pending collateral.

## 2023-11-22 NOTE — ED BEHAVIORAL HEALTH PROGRESS NOTE - DETAILS:
Pt presents anxious and admits to having a panic attack which prompted an EMS call.  Pt is not aware if she had been receiving her Klonopin after leaving [facility] or when there.  Per med rec Pt was on multiple meds included Klonopin however per  Klonopin was not included.  Pt admits to having a panic attack and claims she never had one before.  She endorsed depressed mood and rates it a 5/10, denies SIIP and no h/o suicide attempt in past.  Pt6 is anxious and fearful regarding death and dying.  Stats she lost a family member ine year ago and informed me that the patient on next stretch has "Cancer".  She is worried about her sister who is sick in [place] and was praying to her mother who is  to take care of her sister.  Pt is oriented to month, year, and knows is Thanksgiving tomorrow.  Pt has no interest in getting admitted psychiatrically and does not meet criteria for involuntary psych admission.   Pt reports her  wont let her see her psychiatrist Dr Solis and now she has none.

## 2023-11-22 NOTE — ED ADULT NURSE REASSESSMENT NOTE - NS ED NURSE REASSESS COMMENT FT1
Pt discharged home with ambulanz, pt IV removed, pt discharged home in a wheelchair, pt bleeding controlled, IV catheter in tact, VS performed, pt provided a tray prior to discharge, reviewed discharge medication with patient prior to discharge, pt states understanding of education, education deemed proficient with teach back.

## 2023-11-22 NOTE — ED CDU PROVIDER DISPOSITION NOTE - NSFOLLOWUPINSTRUCTIONS_ED_ALL_ED_FT
1. Return to ED for worsening, progressive or any other concerning symptoms   2. Follow up with your primary care doctor in 2-3days   3. Continue your home medications as previously prescribed   4. Take the klonopin as prescribed   5. Follow up with your Psychiatrist as scheduled in December

## 2023-11-22 NOTE — ED CDU PROVIDER DISPOSITION NOTE - NSCDUDISPOSITION_ED_ALL_ED_DT
22-Nov-2023 15:13
Patient seen and examined at approximately 11-05-18 @ 17:00, with parents at bedside.     I have reviewed the History, Physical Exam, Assessment and Plan as written the above PGY-2. I have edited where appropriate.    In brief, this is a 5y9m previously healthy male who presents with vomiting, diarrhea, and abdominal pain for 6 days. NBNB emesis is improving from 5x/day to once today. Watery nonbloody stool output decreasing from 3-4x/day to once today. Has decrease PO intake for 2 days. Today had less than 8oz of water and only 1 urination. No fevers, sick contacts, or recent travel. Was seen on ED 6 days prior, tolerated PO and discharged home. At that time, U/A and urine culture were negative. AXR showed possible partial SBO but patient was able tolerate PO w/o emesis.     In the ED, received NS bolus x1. CBC, BMP below - mildly elevated WBC, bicarb of 16. US was negative intussusception, but there were mesenteric lymph nodes.     Physical Exam:    T(C): 36.4 (11-05-18 @ 11:32), Max: 36.4 (11-05-18 @ 11:32)  HR: 104 (11-05-18 @ 11:32) (104 - 104)  BP: --  RR: 32 (11-05-18 @ 11:32) (32 - 32)  SpO2: 100% (11-05-18 @ 11:32) (100% - 100%)    Gen: NAD, tired appearing  HEENT: NCAT, dry lips, tacky mucus membranes, EOMI, clear conjunctiva  Neck: supple, no LAD  Heart: S1S2+, RRR, no murmur, cap refill < 2 sec, 2+ peripheral pulses  Lungs: normal respiratory pattern, CTAB  Abd: soft, NT, ND, BSP, no HSM  : bilaterally descended testes  Neuro: no focal deficits, awake, alert, tired but responsive  Skin: WWP    Labs noted:              12.9                 136  | 16.0 | 16.0         17.7  >-----------< 380     ------------------------< 72                    36.8                 4.7  | 100  | 0.31                                         Ca 9.7   Mg x     Ph x        Imaging noted: No sonographic evidence of intussusception. Prominent lymph nodes in the left upper quadrant and midline, measuring up to 1.1 x 0.9 x 10.7 cm in the periumbilical region. No free fluid is present. Debris is present in the urinary bladder.    A/P: ELIUD is a 5y9m old previously healthy male who presents with a chief complaint of dehydration secondary to viral gastroenteritis. His vomiting and diarrhea appear to be improving but he has poor PO intake. Signs of moderate dehydration based on history and exam. Admit for IV hydration.    -MIVF,   -Encourage PO  -Strict I&Os  -Contact precautions    Eva Black MD  Pediatric Hospitalist

## 2023-11-22 NOTE — ED BEHAVIORAL HEALTH PROGRESS NOTE - CASE SUMMARY/FORMULATION (CLEARLY DOCUMENT RATIONALE FOR DISPOSITION CHANGE)
Pt presents anxious and admits to having a panic attack which prompted an EMS call.  Pt is not aware if she had been receiving her Klonopin after leaving [facility] or when there.  Per med rec Pt was on multiple meds included Klonopin however per  Klonopin was not included with rx sent.  Pt admits to having a panic attack yesterday and claims she never had one before.  She endorsed depressed mood and rates it a 5/10, denies SIIP and no h/o suicide attempt in past.  Pt is anxious and fearful regarding death and dying.  Stats she lost a family member one year ago and informed me that the patient on next  has "Cancer".  She is worried about her sister who is sick in [place] and was praying to her mother yesterday who is ,  to take care of her sister.  Pt is oriented to month, year, and knows is Thanksgiving tomorrow.  Pt has no interest in getting admitted psychiatrically and does not meet criteria for involuntary psych admission. No acute psych condition which requires psych admission.   Pt reports her  wont let her see her psychiatrist Dr Solis and now she has none.  Awaiting return call from MIRIAM Rollins regard need for psych referral or not.  Can refer to [facility] if needed.  May give 1 week supply of Klonopin 1 mg qd or contact [facility] or PCP.   is agreeable to plan providing her Klonopin is available.  He is unsure about her psych dc plan and will call Ria.

## 2023-11-22 NOTE — ED BEHAVIORAL HEALTH PROGRESS NOTE - STANDING MEDS RECEIVED IN ED DETAILS FREE TEXT
MEDICATIONS  (STANDING):  DULoxetine 60 milliGRAM(s) Oral two times a day  gabapentin 400 milliGRAM(s) Oral two times a day  gabapentin 800 milliGRAM(s) Oral at bedtime  losartan 50 milliGRAM(s) Oral daily  potassium chloride    Tablet ER 40 milliEquivalent(s) Oral once  QUEtiapine 225 milliGRAM(s) Oral at bedtime

## 2023-11-22 NOTE — ED BEHAVIORAL HEALTH PROGRESS NOTE - COLLATERAL INFORMATION (NAME, PHONE, RELATIONSHIP):
Spoke to  who reports he felt Pt was unsafe for discharge per prior note.  Today he confirmed and asked that I speak with her SW Ria from  PCP office.  Message left for Ria to return call at .  Will further exploration  was vague regarding concerns and denies it was due to self harm concerns.  He claimed and was upset she did not get her Klonopin rx after leaving [facility] following spine surgery.  He claims yesterday Pt was talking to herself.  Pt reported she was praying to her mother and was not having VH/AH.   reports Pt no longer has a psych provider at [facility] because he felt that   "they were doing nothing but giving her meds".  Safety concerns appears to be logistical with regard to Pt ADL rather than mental healt and of not having an rx for Klonopin.

## 2023-11-28 NOTE — ED ADULT NURSE NOTE - PATIENT DISCHARGE SIGNATURE
9:39 AM    Reason for Call: Phone Call    Description: pt is needing to get his lab results faxed over from his last lab appt. Pt is requesting these get faxed to [facility] Fax: [phone]    Was an appointment offered for this call? No  If yes : Appointment type              Date    Preferred method for responding to this message: Telephone Call  What is your phone number ?[phone]    If we cannot reach you directly, may we leave a detailed response at the number you provided? Yes    Can this message wait until your PCP/provider returns, if available today? Not applicable    Eli Caicedo  
Spoke with  to have lab results faxed to number listed below.  
19-Feb-2018

## 2023-12-01 ENCOUNTER — APPOINTMENT (OUTPATIENT)
Dept: UROLOGY | Facility: CLINIC | Age: 73
End: 2023-12-01

## 2024-01-10 ENCOUNTER — OFFICE (OUTPATIENT)
Dept: URBAN - METROPOLITAN AREA CLINIC 112 | Facility: CLINIC | Age: 74
Setting detail: OPHTHALMOLOGY
End: 2024-01-10
Payer: MEDICARE

## 2024-01-10 DIAGNOSIS — H35.372: ICD-10-CM

## 2024-01-10 DIAGNOSIS — H35.343: ICD-10-CM

## 2024-01-10 DIAGNOSIS — H16.223: ICD-10-CM

## 2024-01-10 DIAGNOSIS — H16.221: ICD-10-CM

## 2024-01-10 DIAGNOSIS — H43.813: ICD-10-CM

## 2024-01-10 DIAGNOSIS — H16.222: ICD-10-CM

## 2024-01-10 PROCEDURE — 92134 CPTRZ OPH DX IMG PST SGM RTA: CPT | Performed by: OPHTHALMOLOGY

## 2024-01-10 PROCEDURE — 83861 MICROFLUID ANALY TEARS: CPT | Mod: QW,LT | Performed by: OPHTHALMOLOGY

## 2024-01-10 PROCEDURE — 83861 MICROFLUID ANALY TEARS: CPT | Mod: QW,RT | Performed by: OPHTHALMOLOGY

## 2024-01-10 PROCEDURE — 92014 COMPRE OPH EXAM EST PT 1/>: CPT | Performed by: OPHTHALMOLOGY

## 2024-01-10 ASSESSMENT — TEAR BREAK UP TIME (TBUT)
OS_TBUT: 1+
OD_TBUT: 1+

## 2024-01-10 ASSESSMENT — REFRACTION_MANIFEST
OS_CYLINDER: -0.75
OS_VA1: 20/30-
OD_AXIS: 105
OD_VA1: 20/30-
OS_AXIS: 85
OD_CYLINDER: -0.75
OS_ADD: +2.25
OS_SPHERE: +0.50
OD_ADD: +2.25
OD_SPHERE: -0.50

## 2024-01-10 ASSESSMENT — SPHEQUIV_DERIVED
OD_SPHEQUIV: -1.125
OD_SPHEQUIV: -0.875
OS_SPHEQUIV: 0.125
OS_SPHEQUIV: 0.125

## 2024-01-10 ASSESSMENT — REFRACTION_AUTOREFRACTION
OD_SPHERE: -0.50
OS_AXIS: 085
OS_CYLINDER: -1.25
OS_SPHERE: +0.75
OD_AXIS: 105
OD_CYLINDER: -1.25

## 2024-01-10 ASSESSMENT — LID EXAM ASSESSMENTS
OS_BLEPHARITIS: 1+
OD_BLEPHARITIS: 1+

## 2024-01-10 ASSESSMENT — SUPERFICIAL PUNCTATE KERATITIS (SPK)
OS_SPK: T
OD_SPK: 1+

## 2024-01-10 ASSESSMENT — CONFRONTATIONAL VISUAL FIELD TEST (CVF)
OS_FINDINGS: FULL
OD_FINDINGS: FULL

## 2024-01-16 PROBLEM — R32 URINARY INCONTINENCE: Status: ACTIVE | Noted: 2023-08-23

## 2024-01-22 ENCOUNTER — APPOINTMENT (OUTPATIENT)
Dept: UROGYNECOLOGY | Facility: CLINIC | Age: 74
End: 2024-01-22
Payer: MEDICARE

## 2024-01-22 VITALS
WEIGHT: 217 LBS | SYSTOLIC BLOOD PRESSURE: 137 MMHG | HEART RATE: 102 BPM | HEIGHT: 62 IN | DIASTOLIC BLOOD PRESSURE: 80 MMHG | BODY MASS INDEX: 39.93 KG/M2

## 2024-01-22 DIAGNOSIS — R39.15 URGENCY OF URINATION: ICD-10-CM

## 2024-01-22 DIAGNOSIS — R32 UNSPECIFIED URINARY INCONTINENCE: ICD-10-CM

## 2024-01-22 LAB
BILIRUB UR QL STRIP: NORMAL
CLARITY UR: CLEAR
COLLECTION METHOD: NORMAL
GLUCOSE UR-MCNC: NEGATIVE
HCG UR QL: 0.2 EU/DL
HGB UR QL STRIP.AUTO: NEGATIVE
KETONES UR-MCNC: NORMAL
LEUKOCYTE ESTERASE UR QL STRIP: NORMAL
NITRITE UR QL STRIP: NEGATIVE
PH UR STRIP: 5.5
PROT UR STRIP-MCNC: NORMAL
SP GR UR STRIP: 1.03

## 2024-01-22 PROCEDURE — 81003 URINALYSIS AUTO W/O SCOPE: CPT | Mod: QW

## 2024-01-22 PROCEDURE — 51701 INSERT BLADDER CATHETER: CPT | Mod: 59

## 2024-01-22 PROCEDURE — 99459 PELVIC EXAMINATION: CPT

## 2024-01-22 PROCEDURE — 99204 OFFICE O/P NEW MOD 45 MIN: CPT | Mod: 25

## 2024-01-22 RX ORDER — VIBEGRON 75 MG/1
75 TABLET, FILM COATED ORAL
Qty: 30 | Refills: 0 | Status: DISCONTINUED | COMMUNITY
Start: 2022-06-02 | End: 2024-01-22

## 2024-01-22 NOTE — PHYSICAL EXAM
[Chaperone Present] : A chaperone was present in the examining room during all aspects of the physical examination [No Acute Distress] : in no acute distress [Well developed] : well developed [Well Nourished] : ~L well nourished [Oriented x3] : oriented to person, place, and time [Anxiety] : patient is anxious [No Edema] : ~T edema was not present [Tenderness] : ~T ~M abdominal tenderness observed [Scar] : a scar was noted [Warm and Dry] : was warm and dry to touch [Normal Appearance] : general appearance was normal [Atrophy] : atrophy [2] : 2 [Aa ____] : Aa [unfilled] [Ba ____] : Ba [unfilled] [C ____] : C [unfilled] [GH ____] : GH [unfilled] [PB ____] : PB [unfilled] [TVL ____] : TVL  [unfilled] [Ap ____] : Ap [unfilled] [Bp ____] : Bp [unfilled] [D ____] : D [unfilled] [Normal] : no abnormalities [Post Void Residual ____ml] : post void residual was [unfilled] ml [FreeTextEntry1] : Flores [Cough] : no cough [Hernia] : no hernia observed

## 2024-01-22 NOTE — ED CDU PROVIDER DISPOSITION NOTE - NS ED MD DISPO ISOLATION TYPES
Continue taking your antiinflammatories, muscle relaxer, and follow with your pcp regarding your ongoing back pain.  
None

## 2024-01-22 NOTE — LETTER BODY
[Dear  ___] : Dear  [unfilled], [I had the pleasure of evaluating your patient, [unfilled]. Thank you for referring Ms. [unfilled] for consultation for ___] : I had the pleasure of evaluating your patient, [unfilled]. Thank you for referring Ms. [unfilled] for consultation for [unfilled]. [DrVincent  ___] : Dr. PARK  [Thank you very much for allowing me to participate in the care of this patient. If you have any questions, please do not hesitate to contact me] : Thank you very much for allowing me to participate in the care of this patient. If you have any questions, please do not hesitate to contact me. [Attached please find my note.] : Attached please find my note.

## 2024-01-22 NOTE — DISCUSSION/SUMMARY
[FreeTextEntry1] : Ms. VILLANUEVA is 74 yo with mixed UI, frequency, urgency. We talked about the types of urinary incontinence and the treatment options. We reviewed physical therapy, medication, surgery, vaginal inserts and third line treatments.  She is currently under good regiment with both Myrbetriq and oxybutynin.  She is unsure what the doses are if she needs refills.  We will find out what the dose is and send a refill if it is needed.  I sent her urine to the lab. I gave her some literature on OAB. I was able to answer all of her questions.

## 2024-01-22 NOTE — OB HISTORY
[Vaginal ___] : [unfilled] vaginal delivery(s) [Last Pap Smear ___] : date of last pap smear was on [unfilled] [Approximately ___ (Month)] : the LMP was approximately [unfilled] month(s) ago [Abnormal Pap Smear] : normal pap smear [Taking Estrogens] : is not taking estrogen replacement [Sexually Active] : is not sexually active [FreeTextEntry1] : Baby weighed 6#9oz.

## 2024-01-23 RX ORDER — OXYBUTYNIN CHLORIDE 10 MG/1
10 TABLET, EXTENDED RELEASE ORAL
Qty: 30 | Refills: 3 | Status: ACTIVE | COMMUNITY
Start: 2024-01-23 | End: 1900-01-01

## 2024-01-24 LAB
APPEARANCE: ABNORMAL
BACTERIA: NEGATIVE /HPF
BILIRUBIN URINE: ABNORMAL
BLOOD URINE: NEGATIVE
CAST: 4 /LPF
COLOR: NORMAL
EPITHELIAL CELLS: 2 /HPF
GLUCOSE QUALITATIVE U: NEGATIVE MG/DL
KETONES URINE: ABNORMAL MG/DL
LEUKOCYTE ESTERASE URINE: NEGATIVE
MICROSCOPIC-UA: NORMAL
NITRITE URINE: NEGATIVE
PH URINE: 5.5
PROTEIN URINE: NORMAL MG/DL
RED BLOOD CELLS URINE: 2 /HPF
REVIEW: NORMAL
SPECIFIC GRAVITY URINE: 1.03
UROBILINOGEN URINE: 0.2 MG/DL
WHITE BLOOD CELLS URINE: 1 /HPF

## 2024-01-24 RX ORDER — OXYBUTYNIN CHLORIDE 10 MG/1
10 TABLET, EXTENDED RELEASE ORAL
Qty: 90 | Refills: 1 | Status: ACTIVE | COMMUNITY
Start: 2024-01-24 | End: 1900-01-01

## 2024-01-24 RX ORDER — MIRABEGRON 50 MG/1
50 TABLET, FILM COATED, EXTENDED RELEASE ORAL
Qty: 90 | Refills: 1 | Status: ACTIVE | COMMUNITY
Start: 2024-01-24 | End: 1900-01-01

## 2024-01-25 LAB — BACTERIA UR CULT: NORMAL

## 2024-04-16 NOTE — ED BEHAVIORAL HEALTH ASSESSMENT NOTE - NS ED BHA MED ROS EYES
Patient: Abelardo Fenton    Procedure Summary       Date: 04/15/24 Room / Location: [place] [address] / SURGERY [facility]    Anesthesia Start: 1149 Anesthesia Stop: 1349    Procedures:       PANCREATIC NECROSECTOMY (Abdomen)      INTRA-OPERATIVE ULTRASOUND GUIDANCE (Abdomen)      DRAINAGE PERITONEAL ABSCESS (Abdomen) Diagnosis: (NECROTIZING PANCREATITIS)    Surgeons: Blair Sainz M.D. Responsible Provider: Janie Florian M.D.    Anesthesia Type: general, epidural ASA Status: 2            Final Anesthesia Type: general, epidural  Last vitals  BP   Blood Pressure: 113/69    Temp   36.7 °C (98.1 °F)    Pulse   84   Resp   18    SpO2   96 %      Anesthesia Post Evaluation    Patient location during evaluation: PACU  Patient participation: complete - patient participated  Level of consciousness: awake and alert  Pain score: 6    Airway patency: patent  Anesthetic complications: no  Cardiovascular status: adequate  Respiratory status: acceptable  Hydration status: acceptable  Comments: In PACU, pt c/o severe pain. Epidural running w/o good band of numbness. Bolus of 5cc 1% lidocaine along w/ 5cc 0.125% marcaine given via epidural. Pain reassessed 10 minutes later and was significantly improved. Good band of insensitivity to cold from about T6-T12  Patient has been marked as needing Post Surgery Rounding  PONV: none          No notable events documented.     Nurse Pain Score: 7 (NPRS)          
No complaints

## 2024-05-07 NOTE — ED ADULT NURSE NOTE - BREATH SOUNDS, MLM
Immediate Post- Operative Note        PostOp Diagnosis: CVA, TUBE FEEDS REQUIRED FOR NUTRITION      Procedure(s): PERCUTANEOUS GASTROSTOMY TUBE PLACEMENT WITH US AND FLUOROSCOPIC GUIDANCE    SUTURE ANCHORS PLACED X 2    18F MARIOLA BALLOON G-TUBE PLACED UNDER GENL ANESTHESIA    BALLOON FILLED WITH 10 ML STERILE SALINE      Estimated Blood Loss: Less than 1 ml      FINDINGS: TUBE IN GOOD POSITION        Complications: None            5/7/2024  9:29 AM  Dinesh Sosa M.D.   Clear

## 2024-05-14 ENCOUNTER — INPATIENT (INPATIENT)
Facility: HOSPITAL | Age: 74
LOS: 14 days | Discharge: ANOTHER HOSPITAL | DRG: 951 | End: 2024-05-29
Attending: HOSPITALIST | Admitting: STUDENT IN AN ORGANIZED HEALTH CARE EDUCATION/TRAINING PROGRAM
Payer: MEDICARE

## 2024-05-14 VITALS
RESPIRATION RATE: 18 BRPM | SYSTOLIC BLOOD PRESSURE: 147 MMHG | OXYGEN SATURATION: 95 % | HEART RATE: 86 BPM | DIASTOLIC BLOOD PRESSURE: 91 MMHG | WEIGHT: 210.1 LBS | TEMPERATURE: 98 F

## 2024-05-14 DIAGNOSIS — Z90.49 ACQUIRED ABSENCE OF OTHER SPECIFIED PARTS OF DIGESTIVE TRACT: Chronic | ICD-10-CM

## 2024-05-14 LAB
ALBUMIN SERPL ELPH-MCNC: 3.8 G/DL — SIGNIFICANT CHANGE UP (ref 3.3–5.2)
ALP SERPL-CCNC: 112 U/L — SIGNIFICANT CHANGE UP (ref 40–120)
ALT FLD-CCNC: 18 U/L — SIGNIFICANT CHANGE UP
ANION GAP SERPL CALC-SCNC: 17 MMOL/L — SIGNIFICANT CHANGE UP (ref 5–17)
AST SERPL-CCNC: 17 U/L — SIGNIFICANT CHANGE UP
BASOPHILS # BLD AUTO: 0.07 K/UL — SIGNIFICANT CHANGE UP (ref 0–0.2)
BASOPHILS NFR BLD AUTO: 0.8 % — SIGNIFICANT CHANGE UP (ref 0–2)
BILIRUB SERPL-MCNC: 0.7 MG/DL — SIGNIFICANT CHANGE UP (ref 0.4–2)
BUN SERPL-MCNC: 12.6 MG/DL — SIGNIFICANT CHANGE UP (ref 8–20)
CALCIUM SERPL-MCNC: 9.3 MG/DL — SIGNIFICANT CHANGE UP (ref 8.4–10.5)
CHLORIDE SERPL-SCNC: 103 MMOL/L — SIGNIFICANT CHANGE UP (ref 96–108)
CO2 SERPL-SCNC: 17 MMOL/L — LOW (ref 22–29)
CREAT SERPL-MCNC: 0.93 MG/DL — SIGNIFICANT CHANGE UP (ref 0.5–1.3)
EGFR: 65 ML/MIN/1.73M2 — SIGNIFICANT CHANGE UP
EOSINOPHIL # BLD AUTO: 0.13 K/UL — SIGNIFICANT CHANGE UP (ref 0–0.5)
EOSINOPHIL NFR BLD AUTO: 1.6 % — SIGNIFICANT CHANGE UP (ref 0–6)
GLUCOSE SERPL-MCNC: 116 MG/DL — HIGH (ref 70–99)
HCT VFR BLD CALC: 40 % — SIGNIFICANT CHANGE UP (ref 34.5–45)
HGB BLD-MCNC: 13 G/DL — SIGNIFICANT CHANGE UP (ref 11.5–15.5)
IMM GRANULOCYTES NFR BLD AUTO: 0.4 % — SIGNIFICANT CHANGE UP (ref 0–0.9)
LITHIUM SERPL-MCNC: 0.14 MMOL/L — LOW (ref 0.5–1.5)
LYMPHOCYTES # BLD AUTO: 2.01 K/UL — SIGNIFICANT CHANGE UP (ref 1–3.3)
LYMPHOCYTES # BLD AUTO: 24 % — SIGNIFICANT CHANGE UP (ref 13–44)
MCHC RBC-ENTMCNC: 27.5 PG — SIGNIFICANT CHANGE UP (ref 27–34)
MCHC RBC-ENTMCNC: 32.5 GM/DL — SIGNIFICANT CHANGE UP (ref 32–36)
MCV RBC AUTO: 84.7 FL — SIGNIFICANT CHANGE UP (ref 80–100)
MONOCYTES # BLD AUTO: 1 K/UL — HIGH (ref 0–0.9)
MONOCYTES NFR BLD AUTO: 11.9 % — SIGNIFICANT CHANGE UP (ref 2–14)
NEUTROPHILS # BLD AUTO: 5.14 K/UL — SIGNIFICANT CHANGE UP (ref 1.8–7.4)
NEUTROPHILS NFR BLD AUTO: 61.3 % — SIGNIFICANT CHANGE UP (ref 43–77)
PLATELET # BLD AUTO: 268 K/UL — SIGNIFICANT CHANGE UP (ref 150–400)
POTASSIUM SERPL-MCNC: 3.8 MMOL/L — SIGNIFICANT CHANGE UP (ref 3.5–5.3)
POTASSIUM SERPL-SCNC: 3.8 MMOL/L — SIGNIFICANT CHANGE UP (ref 3.5–5.3)
PROT SERPL-MCNC: 6.9 G/DL — SIGNIFICANT CHANGE UP (ref 6.6–8.7)
RBC # BLD: 4.72 M/UL — SIGNIFICANT CHANGE UP (ref 3.8–5.2)
RBC # FLD: 13.5 % — SIGNIFICANT CHANGE UP (ref 10.3–14.5)
SODIUM SERPL-SCNC: 137 MMOL/L — SIGNIFICANT CHANGE UP (ref 135–145)
WBC # BLD: 8.38 K/UL — SIGNIFICANT CHANGE UP (ref 3.8–10.5)
WBC # FLD AUTO: 8.38 K/UL — SIGNIFICANT CHANGE UP (ref 3.8–10.5)

## 2024-05-14 PROCEDURE — 70450 CT HEAD/BRAIN W/O DYE: CPT | Mod: 26,MC

## 2024-05-14 PROCEDURE — 99285 EMERGENCY DEPT VISIT HI MDM: CPT

## 2024-05-14 NOTE — ED ADULT TRIAGE NOTE - CHIEF COMPLAINT QUOTE
BIBEMS from home for anxiety. Pt reports "I take a lot of medications for my anxiety but they aren't working." Reports that she began to feel anxious yesterday and it has gotten worse since then. Denies medical complaints.

## 2024-05-14 NOTE — ED PROVIDER NOTE - OBJECTIVE STATEMENT
72-year-old female past medical history of panic attacks?  Bipolar comes to the ED with multiple panic attacks over the last  week.   as per  Sagar, patient did not want to go to her appointment for her psychiatrist this morning.  Patient was refusing to take her medications.  As per neighbor patient has been less active over the last month.  Patient patient recently placed on lithium.  As per ,  Patient with decreased p.o. intake and vomiting today.

## 2024-05-14 NOTE — ED ADULT NURSE REASSESSMENT NOTE - NS ED NURSE REASSESS COMMENT FT1
Patient remains A&Ox4, to be placed in observation services, pending psych evaluation.  Pt resting on stretcher in no apparent distress at this time, VSS as charted with no complaints voiced.

## 2024-05-15 DIAGNOSIS — Z90.49 ACQUIRED ABSENCE OF OTHER SPECIFIED PARTS OF DIGESTIVE TRACT: ICD-10-CM

## 2024-05-15 LAB
ALBUMIN SERPL ELPH-MCNC: 3.8 G/DL — SIGNIFICANT CHANGE UP (ref 3.3–5.2)
ALP SERPL-CCNC: 116 U/L — SIGNIFICANT CHANGE UP (ref 40–120)
ALT FLD-CCNC: 22 U/L — SIGNIFICANT CHANGE UP
ANION GAP SERPL CALC-SCNC: 15 MMOL/L — SIGNIFICANT CHANGE UP (ref 5–17)
APPEARANCE UR: CLEAR — SIGNIFICANT CHANGE UP
AST SERPL-CCNC: 21 U/L — SIGNIFICANT CHANGE UP
BACTERIA # UR AUTO: ABNORMAL /HPF
BASOPHILS # BLD AUTO: 0.09 K/UL — SIGNIFICANT CHANGE UP (ref 0–0.2)
BASOPHILS NFR BLD AUTO: 1 % — SIGNIFICANT CHANGE UP (ref 0–2)
BILIRUB SERPL-MCNC: 0.7 MG/DL — SIGNIFICANT CHANGE UP (ref 0.4–2)
BILIRUB UR-MCNC: NEGATIVE — SIGNIFICANT CHANGE UP
BUN SERPL-MCNC: 11.7 MG/DL — SIGNIFICANT CHANGE UP (ref 8–20)
CALCIUM SERPL-MCNC: 9.3 MG/DL — SIGNIFICANT CHANGE UP (ref 8.4–10.5)
CAST: 1 /LPF — SIGNIFICANT CHANGE UP (ref 0–4)
CHLORIDE SERPL-SCNC: 102 MMOL/L — SIGNIFICANT CHANGE UP (ref 96–108)
CO2 SERPL-SCNC: 19 MMOL/L — LOW (ref 22–29)
COLOR SPEC: YELLOW — SIGNIFICANT CHANGE UP
CREAT SERPL-MCNC: 0.8 MG/DL — SIGNIFICANT CHANGE UP (ref 0.5–1.3)
DIFF PNL FLD: NEGATIVE — SIGNIFICANT CHANGE UP
EGFR: 78 ML/MIN/1.73M2 — SIGNIFICANT CHANGE UP
EOSINOPHIL # BLD AUTO: 0.19 K/UL — SIGNIFICANT CHANGE UP (ref 0–0.5)
EOSINOPHIL NFR BLD AUTO: 2 % — SIGNIFICANT CHANGE UP (ref 0–6)
FOLATE SERPL-MCNC: 17.2 NG/ML — SIGNIFICANT CHANGE UP
GLUCOSE SERPL-MCNC: 118 MG/DL — HIGH (ref 70–99)
GLUCOSE UR QL: NEGATIVE MG/DL — SIGNIFICANT CHANGE UP
HCT VFR BLD CALC: 40 % — SIGNIFICANT CHANGE UP (ref 34.5–45)
HGB BLD-MCNC: 12.7 G/DL — SIGNIFICANT CHANGE UP (ref 11.5–15.5)
IMM GRANULOCYTES NFR BLD AUTO: 0.4 % — SIGNIFICANT CHANGE UP (ref 0–0.9)
KETONES UR-MCNC: 15 MG/DL
LEUKOCYTE ESTERASE UR-ACNC: ABNORMAL
LYMPHOCYTES # BLD AUTO: 1.86 K/UL — SIGNIFICANT CHANGE UP (ref 1–3.3)
LYMPHOCYTES # BLD AUTO: 19.9 % — SIGNIFICANT CHANGE UP (ref 13–44)
MAGNESIUM SERPL-MCNC: 2.2 MG/DL — SIGNIFICANT CHANGE UP (ref 1.6–2.6)
MCHC RBC-ENTMCNC: 27.6 PG — SIGNIFICANT CHANGE UP (ref 27–34)
MCHC RBC-ENTMCNC: 31.8 GM/DL — LOW (ref 32–36)
MCV RBC AUTO: 87 FL — SIGNIFICANT CHANGE UP (ref 80–100)
MONOCYTES # BLD AUTO: 1.05 K/UL — HIGH (ref 0–0.9)
MONOCYTES NFR BLD AUTO: 11.2 % — SIGNIFICANT CHANGE UP (ref 2–14)
NEUTROPHILS # BLD AUTO: 6.12 K/UL — SIGNIFICANT CHANGE UP (ref 1.8–7.4)
NEUTROPHILS NFR BLD AUTO: 65.5 % — SIGNIFICANT CHANGE UP (ref 43–77)
NITRITE UR-MCNC: NEGATIVE — SIGNIFICANT CHANGE UP
PH UR: 6 — SIGNIFICANT CHANGE UP (ref 5–8)
PHOSPHATE SERPL-MCNC: 3 MG/DL — SIGNIFICANT CHANGE UP (ref 2.4–4.7)
PLATELET # BLD AUTO: 288 K/UL — SIGNIFICANT CHANGE UP (ref 150–400)
POTASSIUM SERPL-MCNC: 4.4 MMOL/L — SIGNIFICANT CHANGE UP (ref 3.5–5.3)
POTASSIUM SERPL-SCNC: 4.4 MMOL/L — SIGNIFICANT CHANGE UP (ref 3.5–5.3)
PROT SERPL-MCNC: 6.9 G/DL — SIGNIFICANT CHANGE UP (ref 6.6–8.7)
PROT UR-MCNC: SIGNIFICANT CHANGE UP MG/DL
RBC # BLD: 4.6 M/UL — SIGNIFICANT CHANGE UP (ref 3.8–5.2)
RBC # FLD: 13.6 % — SIGNIFICANT CHANGE UP (ref 10.3–14.5)
RBC CASTS # UR COMP ASSIST: 2 /HPF — SIGNIFICANT CHANGE UP (ref 0–4)
SODIUM SERPL-SCNC: 135 MMOL/L — SIGNIFICANT CHANGE UP (ref 135–145)
SP GR SPEC: 1.02 — SIGNIFICANT CHANGE UP (ref 1–1.03)
SQUAMOUS # UR AUTO: 20 /HPF — HIGH (ref 0–5)
UROBILINOGEN FLD QL: 1 MG/DL — SIGNIFICANT CHANGE UP (ref 0.2–1)
VIT B12 SERPL-MCNC: 1097 PG/ML — SIGNIFICANT CHANGE UP (ref 232–1245)
WBC # BLD: 9.35 K/UL — SIGNIFICANT CHANGE UP (ref 3.8–10.5)
WBC # FLD AUTO: 9.35 K/UL — SIGNIFICANT CHANGE UP (ref 3.8–10.5)
WBC UR QL: 6 /HPF — HIGH (ref 0–5)

## 2024-05-15 PROCEDURE — 90792 PSYCH DIAG EVAL W/MED SRVCS: CPT

## 2024-05-15 PROCEDURE — 99223 1ST HOSP IP/OBS HIGH 75: CPT

## 2024-05-15 RX ORDER — QUETIAPINE FUMARATE 200 MG/1
2 TABLET, FILM COATED ORAL
Refills: 0 | DISCHARGE

## 2024-05-15 RX ORDER — CLONAZEPAM 1 MG
1 TABLET ORAL ONCE
Refills: 0 | Status: DISCONTINUED | OUTPATIENT
Start: 2024-05-15 | End: 2024-05-15

## 2024-05-15 RX ORDER — CEFTRIAXONE 500 MG/1
1000 INJECTION, POWDER, FOR SOLUTION INTRAMUSCULAR; INTRAVENOUS ONCE
Refills: 0 | Status: COMPLETED | OUTPATIENT
Start: 2024-05-15 | End: 2024-05-15

## 2024-05-15 RX ORDER — ALBUTEROL 90 UG/1
1 AEROSOL, METERED ORAL
Refills: 0 | Status: DISCONTINUED | OUTPATIENT
Start: 2024-05-15 | End: 2024-05-29

## 2024-05-15 RX ORDER — CEFTRIAXONE 500 MG/1
1000 INJECTION, POWDER, FOR SOLUTION INTRAMUSCULAR; INTRAVENOUS EVERY 24 HOURS
Refills: 0 | Status: COMPLETED | OUTPATIENT
Start: 2024-05-16 | End: 2024-05-18

## 2024-05-15 RX ORDER — LANOLIN ALCOHOL/MO/W.PET/CERES
3 CREAM (GRAM) TOPICAL AT BEDTIME
Refills: 0 | Status: DISCONTINUED | OUTPATIENT
Start: 2024-05-15 | End: 2024-05-29

## 2024-05-15 RX ORDER — LEFLUNOMIDE 10 MG/1
1 TABLET ORAL
Qty: 0 | Refills: 0 | DISCHARGE

## 2024-05-15 RX ORDER — GABAPENTIN 400 MG/1
1 CAPSULE ORAL
Refills: 0 | DISCHARGE

## 2024-05-15 RX ORDER — CEFTRIAXONE 500 MG/1
INJECTION, POWDER, FOR SOLUTION INTRAMUSCULAR; INTRAVENOUS
Refills: 0 | Status: COMPLETED | OUTPATIENT
Start: 2024-05-15 | End: 2024-05-19

## 2024-05-15 RX ORDER — CLONAZEPAM 1 MG
1 TABLET ORAL
Refills: 0 | Status: DISCONTINUED | OUTPATIENT
Start: 2024-05-15 | End: 2024-05-20

## 2024-05-15 RX ORDER — SODIUM CHLORIDE 9 MG/ML
1000 INJECTION, SOLUTION INTRAVENOUS
Refills: 0 | Status: DISCONTINUED | OUTPATIENT
Start: 2024-05-15 | End: 2024-05-21

## 2024-05-15 RX ORDER — CLOPIDOGREL BISULFATE 75 MG/1
75 TABLET, FILM COATED ORAL DAILY
Refills: 0 | Status: DISCONTINUED | OUTPATIENT
Start: 2024-05-15 | End: 2024-05-29

## 2024-05-15 RX ORDER — ACETAMINOPHEN 500 MG
650 TABLET ORAL EVERY 6 HOURS
Refills: 0 | Status: DISCONTINUED | OUTPATIENT
Start: 2024-05-15 | End: 2024-05-29

## 2024-05-15 RX ORDER — GABAPENTIN 400 MG/1
100 CAPSULE ORAL THREE TIMES A DAY
Refills: 0 | Status: DISCONTINUED | OUTPATIENT
Start: 2024-05-15 | End: 2024-05-29

## 2024-05-15 RX ORDER — CEFTRIAXONE 500 MG/1
INJECTION, POWDER, FOR SOLUTION INTRAMUSCULAR; INTRAVENOUS
Refills: 0 | Status: DISCONTINUED | OUTPATIENT
Start: 2024-05-15 | End: 2024-05-15

## 2024-05-15 RX ORDER — HYDROXYZINE HCL 10 MG
25 TABLET ORAL THREE TIMES A DAY
Refills: 0 | Status: DISCONTINUED | OUTPATIENT
Start: 2024-05-15 | End: 2024-05-29

## 2024-05-15 RX ORDER — PANTOPRAZOLE SODIUM 20 MG/1
40 TABLET, DELAYED RELEASE ORAL
Refills: 0 | Status: DISCONTINUED | OUTPATIENT
Start: 2024-05-15 | End: 2024-05-21

## 2024-05-15 RX ORDER — DULOXETINE HYDROCHLORIDE 30 MG/1
1 CAPSULE, DELAYED RELEASE ORAL
Refills: 0 | DISCHARGE

## 2024-05-15 RX ORDER — ALBUTEROL 90 UG/1
1 AEROSOL, METERED ORAL
Refills: 0 | DISCHARGE

## 2024-05-15 RX ORDER — ENOXAPARIN SODIUM 100 MG/ML
40 INJECTION SUBCUTANEOUS EVERY 24 HOURS
Refills: 0 | Status: DISCONTINUED | OUTPATIENT
Start: 2024-05-15 | End: 2024-05-29

## 2024-05-15 RX ORDER — DULOXETINE HYDROCHLORIDE 30 MG/1
60 CAPSULE, DELAYED RELEASE ORAL
Refills: 0 | Status: DISCONTINUED | OUTPATIENT
Start: 2024-05-15 | End: 2024-05-15

## 2024-05-15 RX ORDER — LOSARTAN POTASSIUM 100 MG/1
50 TABLET, FILM COATED ORAL DAILY
Refills: 0 | Status: DISCONTINUED | OUTPATIENT
Start: 2024-05-15 | End: 2024-05-29

## 2024-05-15 RX ORDER — QUETIAPINE FUMARATE 200 MG/1
300 TABLET, FILM COATED ORAL AT BEDTIME
Refills: 0 | Status: DISCONTINUED | OUTPATIENT
Start: 2024-05-15 | End: 2024-05-15

## 2024-05-15 RX ORDER — QUETIAPINE FUMARATE 200 MG/1
1 TABLET, FILM COATED ORAL
Refills: 0 | DISCHARGE

## 2024-05-15 RX ORDER — LITHIUM CARBONATE 300 MG/1
150 TABLET, EXTENDED RELEASE ORAL AT BEDTIME
Refills: 0 | Status: DISCONTINUED | OUTPATIENT
Start: 2024-05-15 | End: 2024-05-15

## 2024-05-15 RX ADMIN — Medication 1 MILLIGRAM(S): at 18:59

## 2024-05-15 RX ADMIN — GABAPENTIN 100 MILLIGRAM(S): 400 CAPSULE ORAL at 21:42

## 2024-05-15 RX ADMIN — Medication 25 MILLIGRAM(S): at 15:07

## 2024-05-15 RX ADMIN — Medication 1 MILLIGRAM(S): at 10:37

## 2024-05-15 RX ADMIN — GABAPENTIN 100 MILLIGRAM(S): 400 CAPSULE ORAL at 14:28

## 2024-05-15 RX ADMIN — Medication 1 MILLIGRAM(S): at 00:42

## 2024-05-15 RX ADMIN — SODIUM CHLORIDE 85 MILLILITER(S): 9 INJECTION, SOLUTION INTRAVENOUS at 21:47

## 2024-05-15 RX ADMIN — ENOXAPARIN SODIUM 40 MILLIGRAM(S): 100 INJECTION SUBCUTANEOUS at 14:28

## 2024-05-15 RX ADMIN — CEFTRIAXONE 1000 MILLIGRAM(S): 500 INJECTION, POWDER, FOR SOLUTION INTRAMUSCULAR; INTRAVENOUS at 14:27

## 2024-05-15 NOTE — ED BEHAVIORAL HEALTH ASSESSMENT NOTE - NSSUICRSKFACTOR_PSY_ALL_CORE
Current and Past Psychiatric Diagnoses/Presenting Symptoms/Treatment Related Factors 4 = completely dependent

## 2024-05-15 NOTE — ED CDU PROVIDER INITIAL DAY NOTE - CLINICAL SUMMARY MEDICAL DECISION MAKING FREE TEXT BOX
history of anxiety with decreased activity , po intake ; eval psych, labs, urine and ct of head history of anxiety with decreased activity , po intake ; eval psych, labs, urine and ct of head  darek evaluated pt and discussed her case with case management and they state.  pt is confused compared to baseline   will admit to medicine

## 2024-05-15 NOTE — PATIENT PROFILE ADULT - FUNCTIONAL ASSESSMENT - BASIC MOBILITY 6.
2-calculated by average/Not able to assess (calculate score using [facility] averaging method)

## 2024-05-15 NOTE — ED BEHAVIORAL HEALTH ASSESSMENT NOTE - HPI (INCLUDE ILLNESS QUALITY, SEVERITY, DURATION, TIMING, CONTEXT, MODIFYING FACTORS, ASSOCIATED SIGNS AND SYMPTOMS)
Patient is a 73 year old, female; domiciled with her adult son and ex-;  (2003); noncaregiver; unemployed on SSI; past psychiatric history of depression and anxiety; currently under the care of Dr. Cleary at [facility]; 2 reported prior psychiatric hospitalizations at [facility] over 10 years ago; no known suicide attempts; no known history of violence or arrests; no active substance abuse or known history of complicated withdrawal; PMH of arthritis, fibromyalgia; discharged from [facility] on 11/17/23; brought in by EMS; called by ex- (with whom she lives); presenting with anxiety. Pt last seen by  at [facility] 4/20/2022 where she was cleared psychiatrically.     Upon assessment, pt presents with anxious mood and affect stating that she had a "panic attack" prior to arrival where she was "hyperventilating" without any particular triggering event. Pt states that she has suffered from anxiety and depression since 1993 when she and her  ; states she was last employed in 2020. Pt denies any SI/HI/AH/VH/paranoia and states she does not want to be in the hospital but would like to have her medications restarted. Pt states that she was previously on Klonopin prescribed by Dr. Cleary (spelling?); last rx written in July 2023. Pt now requesting Klonopin because "that's what works." Pt does not present with acute signs of psychosis, is anxious but generally well related with writer and answers questions appropriately.     Collateral collected from ex-, Sagar, (with whom pt lives) states that the pt has been suffering from acute anxiety for an extended period of time and states that today she was "out of control." Sagar states that the pt is not safe to return home tonight and adds that she has an intake appt with a new outpatient clinic on 12/18. He adds that she is being followed by a , Ria Lynch [phone] and is requesting we speak with Ria prior to making any decisions regarding her disposition. Patient is a 74 year old, female; domiciled with her adult son and ex-;  (2003); noncaregiver; unemployed on SSI; past psychiatric history of bipolar depression and anxiety; currently under the care of [facility]; per chart review- 2 reported prior psychiatric hospitalizations at [facility] over 10 years ago; no known suicide attempts; no known history of violence or arrests; no active substance abuse or known history of complicated withdrawal; PMH of arthritis, fibromyalgia; brought in by EMS; called by ex- (with whom she lives); presenting with anxiety and reports of vomiting x several day.    Spoke to attending MD for clarification r/t ER visit.  It was reported that EMS was called for anxiety, however, patient reports that she called "because I had to get to the hospital... I was vomiting."  Patient reports ongoing depression and anxiety since 20s, reports +hx of panic attacks.  She reports last panic attack was approx 1 month ago.  She denies any hx of suicide attempts or self harm.  Med list provided to writer.  Writer attempted to contact  Sagar (# in chart) regarding reason for ER visit.  Non-descript VM left requesting call back.     Writer spoke again to patient to clarify that it was reported she has been having anxiety/panic attacks- she reports "I guess I did." Upon brief mental status exam, patient a/ox2, aware of her own name (can name birthday but with difficulty), aware of president, but does not know year or where she is.     Writer spoke with patient's  Ria Duval who reports that she is SW  from [facility] office. Per Ms. Duval, patient has been declining in the past week, not getting out of bed, refusing to eat or drink, and reportedly vomiting. Per Ms. Duval, patient's  having difficulty convincing her to take her meds.  Ms. Flanagan reports that patient normally a/ox4 and is currently not at her baseline.  She reports that patient has had "minor memory deficits," but does not have a dementia dx.

## 2024-05-15 NOTE — PATIENT PROFILE ADULT - FALL HARM RISK - HARM RISK INTERVENTIONS

## 2024-05-15 NOTE — ED BEHAVIORAL HEALTH ASSESSMENT NOTE - CURRENT MEDICATION
Patient with med list:  losartan 50mg po qdaily  celecoxib 200mg po qdaily  albuterol 1 puff twice a day  gabapentin cap 100mg po TID  hydoxyzine hcl 25mg tab po TID  quatiapine 300mg ER po qHS  mybetriq 50mg po qdaily   oxybutynin ER 10mg po qHS  lithium 150mg po caps qHS -- list says "new"  clonazepam 1mg po BID  vitamin D3 5000unit 1 caps daily  clopidogrel 75mg po qdaily  duloxetine 60mg po BID  ibuprofen 800mg po q6hrs PRN  clindamycin 150mg PO q 6hrs

## 2024-05-15 NOTE — ED BEHAVIORAL HEALTH ASSESSMENT NOTE - DIFFERENTIAL
delirum d/t underlying medical etiology  failure to thrive  anxiety with panic attacks likely contributing to clinical presentation

## 2024-05-15 NOTE — H&P ADULT - NSHPREVIEWOFSYSTEMS_GEN_ALL_CORE
CONSTITUTIONAL: no fevers, chills, night sweats, weight changes  HEENT: no vision changes or diplopia, no tinnitus, no sore throat  RESPIRATORY: denies dyspnea, sob, nocturnal cough, sputum or hemoptysis  CARDIOVASCULAR: no CP, palpitations or lower extremity edema  GI: no dysphagia, + nausea, no abd pain, constipation, diarrhea, stool change or blood in stool  : no dysuria or hematuria, no flank pain, no incontinence or urinary retention  MSK: no joint pain or swelling  INTEGUMENTARY: no rashes or lesions  NEUROLOGICAL: no HA, confusion, syncope, numbness, weakness, tremors or ataxia  PSYCH: denies depression  ENDOCRINE: no polyuria, polydipsia, no temp intolerance, no tremors, no changes in skin, hair or nails  HEMATOLOGIC/LYMPHATIC: no lymph node enlargement, abnormal bruising or bleeding

## 2024-05-15 NOTE — ED CDU PROVIDER DISPOSITION NOTE - CLINICAL COURSE
pt awake and alert x 1    psych states this is not psych , she normally is awake alert and oriented x 4    will admit to medicine

## 2024-05-15 NOTE — ED BEHAVIORAL HEALTH ASSESSMENT NOTE - SUMMARY
Patient is a 74 year old, female; domiciled with her adult son and ex-;  (2003); noncaregiver; unemployed on SSI; past psychiatric history of bipolar depression and anxiety; currently under the care of [facility]; per chart review- 2 reported prior psychiatric hospitalizations at [facility] over 10 years ago; no known suicide attempts; no known history of violence or arrests; no active substance abuse or known history of complicated withdrawal; PMH of arthritis, fibromyalgia; brought in by EMS; called by ex- (with whom she lives); presenting with anxiety and reports of vomiting x several day.    Patient seen at bedside.  She presents as anxious, noted to be making throat clearing sound, which as per nursing staff, she reportedly does when anxious.  Patient initially denied recent panic attacks, but when asked about statement in chart on admission, she reports "I guess I did have one (yesterday)."   Patient able to compensate with hx facts, but upon brief mental status exam, patient a/ox2, aware of her own name (can name birthday but with difficulty), aware of president, but does not know year or where she is. This is inconsistent with 's report of patient's baseline.  Due to patient's altered mental status, patient likely presenting with delirium      Recs  rule out medical etiology of delirium  consider safety sitter   awaiting collateral from patient's  before psych to advise how to restart meds  can utilize klonopin 1mg PO BID as she has been on this for several years  ISTOP reviewed #[id number]

## 2024-05-15 NOTE — ED ADULT NURSE REASSESSMENT NOTE - NS ED NURSE REASSESS COMMENT FT1
Assumed care of patient at 0730. patient is A&Ox3, and observed trying to get out of bed to go to the bathroom. Patient was placed back in bed and placed on a purwick due to c/o lightheadedness. Currently states she feels anxious. Now currenty resting in bed after Purwick placed. Will continue to monitor. Bed is at lowest position and side rails up,

## 2024-05-15 NOTE — PATIENT PROFILE ADULT - FALL HARM RISK - DEVICES
Patient: Jen Ledezma Date: 2023   : 1945    77 year old female      INPATIENT WOUND CARE PROGRESS NOTE    Supervising Wound Care / Hyperbaric Medicine Physician: Dr. Muna King  Consulting Provider:  Dia Christine NP  Date of Consultation/Last Comprehensive Exam:  2023  Referring  Provider:  Dr. Tapia    SUBJECTIVE:    Chief Complaint:  Bilateral leg wounds      Wound/Ulcer Present:    Other, moisture dermatitis    Additional Wound Category:  None     Maximum Baseline Ambulatory Status:  Walks with cane    History of Present Illness:  This is a 77 year old female with a past medical history significant for DM, HTN, dementia who presented to the ED with bilateral leg wounds and erythema. She is nonverbal and resides at home with her daughter who cares for her. When her daughter went to remove her pants she was noted to have open wounds. She has been started on IV Rocephin and Vancomycin.   Patient is non verbal    Wound care consulted for further evaluation and recommendations.     2023  Seen today for follow up. Apparently she has been trying to remove her dressings.   Awaiting possible placement.     Current Treatment Regimen:  Dressing:  xeroform   Frequency:  Daily   Changed by:  Staff/bedside nurse    Review of Systems:  Review of systems not obtained due to patient factors.    Past Medical History:   Diagnosis Date   • Abnormal EKG 02/15/2016   • Arthritis    • Cataract 2016   • Dementia (CMD)    • Diabetes mellitus (CMD)    • DM2 (diabetes mellitus, type 2) (CMD)    • Essential (primary) hypertension    • High cholesterol    • LVH (left ventricular hypertrophy) due to hypertensive disease 02/15/2016   • Pneumonia    • Prediabetes 02/15/2016   • Resistant hypertension 2016     Past Surgical History:   Procedure Laterality Date   • Cataract extraction w/  intraocular lens implant Bilateral    • Colonoscopy  2016     Recall 10 years    • Extracapsular  cataract removal w insert io lens prosth wo ecp  03/16/16    right eye   • Hysterectomy       Social History     Socioeconomic History   • Marital status:      Spouse name: Not on file   • Number of children: Not on file   • Years of education: Not on file   • Highest education level: Not on file   Occupational History   • Not on file   Tobacco Use   • Smoking status: Never   • Smokeless tobacco: Never   Vaping Use   • Vaping status: Not on file   Substance and Sexual Activity   • Alcohol use: Yes     Alcohol/week: 1.0 standard drink of alcohol     Types: 1 Standard drinks or equivalent per week   • Drug use: No   • Sexual activity: Not on file   Other Topics Concern   • Not on file   Social History Narrative   • Not on file     Social Determinants of Health     Financial Resource Strain: Low Risk  (4/26/2023)    Financial Resource Strain    • Social Determinants: Financial Resource Strain: None   Food Insecurity: No Food Insecurity (4/25/2023)    Food Insecurity    • Social Determinants: Food Insecurity: Rarely   Transportation Needs: No Transportation Needs (4/25/2023)    PRAPARE - Transportation    • Lack of Transportation (Medical): No    • Lack of Transportation (Non-Medical): No   Physical Activity: Not on file   Stress: Not on file   Social Connections: Socially Integrated (4/25/2023)    Social Connections    • Social Determinants: Social Connections: 5 or more times a week   Intimate Partner Violence: Not At Risk (4/25/2023)    Intimate Partner Violence    • Social Determinants: Intimate Partner Violence Past Fear: No    • Social Determinants: Intimate Partner Violence Current Fear: No     Family History   Problem Relation Age of Onset   • Cataracts Mother    • Heart Mother    • Dementia/Alzheimers Mother    • Cancer Brother        Current Facility-Administered Medications   Medication   • sulfamethoxazole-trimethoprim (BACTRIM DS) 800-160 MG tablet 1 tablet   • HYDROcodone-acetaminophen (NORCO) 5-325  MG per tablet 1 tablet   • ketorolac (TORADOL) injection 15 mg   • traZODone (DESYREL) tablet 50 mg   • lactobacillus acidophilus (BACID) tablet 1 tablet   • loperamide (IMODIUM) capsule 2 mg   • dextrose 50 % injection 25 g   • dextrose 50 % injection 12.5 g   • glucagon (GLUCAGEN) injection 1 mg   • dextrose (GLUTOSE) 40 % gel 15 g   • dextrose (GLUTOSE) 40 % gel 30 g   • insulin lispro (ADMELOG,HumaLOG) - Correction Dose   • insulin lispro (ADMELOG,HumaLOG) - Correction Dose   • lisinopril (ZESTRIL) tablet 40 mg   • amLODIPine (NORVASC) tablet 10 mg   • hydrALAZINE (APRESOLINE) injection 10 mg   • aspirin (ECOTRIN) enteric coated tablet 81 mg   • atorvastatin (LIPITOR) tablet 10 mg   • folic acid (FOLATE) tablet 1 mg   • [Held by provider] lisinopril-hydroCHLOROthiazide 20-12.5 mg   • potassium CHLORIDE (KLOR-CON M) anurag ER tablet 20 mEq   • sodium chloride 0.9 % flush bag 25 mL   • sodium chloride (PF) 0.9 % injection 2 mL   • enoxaparin (LOVENOX) injection 40 mg   • nystatin (MYCOSTATIN) ointment   • bacitracin ointment   • acetaminophen (TYLENOL) tablet 650 mg        ALLERGIES:  Patient has no known allergies.    OBJECTIVE:  Vital Signs:    Visit Vitals  BP (!) 169/68 (BP Location: RUE - Right upper extremity, Patient Position: Sitting)   Pulse 69   Temp 98.2 °F (36.8 °C) (Oral)   Resp 20   Ht 5' 2\" (1.575 m)   Wt 68.6 kg (151 lb 3.8 oz)   LMP  (LMP Unknown)   SpO2 100%   BMI 27.66 kg/m²         Physical Exam:  General appearance: Appears stated age, Alert, thin and in no distress   Nods her head yes to some questions    Right medial thigh region has one small open superficial area. Remainder has epithelialized.   Left medial thigh wounds all  Have improved. Superficial breakdown is noted.   Erythema much improved.     5/1/2023 Left leg       Right leg      4/26/2026             Wound Bed Quality:  excoriation and breakdown      Naomy-wound Quality:    Erythema    Additional Descriptors:  none    Wound  Measurements Per Flowsheet:       Wound Leg Medial Cellulitis/Infection (Active)   Wound Care Team Consult Date 23 1307   Number of days: 6         PROCEDURE:  Not indicated    Procedure was Performed by:  Not applicable    Laboratory assessments reviewed:  No results found for: PAB   Albumin (g/dL)   Date Value   2023 3.1 (L)   2023 3.7   2023 3.7      Recent Labs   Lab 23  0737 23  1145   GLUCOSE BEDSIDE 142* 120* 96       Lab Results   Component Value Date    WBC 4.5 2023    GLUCOSE 115 (H) 2023    HGBA1C 7.4 (H) 2023    CRP 8.1 (H) 2023    RESR 32 (H) 2023    CREATININE 0.73 2023    GFRA 73 2019    GFRNA 63 2019        Culture results:  No results found for: SDES No results found for: CULT     Diagnostic Assessments Reviewed:  No new update    Nutritional Assessment:  Prealbumin and/or Albumin reviewed    Wound treatment goals are palliative:  No    DIAGNOSES:  moisture associated dermatitis    Medical Decision Makin year old diabetic non verbal female with dementia admitted with bilateral thigh wounds and cellulitis. It appears that the patient was incontinent and was sitting in wet clothing for some time. She has developed a moisture associated dermatitis and breakdown. She was being treated with IV Vanco and Rocephin- now on Bactrim    All wounds are improved, bandages are sticking  For the open draining areas we recommend hydrogel/ xeroform followed by ABD pad.   Blue cap Hydroguard for her buttocks.     Low air loss mattress,   Dietary following    Will continue to follow    Plan of Care:  Advanced Wound Care Recommendations:  NA  Percent Wound Closure from consult:  NA  Care plan to augment wound closure:  Not applicable.       Patient stable.       Dia Christine NP       None

## 2024-05-15 NOTE — ED BEHAVIORAL HEALTH ASSESSMENT NOTE - RISK ASSESSMENT
RF  past hx of inpatient hospitalization  unclear compliance with medications    PF  access to clinical intervention  engagement in tx  "family"    low risk for acute suicide/potential violence

## 2024-05-15 NOTE — H&P ADULT - NSHPPHYSICALEXAM_GEN_ALL_CORE
GENERAL: pt examined bedside, laying comfortably in bed in NAD  HEENT: NC/AT, dry oral mucosa, clear conjunctiva, sclera nonicteric  RESPIRATORY: Normal respiratory effort, no wheezing, rhonchi, rales  CARDIOVASCULAR: RRR, normal S1 and S2, +3/6 systolic murmur  ABDOMEN: soft, NT/ND, +bowel sounds, no rebound/guarding  MSK: No joint deformities, edema, erythema  EXTREMITIES: No cynaosis, no clubbing, no lower extremity edema  PSYCH: affect appropriate and cooperative  NEUROLOGY: A+Ox2-3, no focal neurologic deficits appreciated  SKIN: No rashes or no palpable lesions

## 2024-05-15 NOTE — ED ADULT NURSE REASSESSMENT NOTE - NS ED NURSE REASSESS COMMENT FT1
Patient resting on stretcher in no apparent distress.  A&Ox4, pending psych eval.  Pt voiced no complaints throughout the night.

## 2024-05-15 NOTE — H&P ADULT - NSHPLABSRESULTS_GEN_ALL_CORE
13.0   8.38  )-----------( 268      ( 14 May 2024 12:39 )             40.0         05-14    137  |  103  |  12.6  ----------------------------<  116<H>  3.8   |  17.0<L>  |  0.93    Ca    9.3      14 May 2024 12:39    TPro  6.9  /  Alb  3.8  /  TBili  0.7  /  DBili  x   /  AST  17  /  ALT  18  /  AlkPhos  112  05-14

## 2024-05-15 NOTE — H&P ADULT - ASSESSMENT
73 y/o F w/ PMH of panic attacks, HTN, depression, anxiety, fibromyalgia, breast CA, HLD presented for N/NBNB emisis for the past few days.  Pt is a poor historian at this time and information obtained from chart review.  Pt however does report dysuria and frequent urination but denies diarrhea, fevers, chills.  No reports of vomiting while in ED and tolerating diet today.  Pts baseline mentation is reported to be AAOx4 but currently x3 to self, month and place but not year or birthday or president.   Pts  Ria Duval reported to  that pt has minor memory deficits but no official diagnosis of dementia.  She also reports pt has been declining to get out of bed, refusing to eat or drink x 1 week and c/o nausea and 1-2 days of vomiting and has not been taking her meds.          Acute encephalopathy suspect metabolic from UTI and dehydration   Anxiety/depression w/ behavioral disturbances   - c/o dysuria and has +pyuria on UA although only 6WBC but +bacteria   - Ucxs ordered and will start on epimeric Abx for possible UTI  - Has had hx of SBO in 2022 but currently abd soft, +BS, tolerating diet well and passing flatus w/ last BM 2 days ago  - If starts having N/V or not tolerating po while inpt would order CT a/p and lactate     - Clinically dehydrated therefore will give gentle IVFs for today and encourage po intake   - Will hold myrbetriq and oxybutynin as both can cause delirium in elderly pts   - Given age, reported "memory deficits" and anxiety/depression w/ behavioral disturbances pt is at higher risk for developing acute delirium from medications, dehydration or infections  - Will also check vitamin B12 or folate levels   - Fall and aspiration precautions   - Lithium level low but likely from not taking meds past few days   - Resume home psych meds   -  following       AGMA likely from starvation ketosis   - Ketones on UA noted and reports of poor po intake over the past few days   - Repeat CMP collected and results pending   - Serum HCO3 low but anticipate will improve w/ IVLR  - Repeat CMP in AM monitor AG and serum HCO3      HTN   - Resume ARB       VTE ppx: lovenox  73 y/o F w/ PMH of panic attacks, HTN, depression, anxiety, fibromyalgia, breast CA, HLD presented for N/NBNB emisis for the past few days.  Pt is a poor historian at this time and information obtained from chart review.  Pt however does report dysuria and frequent urination but denies diarrhea, fevers, chills.  No reports of vomiting while in ED and tolerating diet today.  Pts baseline mentation is reported to be AAOx4 but currently x3 to self, month and place but not year or birthday or president.   Pts  Ria Duval reported to  that pt has minor memory deficits but no official diagnosis of dementia.  She also reports pt has been declining to get out of bed, refusing to eat or drink x 1 week and c/o nausea and 1-2 days of vomiting and has not been taking her meds.          Acute encephalopathy suspect metabolic from UTI and dehydration   Anxiety/depression w/ behavioral disturbances   - c/o dysuria and has +pyuria on UA although only 6WBC but +bacteria   - CTH negative for acute pathologies   - Ucxs ordered and will start on epimeric Abx for possible UTI  - Has had hx of SBO in 2022 but currently abd soft, +BS, tolerating diet well and passing flatus w/ last BM 2 days ago  - If starts having N/V or not tolerating po while inpt would order CT a/p and lactate     - Clinically dehydrated therefore will give gentle IVFs for today and encourage po intake   - Will hold myrbetriq and oxybutynin as both can cause delirium in elderly pts   - Given age, reported "memory deficits" and anxiety/depression w/ behavioral disturbances pt is at higher risk for developing acute delirium from medications, dehydration or infections  - Will also check vitamin B12 or folate levels   - Fall and aspiration precautions   - Lithium level low but likely from not taking meds past few days   - Resume home psych meds   -  following       AGMA likely from starvation ketosis   - Ketones on UA noted and reports of poor po intake over the past few days   - Repeat CMP collected and results pending   - Serum HCO3 low but anticipate will improve w/ IVLR  - Repeat CMP in AM monitor AG and serum HCO3      HTN   - Resume ARB       VTE ppx: lovenox

## 2024-05-15 NOTE — H&P ADULT - HISTORY OF PRESENT ILLNESS
75 y/o F w/ PMH of panic attacks, HTN, depression, anxiety, fibromyalgia, breast CA, HLD presented for N/NBNB emisis for the past few days.  Pt is a poor historian at this time and information obtained from chart review.  Pt however does report dysuria and frequent urination but denies diarrhea, fevers, chills.  No reports of vomiting while in ED and tolerating diet today.  Pts baseline mentation is reported to be AAOx4 but currently x3 to self, month and place but not year or birthday or president.   Pts  Ria Duval reported to  that pt has minor memory deficits but no official diagnosis of dementia.  She also reports pt has been declining to get out of bed, refusing to eat or drink x 1 week and c/o nause and 1-2 days of vomiting and has not been taking her meds.

## 2024-05-16 DIAGNOSIS — N39.0 URINARY TRACT INFECTION, SITE NOT SPECIFIED: ICD-10-CM

## 2024-05-16 LAB
ALBUMIN SERPL ELPH-MCNC: 3.3 G/DL — SIGNIFICANT CHANGE UP (ref 3.3–5.2)
ALP SERPL-CCNC: 103 U/L — SIGNIFICANT CHANGE UP (ref 40–120)
ALT FLD-CCNC: 18 U/L — SIGNIFICANT CHANGE UP
ANION GAP SERPL CALC-SCNC: 13 MMOL/L — SIGNIFICANT CHANGE UP (ref 5–17)
AST SERPL-CCNC: 16 U/L — SIGNIFICANT CHANGE UP
BASOPHILS # BLD AUTO: 0.07 K/UL — SIGNIFICANT CHANGE UP (ref 0–0.2)
BASOPHILS NFR BLD AUTO: 1 % — SIGNIFICANT CHANGE UP (ref 0–2)
BILIRUB SERPL-MCNC: 0.5 MG/DL — SIGNIFICANT CHANGE UP (ref 0.4–2)
BUN SERPL-MCNC: 10.4 MG/DL — SIGNIFICANT CHANGE UP (ref 8–20)
CALCIUM SERPL-MCNC: 8.7 MG/DL — SIGNIFICANT CHANGE UP (ref 8.4–10.5)
CHLORIDE SERPL-SCNC: 105 MMOL/L — SIGNIFICANT CHANGE UP (ref 96–108)
CO2 SERPL-SCNC: 20 MMOL/L — LOW (ref 22–29)
CREAT SERPL-MCNC: 0.79 MG/DL — SIGNIFICANT CHANGE UP (ref 0.5–1.3)
EGFR: 79 ML/MIN/1.73M2 — SIGNIFICANT CHANGE UP
EOSINOPHIL # BLD AUTO: 0.34 K/UL — SIGNIFICANT CHANGE UP (ref 0–0.5)
EOSINOPHIL NFR BLD AUTO: 4.7 % — SIGNIFICANT CHANGE UP (ref 0–6)
GLUCOSE SERPL-MCNC: 102 MG/DL — HIGH (ref 70–99)
HCT VFR BLD CALC: 38.3 % — SIGNIFICANT CHANGE UP (ref 34.5–45)
HGB BLD-MCNC: 12.4 G/DL — SIGNIFICANT CHANGE UP (ref 11.5–15.5)
IMM GRANULOCYTES NFR BLD AUTO: 0.4 % — SIGNIFICANT CHANGE UP (ref 0–0.9)
LYMPHOCYTES # BLD AUTO: 2.12 K/UL — SIGNIFICANT CHANGE UP (ref 1–3.3)
LYMPHOCYTES # BLD AUTO: 29.3 % — SIGNIFICANT CHANGE UP (ref 13–44)
MAGNESIUM SERPL-MCNC: 2 MG/DL — SIGNIFICANT CHANGE UP (ref 1.6–2.6)
MCHC RBC-ENTMCNC: 27.7 PG — SIGNIFICANT CHANGE UP (ref 27–34)
MCHC RBC-ENTMCNC: 32.4 GM/DL — SIGNIFICANT CHANGE UP (ref 32–36)
MCV RBC AUTO: 85.7 FL — SIGNIFICANT CHANGE UP (ref 80–100)
MONOCYTES # BLD AUTO: 0.96 K/UL — HIGH (ref 0–0.9)
MONOCYTES NFR BLD AUTO: 13.3 % — SIGNIFICANT CHANGE UP (ref 2–14)
NEUTROPHILS # BLD AUTO: 3.72 K/UL — SIGNIFICANT CHANGE UP (ref 1.8–7.4)
NEUTROPHILS NFR BLD AUTO: 51.3 % — SIGNIFICANT CHANGE UP (ref 43–77)
PHOSPHATE SERPL-MCNC: 3 MG/DL — SIGNIFICANT CHANGE UP (ref 2.4–4.7)
PLATELET # BLD AUTO: 216 K/UL — SIGNIFICANT CHANGE UP (ref 150–400)
POTASSIUM SERPL-MCNC: 3.6 MMOL/L — SIGNIFICANT CHANGE UP (ref 3.5–5.3)
POTASSIUM SERPL-SCNC: 3.6 MMOL/L — SIGNIFICANT CHANGE UP (ref 3.5–5.3)
PROT SERPL-MCNC: 6 G/DL — LOW (ref 6.6–8.7)
RBC # BLD: 4.47 M/UL — SIGNIFICANT CHANGE UP (ref 3.8–5.2)
RBC # FLD: 13.4 % — SIGNIFICANT CHANGE UP (ref 10.3–14.5)
SODIUM SERPL-SCNC: 138 MMOL/L — SIGNIFICANT CHANGE UP (ref 135–145)
WBC # BLD: 7.24 K/UL — SIGNIFICANT CHANGE UP (ref 3.8–10.5)
WBC # FLD AUTO: 7.24 K/UL — SIGNIFICANT CHANGE UP (ref 3.8–10.5)

## 2024-05-16 PROCEDURE — 99232 SBSQ HOSP IP/OBS MODERATE 35: CPT

## 2024-05-16 RX ORDER — LITHIUM CARBONATE 300 MG/1
150 TABLET, EXTENDED RELEASE ORAL AT BEDTIME
Refills: 0 | Status: DISCONTINUED | OUTPATIENT
Start: 2024-05-16 | End: 2024-05-29

## 2024-05-16 RX ORDER — DULOXETINE HYDROCHLORIDE 30 MG/1
60 CAPSULE, DELAYED RELEASE ORAL
Refills: 0 | Status: DISCONTINUED | OUTPATIENT
Start: 2024-05-16 | End: 2024-05-29

## 2024-05-16 RX ORDER — LANOLIN ALCOHOL/MO/W.PET/CERES
6 CREAM (GRAM) TOPICAL AT BEDTIME
Refills: 0 | Status: DISCONTINUED | OUTPATIENT
Start: 2024-05-16 | End: 2024-05-29

## 2024-05-16 RX ORDER — QUETIAPINE FUMARATE 200 MG/1
300 TABLET, FILM COATED ORAL AT BEDTIME
Refills: 0 | Status: DISCONTINUED | OUTPATIENT
Start: 2024-05-16 | End: 2024-05-17

## 2024-05-16 RX ORDER — TRAZODONE HCL 50 MG
50 TABLET ORAL AT BEDTIME
Refills: 0 | Status: DISCONTINUED | OUTPATIENT
Start: 2024-05-16 | End: 2024-05-29

## 2024-05-16 RX ADMIN — GABAPENTIN 100 MILLIGRAM(S): 400 CAPSULE ORAL at 06:12

## 2024-05-16 RX ADMIN — Medication 1 MILLIGRAM(S): at 18:32

## 2024-05-16 RX ADMIN — CLOPIDOGREL BISULFATE 75 MILLIGRAM(S): 75 TABLET, FILM COATED ORAL at 13:53

## 2024-05-16 RX ADMIN — Medication 1 MILLIGRAM(S): at 06:13

## 2024-05-16 RX ADMIN — PANTOPRAZOLE SODIUM 40 MILLIGRAM(S): 20 TABLET, DELAYED RELEASE ORAL at 06:12

## 2024-05-16 RX ADMIN — LOSARTAN POTASSIUM 50 MILLIGRAM(S): 100 TABLET, FILM COATED ORAL at 06:13

## 2024-05-16 RX ADMIN — DULOXETINE HYDROCHLORIDE 60 MILLIGRAM(S): 30 CAPSULE, DELAYED RELEASE ORAL at 18:32

## 2024-05-16 RX ADMIN — QUETIAPINE FUMARATE 300 MILLIGRAM(S): 200 TABLET, FILM COATED ORAL at 23:00

## 2024-05-16 RX ADMIN — GABAPENTIN 100 MILLIGRAM(S): 400 CAPSULE ORAL at 23:00

## 2024-05-16 RX ADMIN — Medication 6 MILLIGRAM(S): at 23:00

## 2024-05-16 RX ADMIN — LITHIUM CARBONATE 150 MILLIGRAM(S): 300 TABLET, EXTENDED RELEASE ORAL at 22:59

## 2024-05-16 NOTE — PROGRESS NOTE ADULT - SUBJECTIVE AND OBJECTIVE BOX
[facility] Division of Hospital Medicine    SUBJECTIVE / OVERNIGHT EVENTS:  No events  admitted overnight  patient is arousable but does not want to cooperate with encounter this morning. says that she wants to sleep    Patient denies chest pain, SOB, abd pain, N/V, fever, chills, dysuria or any other complaints. All remainder ROS negative.     MEDICATIONS  (STANDING):  cefTRIAXone Injectable. 1000 milliGRAM(s) IV Push every 24 hours  cefTRIAXone Injectable.      clonazePAM  Tablet 1 milliGRAM(s) Oral two times a day  clopidogrel Tablet 75 milliGRAM(s) Oral daily  enoxaparin Injectable 40 milliGRAM(s) SubCutaneous every 24 hours  gabapentin 100 milliGRAM(s) Oral three times a day  lactated ringers. 1000 milliLiter(s) (85 mL/Hr) IV Continuous <Continuous>  losartan 50 milliGRAM(s) Oral daily  pantoprazole    Tablet 40 milliGRAM(s) Oral before breakfast    MEDICATIONS  (PRN):  acetaminophen     Tablet .. 650 milliGRAM(s) Oral every 6 hours PRN Temp greater or equal to 38C (100.4F), Mild Pain (1 - 3)  albuterol    90 MICROgram(s) HFA Inhaler 1 Puff(s) Inhalation two times a day PRN for shortness of breath and/or wheezing  aluminum hydroxide/magnesium hydroxide/simethicone Suspension 30 milliLiter(s) Oral every 4 hours PRN Dyspepsia  hydrOXYzine hydrochloride 25 milliGRAM(s) Oral three times a day PRN Anxiety  melatonin 3 milliGRAM(s) Oral at bedtime PRN Insomnia        I&O's Summary      PHYSICAL EXAM:  Vital Signs Last 24 Hrs  T(C): 36.4 (16 May 2024 04:32), Max: 37.1 (15 May 2024 19:27)  T(F): 97.5 (16 May 2024 04:32), Max: 98.7 (15 May 2024 19:27)  HR: 73 (16 May 2024 04:32) (73 - 86)  BP: 133/79 (16 May 2024 04:32) (121/74 - 145/79)  BP(mean): --  RR: 18 (16 May 2024 04:32) (18 - 19)  SpO2: 93% (16 May 2024 04:32) (93% - 96%)    Parameters below as of 16 May 2024 04:32  Patient On (Oxygen Delivery Method): room air            CONSTITUTIONAL: NAD, appears stated age  ENMT: Moist oral mucosa, no pharyngeal injection or exudates; normal dentition  RESPIRATORY: Normal respiratory effort; clear to auscultation bilaterally  CARDIOVASCULAR: Regular rate and rhythm, normal S1 and S2, no murmur/rub/gallop; Peripheral pulses are 2+ bilaterally  ABDOMEN: Nontender to palpation, normoactive bowel sounds, no rebound/guarding;   MUSCLOSKELETAL:  No clubbing or cyanosis of digits; no joint swelling or tenderness to palpation  PSYCH: A+O to person; affect appropriate  NEUROLOGY: CN 2-12 are intact and symmetric; no gross sensory deficits;   SKIN: No rashes; no palpable lesions    LABS:                        12.4   7.24  )-----------( 216      ( 16 May 2024 06:55 )             38.3     05-16    138  |  105  |  10.4  ----------------------------<  102<H>  3.6   |  20.0<L>  |  0.79    Ca    8.7      16 May 2024 06:55  Phos  3.0     05-16  Mg     2.0     05-16    TPro  6.0<L>  /  Alb  3.3  /  TBili  0.5  /  DBili  x   /  AST  16  /  ALT  18  /  AlkPhos  103  05-16          Urinalysis Basic - ( 16 May 2024 06:55 )    Color: x / Appearance: x / SG: x / pH: x  Gluc: 102 mg/dL / Ketone: x  / Bili: x / Urobili: x   Blood: x / Protein: x / Nitrite: x   Leuk Esterase: x / RBC: x / WBC x   Sq Epi: x / Non Sq Epi: x / Bacteria: x        CAPILLARY BLOOD GLUCOSE            RADIOLOGY & ADDITIONAL TESTS:  Results Reviewed:   Imaging Personally Reviewed:  Electrocardiogram Personally Reviewed:

## 2024-05-16 NOTE — BH CONSULTATION LIAISON PROGRESS NOTE - NSBHASSESSMENTFT_PSY_ALL_CORE
Patient is a 74 year old, female; domiciled with her adult son and ex-;  (2003); noncaregiver; unemployed on SSI; past psychiatric history of bipolar depression and anxiety; currently under the care of [facility]; per chart review- 2 reported prior psychiatric hospitalizations at [facility] over 10 years ago; no known suicide attempts; no known history of violence or arrests; no active substance abuse or known history of complicated withdrawal; PMH of arthritis, fibromyalgia; brought in by EMS; called by ex- (with whom she lives); presenting with anxiety and reports of vomiting x several days.    5/16: Patient sedated on examination today, unable to evaluate. Will resume patient's home psychiatric medications and consider titrations after evaluating her ongoing anxiety. Will continue to monitor patient's sedation.     Labs: Li level 0.14 (5/14)    Recommendations:  - RESTART Seroquel  mg QHS  - RESTART Lithium 150 mg QHS   - RESTART Cymbalta 60 mg BID     - Continue Klonopin 1 mg BID  - Continue Atarax 25 mg TID PRN   - Continue Gabapentin 100 mg TID   - Continue Melatonin 3 mg QHS     - Maintain delirium precautions   - Avoid anticholinergic agents, benzos, opioid as they can further perpetuate confusion   - Frequent re orientation, Hydration, try to avoid restraints and if possible, mobilize patient and PT involvement   Patient is a 74 year old, female; domiciled with her adult son and ex-;  (2003); noncaregiver; unemployed on SSI; past psychiatric history of bipolar depression and anxiety; currently under the care of [facility]; per chart review- 2 reported prior psychiatric hospitalizations at [facility] over 10 years ago; no known suicide attempts; no known history of violence or arrests; no active substance abuse or known history of complicated withdrawal; PMH of arthritis, fibromyalgia; brought in by EMS; called by ex- (with whom she lives); presenting with anxiety and reports of vomiting x several days.    5/16: Patient reports ongoing heightened anxiety, however, not observable during the encounter today. Patient did have difficulty speaking with gulping noises, however, physically she was not observed to be restless or hyperactive. Will resume patient's home psychiatric medications and monitor her anxiety. Will attempt to optimize the patient's sleep as it may be contributing to her anxiety. Will attempt a MOCA during tomorrow's assessment as patient exhibits some confusion and per collateral, her mental status has been worsening.     Labs: Li level 0.14 (5/14)    Recommendations:  - Rule out organic causes of anxiety (i.e. PE, stroke, thyroid disease)  - RESTART Seroquel  mg QHS  - RESTART Lithium 150 mg QHS   - RESTART Cymbalta 60 mg BID  - INCREASE Melatonin to 6 mg QHS   - May utilize Trazodone 50 mg 1-2 hours after melatonin administration if patient has difficulty sleeping     - Continue Klonopin 1 mg BID  - Continue Atarax 25 mg TID PRN   - Continue Gabapentin 100 mg TID   - Continue Melatonin 3 mg QHS     - Maintain delirium precautions   - Avoid anticholinergic agents, benzos, opioid as they can further perpetuate confusion   - Frequent re orientation, Hydration, try to avoid restraints and if possible, mobilize patient and PT involvement   Patient is a 74 year old, female; domiciled with her adult son and ex-;  (2003); noncaregiver; unemployed on SSI; past psychiatric history of bipolar depression and anxiety; currently under the care of [facility]; per chart review- 2 reported prior psychiatric hospitalizations at [facility] over 10 years ago; no known suicide attempts; no known history of violence or arrests; no active substance abuse or known history of complicated withdrawal; PMH of arthritis, fibromyalgia; brought in by EMS; called by ex- (with whom she lives); presenting with anxiety and reports of vomiting x several days.    5/16: Patient reports ongoing heightened anxiety, however, not observable during the encounter today. Patient did have difficulty speaking with gulping noises, however, physically she was not observed to be restless or hyperactive. Will resume patient's home psychiatric medications and monitor her anxiety. Will attempt to optimize the patient's sleep as it may be contributing to her anxiety. Will attempt a MOCA during tomorrow's assessment as patient exhibits some confusion and per collateral, her mental status has been worsening.     Per collateral from patient's ex-, patient has been declining clinically with decreased PO intake, poor ADLs (not showering for 3 weeks), isolative, impulsive spending- will continue to evaluate for the need for inpatient psychiatric hospitalization. Ex- recommended to contact [facility] for further collateral. Upon calling them, they notified this writer that they are terminating the patient's outpatient services today.     Labs: Li level 0.14 (5/14)    Recommendations:  - Rule out organic causes of anxiety (i.e. PE, stroke, thyroid disease)  - RESTART Seroquel  mg QHS  - RESTART Lithium 150 mg QHS   - RESTART Cymbalta 60 mg BID  - INCREASE Melatonin to 6 mg QHS   - May utilize Trazodone 50 mg 1-2 hours after melatonin administration if patient has difficulty sleeping     - Continue Klonopin 1 mg BID  - Continue Atarax 25 mg TID PRN   - Continue Gabapentin 100 mg TID   - Continue Melatonin 3 mg QHS     - May require inpatient psychiatric admission  - Maintain delirium precautions   - Avoid anticholinergic agents, benzos, opioid as they can further perpetuate confusion   - Frequent re orientation, Hydration, try to avoid restraints and if possible, mobilize patient and PT involvement

## 2024-05-16 NOTE — PROGRESS NOTE ADULT - ASSESSMENT
74F w/ PMH of panic attacks, HTN, depression, anxiety, fibromyalgia, breast CA, HLD presented for N/NBNB emisis for the past few days.  Pt is a poor historian at this time and information obtained from chart review.  Pt however does report dysuria and frequent urination but denies diarrhea, fevers, chills.  No reports of vomiting while in ED and tolerating diet today.  Pts baseline mentation is reported to be AAOx4 but currently x3 to self, month and place but not year or birthday or president.   Pts  Ria Wilsonjose martin reported to  that pt has minor memory deficits but no official diagnosis of dementia.  She also reports pt has been declining to get out of bed, refusing to eat or drink x 1 week and c/o nausea and 1-2 days of vomiting and has not been taking her meds.        Acute encephalopathy suspect metabolic from UTI and dehydration. Suspect underlying psychiatric condition also contributing to patient's condition  Anxiety/depression w/ behavioral disturbances   c/o dysuria and has +pyuria on UA although only 6WBC but +bacteria   CTH negative for acute pathologies   Ucxs ordered   CTX  Holding Myrbetriq and oxybutynin as both can cause delirium in elderly pts   Psych meds held on admission  Pending further psych recommendations   following     HTN   - Resume ARB       VTE ppx: lovenox   Dispo: Acute

## 2024-05-16 NOTE — BH CONSULTATION LIAISON PROGRESS NOTE - NSBHFUPINTERVALHXFT_PSY_A_CORE
Patient is a 74 year old, female; domiciled with her adult son and ex-;  (2003); noncaregiver; unemployed on SSI; past psychiatric history of bipolar depression and anxiety; currently under the care of [facility]; per chart review- 2 reported prior psychiatric hospitalizations at [facility] over 10 years ago; no known suicide attempts; no known history of violence or arrests; no active substance abuse or known history of complicated withdrawal; PMH of arthritis, fibromyalgia; brought in by EMS; called by ex- (with whom she lives); presenting with anxiety and reports of vomiting x several days.    Patient was seen and examined by the psychiatric treatment team today. On encounter, patient was sedated and refused to engage in conversation with the team.  Patient is a 74 year old, female; domiciled with her adult son and ex-;  (2003); noncaregiver; unemployed on SSI; past psychiatric history of bipolar depression and anxiety; currently under the care of [facility]; per chart review- 2 reported prior psychiatric hospitalizations at [facility] over 10 years ago; no known suicide attempts; no known history of violence or arrests; no active substance abuse or known history of complicated withdrawal; PMH of arthritis, fibromyalgia; brought in by EMS; called by ex- (with whom she lives); presenting with anxiety and reports of vomiting x several days.    Patient was seen and examined by the psychiatric treatment team today. On encounter, patient was sedated and refused to engage in conversation with the team.    On repeat encounter, patient was awake and able to engage with the team. She was alert and oriented to person, place, and partially to time (correct month, wrong year). She reports ongoing anxiety with a severity of 8/10. She was unable to really elaborate or pinpoint triggers. She was observed to have difficulty with speech production and was making gulping noises frequently. She reports lack of sleep overnight. Spoke to patient's  with the patient present. She reports the patient is improved clinically since Monday, however, she is not at her baseline.

## 2024-05-17 PROCEDURE — 99232 SBSQ HOSP IP/OBS MODERATE 35: CPT

## 2024-05-17 RX ORDER — QUETIAPINE FUMARATE 200 MG/1
150 TABLET, FILM COATED ORAL
Refills: 0 | Status: DISCONTINUED | OUTPATIENT
Start: 2024-05-17 | End: 2024-05-20

## 2024-05-17 RX ADMIN — CLOPIDOGREL BISULFATE 75 MILLIGRAM(S): 75 TABLET, FILM COATED ORAL at 13:09

## 2024-05-17 RX ADMIN — GABAPENTIN 100 MILLIGRAM(S): 400 CAPSULE ORAL at 05:48

## 2024-05-17 RX ADMIN — DULOXETINE HYDROCHLORIDE 60 MILLIGRAM(S): 30 CAPSULE, DELAYED RELEASE ORAL at 17:57

## 2024-05-17 RX ADMIN — PANTOPRAZOLE SODIUM 40 MILLIGRAM(S): 20 TABLET, DELAYED RELEASE ORAL at 05:48

## 2024-05-17 RX ADMIN — Medication 6 MILLIGRAM(S): at 21:15

## 2024-05-17 RX ADMIN — LITHIUM CARBONATE 150 MILLIGRAM(S): 300 TABLET, EXTENDED RELEASE ORAL at 21:15

## 2024-05-17 RX ADMIN — LOSARTAN POTASSIUM 50 MILLIGRAM(S): 100 TABLET, FILM COATED ORAL at 05:48

## 2024-05-17 RX ADMIN — GABAPENTIN 100 MILLIGRAM(S): 400 CAPSULE ORAL at 21:15

## 2024-05-17 RX ADMIN — Medication 1 MILLIGRAM(S): at 05:48

## 2024-05-17 RX ADMIN — QUETIAPINE FUMARATE 150 MILLIGRAM(S): 200 TABLET, FILM COATED ORAL at 17:57

## 2024-05-17 RX ADMIN — DULOXETINE HYDROCHLORIDE 60 MILLIGRAM(S): 30 CAPSULE, DELAYED RELEASE ORAL at 05:48

## 2024-05-17 RX ADMIN — ENOXAPARIN SODIUM 40 MILLIGRAM(S): 100 INJECTION SUBCUTANEOUS at 13:40

## 2024-05-17 RX ADMIN — Medication 1 MILLIGRAM(S): at 17:57

## 2024-05-17 RX ADMIN — CEFTRIAXONE 1000 MILLIGRAM(S): 500 INJECTION, POWDER, FOR SOLUTION INTRAMUSCULAR; INTRAVENOUS at 13:09

## 2024-05-17 RX ADMIN — GABAPENTIN 100 MILLIGRAM(S): 400 CAPSULE ORAL at 13:09

## 2024-05-17 NOTE — BH CONSULTATION LIAISON PROGRESS NOTE - NSBHASSESSMENTFT_PSY_ALL_CORE
Patient is a 74 year old, female; domiciled with her adult son and ex-;  (2003); noncaregiver; unemployed on SSI; past psychiatric history of bipolar depression and anxiety; [facility]; per chart review- 2 reported prior psychiatric hospitalizations at [facility] over 10 years ago; no known suicide attempts; no known history of violence or arrests; no active substance abuse or known history of complicated withdrawal; PMH of arthritis, fibromyalgia; brought in by EMS; called by ex- (with whom she lives); presenting with anxiety and reports of vomiting x several days.    5/16: Patient reports ongoing heightened anxiety, however, not observable during the encounter today. Patient did have difficulty speaking with gulping noises, however, physically she was not observed to be restless or hyperactive. Will resume patient's home psychiatric medications and monitor her anxiety. Will attempt to optimize the patient's sleep as it may be contributing to her anxiety. Will attempt a MOCA during tomorrow's assessment as patient exhibits some confusion and per collateral, her mental status has been worsening. Per collateral from patient's ex-, patient has been declining clinically with decreased PO intake, poor ADLs (not showering for 3 weeks), isolative, impulsive spending- will continue to evaluate for the need for inpatient psychiatric hospitalization. Ex- recommended to contact [facility] for further collateral. Upon calling them, they notified this writer that they are terminating the patient's outpatient services today.     5/17: Patient was sedated on examination, however, alert enough to engage in a conversation. Patient with appropriate sleep after home psychiatric medications were restarted. Patient with ongoing symptoms of anxiety and depression, unchanged from prior examinations. Due to the severity of her symptoms and inability to care for self, will pursue an inpatient psychiatric hospitalization once medically cleared. Will continue to monitor sedation as an iatrogenic cause, however, her symptoms of depression are likely contributing.     Labs: Li level 0.14 (5/14)    Recommendations:  - CHANGE Seroquel to Extended Release Seroquel 300 mg QHS     - Rule out organic causes of anxiety (i.e. PE, stroke, thyroid disease)  - Continue Lithium 150 mg QHS   - Continue Cymbalta 60 mg BID  - Continue Melatonin to 6 mg QHS   - Continue PRN Trazodone 50 mg 1-2 hours after melatonin administration  - Continue Klonopin 1 mg BID  - Continue Atarax 25 mg TID PRN   - Continue Gabapentin 100 mg TID   - Continue Melatonin 3 mg QHS     - Patient pending inpatient psychiatric admission once medically cleared  - Maintain delirium precautions   - Avoid anticholinergic agents, benzos, opioid as they can further perpetuate confusion   - Frequent re orientation, Hydration, try to avoid restraints and if possible, mobilize patient and PT involvement   Patient is a 74 year old, female; domiciled with her adult son and ex-;  (2003); noncaregiver; unemployed on SSI; past psychiatric history of bipolar depression and anxiety; [facility]; per chart review- 2 reported prior psychiatric hospitalizations at [facility] over 10 years ago; no known suicide attempts; no known history of violence or arrests; no active substance abuse or known history of complicated withdrawal; PMH of arthritis, fibromyalgia; brought in by EMS; called by ex- (with whom she lives); presenting with anxiety and reports of vomiting x several days.    5/16: Patient reports ongoing heightened anxiety, however, not observable during the encounter today. Patient did have difficulty speaking with gulping noises, however, physically she was not observed to be restless or hyperactive. Will resume patient's home psychiatric medications and monitor her anxiety. Will attempt to optimize the patient's sleep as it may be contributing to her anxiety. Will attempt a MOCA during tomorrow's assessment as patient exhibits some confusion and per collateral, her mental status has been worsening. Per collateral from patient's ex-, patient has been declining clinically with decreased PO intake, poor ADLs (not showering for 3 weeks), isolative, impulsive spending- will continue to evaluate for the need for inpatient psychiatric hospitalization. Ex- recommended to contact [facility] for further collateral. Upon calling them, they notified this writer that they are terminating the patient's outpatient services today.     5/17: Patient was sedated on examination, however, alert enough to engage in a conversation. Patient with appropriate sleep after home psychiatric medications were restarted. Patient with ongoing symptoms of anxiety and depression, unchanged from prior examinations. Due to the severity of her symptoms and inability to care for self, will pursue an inpatient psychiatric hospitalization once medically cleared. Will continue to monitor sedation as an iatrogenic cause, however, her symptoms of depression are likely contributing.     Labs: Li level 0.14 (5/14)    Recommendations:  - CHANGE Seroquel to Extended Release Seroquel 300 mg QHS   - DECREASE Klonopin to 0.5 mg AM dose/1 mg PM dose  - Constant observation 1:1    - Rule out organic causes of anxiety (i.e. PE, stroke, thyroid disease)  - Continue Lithium 150 mg QHS   - Continue Cymbalta 60 mg BID  - Continue Melatonin to 6 mg QHS   - Continue PRN Trazodone 50 mg 1-2 hours after melatonin administration  - Continue Atarax 25 mg TID PRN   - Continue Gabapentin 100 mg TID   - Continue Melatonin 3 mg QHS     - Patient pending inpatient psychiatric admission once medically cleared  - Maintain delirium precautions   - Avoid anticholinergic agents, benzos, opioid as they can further perpetuate confusion   - Frequent re orientation, Hydration, try to avoid restraints and if possible, mobilize patient and PT involvement

## 2024-05-17 NOTE — PHYSICAL THERAPY INITIAL EVALUATION ADULT - ADDITIONAL COMMENTS
Pt AxOx3 states she lives at home with ex  and son with 13 steps upstairs. Pt was independent PTA without DME and owns RW.

## 2024-05-17 NOTE — PROGRESS NOTE ADULT - SUBJECTIVE AND OBJECTIVE BOX
Patient is a 74y old  Female who presents with a chief complaint of AMS (16 May 2024 12:28)      INTERVAL HPI/OVERNIGHT EVENTS: No acute significant events.     MEDICATIONS  (STANDING):  cefTRIAXone Injectable. 1000 milliGRAM(s) IV Push every 24 hours  cefTRIAXone Injectable.      clonazePAM  Tablet 1 milliGRAM(s) Oral two times a day  clopidogrel Tablet 75 milliGRAM(s) Oral daily  DULoxetine 60 milliGRAM(s) Oral two times a day  enoxaparin Injectable 40 milliGRAM(s) SubCutaneous every 24 hours  gabapentin 100 milliGRAM(s) Oral three times a day  lactated ringers. 1000 milliLiter(s) (85 mL/Hr) IV Continuous <Continuous>  lithium 150 milliGRAM(s) Oral at bedtime  losartan 50 milliGRAM(s) Oral daily  melatonin 6 milliGRAM(s) Oral at bedtime  pantoprazole    Tablet 40 milliGRAM(s) Oral before breakfast  QUEtiapine 300 milliGRAM(s) Oral at bedtime    MEDICATIONS  (PRN):  acetaminophen     Tablet .. 650 milliGRAM(s) Oral every 6 hours PRN Temp greater or equal to 38C (100.4F), Mild Pain (1 - 3)  albuterol    90 MICROgram(s) HFA Inhaler 1 Puff(s) Inhalation two times a day PRN for shortness of breath and/or wheezing  aluminum hydroxide/magnesium hydroxide/simethicone Suspension 30 milliLiter(s) Oral every 4 hours PRN Dyspepsia  hydrOXYzine hydrochloride 25 milliGRAM(s) Oral three times a day PRN Anxiety  melatonin 3 milliGRAM(s) Oral at bedtime PRN Insomnia  traZODone 50 milliGRAM(s) Oral at bedtime PRN Insomnia      Allergies    Cipro (Stomach Upset; Vomiting)  Demerol HCl (Stomach Upset; Vomiting)    Intolerances        REVIEW OF SYSTEMS:  CONSTITUTIONAL: No fever, weight loss, or fatigue  RESPIRATORY: No cough, wheezing, chills or hemoptysis; No shortness of breath  CARDIOVASCULAR: No chest pain, palpitations, dizziness, or leg swelling  GASTROINTESTINAL: No abdominal or epigastric pain. No nausea, vomiting, or hematemesis; No diarrhea or constipation. No melena or hematochezia.  NEUROLOGICAL: No headaches, memory loss, loss of strength, numbness, or tremors  MUSCULOSKELETAL: No joint pain or swelling; No muscle, back, or extremity pain      Vital Signs Last 24 Hrs  T(C): 36.4 (17 May 2024 05:22), Max: 36.4 (16 May 2024 16:43)  T(F): 97.6 (17 May 2024 05:22), Max: 97.6 (16 May 2024 16:43)  HR: 83 (17 May 2024 05:22) (72 - 84)  BP: 115/66 (17 May 2024 05:22) (115/66 - 150/80)  BP(mean): --  RR: 18 (17 May 2024 05:22) (18 - 18)  SpO2: 93% (17 May 2024 05:22) (92% - 93%)    Parameters below as of 17 May 2024 05:22  Patient On (Oxygen Delivery Method): room air        PHYSICAL EXAM:  GENERAL: NAD,   HEAD:  Atraumatic, Normocephalic  EYES: EOMI, PERRLA, conjunctiva and sclera clear  NECK: Supple, No JVD  NERVOUS SYSTEM:  Alert & Oriented X3, No gross focal deficits  CHEST/LUNG: Clear to auscultation bilaterally; No rales, rhonchi, wheezing, or rubs  HEART: Regular rate and rhythm; No murmurs, rubs, or gallops  ABDOMEN: Soft, Nontender, Nondistended; Bowel sounds present  EXTREMITIES:  No clubbing, cyanosis, or edema  SKIN: No rashes or lesions    LABS:                        12.4   7.24  )-----------( 216      ( 16 May 2024 06:55 )             38.3     05-16    138  |  105  |  10.4  ----------------------------<  102<H>  3.6   |  20.0<L>  |  0.79    Ca    8.7      16 May 2024 06:55  Phos  3.0     05-16  Mg     2.0     05-16    TPro  6.0<L>  /  Alb  3.3  /  TBili  0.5  /  DBili  x   /  AST  16  /  ALT  18  /  AlkPhos  103  05-16      Urinalysis Basic - ( 16 May 2024 06:55 )    Color: x / Appearance: x / SG: x / pH: x  Gluc: 102 mg/dL / Ketone: x  / Bili: x / Urobili: x   Blood: x / Protein: x / Nitrite: x   Leuk Esterase: x / RBC: x / WBC x   Sq Epi: x / Non Sq Epi: x / Bacteria: x      CAPILLARY BLOOD GLUCOSE

## 2024-05-17 NOTE — PROGRESS NOTE ADULT - ASSESSMENT
74F w/ PMH of panic attacks, HTN, depression, anxiety, fibromyalgia, breast CA, HLD presented for N/NBNB emisis for the past few days.  Pt is a poor historian at this time and information obtained from chart review.  Pt however does report dysuria and frequent urination but denies diarrhea, fevers, chills.  No reports of vomiting while in ED and tolerating diet today.  Pts baseline mentation is reported to be AAOx4 but currently x3 to self, month and place but not year or birthday or president.   Pts  Ria Simin reported to  that pt has minor memory deficits but no official diagnosis of dementia.  She also reports pt has been declining to get out of bed, refusing to eat or drink x 1 week and c/o nausea and 1-2 days of vomiting and has not been taking her meds.        > hx Bipolar depression and anxiety  - Anxiety/depression w/ behavioral disturbances   - Restarted psych meds last night  - BH following     > Acute metabolic encephalopathy   - c/o dysuria and has +pyuria on UA although only 6WBC but +bacteria   - Ucxs NGTD  - Holding Myrbetriq and oxybutynin as both can cause delirium in elderly pts   - CTH negative for acute pathologies     > HTN   - c/w ARB       VTE ppx: lovenox   Dispo: likely inpatient psych per  74F w/ PMH of panic attacks, HTN, depression, anxiety, fibromyalgia, breast CA, HLD presented for N/NBNB emisis for the past few days.  Pt is a poor historian at this time and information obtained from chart review.  Pt however does report dysuria and frequent urination but denies diarrhea, fevers, chills.  No reports of vomiting while in ED and tolerating diet today.  Pts baseline mentation is reported to be AAOx4 but currently x3 to self, month and place but not year or birthday or president.   Pts  Ria Duval reported to  that pt has minor memory deficits but no official diagnosis of dementia.  She also reports pt has been declining to get out of bed, refusing to eat or drink x 1 week and c/o nausea and 1-2 days of vomiting and has not been taking her meds.        > hx Bipolar depression and anxiety  - Anxiety/depression w/ behavioral disturbances   - Restarted psych meds last night  - BH following   - c/w Cymbalta 60mg BID, lithium 150mg qhs, klonopin 1mg BID, gabapentin 100mg TID  - per  rec seroquel 300mg XR not on formulary; discussed alternative with pharmacy recommended 150mg BID  - atarax 25mg TID PRN    > Acute metabolic encephalopathy   - c/o dysuria and has +pyuria on UA although only 6WBC but +bacteria   - Ucxs NGTD  - Holding Myrbetriq and oxybutynin as both can cause delirium in elderly pts   - CTH negative for acute pathologies     > HTN   - c/w ARB       VTE ppx: lovenox   Dispo: likely inpatient psych per

## 2024-05-17 NOTE — BH CONSULTATION LIAISON PROGRESS NOTE - ATTENDING COMMENTS
Mrs Browne is a 74 year old woman with a history of Bipolar depression and Anxiety who was admitted to the medical floor for the management Acute encephalopathy suspect metabolic from UTI and dehydration. Psychiatry consult was called the management of worsening anxiety .   Patient seems to have had significant insomnia overnight , likely in the context of not receiving her psychotropic medications. However other causes of anxiety can not be ruled out such as medical causes such as a pulmonary embolism.   Patient appears lethargic , likely in the context of her insomnia and will benefit from restarting her home medications  as soon as possible if okay be the medical team.   In addition, patient seems to have symptoms suggestive of an acute decompensation of her psychiatric illness and has been having significant difficulty taking care of herself.   At this time, patient is considered a danger to herself and needs inpatient psychiatric hospitalization for medication management and symptoms stabilization.  
Case discussed with ROXY Conley and Dr Thorpe . I agree with their assessment and recommendation

## 2024-05-17 NOTE — PHYSICAL THERAPY INITIAL EVALUATION ADULT - PERTINENT HX OF CURRENT PROBLEM, REHAB EVAL
74F w/ PMH of panic attacks, HTN, depression, anxiety, fibromyalgia, breast CA, HLD presented for N/NBNB emisis for the past few days.  Pt is a poor historian at this time and information obtained from chart review.  Pt however does report dysuria and frequent urination but denies diarrhea, fevers, chills.  No reports of vomiting while in ED and tolerating diet today.  Pts baseline mentation is reported to be AAOx4 but currently x3 to self, month and place but not year or birthday or president.   Pts  Ria Duval reported to  that pt has minor memory deficits but no official diagnosis of dementia.

## 2024-05-17 NOTE — BH CONSULTATION LIAISON PROGRESS NOTE - NSBHFUPINTERVALHXFT_PSY_A_CORE
Patient is a 74 year old, female; domiciled with her adult son and ex-;  (2003); noncaregiver; unemployed on SSI; past psychiatric history of bipolar depression and anxiety; currently under the care of [facility]; per chart review- 2 reported prior psychiatric hospitalizations at [facility] over 10 years ago; no known suicide attempts; no known history of violence or arrests; no active substance abuse or known history of complicated withdrawal; PMH of arthritis, fibromyalgia; brought in by EMS; called by ex- (with whom she lives); presenting with anxiety and reports of vomiting x several days.    Patient was seen and examined by the psychiatric treatment team today. On encounter, patient was awake in bed, however, somnolent. She reports adequate sleep last night. Continues to endorses a similar level of anxiety but cannot elaborate. On observation, patient was comfortable in bed with occasional gulping noises. Patient also reports a depressed mood for "a while" and endorses lack of energy, low motivation, low appetite, loss of interest, isolative behaviors. She also endorses spending a lot of time in bed at home- as mentioned by her ex-. Reports her  leaving serves as a trigger, however, they were  in 2020.

## 2024-05-18 LAB
ANION GAP SERPL CALC-SCNC: 14 MMOL/L — SIGNIFICANT CHANGE UP (ref 5–17)
BUN SERPL-MCNC: 15.3 MG/DL — SIGNIFICANT CHANGE UP (ref 8–20)
CALCIUM SERPL-MCNC: 9 MG/DL — SIGNIFICANT CHANGE UP (ref 8.4–10.5)
CHLORIDE SERPL-SCNC: 104 MMOL/L — SIGNIFICANT CHANGE UP (ref 96–108)
CO2 SERPL-SCNC: 20 MMOL/L — LOW (ref 22–29)
CREAT SERPL-MCNC: 0.78 MG/DL — SIGNIFICANT CHANGE UP (ref 0.5–1.3)
EGFR: 81 ML/MIN/1.73M2 — SIGNIFICANT CHANGE UP
GLUCOSE SERPL-MCNC: 96 MG/DL — SIGNIFICANT CHANGE UP (ref 70–99)
HCT VFR BLD CALC: 37.9 % — SIGNIFICANT CHANGE UP (ref 34.5–45)
HGB BLD-MCNC: 12.3 G/DL — SIGNIFICANT CHANGE UP (ref 11.5–15.5)
MCHC RBC-ENTMCNC: 27.5 PG — SIGNIFICANT CHANGE UP (ref 27–34)
MCHC RBC-ENTMCNC: 32.5 GM/DL — SIGNIFICANT CHANGE UP (ref 32–36)
MCV RBC AUTO: 84.8 FL — SIGNIFICANT CHANGE UP (ref 80–100)
PLATELET # BLD AUTO: 233 K/UL — SIGNIFICANT CHANGE UP (ref 150–400)
POTASSIUM SERPL-MCNC: 3.9 MMOL/L — SIGNIFICANT CHANGE UP (ref 3.5–5.3)
POTASSIUM SERPL-SCNC: 3.9 MMOL/L — SIGNIFICANT CHANGE UP (ref 3.5–5.3)
RBC # BLD: 4.47 M/UL — SIGNIFICANT CHANGE UP (ref 3.8–5.2)
RBC # FLD: 13.6 % — SIGNIFICANT CHANGE UP (ref 10.3–14.5)
SODIUM SERPL-SCNC: 138 MMOL/L — SIGNIFICANT CHANGE UP (ref 135–145)
WBC # BLD: 7.25 K/UL — SIGNIFICANT CHANGE UP (ref 3.8–10.5)
WBC # FLD AUTO: 7.25 K/UL — SIGNIFICANT CHANGE UP (ref 3.8–10.5)

## 2024-05-18 PROCEDURE — 99232 SBSQ HOSP IP/OBS MODERATE 35: CPT

## 2024-05-18 RX ADMIN — LITHIUM CARBONATE 150 MILLIGRAM(S): 300 TABLET, EXTENDED RELEASE ORAL at 21:40

## 2024-05-18 RX ADMIN — CEFTRIAXONE 1000 MILLIGRAM(S): 500 INJECTION, POWDER, FOR SOLUTION INTRAMUSCULAR; INTRAVENOUS at 13:49

## 2024-05-18 RX ADMIN — CLOPIDOGREL BISULFATE 75 MILLIGRAM(S): 75 TABLET, FILM COATED ORAL at 12:02

## 2024-05-18 RX ADMIN — DULOXETINE HYDROCHLORIDE 60 MILLIGRAM(S): 30 CAPSULE, DELAYED RELEASE ORAL at 17:36

## 2024-05-18 RX ADMIN — ENOXAPARIN SODIUM 40 MILLIGRAM(S): 100 INJECTION SUBCUTANEOUS at 13:49

## 2024-05-18 RX ADMIN — GABAPENTIN 100 MILLIGRAM(S): 400 CAPSULE ORAL at 05:22

## 2024-05-18 RX ADMIN — Medication 1 MILLIGRAM(S): at 05:22

## 2024-05-18 RX ADMIN — Medication 1 MILLIGRAM(S): at 17:36

## 2024-05-18 RX ADMIN — QUETIAPINE FUMARATE 150 MILLIGRAM(S): 200 TABLET, FILM COATED ORAL at 05:22

## 2024-05-18 RX ADMIN — GABAPENTIN 100 MILLIGRAM(S): 400 CAPSULE ORAL at 13:49

## 2024-05-18 RX ADMIN — QUETIAPINE FUMARATE 150 MILLIGRAM(S): 200 TABLET, FILM COATED ORAL at 17:36

## 2024-05-18 RX ADMIN — PANTOPRAZOLE SODIUM 40 MILLIGRAM(S): 20 TABLET, DELAYED RELEASE ORAL at 05:22

## 2024-05-18 RX ADMIN — DULOXETINE HYDROCHLORIDE 60 MILLIGRAM(S): 30 CAPSULE, DELAYED RELEASE ORAL at 05:22

## 2024-05-18 RX ADMIN — LOSARTAN POTASSIUM 50 MILLIGRAM(S): 100 TABLET, FILM COATED ORAL at 05:22

## 2024-05-18 RX ADMIN — GABAPENTIN 100 MILLIGRAM(S): 400 CAPSULE ORAL at 21:40

## 2024-05-18 RX ADMIN — Medication 6 MILLIGRAM(S): at 21:40

## 2024-05-18 NOTE — PROGRESS NOTE ADULT - SUBJECTIVE AND OBJECTIVE BOX
Patient is a 74y old  Female who presents with a chief complaint of AMS (17 May 2024 06:53)      INTERVAL HPI/OVERNIGHT EVENTS: No acute significant events.     MEDICATIONS  (STANDING):  cefTRIAXone Injectable.      cefTRIAXone Injectable. 1000 milliGRAM(s) IV Push every 24 hours  clonazePAM  Tablet 1 milliGRAM(s) Oral two times a day  clopidogrel Tablet 75 milliGRAM(s) Oral daily  DULoxetine 60 milliGRAM(s) Oral two times a day  enoxaparin Injectable 40 milliGRAM(s) SubCutaneous every 24 hours  gabapentin 100 milliGRAM(s) Oral three times a day  lactated ringers. 1000 milliLiter(s) (85 mL/Hr) IV Continuous <Continuous>  lithium 150 milliGRAM(s) Oral at bedtime  losartan 50 milliGRAM(s) Oral daily  melatonin 6 milliGRAM(s) Oral at bedtime  pantoprazole    Tablet 40 milliGRAM(s) Oral before breakfast  QUEtiapine 150 milliGRAM(s) Oral two times a day    MEDICATIONS  (PRN):  acetaminophen     Tablet .. 650 milliGRAM(s) Oral every 6 hours PRN Temp greater or equal to 38C (100.4F), Mild Pain (1 - 3)  albuterol    90 MICROgram(s) HFA Inhaler 1 Puff(s) Inhalation two times a day PRN for shortness of breath and/or wheezing  aluminum hydroxide/magnesium hydroxide/simethicone Suspension 30 milliLiter(s) Oral every 4 hours PRN Dyspepsia  hydrOXYzine hydrochloride 25 milliGRAM(s) Oral three times a day PRN Anxiety  melatonin 3 milliGRAM(s) Oral at bedtime PRN Insomnia  traZODone 50 milliGRAM(s) Oral at bedtime PRN Insomnia      Allergies    Cipro (Stomach Upset; Vomiting)  Demerol HCl (Stomach Upset; Vomiting)    Intolerances        REVIEW OF SYSTEMS:  CONSTITUTIONAL: No fever, weight loss, or fatigue  RESPIRATORY: No cough, wheezing, chills or hemoptysis; No shortness of breath  CARDIOVASCULAR: No chest pain, palpitations, dizziness, or leg swelling  GASTROINTESTINAL: No abdominal or epigastric pain. No nausea, vomiting, or hematemesis; No diarrhea or constipation. No melena or hematochezia.  NEUROLOGICAL: No headaches, memory loss, loss of strength, numbness, or tremors  MUSCULOSKELETAL: No joint pain or swelling; No muscle, back, or extremity pain      Vital Signs Last 24 Hrs  T(C): 36.6 (18 May 2024 03:57), Max: 36.6 (18 May 2024 03:57)  T(F): 97.9 (18 May 2024 03:57), Max: 97.9 (18 May 2024 03:57)  HR: 81 (18 May 2024 03:57) (80 - 85)  BP: 102/61 (18 May 2024 03:57) (102/61 - 121/71)  BP(mean): --  RR: 18 (18 May 2024 03:57) (18 - 18)  SpO2: 92% (18 May 2024 03:57) (91% - 93%)    Parameters below as of 18 May 2024 03:57  Patient On (Oxygen Delivery Method): room air        PHYSICAL EXAM:  GENERAL: NAD,   HEAD:  Atraumatic, Normocephalic  EYES: EOMI, PERRLA, conjunctiva and sclera clear  NECK: Supple, No JVD  NERVOUS SYSTEM:  Alert & Oriented X3, No gross focal deficits  CHEST/LUNG: Clear to auscultation bilaterally; No rales, rhonchi, wheezing, or rubs  HEART: Regular rate and rhythm; No murmurs, rubs, or gallops  ABDOMEN: Soft, Nontender, Nondistended; Bowel sounds present  EXTREMITIES:  No clubbing, cyanosis, or edema  SKIN: No rashes or lesions    LABS:                        12.3   7.25  )-----------( 233      ( 18 May 2024 06:04 )             37.9     05-18    138  |  104  |  15.3  ----------------------------<  96  3.9   |  20.0<L>  |  0.78    Ca    9.0      18 May 2024 06:04        Urinalysis Basic - ( 18 May 2024 06:04 )    Color: x / Appearance: x / SG: x / pH: x  Gluc: 96 mg/dL / Ketone: x  / Bili: x / Urobili: x   Blood: x / Protein: x / Nitrite: x   Leuk Esterase: x / RBC: x / WBC x   Sq Epi: x / Non Sq Epi: x / Bacteria: x      CAPILLARY BLOOD GLUCOSE

## 2024-05-18 NOTE — PROGRESS NOTE ADULT - ASSESSMENT
74F w/ PMH of panic attacks, HTN, depression, anxiety, fibromyalgia, breast CA, HLD presented for N/NBNB emisis for the past few days.  Pt is a poor historian at this time and information obtained from chart review.  Pt however does report dysuria and frequent urination but denies diarrhea, fevers, chills.  No reports of vomiting while in ED and tolerating diet today.  Pts baseline mentation is reported to be AAOx4 but currently x3 to self, month and place but not year or birthday or president.   Pts  Ria Duval reported to  that pt has minor memory deficits but no official diagnosis of dementia.  She also reports pt has been declining to get out of bed, refusing to eat or drink x 1 week and c/o nausea and 1-2 days of vomiting and has not been taking her meds.        > hx Bipolar depression and anxiety  - Anxiety/depression w/ behavioral disturbances   - Restarted psych meds last night  - BH following   - c/w Cymbalta 60mg BID, lithium 150mg qhs, klonopin 1mg BID, gabapentin 100mg TID  - per  rec seroquel 300mg XR not on formulary; discussed alternative with pharmacy recommended 150mg BID  - atarax 25mg TID PRN    > Acute metabolic encephalopathy 2/2 UTI  - c/o dysuria and has +pyuria on UA although only 6WBC but +bacteria   - Ucxs Gram negative rods  - on empiric ceftriaxone   - pending sensitivities  - Holding Myrbetriq and oxybutynin as both can cause delirium in elderly pts   - CTH negative for acute pathologies     > HTN   - c/w ARB       VTE ppx: lovenox   Dispo: inpatient psych per  pending resolution of UTI

## 2024-05-18 NOTE — BH CONSULTATION LIAISON PROGRESS NOTE - NSBHASSESSMENTFT_PSY_ALL_CORE
Reports poor sleep due to room mate having TV too loud.  Reports anxiety is a "little better" and rates it a 7/10, 10 being worst.  Pt c/o depression and is tearful claiming she misses her ex- with whom she lives.   Mood is described as a 8/10, 10 being worst.  Denies SI/HI/AH/VH.  Oriented to Month, year but not date.  Oriented to place, name of hospital and president.  Continually making grunting sounds.  Unable to provide names of psych meds.  No agitation or acute distress.  Poor appetite and ate nothing on breakfast tray.  States she wants to go home to see her exhusband.  Admits her anxiety is related to  and substance use which is better than in past.  Admits to decline in functioning from prior ability.  Denies interest in psych admission.    Recommendations.  Encourage Trazodone or sleep.  Avoid Atarax.  Continue with current meds.  Suggest Duloxetine dose be lowered to 60 mg (max for age) unless has been on this dose and tolerated in past.

## 2024-05-18 NOTE — BH CONSULTATION LIAISON PROGRESS NOTE - NSBHFUPINTERVALHXFT_PSY_A_CORE
Seen on follow up.  Reports poor sleep due to room mate having TV too loud.  Reports anxiety is a "little better" and rates it a 7/10, 10 being worst.  Pt c/o depression and is tearful claiming she misses her ex- with whom she lives.   Mood is described as a 8/10, 10 being worst.  Denies SI/HI/AH/VH.  Oriented to Month, year but not date.  Oriented to place, name of hospital and president.  Continually making grunting sounds.  Unable to provide names of psych meds.  No agitation or acute distress.  Poor appetite and ate nothing on breakfast tray.  States she wants to go home to see her exhusband.  Admits her anxiety is related to  and substance use which is better than in past.  Admits to decline in functioning from prior ability.  Denies interest in psych admission.

## 2024-05-19 LAB
-  AMOXICILLIN/CLAVULANIC ACID: SIGNIFICANT CHANGE UP
-  AMPICILLIN/SULBACTAM: SIGNIFICANT CHANGE UP
-  AMPICILLIN: SIGNIFICANT CHANGE UP
-  AZTREONAM: SIGNIFICANT CHANGE UP
-  CEFAZOLIN: SIGNIFICANT CHANGE UP
-  CEFEPIME: SIGNIFICANT CHANGE UP
-  CEFOXITIN: SIGNIFICANT CHANGE UP
-  CEFTRIAXONE: SIGNIFICANT CHANGE UP
-  CEFUROXIME: SIGNIFICANT CHANGE UP
-  CIPROFLOXACIN: SIGNIFICANT CHANGE UP
-  ERTAPENEM: SIGNIFICANT CHANGE UP
-  GENTAMICIN: SIGNIFICANT CHANGE UP
-  LEVOFLOXACIN: SIGNIFICANT CHANGE UP
-  MEROPENEM: SIGNIFICANT CHANGE UP
-  NITROFURANTOIN: SIGNIFICANT CHANGE UP
-  PIPERACILLIN/TAZOBACTAM: SIGNIFICANT CHANGE UP
-  TOBRAMYCIN: SIGNIFICANT CHANGE UP
-  TRIMETHOPRIM/SULFAMETHOXAZOLE: SIGNIFICANT CHANGE UP
CULTURE RESULTS: ABNORMAL
HCT VFR BLD CALC: 35.8 % — SIGNIFICANT CHANGE UP (ref 34.5–45)
HGB BLD-MCNC: 11.7 G/DL — SIGNIFICANT CHANGE UP (ref 11.5–15.5)
MCHC RBC-ENTMCNC: 28 PG — SIGNIFICANT CHANGE UP (ref 27–34)
MCHC RBC-ENTMCNC: 32.7 GM/DL — SIGNIFICANT CHANGE UP (ref 32–36)
MCV RBC AUTO: 85.6 FL — SIGNIFICANT CHANGE UP (ref 80–100)
METHOD TYPE: SIGNIFICANT CHANGE UP
ORGANISM # SPEC MICROSCOPIC CNT: ABNORMAL
ORGANISM # SPEC MICROSCOPIC CNT: SIGNIFICANT CHANGE UP
PLATELET # BLD AUTO: 237 K/UL — SIGNIFICANT CHANGE UP (ref 150–400)
RBC # BLD: 4.18 M/UL — SIGNIFICANT CHANGE UP (ref 3.8–5.2)
RBC # FLD: 13.6 % — SIGNIFICANT CHANGE UP (ref 10.3–14.5)
SPECIMEN SOURCE: SIGNIFICANT CHANGE UP
WBC # BLD: 6.51 K/UL — SIGNIFICANT CHANGE UP (ref 3.8–10.5)
WBC # FLD AUTO: 6.51 K/UL — SIGNIFICANT CHANGE UP (ref 3.8–10.5)

## 2024-05-19 PROCEDURE — 99232 SBSQ HOSP IP/OBS MODERATE 35: CPT

## 2024-05-19 RX ADMIN — QUETIAPINE FUMARATE 150 MILLIGRAM(S): 200 TABLET, FILM COATED ORAL at 05:40

## 2024-05-19 RX ADMIN — PANTOPRAZOLE SODIUM 40 MILLIGRAM(S): 20 TABLET, DELAYED RELEASE ORAL at 05:40

## 2024-05-19 RX ADMIN — ENOXAPARIN SODIUM 40 MILLIGRAM(S): 100 INJECTION SUBCUTANEOUS at 14:11

## 2024-05-19 RX ADMIN — DULOXETINE HYDROCHLORIDE 60 MILLIGRAM(S): 30 CAPSULE, DELAYED RELEASE ORAL at 05:40

## 2024-05-19 RX ADMIN — GABAPENTIN 100 MILLIGRAM(S): 400 CAPSULE ORAL at 21:11

## 2024-05-19 RX ADMIN — Medication 6 MILLIGRAM(S): at 21:12

## 2024-05-19 RX ADMIN — GABAPENTIN 100 MILLIGRAM(S): 400 CAPSULE ORAL at 14:11

## 2024-05-19 RX ADMIN — LOSARTAN POTASSIUM 50 MILLIGRAM(S): 100 TABLET, FILM COATED ORAL at 05:40

## 2024-05-19 RX ADMIN — Medication 1 MILLIGRAM(S): at 05:40

## 2024-05-19 RX ADMIN — GABAPENTIN 100 MILLIGRAM(S): 400 CAPSULE ORAL at 05:40

## 2024-05-19 RX ADMIN — LITHIUM CARBONATE 150 MILLIGRAM(S): 300 TABLET, EXTENDED RELEASE ORAL at 21:11

## 2024-05-19 RX ADMIN — QUETIAPINE FUMARATE 150 MILLIGRAM(S): 200 TABLET, FILM COATED ORAL at 18:28

## 2024-05-19 RX ADMIN — Medication 1 MILLIGRAM(S): at 18:28

## 2024-05-19 RX ADMIN — CLOPIDOGREL BISULFATE 75 MILLIGRAM(S): 75 TABLET, FILM COATED ORAL at 14:12

## 2024-05-19 RX ADMIN — DULOXETINE HYDROCHLORIDE 60 MILLIGRAM(S): 30 CAPSULE, DELAYED RELEASE ORAL at 18:28

## 2024-05-19 NOTE — PROGRESS NOTE ADULT - ASSESSMENT
74F w/ PMH of panic attacks, Anxiety, Bipolar, HTN, depression, anxiety, fibromyalgia, breast CA, HLD presented for N/NBNB emisis for the past few days.  Pt is a poor historian at this time and information obtained from chart review.  Pt however does report dysuria and frequent urination but denies diarrhea, fevers, chills. Pts baseline mentation is reported to be AAOx4  Pts  Ria Duval reported to  that pt has minor memory deficits but no official diagnosis of dementia.  She also reports pt has been declining to get out of bed, refusing to eat or drink x 1 week and c/o nausea and 1-2 days of vomiting and has not been taking her meds. Admitted with bipolar disorder and UTI       hx Bipolar depression and anxiety  Not taking meds   - Anxiety/depression w/ behavioral disturbances   - BH following   - c/w Cymbalta 60mg BID, lithium 150mg qhs, klonopin 1mg BID, gabapentin 100mg TID  - per BH rec seroquel 300mg XR not on formulary; discussed alternative with pharmacy recommended 150mg BID  - atarax 25mg TID PRN    Acute metabolic encephalopathy 2/2 UTI  - c/o dysuria and has +pyuria on UA although only 6WBC but +bacteria   - Ucxs Gram negative rods  - on empiric ceftriaxone   - pending sensitivities  - Holding Myrbetriq and oxybutynin as both can cause delirium in elderly pts   - CTH negative for acute pathologies     HTN   - c/w ARB       VTE ppx: lovenox   Dispo: inpatient psych per  pending resolution of UTI

## 2024-05-19 NOTE — PROGRESS NOTE ADULT - SUBJECTIVE AND OBJECTIVE BOX
[facility] Division of Hospital Medicine    SUBJECTIVE / OVERNIGHT EVENTS:  No events    Patient denies chest pain, SOB, abd pain, N/V, fever, chills, dysuria or any other complaints. All remainder ROS negative.     MEDICATIONS  (STANDING):  clonazePAM  Tablet 1 milliGRAM(s) Oral two times a day  clopidogrel Tablet 75 milliGRAM(s) Oral daily  DULoxetine 60 milliGRAM(s) Oral two times a day  enoxaparin Injectable 40 milliGRAM(s) SubCutaneous every 24 hours  gabapentin 100 milliGRAM(s) Oral three times a day  lactated ringers. 1000 milliLiter(s) (85 mL/Hr) IV Continuous <Continuous>  lithium 150 milliGRAM(s) Oral at bedtime  losartan 50 milliGRAM(s) Oral daily  melatonin 6 milliGRAM(s) Oral at bedtime  pantoprazole    Tablet 40 milliGRAM(s) Oral before breakfast  QUEtiapine 150 milliGRAM(s) Oral two times a day    MEDICATIONS  (PRN):  acetaminophen     Tablet .. 650 milliGRAM(s) Oral every 6 hours PRN Temp greater or equal to 38C (100.4F), Mild Pain (1 - 3)  albuterol    90 MICROgram(s) HFA Inhaler 1 Puff(s) Inhalation two times a day PRN for shortness of breath and/or wheezing  aluminum hydroxide/magnesium hydroxide/simethicone Suspension 30 milliLiter(s) Oral every 4 hours PRN Dyspepsia  hydrOXYzine hydrochloride 25 milliGRAM(s) Oral three times a day PRN Anxiety  melatonin 3 milliGRAM(s) Oral at bedtime PRN Insomnia  traZODone 50 milliGRAM(s) Oral at bedtime PRN Insomnia        I&O's Summary    18 May 2024 07:01  -  19 May 2024 07:00  --------------------------------------------------------  IN: 0 mL / OUT: 300 mL / NET: -300 mL        PHYSICAL EXAM:  Vital Signs Last 24 Hrs  T(C): 36.9 (19 May 2024 04:30), Max: 36.9 (18 May 2024 16:26)  T(F): 98.4 (19 May 2024 04:30), Max: 98.5 (18 May 2024 16:26)  HR: 70 (19 May 2024 04:30) (70 - 94)  BP: 120/68 (19 May 2024 04:30) (120/68 - 136/68)  BP(mean): --  RR: 18 (19 May 2024 04:30) (18 - 18)  SpO2: 94% (19 May 2024 04:30) (92% - 94%)    Parameters below as of 19 May 2024 04:30  Patient On (Oxygen Delivery Method): room air            CONSTITUTIONAL: NAD, appears stated age  ENMT: Moist oral mucosa, no pharyngeal injection or exudates; normal dentition  RESPIRATORY: Normal respiratory effort; clear to auscultation bilaterally  CARDIOVASCULAR: Regular rate and rhythm, normal S1 and S2, no murmur/rub/gallop; Peripheral pulses are 2+ bilaterally  ABDOMEN: Nontender to palpation, normoactive bowel sounds, no rebound/guarding;   MUSCLOSKELETAL:  No clubbing or cyanosis of digits; no joint swelling or tenderness to palpation  PSYCH: A+O to person, place; affect appropriate  NEUROLOGY: CN 2-12 are intact and symmetric; no gross sensory deficits;   SKIN: No rashes; no palpable lesions    LABS:                        11.7   6.51  )-----------( 237      ( 19 May 2024 04:16 )             35.8     05-18    138  |  104  |  15.3  ----------------------------<  96  3.9   |  20.0<L>  |  0.78    Ca    9.0      18 May 2024 06:04            Urinalysis Basic - ( 18 May 2024 06:04 )    Color: x / Appearance: x / SG: x / pH: x  Gluc: 96 mg/dL / Ketone: x  / Bili: x / Urobili: x   Blood: x / Protein: x / Nitrite: x   Leuk Esterase: x / RBC: x / WBC x   Sq Epi: x / Non Sq Epi: x / Bacteria: x        CAPILLARY BLOOD GLUCOSE            RADIOLOGY & ADDITIONAL TESTS:  Results Reviewed:   Imaging Personally Reviewed:  Electrocardiogram Personally Reviewed:

## 2024-05-20 LAB
ANION GAP SERPL CALC-SCNC: 11 MMOL/L — SIGNIFICANT CHANGE UP (ref 5–17)
BASOPHILS # BLD AUTO: 0.05 K/UL — SIGNIFICANT CHANGE UP (ref 0–0.2)
BASOPHILS NFR BLD AUTO: 0.7 % — SIGNIFICANT CHANGE UP (ref 0–2)
BUN SERPL-MCNC: 15.4 MG/DL — SIGNIFICANT CHANGE UP (ref 8–20)
CALCIUM SERPL-MCNC: 8.9 MG/DL — SIGNIFICANT CHANGE UP (ref 8.4–10.5)
CHLORIDE SERPL-SCNC: 105 MMOL/L — SIGNIFICANT CHANGE UP (ref 96–108)
CO2 SERPL-SCNC: 24 MMOL/L — SIGNIFICANT CHANGE UP (ref 22–29)
CREAT SERPL-MCNC: 1.01 MG/DL — SIGNIFICANT CHANGE UP (ref 0.5–1.3)
CULTURE RESULTS: SIGNIFICANT CHANGE UP
CULTURE RESULTS: SIGNIFICANT CHANGE UP
EGFR: 59 ML/MIN/1.73M2 — LOW
EOSINOPHIL # BLD AUTO: 0.3 K/UL — SIGNIFICANT CHANGE UP (ref 0–0.5)
EOSINOPHIL NFR BLD AUTO: 4.2 % — SIGNIFICANT CHANGE UP (ref 0–6)
GLUCOSE SERPL-MCNC: 119 MG/DL — HIGH (ref 70–99)
HCT VFR BLD CALC: 36.2 % — SIGNIFICANT CHANGE UP (ref 34.5–45)
HGB BLD-MCNC: 11.6 G/DL — SIGNIFICANT CHANGE UP (ref 11.5–15.5)
IMM GRANULOCYTES NFR BLD AUTO: 0.3 % — SIGNIFICANT CHANGE UP (ref 0–0.9)
LYMPHOCYTES # BLD AUTO: 2.46 K/UL — SIGNIFICANT CHANGE UP (ref 1–3.3)
LYMPHOCYTES # BLD AUTO: 34.7 % — SIGNIFICANT CHANGE UP (ref 13–44)
MCHC RBC-ENTMCNC: 27.4 PG — SIGNIFICANT CHANGE UP (ref 27–34)
MCHC RBC-ENTMCNC: 32 GM/DL — SIGNIFICANT CHANGE UP (ref 32–36)
MCV RBC AUTO: 85.4 FL — SIGNIFICANT CHANGE UP (ref 80–100)
MONOCYTES # BLD AUTO: 0.86 K/UL — SIGNIFICANT CHANGE UP (ref 0–0.9)
MONOCYTES NFR BLD AUTO: 12.1 % — SIGNIFICANT CHANGE UP (ref 2–14)
NEUTROPHILS # BLD AUTO: 3.4 K/UL — SIGNIFICANT CHANGE UP (ref 1.8–7.4)
NEUTROPHILS NFR BLD AUTO: 48 % — SIGNIFICANT CHANGE UP (ref 43–77)
PLATELET # BLD AUTO: 247 K/UL — SIGNIFICANT CHANGE UP (ref 150–400)
POTASSIUM SERPL-MCNC: 3.4 MMOL/L — LOW (ref 3.5–5.3)
POTASSIUM SERPL-SCNC: 3.4 MMOL/L — LOW (ref 3.5–5.3)
RBC # BLD: 4.24 M/UL — SIGNIFICANT CHANGE UP (ref 3.8–5.2)
RBC # FLD: 13.5 % — SIGNIFICANT CHANGE UP (ref 10.3–14.5)
SODIUM SERPL-SCNC: 140 MMOL/L — SIGNIFICANT CHANGE UP (ref 135–145)
SPECIMEN SOURCE: SIGNIFICANT CHANGE UP
SPECIMEN SOURCE: SIGNIFICANT CHANGE UP
WBC # BLD: 7.09 K/UL — SIGNIFICANT CHANGE UP (ref 3.8–10.5)
WBC # FLD AUTO: 7.09 K/UL — SIGNIFICANT CHANGE UP (ref 3.8–10.5)

## 2024-05-20 PROCEDURE — 99233 SBSQ HOSP IP/OBS HIGH 50: CPT | Mod: GC

## 2024-05-20 PROCEDURE — 99232 SBSQ HOSP IP/OBS MODERATE 35: CPT

## 2024-05-20 RX ORDER — POTASSIUM CHLORIDE 20 MEQ
20 PACKET (EA) ORAL
Refills: 0 | Status: COMPLETED | OUTPATIENT
Start: 2024-05-20 | End: 2024-05-20

## 2024-05-20 RX ORDER — CLONAZEPAM 1 MG
0.5 TABLET ORAL
Refills: 0 | Status: DISCONTINUED | OUTPATIENT
Start: 2024-05-20 | End: 2024-05-27

## 2024-05-20 RX ORDER — KETOROLAC TROMETHAMINE 30 MG/ML
10 SYRINGE (ML) INJECTION EVERY 6 HOURS
Refills: 0 | Status: DISCONTINUED | OUTPATIENT
Start: 2024-05-20 | End: 2024-05-20

## 2024-05-20 RX ORDER — POTASSIUM CHLORIDE 20 MEQ
20 PACKET (EA) ORAL
Refills: 0 | Status: DISCONTINUED | OUTPATIENT
Start: 2024-05-20 | End: 2024-05-20

## 2024-05-20 RX ORDER — CLONAZEPAM 1 MG
1 TABLET ORAL AT BEDTIME
Refills: 0 | Status: DISCONTINUED | OUTPATIENT
Start: 2024-05-20 | End: 2024-05-27

## 2024-05-20 RX ORDER — QUETIAPINE FUMARATE 200 MG/1
150 TABLET, FILM COATED ORAL AT BEDTIME
Refills: 0 | Status: DISCONTINUED | OUTPATIENT
Start: 2024-05-20 | End: 2024-05-29

## 2024-05-20 RX ADMIN — Medication 20 MILLIEQUIVALENT(S): at 13:20

## 2024-05-20 RX ADMIN — Medication 6 MILLIGRAM(S): at 21:08

## 2024-05-20 RX ADMIN — GABAPENTIN 100 MILLIGRAM(S): 400 CAPSULE ORAL at 05:25

## 2024-05-20 RX ADMIN — GABAPENTIN 100 MILLIGRAM(S): 400 CAPSULE ORAL at 21:08

## 2024-05-20 RX ADMIN — Medication 1 MILLIGRAM(S): at 21:08

## 2024-05-20 RX ADMIN — Medication 20 MILLIEQUIVALENT(S): at 17:15

## 2024-05-20 RX ADMIN — PANTOPRAZOLE SODIUM 40 MILLIGRAM(S): 20 TABLET, DELAYED RELEASE ORAL at 05:22

## 2024-05-20 RX ADMIN — ENOXAPARIN SODIUM 40 MILLIGRAM(S): 100 INJECTION SUBCUTANEOUS at 17:15

## 2024-05-20 RX ADMIN — QUETIAPINE FUMARATE 150 MILLIGRAM(S): 200 TABLET, FILM COATED ORAL at 05:22

## 2024-05-20 RX ADMIN — LOSARTAN POTASSIUM 50 MILLIGRAM(S): 100 TABLET, FILM COATED ORAL at 05:22

## 2024-05-20 RX ADMIN — GABAPENTIN 100 MILLIGRAM(S): 400 CAPSULE ORAL at 13:20

## 2024-05-20 RX ADMIN — CLOPIDOGREL BISULFATE 75 MILLIGRAM(S): 75 TABLET, FILM COATED ORAL at 13:20

## 2024-05-20 RX ADMIN — Medication 1 MILLIGRAM(S): at 05:25

## 2024-05-20 RX ADMIN — DULOXETINE HYDROCHLORIDE 60 MILLIGRAM(S): 30 CAPSULE, DELAYED RELEASE ORAL at 05:22

## 2024-05-20 RX ADMIN — LITHIUM CARBONATE 150 MILLIGRAM(S): 300 TABLET, EXTENDED RELEASE ORAL at 21:08

## 2024-05-20 RX ADMIN — DULOXETINE HYDROCHLORIDE 60 MILLIGRAM(S): 30 CAPSULE, DELAYED RELEASE ORAL at 17:15

## 2024-05-20 RX ADMIN — QUETIAPINE FUMARATE 150 MILLIGRAM(S): 200 TABLET, FILM COATED ORAL at 21:08

## 2024-05-20 NOTE — BH CONSULTATION LIAISON PROGRESS NOTE - NSBHASSESSMENTFT_PSY_ALL_CORE
Patient is a 74 year old, female; domiciled with her adult son and ex-;  (2003); noncaregiver; unemployed on SSI; past psychiatric history of bipolar depression and anxiety; currently under the care of [facility]; per chart review- 2 reported prior psychiatric hospitalizations at [facility] over 10 years ago; no known suicide attempts; no known history of violence or arrests; no active substance abuse or known history of complicated withdrawal; PMH of arthritis, fibromyalgia; brought in by EMS; called by ex- (with whom she lives); presenting with anxiety and reports of vomiting x several days.    5/17: Patient was sedated on examination, however, alert enough to engage in a conversation. Patient with appropriate sleep after home psychiatric medications were restarted. Patient with ongoing symptoms of anxiety and depression, unchanged from prior examinations. Due to the severity of her symptoms and inability to care for self, will pursue an inpatient psychiatric hospitalization once medically cleared. Will continue to monitor sedation as an iatrogenic cause, however, her symptoms of depression are likely contributing.     5/20: Patient continues to present with symptoms of depression and anxiety on observation with unchanged clinical presentation, however, she is reporting improvement. Due to patient somnolence, will recommend decreasing AM Klonopin to 0.5 mg. Will also recommend patient stay on 1:1 due to poor ADLs and pending inpatient psychiatric admission.     Labs: Li level 0.14 (5/14)    Recommendations:  - DECREASE Klonopin to 0.5 mg AM dose/1 mg PM dose  - CONSTANT OBSERVATION 1:1 (patient pending inpatient psych admission)    - Rule out organic causes of anxiety (i.e. PE, stroke, thyroid disease)  - Continue Seroquel 150 mg QHS   - Continue Lithium 150 mg QHS   - Continue Cymbalta 60 mg BID  - Continue Melatonin 6 mg QHS   - Continue PRN Trazodone 50 mg 1-2 hours after melatonin administration  - Continue PRN Atarax 25 mg TID  - Continue Gabapentin 100 mg TID   - Continue Melatonin 3 mg QHS     - Patient pending inpatient psychiatric admission once medically cleared  - Maintain delirium precautions   - Avoid anticholinergic agents, benzos, opioid as they can further perpetuate confusion   - Frequent re orientation, Hydration, try to avoid restraints and if possible, mobilize patient and PT involvement

## 2024-05-20 NOTE — PROGRESS NOTE ADULT - ASSESSMENT
74F w/ PMH of panic attacks, Anxiety, Bipolar, HTN, depression, anxiety, fibromyalgia, breast CA, HLD presented for N/NBNB emisis for the past few days.  Pt is a poor historian at this time and information obtained from chart review.  Pt however does report dysuria and frequent urination but denies diarrhea, fevers, chills. Pts baseline mentation is reported to be AAOx4  Pts  Ria Duval reported to  that pt has minor memory deficits but no official diagnosis of dementia.  She also reports pt has been declining to get out of bed, refusing to eat or drink x 1 week and c/o nausea and 1-2 days of vomiting and has not been taking her meds. Admitted with bipolar disorder and UTI       hx Bipolar depression and anxiety  Not taking meds   - Anxiety/depression w/ behavioral disturbances   - BH following   - c/w Cymbalta 60mg BID, lithium 150mg qhs, klonopin 1mg BID, gabapentin 100mg TID  - changed seroquel from BID to 150 mg QD due pt appears more lethargic and psych recs QD dose   - atarax 25mg TID PRN    Acute metabolic encephalopathy 2/2 UTI  afebrile and WBC WNL  - c/o dysuria and has +pyuria on UA although only 6WBC but +bacteria   - Ucxs Gram negative rods, completed 3 days of ceftriaxone   - BCx NGTD, pending sensitivities  - Holding Myrbetriq and oxybutynin as both can cause delirium in elderly pts   - CTH negative for acute pathologies     Electrolyte imbalance  - K 3.4 this AM, s/p replacement with KCl 20mEq x2    HTN   - c/w ARB       VTE ppx: Lovenox   Dispo: pending inpatient psych per , PT recs [facility]

## 2024-05-20 NOTE — BH CONSULTATION LIAISON PROGRESS NOTE - NSBHFUPINTERVALHXFT_PSY_A_CORE
Patient is a 74 year old, female; domiciled with her adult son and ex-;  (2003); noncaregiver; unemployed on SSI; past psychiatric history of bipolar depression and anxiety; currently under the care of [facility]; per chart review- 2 reported prior psychiatric hospitalizations at [facility] over 10 years ago; no known suicide attempts; no known history of violence or arrests; no active substance abuse or known history of complicated withdrawal; PMH of arthritis, fibromyalgia; brought in by EMS; called by ex- (with whom she lives); presenting with anxiety and reports of vomiting x several days.    Patient was seen and examined by the psychiatric treatment team today. On encounter, patient was in bed- was able to engage in conversation, however, her eyes remained closed for the entire duration. Today, she was fixated on discharge and seemed to be minimizing her symptoms of anxiety and depression as she stated she felt improved, but was unable to elaborate. Her behavior of partially engaging with the team, sleeping, and staying in bed all day has not changed. No gulping/grunting noises overheard today. Patient was seen over the weekend and was very emotional about her ex- and acknowledged her recent decline.

## 2024-05-20 NOTE — PROGRESS NOTE ADULT - SUBJECTIVE AND OBJECTIVE BOX
SUBJECTIVE:    Chief Complaint: Patient is a 74y old  Female who presents with a chief complaint of AMS (19 May 2024 11:05)    BRIEF HOSPITAL COURSE:   74F w/ PMHx of panic attacks, HTN, depression, anxiety, fibromyalgia, breast CA, HLD, presented with c/o emesis, dysuria, frequent urination, poor po intake, N/V and not taking meds for few days. Admitted for acute encephalopathy suspected 2/2 to UTI and dehydration, also suspected underlying psych condition, BH following, resumed her bipolar and anxiety meds. CTH negative. UCx growing negative rods, on empiric ceftriaxone, pending sensitivities. Inpatient psych placement once resolution of UTI.     PT recs [facility]      INTERVAL HPI/LAST 24HRS EVENTS: Patient seen and examined at bedside at AM. No clinically acute event overnight.   Denies any chest pain, palpitations, SOB, PND, orthopnea, leg edema, N/V/D, or any other complaints.     MEDICATIONS  (STANDING):  clonazePAM  Tablet 1 milliGRAM(s) Oral two times a day  clopidogrel Tablet 75 milliGRAM(s) Oral daily  DULoxetine 60 milliGRAM(s) Oral two times a day  enoxaparin Injectable 40 milliGRAM(s) SubCutaneous every 24 hours  gabapentin 100 milliGRAM(s) Oral three times a day  lactated ringers. 1000 milliLiter(s) (85 mL/Hr) IV Continuous <Continuous>  lithium 150 milliGRAM(s) Oral at bedtime  losartan 50 milliGRAM(s) Oral daily  melatonin 6 milliGRAM(s) Oral at bedtime  pantoprazole    Tablet 40 milliGRAM(s) Oral before breakfast  QUEtiapine 150 milliGRAM(s) Oral two times a day    MEDICATIONS  (PRN):  acetaminophen     Tablet .. 650 milliGRAM(s) Oral every 6 hours PRN Temp greater or equal to 38C (100.4F), Mild Pain (1 - 3)  albuterol    90 MICROgram(s) HFA Inhaler 1 Puff(s) Inhalation two times a day PRN for shortness of breath and/or wheezing  aluminum hydroxide/magnesium hydroxide/simethicone Suspension 30 milliLiter(s) Oral every 4 hours PRN Dyspepsia  hydrOXYzine hydrochloride 25 milliGRAM(s) Oral three times a day PRN Anxiety  melatonin 3 milliGRAM(s) Oral at bedtime PRN Insomnia  traZODone 50 milliGRAM(s) Oral at bedtime PRN Insomnia      Allergies    Cipro (Stomach Upset; Vomiting)  Demerol HCl (Stomach Upset; Vomiting)    Intolerances        REVIEW OF SYSTEMS:  CONSTITUTIONAL: No fever, weight loss, or fatigue  RESPIRATORY: No cough, wheezing, chills or hemoptysis; No shortness of breath  CARDIOVASCULAR: No chest pain, palpitations, dizziness, or leg swelling  GASTROINTESTINAL: No abdominal or epigastric pain. No nausea, vomiting, or hematemesis; No diarrhea or constipation. No melena or hematochezia.  NEUROLOGICAL: No headaches, loss of strength, numbness, or tremors  MUSCULOSKELETAL: No joint pain or swelling; No muscle, back, or extremity pain    OBJECTIVE:    Vital Signs Last 24 Hrs  T(C): 36.7 (20 May 2024 04:40), Max: 36.9 (19 May 2024 10:30)  T(F): 98 (20 May 2024 04:40), Max: 98.4 (19 May 2024 10:30)  HR: 78 (20 May 2024 04:40) (76 - 91)  BP: 107/66 (20 May 2024 04:40) (107/66 - 138/78)  BP(mean): --  RR: 18 (20 May 2024 04:40) (18 - 18)  SpO2: 94% (20 May 2024 04:40) (92% - 94%)    Parameters below as of 20 May 2024 04:40  Patient On (Oxygen Delivery Method): room air        PHYSICAL EXAM:  Constitutional: Alert, interactive, comfortable, NAD  Head and Face: Atraumatic, head and face were normal in appearance  Eyes: Sclera and conjunctiva were normal, pupils were equal in size, round, eyelids normal  ENT: Ears and nose were normal in appearance  Neck: Appearance was normal, neck was supple, No JVD  Pulmonary: Clear to auscultation bilaterally, no rales/crackles, or wheezing  Heart: Regular rate and rhythm, no murmurs, gallops, or pericardial rubs  Abdominal: Soft, nontender, nondistended. No appreciable hepatosplenomegaly. Bowel sounds normal  Skin: Normal color and intact without appreciable rash or abnormal skin lesion  Extremities: Warm without edema. No clubbing.  Pulse: 2+ radial pulse  Neuro: Oriented to person, place, and time    Lab/ Imaging:    LABS:                        11.6   7.09  )-----------( 247      ( 20 May 2024 03:49 )             36.2     05-20    140  |  105  |  15.4  ----------------------------<  119<H>  3.4<L>   |  24.0  |  1.01    Ca    8.9      20 May 2024 03:49        Urinalysis Basic - ( 20 May 2024 03:49 )    Color: x / Appearance: x / SG: x / pH: x  Gluc: 119 mg/dL / Ketone: x  / Bili: x / Urobili: x   Blood: x / Protein: x / Nitrite: x   Leuk Esterase: x / RBC: x / WBC x   Sq Epi: x / Non Sq Epi: x / Bacteria: x       SUBJECTIVE:    Chief Complaint: Patient is a 74y old  Female who presents with a chief complaint of AMS (19 May 2024 11:05)    BRIEF HOSPITAL COURSE:   74F w/ PMHx of panic attacks, HTN, depression, anxiety, fibromyalgia, breast CA, HLD, presented with c/o emesis, dysuria, frequent urination, poor po intake, N/V and not taking meds for few days. Admitted for acute encephalopathy suspected 2/2 to UTI and dehydration, also suspected underlying psych condition, BH following, resumed her bipolar and anxiety meds. CTH negative. UCx growing negative rods, on empiric ceftriaxone, pending sensitivities. Inpatient psych placement once resolution of UTI.     PT recs [facility]      INTERVAL HPI/LAST 24HRS EVENTS: Patient seen and examined at bedside at AM. No clinically acute event overnight. Pt appeared more lethargic this AM which was due to over medication and adjusted her seroquel to at bedtime. Denies any chest pain, palpitations, SOB, PND, orthopnea, leg edema, N/V/D, or any other complaints.     MEDICATIONS  (STANDING):  clonazePAM  Tablet 1 milliGRAM(s) Oral two times a day  clopidogrel Tablet 75 milliGRAM(s) Oral daily  DULoxetine 60 milliGRAM(s) Oral two times a day  enoxaparin Injectable 40 milliGRAM(s) SubCutaneous every 24 hours  gabapentin 100 milliGRAM(s) Oral three times a day  lactated ringers. 1000 milliLiter(s) (85 mL/Hr) IV Continuous <Continuous>  lithium 150 milliGRAM(s) Oral at bedtime  losartan 50 milliGRAM(s) Oral daily  melatonin 6 milliGRAM(s) Oral at bedtime  pantoprazole    Tablet 40 milliGRAM(s) Oral before breakfast  QUEtiapine 150 milliGRAM(s) Oral two times a day    MEDICATIONS  (PRN):  acetaminophen     Tablet .. 650 milliGRAM(s) Oral every 6 hours PRN Temp greater or equal to 38C (100.4F), Mild Pain (1 - 3)  albuterol    90 MICROgram(s) HFA Inhaler 1 Puff(s) Inhalation two times a day PRN for shortness of breath and/or wheezing  aluminum hydroxide/magnesium hydroxide/simethicone Suspension 30 milliLiter(s) Oral every 4 hours PRN Dyspepsia  hydrOXYzine hydrochloride 25 milliGRAM(s) Oral three times a day PRN Anxiety  melatonin 3 milliGRAM(s) Oral at bedtime PRN Insomnia  traZODone 50 milliGRAM(s) Oral at bedtime PRN Insomnia      Allergies    Cipro (Stomach Upset; Vomiting)  Demerol HCl (Stomach Upset; Vomiting)    Intolerances        REVIEW OF SYSTEMS:  CONSTITUTIONAL: No fever, weight loss, or fatigue  RESPIRATORY: No cough, wheezing, chills or hemoptysis; No shortness of breath  CARDIOVASCULAR: No chest pain, palpitations, dizziness, or leg swelling  GASTROINTESTINAL: No abdominal or epigastric pain. No nausea, vomiting, or hematemesis; No diarrhea or constipation. No melena or hematochezia.  NEUROLOGICAL: No headaches, loss of strength, numbness, or tremors  MUSCULOSKELETAL: No joint pain or swelling; No muscle, back, or extremity pain    OBJECTIVE:    Vital Signs Last 24 Hrs  T(C): 36.7 (20 May 2024 04:40), Max: 36.9 (19 May 2024 10:30)  T(F): 98 (20 May 2024 04:40), Max: 98.4 (19 May 2024 10:30)  HR: 78 (20 May 2024 04:40) (76 - 91)  BP: 107/66 (20 May 2024 04:40) (107/66 - 138/78)  BP(mean): --  RR: 18 (20 May 2024 04:40) (18 - 18)  SpO2: 94% (20 May 2024 04:40) (92% - 94%)    Parameters below as of 20 May 2024 04:40  Patient On (Oxygen Delivery Method): room air        PHYSICAL EXAM:  Constitutional: Alert, interactive, comfortable, NAD, lethargic    Head and Face: Atraumatic, head and face were normal in appearance  Eyes: Sclera and conjunctiva were normal, pupils were equal in size, round, eyelids normal  ENT: Ears and nose were normal in appearance  Neck: Appearance was normal, neck was supple, No JVD  Pulmonary: Clear to auscultation bilaterally, no rales/crackles, or wheezing  Heart: Regular rate and rhythm, no murmurs, gallops, or pericardial rubs  Abdominal: Soft, nontender, nondistended. No appreciable hepatosplenomegaly. Bowel sounds normal  Skin: Normal color and intact without appreciable rash or abnormal skin lesion  Extremities: Warm without edema. No clubbing.  Pulse: 2+ radial pulse    Lab/ Imaging:    LABS:                        11.6   7.09  )-----------( 247      ( 20 May 2024 03:49 )             36.2     05-20    140  |  105  |  15.4  ----------------------------<  119<H>  3.4<L>   |  24.0  |  1.01    Ca    8.9      20 May 2024 03:49        Urinalysis Basic - ( 20 May 2024 03:49 )    Color: x / Appearance: x / SG: x / pH: x  Gluc: 119 mg/dL / Ketone: x  / Bili: x / Urobili: x   Blood: x / Protein: x / Nitrite: x   Leuk Esterase: x / RBC: x / WBC x   Sq Epi: x / Non Sq Epi: x / Bacteria: x

## 2024-05-21 LAB
ANION GAP SERPL CALC-SCNC: 10 MMOL/L — SIGNIFICANT CHANGE UP (ref 5–17)
BUN SERPL-MCNC: 14.8 MG/DL — SIGNIFICANT CHANGE UP (ref 8–20)
CALCIUM SERPL-MCNC: 9.2 MG/DL — SIGNIFICANT CHANGE UP (ref 8.4–10.5)
CHLORIDE SERPL-SCNC: 106 MMOL/L — SIGNIFICANT CHANGE UP (ref 96–108)
CO2 SERPL-SCNC: 24 MMOL/L — SIGNIFICANT CHANGE UP (ref 22–29)
CREAT SERPL-MCNC: 0.7 MG/DL — SIGNIFICANT CHANGE UP (ref 0.5–1.3)
EGFR: 92 ML/MIN/1.73M2 — SIGNIFICANT CHANGE UP
GLUCOSE SERPL-MCNC: 96 MG/DL — SIGNIFICANT CHANGE UP (ref 70–99)
HCT VFR BLD CALC: 36.9 % — SIGNIFICANT CHANGE UP (ref 34.5–45)
HGB BLD-MCNC: 11.9 G/DL — SIGNIFICANT CHANGE UP (ref 11.5–15.5)
MAGNESIUM SERPL-MCNC: 1.9 MG/DL — SIGNIFICANT CHANGE UP (ref 1.8–2.6)
MCHC RBC-ENTMCNC: 27.5 PG — SIGNIFICANT CHANGE UP (ref 27–34)
MCHC RBC-ENTMCNC: 32.2 GM/DL — SIGNIFICANT CHANGE UP (ref 32–36)
MCV RBC AUTO: 85.2 FL — SIGNIFICANT CHANGE UP (ref 80–100)
PHOSPHATE SERPL-MCNC: 3 MG/DL — SIGNIFICANT CHANGE UP (ref 2.4–4.7)
PLATELET # BLD AUTO: 235 K/UL — SIGNIFICANT CHANGE UP (ref 150–400)
POTASSIUM SERPL-MCNC: 3.8 MMOL/L — SIGNIFICANT CHANGE UP (ref 3.5–5.3)
POTASSIUM SERPL-SCNC: 3.8 MMOL/L — SIGNIFICANT CHANGE UP (ref 3.5–5.3)
RBC # BLD: 4.33 M/UL — SIGNIFICANT CHANGE UP (ref 3.8–5.2)
RBC # FLD: 13.7 % — SIGNIFICANT CHANGE UP (ref 10.3–14.5)
SODIUM SERPL-SCNC: 140 MMOL/L — SIGNIFICANT CHANGE UP (ref 135–145)
WBC # BLD: 6.5 K/UL — SIGNIFICANT CHANGE UP (ref 3.8–10.5)
WBC # FLD AUTO: 6.5 K/UL — SIGNIFICANT CHANGE UP (ref 3.8–10.5)

## 2024-05-21 PROCEDURE — 99232 SBSQ HOSP IP/OBS MODERATE 35: CPT

## 2024-05-21 PROCEDURE — 99232 SBSQ HOSP IP/OBS MODERATE 35: CPT | Mod: GC

## 2024-05-21 RX ADMIN — Medication 1 MILLIGRAM(S): at 21:04

## 2024-05-21 RX ADMIN — PANTOPRAZOLE SODIUM 40 MILLIGRAM(S): 20 TABLET, DELAYED RELEASE ORAL at 05:02

## 2024-05-21 RX ADMIN — QUETIAPINE FUMARATE 150 MILLIGRAM(S): 200 TABLET, FILM COATED ORAL at 21:03

## 2024-05-21 RX ADMIN — Medication 0.5 MILLIGRAM(S): at 05:02

## 2024-05-21 RX ADMIN — GABAPENTIN 100 MILLIGRAM(S): 400 CAPSULE ORAL at 12:40

## 2024-05-21 RX ADMIN — DULOXETINE HYDROCHLORIDE 60 MILLIGRAM(S): 30 CAPSULE, DELAYED RELEASE ORAL at 05:03

## 2024-05-21 RX ADMIN — DULOXETINE HYDROCHLORIDE 60 MILLIGRAM(S): 30 CAPSULE, DELAYED RELEASE ORAL at 17:16

## 2024-05-21 RX ADMIN — Medication 6 MILLIGRAM(S): at 21:05

## 2024-05-21 RX ADMIN — LOSARTAN POTASSIUM 50 MILLIGRAM(S): 100 TABLET, FILM COATED ORAL at 05:03

## 2024-05-21 RX ADMIN — ENOXAPARIN SODIUM 40 MILLIGRAM(S): 100 INJECTION SUBCUTANEOUS at 12:40

## 2024-05-21 RX ADMIN — GABAPENTIN 100 MILLIGRAM(S): 400 CAPSULE ORAL at 21:03

## 2024-05-21 RX ADMIN — CLOPIDOGREL BISULFATE 75 MILLIGRAM(S): 75 TABLET, FILM COATED ORAL at 12:40

## 2024-05-21 RX ADMIN — LITHIUM CARBONATE 150 MILLIGRAM(S): 300 TABLET, EXTENDED RELEASE ORAL at 21:04

## 2024-05-21 RX ADMIN — GABAPENTIN 100 MILLIGRAM(S): 400 CAPSULE ORAL at 05:02

## 2024-05-21 NOTE — BH CONSULTATION LIAISON PROGRESS NOTE - NSBHASSESSMENTFT_PSY_ALL_CORE
Patient is a 74 year old, female; domiciled with her adult son and ex-;  (2003); noncaregiver; unemployed on SSI; past psychiatric history of bipolar depression and anxiety; currently under the care of [facility]; per chart review- 2 reported prior psychiatric hospitalizations at [facility] over 10 years ago; no known suicide attempts; no known history of violence or arrests; no active substance abuse or known history of complicated withdrawal; PMH of arthritis, fibromyalgia; brought in by EMS; called by ex- (with whom she lives); presenting with anxiety and reports of vomiting x several days.    5/17: Patient was sedated on examination, however, alert enough to engage in a conversation. Patient with appropriate sleep after home psychiatric medications were restarted. Patient with ongoing symptoms of anxiety and depression, unchanged from prior examinations. Due to the severity of her symptoms and inability to care for self, will pursue an inpatient psychiatric hospitalization once medically cleared. Will continue to monitor sedation as an iatrogenic cause, however, her symptoms of depression are likely contributing.     5/20: Patient continues to present with symptoms of depression and anxiety on observation with unchanged clinical presentation, however, she is reporting improvement. Due to patient somnolence, will recommend decreasing AM Klonopin to 0.5 mg. Will also recommend patient stay on 1:1 due to poor ADLs and pending inpatient psychiatric admission.     5/21: Patient with some improvement in her anxiety and unchanged depression with poor ADLs and decreased activity. Patient's sedation improving as medications are being tapered, however, her current symptoms of depression are likely contributing as well. Patient pending and agreeable to inpatient psychiatric admission. Patient encouraged to manage her ADLs, be more active and spend time out of bed    Labs: Li level 0.14 (5/14)    Recommendations:  - CONSTANT OBSERVATION 1:1 (patient pending inpatient psych admission)    - Continue Seroquel 150 mg QHS   - Continue Lithium 150 mg QHS   - Continue Cymbalta 60 mg BID  - Continue Melatonin 6 mg QHS   - Continue PRN Trazodone 50 mg 1-2 hours after melatonin administration  - Continue PRN Atarax 25 mg TID  - Continue Gabapentin 100 mg TID   - Continue Melatonin 3 mg QHS   - Continue Klonopin 0.5 mg/1 mg     - Patient pending inpatient psychiatric admission once medically cleared  - Maintain delirium precautions   - Avoid anticholinergic agents, benzos, opioid as they can further perpetuate confusion   - Frequent re orientation, Hydration, try to avoid restraints and if possible, mobilize patient and PT involvement

## 2024-05-21 NOTE — PROGRESS NOTE ADULT - ASSESSMENT
74F w/ PMH of panic attacks, Anxiety, Bipolar, HTN, depression, anxiety, fibromyalgia, breast CA, HLD presented for N/NBNB emisis for the past few days.  Pt is a poor historian at this time and information obtained from chart review.  Pt however does report dysuria and frequent urination but denies diarrhea, fevers, chills. Pts baseline mentation is reported to be AAOx4  Pts  Ria Duval reported to  that pt has minor memory deficits but no official diagnosis of dementia.  She also reports pt has been declining to get out of bed, refusing to eat or drink x 1 week and c/o nausea and 1-2 days of vomiting and has not been taking her meds. Admitted with bipolar disorder and UTI       hx Bipolar depression and anxiety  Not taking meds   - Anxiety/depression w/ behavioral disturbances   - BH following   - c/w Cymbalta 60mg BID, lithium 150mg qhs, klonopin 1mg BID, gabapentin 100mg TID  - changed seroquel from BID to 150 mg QD due pt appears more lethargic and psych recs QD dose   - atarax 25mg TID PRN    Acute metabolic encephalopathy 2/2 UTI  afebrile and WBC WNL  - c/o dysuria and has +pyuria on UA although only 6WBC but +bacteria   - Ucxs Gram negative rods, completed 3 days of ceftriaxone   - BCx NGTD, pending sensitivities  - Holding Myrbetriq and oxybutynin as both can cause delirium in elderly pts   - CTH negative for acute pathologies     Electrolyte imbalance  - K 3.4 this AM, s/p replacement with KCl 20mEq x2 5/21/24    HTN   - c/w ARB       VTE ppx: Lovenox   Dispo: pending inpatient psych per , need daily PT recs [facility] 74F w/ PMHx of panic attacks, HTN, depression, anxiety, fibromyalgia, breast CA, HLD, presented with c/o emesis, dysuria, frequent urination, poor po intake, N/V and not taking meds for few days. Admitted for acute encephalopathy suspected 2/2 to UTI and dehydration, also suspected underlying psych condition,  following, resumed her bipolar and anxiety meds. CTH negative. UCx growing negative rods, on empiric ceftriaxone, pending sensitivities. Inpatient psych placement after daily PT and deemed stable for transfer for inpatient psych.      Bipolar depression and anxiety  Nonadhereance to home medications  -  following   - c/w Cymbalta 60mg BID, lithium 150mg qhs, klonopin 1mg BID, gabapentin 100mg TID  - c/w Trazodone 50mg PRN for insomnia and Atarax 25mg TID PRN for anxiety   - c/w Seroquel 150 mg po at bedtime   - Clonazepam dose adjusted per  recs (0.5mg AM and 1mg PM) yesterday, pt appeared more awake this AM  - atarax 25mg TID PRN    Acute metabolic encephalopathy 2/2 UTI  afebrile and WBC WNL  - Ucxs Gram negative rods, completed 3 days of ceftriaxone   - BCx NGTD  - Holding Myrbetriq and oxybutynin as both can cause delirium in elderly pts   - CTH negative for acute pathologies   - D/c'd IVF    Electrolyte imbalance  - s/p repletion 5/21/24    HTN   - c/w Losartan 50mg po qd    Plavix 75mg po QD      VTE ppx: Lovenox SQ  Diet: DASH, d/c'd PPI   Dispo: pending inpatient psych placement once deemed ready  need daily PT recs TULIO 74F w/ PMHx of panic attacks, HTN, depression, anxiety, fibromyalgia, breast CA, HLD, presented with c/o emesis, dysuria, frequent urination, poor po intake, N/V and not taking meds for few days. Admitted for acute encephalopathy suspected 2/2 to UTI and dehydration, also suspected underlying psych condition,  following, resumed her bipolar and anxiety meds. CTH negative. UCx growing negative rods, on empiric ceftriaxone, pending sensitivities. Inpatient psych placement after daily PT and deemed stable for transfer for inpatient psych.      Bipolar depression and anxiety  Nonadhereance to home medications  -  following   - c/w 1:1 constant observation (pending inpatient psych admission)  - c/w Cymbalta 60mg BID, lithium 150mg qhs, gabapentin 100mg TID  - c/w Trazodone 50mg and melatonin 6mg PRN for insomnia and Atarax 25mg TID PRN for anxiety   - c/w Seroquel 150 mg po at bedtime   - Clonazepam dose adjusted per  recs (0.5mg AM and 1mg PM) yesterday, pt appeared more awake this AM  - atarax 25mg TID PRN    Acute metabolic encephalopathy 2/2 UTI  afebrile and WBC WNL  - Ucxs Gram negative rods, completed 3 days of ceftriaxone   - BCx NGTD  - Holding Myrbetriq and oxybutynin as both can cause delirium in elderly pts   - CTH negative for acute pathologies   - D/c'd IVF    Electrolyte imbalance  - s/p repletion 5/21/24    HTN   - c/w Losartan 50mg po qd    Plavix 75mg po QD      VTE ppx: Lovenox SQ  Diet: DASH, d/c'd PPI   Dispo: pending inpatient psych placement once deemed ready  need daily PT recs [facility] 74F w/ PMHx of panic attacks, HTN, depression, anxiety, fibromyalgia, breast CA, HLD, presented with c/o emesis, dysuria, frequent urination, poor po intake, N/V and not taking meds for few days. Admitted for acute encephalopathy suspected 2/2 to UTI and dehydration, also suspected underlying psych condition,  following, resumed her bipolar and anxiety meds. CTH negative. UCx growing negative rods, on empiric ceftriaxone, pending sensitivities. Inpatient psych placement after daily PT and deemed stable for transfer for inpatient psych.      Bipolar depression and anxiety  Nonadhereance to home medications  -  following   - c/w 1:1 constant observation (pending inpatient psych admission)  - c/w Cymbalta 60mg BID, lithium 150mg qhs, gabapentin 100mg TID  - c/w Trazodone 50mg and melatonin 6mg PRN for insomnia and Atarax 25mg TID PRN for anxiety   - c/w Seroquel 150 mg po at bedtime   - Clonazepam dose adjusted per  recs (0.5mg AM and 1mg PM) yesterday, pt appeared more awake this AM  - atarax 25mg TID PRN    Acute metabolic encephalopathy 2/2 UTI  afebrile and WBC WNL  - Ucxs Gram negative rods, completed 3 days of ceftriaxone   - BCx NGTD  - Holding Myrbetriq and oxybutynin as both can cause delirium in elderly pts   - CTH negative for acute pathologies   - D/c'd IVF    Electrolyte imbalance  - s/p repletion 5/21/24    HTN   - c/w Losartan 50mg po qd    Other home med  - c/w Plavix 75mg po QD, unable to find the reason to be on via chart review      VTE ppx: Lovenox SQ  Diet: DASH, d/c'd PPI   Dispo: pending inpatient psych placement once deemed ready  need daily PT recs [facility]

## 2024-05-21 NOTE — BH CONSULTATION LIAISON PROGRESS NOTE - NSBHFUPINTERVALHXFT_PSY_A_CORE
Patient is a 74 year old, female; domiciled with her adult son and ex-;  (2003); noncaregiver; unemployed on SSI; past psychiatric history of bipolar depression and anxiety; currently under the care of [facility]; per chart review- 2 reported prior psychiatric hospitalizations at [facility] over 10 years ago; no known suicide attempts; no known history of violence or arrests; no active substance abuse or known history of complicated withdrawal; PMH of arthritis, fibromyalgia; brought in by EMS; called by ex- (with whom she lives); presenting with anxiety and reports of vomiting x several days.    Patient was seen and examined by the psychiatric treatment team today. On encounter, patient was easily arousable from her sleep. On observation today, patient was more awake and less sedated, likely secondary to changes in Seroquel/Klonopin. She reports a slight improvement in her anxiety, which is also apparent on observation as she appears less tense. She associates this improvement with changes in her medications, although no new medications have been added. She reports an unchanged level of depression and continues to spend majority of her time in bed. Patient encouraged to shower and spend time out of bed to improve her mood and prevent further deconditioning.

## 2024-05-21 NOTE — PROGRESS NOTE ADULT - SUBJECTIVE AND OBJECTIVE BOX
SUBJECTIVE:    Chief Complaint: Patient is a 74y old  Female who presents with a chief complaint of AMS (20 May 2024 06:57)    BRIEF HOSPITAL COURSE:   74F w/ PMHx of panic attacks, HTN, depression, anxiety, fibromyalgia, breast CA, HLD, presented with c/o emesis, dysuria, frequent urination, poor po intake, N/V and not taking meds for few days. Admitted for acute encephalopathy suspected 2/2 to UTI and dehydration, also suspected underlying psych condition, BH following, resumed her bipolar and anxiety meds. CTH negative. UCx growing negative rods, on empiric ceftriaxone, pending sensitivities. Inpatient psych placement once resolution of UTI.     PT recs [facility]- Need daily PT for psych placement.        INTERVAL HPI/LAST 24HRS EVENTS: Patient seen and examined at bedside at AM. No clinically acute event overnight.   Denies any chest pain, palpitations, SOB, PND, orthopnea, leg edema, N/V/D, or any other complaints.     MEDICATIONS  (STANDING):  clonazePAM  Tablet 0.5 milliGRAM(s) Oral <User Schedule>  clonazePAM  Tablet 1 milliGRAM(s) Oral at bedtime  clopidogrel Tablet 75 milliGRAM(s) Oral daily  DULoxetine 60 milliGRAM(s) Oral two times a day  enoxaparin Injectable 40 milliGRAM(s) SubCutaneous every 24 hours  gabapentin 100 milliGRAM(s) Oral three times a day  lactated ringers. 1000 milliLiter(s) (85 mL/Hr) IV Continuous <Continuous>  lithium 150 milliGRAM(s) Oral at bedtime  losartan 50 milliGRAM(s) Oral daily  melatonin 6 milliGRAM(s) Oral at bedtime  pantoprazole    Tablet 40 milliGRAM(s) Oral before breakfast  QUEtiapine 150 milliGRAM(s) Oral at bedtime    MEDICATIONS  (PRN):  acetaminophen     Tablet .. 650 milliGRAM(s) Oral every 6 hours PRN Temp greater or equal to 38C (100.4F), Mild Pain (1 - 3)  albuterol    90 MICROgram(s) HFA Inhaler 1 Puff(s) Inhalation two times a day PRN for shortness of breath and/or wheezing  aluminum hydroxide/magnesium hydroxide/simethicone Suspension 30 milliLiter(s) Oral every 4 hours PRN Dyspepsia  hydrOXYzine hydrochloride 25 milliGRAM(s) Oral three times a day PRN Anxiety  melatonin 3 milliGRAM(s) Oral at bedtime PRN Insomnia  traZODone 50 milliGRAM(s) Oral at bedtime PRN Insomnia      Allergies    Cipro (Stomach Upset; Vomiting)  Demerol HCl (Stomach Upset; Vomiting)    Intolerances        REVIEW OF SYSTEMS:  CONSTITUTIONAL: No fever, weight loss, or fatigue  RESPIRATORY: No cough, wheezing, chills or hemoptysis; No shortness of breath  CARDIOVASCULAR: No chest pain, palpitations, dizziness, or leg swelling  GASTROINTESTINAL: No abdominal or epigastric pain. No nausea, vomiting, or hematemesis; No diarrhea or constipation. No melena or hematochezia.  NEUROLOGICAL: No headaches, loss of strength, numbness, or tremors  MUSCULOSKELETAL: No joint pain or swelling; No muscle, back, or extremity pain      OBJECTIVE:    Vital Signs Last 24 Hrs  T(C): 36.8 (21 May 2024 04:42), Max: 36.9 (20 May 2024 16:20)  T(F): 98.3 (21 May 2024 04:42), Max: 98.4 (20 May 2024 16:20)  HR: 75 (21 May 2024 04:42) (74 - 83)  BP: 124/76 (21 May 2024 04:42) (97/62 - 144/75)  BP(mean): --  RR: 18 (21 May 2024 04:42) (18 - 18)  SpO2: 94% (21 May 2024 04:42) (92% - 94%)    Parameters below as of 21 May 2024 04:42  Patient On (Oxygen Delivery Method): room air        PHYSICAL EXAM:  Constitutional: Alert, interactive, comfortable, NAD  Head and Face: Atraumatic, head and face were normal in appearance  Eyes: Sclera and conjunctiva were normal, pupils were equal in size, round, eyelids normal  ENT: Ears and nose were normal in appearance  Neck: Appearance was normal, neck was supple, No JVD  Pulmonary: Clear to auscultation bilaterally, no rales/crackles, or wheezing  Heart: Regular rate and rhythm, no murmurs, gallops, or pericardial rubs  Abdominal: Soft, nontender, nondistended. No appreciable hepatosplenomegaly. Bowel sounds normal  Skin: Normal color and intact without appreciable rash or abnormal skin lesion  Extremities: Warm without edema. No clubbing.  Pulse: 2+ radial pulse      Lab/ Imaging:    LABS:                        11.9   6.50  )-----------( 235      ( 21 May 2024 05:01 )             36.9     05-21    140  |  106  |  14.8  ----------------------------<  96  3.8   |  24.0  |  0.70    Ca    9.2      21 May 2024 05:01  Phos  3.0     05-21  Mg     1.9     05-21        Urinalysis Basic - ( 21 May 2024 05:01 )    Color: x / Appearance: x / SG: x / pH: x  Gluc: 96 mg/dL / Ketone: x  / Bili: x / Urobili: x   Blood: x / Protein: x / Nitrite: x   Leuk Esterase: x / RBC: x / WBC x   Sq Epi: x / Non Sq Epi: x / Bacteria: x     SUBJECTIVE:    Chief Complaint: Patient is a 74y old  Female who presents with a chief complaint of AMS (20 May 2024 06:57)    BRIEF HOSPITAL COURSE:   74F w/ PMHx of panic attacks, HTN, depression, anxiety, fibromyalgia, breast CA, HLD, presented with c/o emesis, dysuria, frequent urination, poor po intake, N/V and not taking meds for few days. Admitted for acute encephalopathy suspected 2/2 to UTI and dehydration, also suspected underlying psych condition, BH following, resumed her bipolar and anxiety meds. CTH negative. UCx growing negative rods, on empiric ceftriaxone, pending sensitivities. Inpatient psych placement once resolution of UTI.     PT recs [facility]- Need daily PT for psych placement.      INTERVAL HPI/LAST 24HRS EVENTS: Patient seen and examined at bedside at AM. No clinically acute event overnight. Pt is more awake this morning after the medication adjustment yesterday. Pt was taken out of bed and sat on chair with assistance. Denies any chest pain, palpitations, SOB, PND, orthopnea, leg edema, N/V/D, or any other complaints.     MEDICATIONS  (STANDING):  clonazePAM  Tablet 0.5 milliGRAM(s) Oral <User Schedule>  clonazePAM  Tablet 1 milliGRAM(s) Oral at bedtime  clopidogrel Tablet 75 milliGRAM(s) Oral daily  DULoxetine 60 milliGRAM(s) Oral two times a day  enoxaparin Injectable 40 milliGRAM(s) SubCutaneous every 24 hours  gabapentin 100 milliGRAM(s) Oral three times a day  lactated ringers. 1000 milliLiter(s) (85 mL/Hr) IV Continuous <Continuous>  lithium 150 milliGRAM(s) Oral at bedtime  losartan 50 milliGRAM(s) Oral daily  melatonin 6 milliGRAM(s) Oral at bedtime  pantoprazole    Tablet 40 milliGRAM(s) Oral before breakfast  QUEtiapine 150 milliGRAM(s) Oral at bedtime    MEDICATIONS  (PRN):  acetaminophen     Tablet .. 650 milliGRAM(s) Oral every 6 hours PRN Temp greater or equal to 38C (100.4F), Mild Pain (1 - 3)  albuterol    90 MICROgram(s) HFA Inhaler 1 Puff(s) Inhalation two times a day PRN for shortness of breath and/or wheezing  aluminum hydroxide/magnesium hydroxide/simethicone Suspension 30 milliLiter(s) Oral every 4 hours PRN Dyspepsia  hydrOXYzine hydrochloride 25 milliGRAM(s) Oral three times a day PRN Anxiety  melatonin 3 milliGRAM(s) Oral at bedtime PRN Insomnia  traZODone 50 milliGRAM(s) Oral at bedtime PRN Insomnia      Allergies    Cipro (Stomach Upset; Vomiting)  Demerol HCl (Stomach Upset; Vomiting)    Intolerances        REVIEW OF SYSTEMS:  CONSTITUTIONAL: No fever, weight loss, or fatigue  RESPIRATORY: No cough, wheezing, chills or hemoptysis; No shortness of breath  CARDIOVASCULAR: No chest pain, palpitations, dizziness, or leg swelling  GASTROINTESTINAL: No abdominal or epigastric pain. No nausea, vomiting, or hematemesis; No diarrhea or constipation. No melena or hematochezia.  NEUROLOGICAL: No headaches, loss of strength, numbness, or tremors  MUSCULOSKELETAL: No joint pain or swelling; No muscle, back, or extremity pain      OBJECTIVE:    Vital Signs Last 24 Hrs  T(C): 36.8 (21 May 2024 04:42), Max: 36.9 (20 May 2024 16:20)  T(F): 98.3 (21 May 2024 04:42), Max: 98.4 (20 May 2024 16:20)  HR: 75 (21 May 2024 04:42) (74 - 83)  BP: 124/76 (21 May 2024 04:42) (97/62 - 144/75)  BP(mean): --  RR: 18 (21 May 2024 04:42) (18 - 18)  SpO2: 94% (21 May 2024 04:42) (92% - 94%)    Parameters below as of 21 May 2024 04:42  Patient On (Oxygen Delivery Method): room air        PHYSICAL EXAM:  Constitutional: Alert, interactive, comfortable, NAD  Head and Face: Atraumatic, head and face were normal in appearance  Eyes: Sclera and conjunctiva were normal, pupils were equal in size, round, eyelids normal  ENT: Ears and nose were normal in appearance  Neck: Appearance was normal, neck was supple, No JVD  Pulmonary: Clear to auscultation bilaterally, no rales/crackles, or wheezing  Heart: Regular rate and rhythm, no murmurs, gallops, or pericardial rubs  Abdominal: Soft, nontender, nondistended. No appreciable hepatosplenomegaly. Bowel sounds normal  Skin: Normal color and intact without appreciable rash or abnormal skin lesion  Extremities: Warm without edema. No clubbing.  Pulse: 2+ radial pulse      Lab/ Imaging:    LABS:                        11.9   6.50  )-----------( 235      ( 21 May 2024 05:01 )             36.9     05-21    140  |  106  |  14.8  ----------------------------<  96  3.8   |  24.0  |  0.70    Ca    9.2      21 May 2024 05:01  Phos  3.0     05-21  Mg     1.9     05-21        Urinalysis Basic - ( 21 May 2024 05:01 )    Color: x / Appearance: x / SG: x / pH: x  Gluc: 96 mg/dL / Ketone: x  / Bili: x / Urobili: x   Blood: x / Protein: x / Nitrite: x   Leuk Esterase: x / RBC: x / WBC x   Sq Epi: x / Non Sq Epi: x / Bacteria: x

## 2024-05-22 PROCEDURE — 99233 SBSQ HOSP IP/OBS HIGH 50: CPT

## 2024-05-22 PROCEDURE — 99232 SBSQ HOSP IP/OBS MODERATE 35: CPT | Mod: GC

## 2024-05-22 RX ADMIN — DULOXETINE HYDROCHLORIDE 60 MILLIGRAM(S): 30 CAPSULE, DELAYED RELEASE ORAL at 18:47

## 2024-05-22 RX ADMIN — GABAPENTIN 100 MILLIGRAM(S): 400 CAPSULE ORAL at 05:06

## 2024-05-22 RX ADMIN — DULOXETINE HYDROCHLORIDE 60 MILLIGRAM(S): 30 CAPSULE, DELAYED RELEASE ORAL at 05:06

## 2024-05-22 RX ADMIN — LITHIUM CARBONATE 150 MILLIGRAM(S): 300 TABLET, EXTENDED RELEASE ORAL at 21:13

## 2024-05-22 RX ADMIN — GABAPENTIN 100 MILLIGRAM(S): 400 CAPSULE ORAL at 21:13

## 2024-05-22 RX ADMIN — LOSARTAN POTASSIUM 50 MILLIGRAM(S): 100 TABLET, FILM COATED ORAL at 05:06

## 2024-05-22 RX ADMIN — CLOPIDOGREL BISULFATE 75 MILLIGRAM(S): 75 TABLET, FILM COATED ORAL at 12:43

## 2024-05-22 RX ADMIN — GABAPENTIN 100 MILLIGRAM(S): 400 CAPSULE ORAL at 14:03

## 2024-05-22 RX ADMIN — Medication 0.5 MILLIGRAM(S): at 05:06

## 2024-05-22 RX ADMIN — ENOXAPARIN SODIUM 40 MILLIGRAM(S): 100 INJECTION SUBCUTANEOUS at 14:03

## 2024-05-22 RX ADMIN — Medication 1 MILLIGRAM(S): at 21:13

## 2024-05-22 RX ADMIN — QUETIAPINE FUMARATE 150 MILLIGRAM(S): 200 TABLET, FILM COATED ORAL at 21:13

## 2024-05-22 RX ADMIN — Medication 6 MILLIGRAM(S): at 21:13

## 2024-05-22 NOTE — PROGRESS NOTE ADULT - SUBJECTIVE AND OBJECTIVE BOX
SUBJECTIVE:    Chief Complaint: Patient is a 74y old  Female who presents with a chief complaint of Acquired absence of other parts of digestive tract (22 May 2024 14:10)    BRIEF HOSPITAL COURSE:   74F w/ PMHx of panic attacks, HTN, depression, anxiety, fibromyalgia, breast CA, HLD, presented with c/o emesis, dysuria, frequent urination, poor po intake, N/V and not taking meds for few days. Admitted for acute encephalopathy suspected 2/2 to UTI and dehydration, also suspected underlying psych condition, BH following, resumed her bipolar and anxiety meds. CTH negative. UCx growing negative rods, on empiric ceftriaxone, pending sensitivities. Inpatient psych placement once resolution of UTI.     PT recs [facility]- Need daily PT for psych placement.      INTERVAL HPI/LAST 24HRS EVENTS: Patient seen and examined at bedside at AM. No clinically acute event overnight.   Denies any chest pain, palpitations, SOB, PND, orthopnea, leg edema, N/V/D, or any other complaints.     MEDICATIONS  (STANDING):  clonazePAM  Tablet 0.5 milliGRAM(s) Oral <User Schedule>  clonazePAM  Tablet 1 milliGRAM(s) Oral at bedtime  clopidogrel Tablet 75 milliGRAM(s) Oral daily  DULoxetine 60 milliGRAM(s) Oral two times a day  enoxaparin Injectable 40 milliGRAM(s) SubCutaneous every 24 hours  gabapentin 100 milliGRAM(s) Oral three times a day  lithium 150 milliGRAM(s) Oral at bedtime  losartan 50 milliGRAM(s) Oral daily  melatonin 6 milliGRAM(s) Oral at bedtime  QUEtiapine 150 milliGRAM(s) Oral at bedtime    MEDICATIONS  (PRN):  acetaminophen     Tablet .. 650 milliGRAM(s) Oral every 6 hours PRN Temp greater or equal to 38C (100.4F), Mild Pain (1 - 3)  albuterol    90 MICROgram(s) HFA Inhaler 1 Puff(s) Inhalation two times a day PRN for shortness of breath and/or wheezing  aluminum hydroxide/magnesium hydroxide/simethicone Suspension 30 milliLiter(s) Oral every 4 hours PRN Dyspepsia  hydrOXYzine hydrochloride 25 milliGRAM(s) Oral three times a day PRN Anxiety  melatonin 3 milliGRAM(s) Oral at bedtime PRN Insomnia  traZODone 50 milliGRAM(s) Oral at bedtime PRN Insomnia      Allergies    Cipro (Stomach Upset; Vomiting)  Demerol HCl (Stomach Upset; Vomiting)    Intolerances      REVIEW OF SYSTEMS:  CONSTITUTIONAL: No fever, weight loss, or fatigue  RESPIRATORY: No cough, wheezing, chills or hemoptysis; No shortness of breath  CARDIOVASCULAR: No chest pain, palpitations, dizziness, or leg swelling  GASTROINTESTINAL: No abdominal or epigastric pain. No nausea, vomiting, or hematemesis; No diarrhea or constipation. No melena or hematochezia.  NEUROLOGICAL: No headaches, loss of strength, numbness, or tremors  MUSCULOSKELETAL: No joint pain or swelling; No muscle, back, or extremity pain    OBJECTIVE:    Vital Signs Last 24 Hrs  T(C): 36.7 (22 May 2024 08:24), Max: 36.8 (21 May 2024 20:02)  T(F): 98.1 (22 May 2024 08:24), Max: 98.2 (21 May 2024 20:02)  HR: 80 (22 May 2024 08:24) (74 - 82)  BP: 118/74 (22 May 2024 08:24) (105/65 - 145/76)  BP(mean): --  RR: 18 (22 May 2024 08:24) (18 - 18)  SpO2: 91% (22 May 2024 08:24) (91% - 97%)    Parameters below as of 22 May 2024 08:24  Patient On (Oxygen Delivery Method): room air        PHYSICAL EXAM:  Constitutional: Alert, interactive, comfortable, NAD  Head and Face: Atraumatic, head and face were normal in appearance  Eyes: Sclera and conjunctiva were normal, pupils were equal in size, round, eyelids normal  ENT: Ears and nose were normal in appearance  Neck: Appearance was normal, neck was supple, No JVD  Pulmonary: Clear to auscultation bilaterally, no rales/crackles, or wheezing  Heart: Regular rate and rhythm, no murmurs, gallops, or pericardial rubs  Abdominal: Soft, nontender, nondistended. No appreciable hepatosplenomegaly. Bowel sounds normal  Skin: Normal color and intact without appreciable rash or abnormal skin lesion  Extremities: Warm without edema. No clubbing.  Pulse: 2+ radial pulse      Lab/ Imaging:    LABS:                        11.9   6.50  )-----------( 235      ( 21 May 2024 05:01 )             36.9     05-21    140  |  106  |  14.8  ----------------------------<  96  3.8   |  24.0  |  0.70    Ca    9.2      21 May 2024 05:01  Phos  3.0     05-21  Mg     1.9     05-21        Urinalysis Basic - ( 21 May 2024 05:01 )    Color: x / Appearance: x / SG: x / pH: x  Gluc: 96 mg/dL / Ketone: x  / Bili: x / Urobili: x   Blood: x / Protein: x / Nitrite: x   Leuk Esterase: x / RBC: x / WBC x   Sq Epi: x / Non Sq Epi: x / Bacteria: x

## 2024-05-22 NOTE — DIETITIAN INITIAL EVALUATION ADULT - ORAL INTAKE PTA/DIET HISTORY
Pt sleepy but is answering questions stating good po intake currently, no weight changes noted. Pt on Dash diet and reviewed guidelines. Pt states she wants salt and appears non compliant. Plan is inpatient psych.

## 2024-05-22 NOTE — PROGRESS NOTE ADULT - ASSESSMENT
74F w/ PMHx of panic attacks, HTN, depression, anxiety, fibromyalgia, breast CA, HLD, presented with c/o emesis, dysuria, frequent urination, poor po intake, N/V and not taking meds for few days. Admitted for acute encephalopathy suspected 2/2 to UTI and dehydration, also suspected underlying psych condition,  following, resumed her bipolar and anxiety meds. CTH negative. UCx growing negative rods, on empiric ceftriaxone, pending sensitivities. Inpatient psych placement after daily PT and deemed stable for transfer for inpatient psych.      Bipolar depression and anxiety  Nonadhereance to home medications  -  following   - c/w 1:1 constant observation (pending inpatient psych admission)  - c/w Cymbalta 60mg BID, lithium 150mg qhs, gabapentin 100mg TID  - c/w Trazodone 50mg and melatonin 6mg PRN for insomnia and Atarax 25mg TID PRN for anxiety   - c/w Seroquel 150 mg po at bedtime   - c/w Clonazepam 0.5mg AM and 1mg PM  - atarax 25mg TID PRN    Acute metabolic encephalopathy 2/2 UTI  afebrile and WBC WNL  - Ucxs Gram negative rods, completed 3 days of ceftriaxone   - BCx NGTD  - Holding Myrbetriq and oxybutynin as both can cause delirium in elderly pts   - CTH negative for acute pathologies     Electrolyte imbalance  - s/p repletion 5/21/24    HTN   - c/w Losartan 50mg po qd    Other home med  - c/w Plavix 75mg po QD, unable to find the reason to be on via chart review      VTE ppx: Lovenox SQ  Diet: DASH, d/c'd PPI   Dispo: pending inpatient psych placement once deemed ready  need daily PT recs [facility]

## 2024-05-22 NOTE — DIETITIAN INITIAL EVALUATION ADULT - PERTINENT LABORATORY DATA
05-21    140  |  106  |  14.8  ----------------------------<  96  3.8   |  24.0  |  0.70    Ca    9.2      21 May 2024 05:01  Phos  3.0     05-21  Mg     1.9     05-21

## 2024-05-22 NOTE — DIETITIAN INITIAL EVALUATION ADULT - PERTINENT MEDS FT
MEDICATIONS  (STANDING):  clonazePAM  Tablet 0.5 milliGRAM(s) Oral <User Schedule>  clonazePAM  Tablet 1 milliGRAM(s) Oral at bedtime  clopidogrel Tablet 75 milliGRAM(s) Oral daily  DULoxetine 60 milliGRAM(s) Oral two times a day  enoxaparin Injectable 40 milliGRAM(s) SubCutaneous every 24 hours  gabapentin 100 milliGRAM(s) Oral three times a day  lithium 150 milliGRAM(s) Oral at bedtime  losartan 50 milliGRAM(s) Oral daily  melatonin 6 milliGRAM(s) Oral at bedtime  QUEtiapine 150 milliGRAM(s) Oral at bedtime    MEDICATIONS  (PRN):  acetaminophen     Tablet .. 650 milliGRAM(s) Oral every 6 hours PRN Temp greater or equal to 38C (100.4F), Mild Pain (1 - 3)  albuterol    90 MICROgram(s) HFA Inhaler 1 Puff(s) Inhalation two times a day PRN for shortness of breath and/or wheezing  aluminum hydroxide/magnesium hydroxide/simethicone Suspension 30 milliLiter(s) Oral every 4 hours PRN Dyspepsia  hydrOXYzine hydrochloride 25 milliGRAM(s) Oral three times a day PRN Anxiety  melatonin 3 milliGRAM(s) Oral at bedtime PRN Insomnia  traZODone 50 milliGRAM(s) Oral at bedtime PRN Insomnia

## 2024-05-22 NOTE — DIETITIAN INITIAL EVALUATION ADULT - FACTORS AFF FOOD INTAKE
denies pain/discomfort PTA, now improved/none/persistent lack of appetite/persistent nausea/vomiting

## 2024-05-22 NOTE — DIETITIAN INITIAL EVALUATION ADULT - OTHER INFO
74F w/ PMHx of panic attacks, HTN, depression, anxiety, fibromyalgia, breast CA, HLD, presented with c/o emesis, dysuria, frequent urination, poor po intake, N/V and not taking meds for few days. Admitted for acute encephalopathy suspected 2/2 to UTI and dehydration, also suspected underlying psych condition, BH following, resumed her bipolar and anxiety meds. CTH negative. UCx growing negative rods, on empiric ceftriaxone, pending sensitivities. Inpatient psych placement once resolution of UTI.

## 2024-05-22 NOTE — BH CONSULTATION LIAISON PROGRESS NOTE - NSBHASSESSMENTFT_PSY_ALL_CORE
Patient is a 74 year old, female; domiciled with her adult son and ex-;  (2003); noncaregiver; unemployed on SSI; past psychiatric history of bipolar depression and anxiety; currently under the care of [facility]; per chart review- 2 reported prior psychiatric hospitalizations at [facility] over 10 years ago; no known suicide attempts; no known history of violence or arrests; no active substance abuse or known history of complicated withdrawal; PMH of arthritis, fibromyalgia; brought in by EMS; called by ex- (with whom she lives); presenting with anxiety and reports of vomiting x several days.    5/17: Patient was sedated on examination, however, alert enough to engage in a conversation. Patient with appropriate sleep after home psychiatric medications were restarted. Patient with ongoing symptoms of anxiety and depression, unchanged from prior examinations. Due to the severity of her symptoms and inability to care for self, will pursue an inpatient psychiatric hospitalization once medically cleared. Will continue to monitor sedation as an iatrogenic cause, however, her symptoms of depression are likely contributing.     5/20: Patient continues to present with symptoms of depression and anxiety on observation with unchanged clinical presentation, however, she is reporting improvement. Due to patient somnolence, will recommend decreasing AM Klonopin to 0.5 mg. Will also recommend patient stay on 1:1 due to poor ADLs and pending inpatient psychiatric admission.     5/21: Patient with some improvement in her anxiety and unchanged depression with poor ADLs and decreased activity. Patient's sedation improving as medications are being tapered, however, her current symptoms of depression are likely contributing as well. Patient pending and agreeable to inpatient psychiatric admission. Patient encouraged to manage her ADLs, be more active and spend time out of bed  \  5/22: Patient seen for f/u and her ambulation has been improving with PT but still with severe depression/ anhedonia and poor ADLs. patient still unable to care for self and would benefit from inpatient psych     Labs: Li level 0.14 (5/14)    Recommendations:  - CONSTANT OBSERVATION 1:1 (patient pending inpatient psych admission)    - Continue Seroquel 150 mg QHS   - Continue Lithium 150 mg QHS   - Continue Cymbalta 60 mg BID  - Continue Melatonin 6 mg QHS   - Continue PRN Trazodone 50 mg 1-2 hours after melatonin administration  - Continue PRN Atarax 25 mg TID  - Continue Gabapentin 100 mg TID   - Continue Melatonin 3 mg QHS   - Continue Klonopin 0.5 mg/1 mg     - Patient pending inpatient psychiatric admission once medically cleared  - Maintain delirium precautions   - Avoid anticholinergic agents, benzos, opioid as they can further perpetuate confusion   - Frequent re orientation, Hydration, try to avoid restraints and if possible, mobilize patient and PT involvement

## 2024-05-22 NOTE — BH CONSULTATION LIAISON PROGRESS NOTE - NSBHFUPINTERVALHXFT_PSY_A_CORE
Patient is a 74 year old, female; domiciled with her adult son and ex-;  (2003); noncaregiver; unemployed on SSI; past psychiatric history of bipolar depression and anxiety; currently under the care of [facility]; per chart review- 2 reported prior psychiatric hospitalizations at [facility] over 10 years ago; no known suicide attempts; no known history of violence or arrests; no active substance abuse or known history of complicated withdrawal; PMH of arthritis, fibromyalgia; brought in by EMS; called by ex- (with whom she lives); presenting with anxiety and reports of vomiting x several days.    Patient was seen and examined by the psychiatric treatment team today. Patient seen walking with PT using a walker. patient agreeable to meet with writer after completing PT. Patient expressing being depressed and having no motivation to do "anything". patient talks again of how she lost her ex  and how she feels alone. still with poor appetite and denying any s/h ideation oR AVH     As per unit nurse, patient needs a lot of encouragement to get up and eat but will do so with encouragement.

## 2024-05-23 LAB
ANION GAP SERPL CALC-SCNC: 15 MMOL/L — SIGNIFICANT CHANGE UP (ref 5–17)
BUN SERPL-MCNC: 16.6 MG/DL — SIGNIFICANT CHANGE UP (ref 8–20)
CALCIUM SERPL-MCNC: 9.4 MG/DL — SIGNIFICANT CHANGE UP (ref 8.4–10.5)
CHLORIDE SERPL-SCNC: 101 MMOL/L — SIGNIFICANT CHANGE UP (ref 96–108)
CO2 SERPL-SCNC: 23 MMOL/L — SIGNIFICANT CHANGE UP (ref 22–29)
CREAT SERPL-MCNC: 0.8 MG/DL — SIGNIFICANT CHANGE UP (ref 0.5–1.3)
EGFR: 78 ML/MIN/1.73M2 — SIGNIFICANT CHANGE UP
GLUCOSE SERPL-MCNC: 108 MG/DL — HIGH (ref 70–99)
HCT VFR BLD CALC: 36.2 % — SIGNIFICANT CHANGE UP (ref 34.5–45)
HGB BLD-MCNC: 11.9 G/DL — SIGNIFICANT CHANGE UP (ref 11.5–15.5)
MAGNESIUM SERPL-MCNC: 1.9 MG/DL — SIGNIFICANT CHANGE UP (ref 1.6–2.6)
MCHC RBC-ENTMCNC: 27.4 PG — SIGNIFICANT CHANGE UP (ref 27–34)
MCHC RBC-ENTMCNC: 32.9 GM/DL — SIGNIFICANT CHANGE UP (ref 32–36)
MCV RBC AUTO: 83.2 FL — SIGNIFICANT CHANGE UP (ref 80–100)
PHOSPHATE SERPL-MCNC: 4.1 MG/DL — SIGNIFICANT CHANGE UP (ref 2.4–4.7)
PLATELET # BLD AUTO: 245 K/UL — SIGNIFICANT CHANGE UP (ref 150–400)
POTASSIUM SERPL-MCNC: 3.9 MMOL/L — SIGNIFICANT CHANGE UP (ref 3.5–5.3)
POTASSIUM SERPL-SCNC: 3.9 MMOL/L — SIGNIFICANT CHANGE UP (ref 3.5–5.3)
RBC # BLD: 4.35 M/UL — SIGNIFICANT CHANGE UP (ref 3.8–5.2)
RBC # FLD: 14 % — SIGNIFICANT CHANGE UP (ref 10.3–14.5)
SARS-COV-2 RNA SPEC QL NAA+PROBE: SIGNIFICANT CHANGE UP
SODIUM SERPL-SCNC: 139 MMOL/L — SIGNIFICANT CHANGE UP (ref 135–145)
WBC # BLD: 7.51 K/UL — SIGNIFICANT CHANGE UP (ref 3.8–10.5)
WBC # FLD AUTO: 7.51 K/UL — SIGNIFICANT CHANGE UP (ref 3.8–10.5)

## 2024-05-23 PROCEDURE — 99232 SBSQ HOSP IP/OBS MODERATE 35: CPT

## 2024-05-23 PROCEDURE — 99232 SBSQ HOSP IP/OBS MODERATE 35: CPT | Mod: GC

## 2024-05-23 RX ADMIN — Medication 1 MILLIGRAM(S): at 20:53

## 2024-05-23 RX ADMIN — Medication 650 MILLIGRAM(S): at 12:39

## 2024-05-23 RX ADMIN — Medication 6 MILLIGRAM(S): at 20:53

## 2024-05-23 RX ADMIN — LOSARTAN POTASSIUM 50 MILLIGRAM(S): 100 TABLET, FILM COATED ORAL at 05:11

## 2024-05-23 RX ADMIN — GABAPENTIN 100 MILLIGRAM(S): 400 CAPSULE ORAL at 14:46

## 2024-05-23 RX ADMIN — ENOXAPARIN SODIUM 40 MILLIGRAM(S): 100 INJECTION SUBCUTANEOUS at 14:50

## 2024-05-23 RX ADMIN — GABAPENTIN 100 MILLIGRAM(S): 400 CAPSULE ORAL at 05:11

## 2024-05-23 RX ADMIN — DULOXETINE HYDROCHLORIDE 60 MILLIGRAM(S): 30 CAPSULE, DELAYED RELEASE ORAL at 05:11

## 2024-05-23 RX ADMIN — GABAPENTIN 100 MILLIGRAM(S): 400 CAPSULE ORAL at 20:53

## 2024-05-23 RX ADMIN — DULOXETINE HYDROCHLORIDE 60 MILLIGRAM(S): 30 CAPSULE, DELAYED RELEASE ORAL at 18:50

## 2024-05-23 RX ADMIN — LITHIUM CARBONATE 150 MILLIGRAM(S): 300 TABLET, EXTENDED RELEASE ORAL at 20:54

## 2024-05-23 RX ADMIN — CLOPIDOGREL BISULFATE 75 MILLIGRAM(S): 75 TABLET, FILM COATED ORAL at 12:37

## 2024-05-23 RX ADMIN — QUETIAPINE FUMARATE 150 MILLIGRAM(S): 200 TABLET, FILM COATED ORAL at 20:54

## 2024-05-23 RX ADMIN — Medication 0.5 MILLIGRAM(S): at 05:11

## 2024-05-23 NOTE — PROGRESS NOTE ADULT - ASSESSMENT
74F w/ PMHx of panic attacks, HTN, depression, anxiety, fibromyalgia, breast CA, HLD, presented with c/o emesis, dysuria, frequent urination, poor po intake, N/V and not taking meds for few days. Admitted for acute encephalopathy suspected 2/2 to UTI and dehydration, also suspected underlying psych condition,  following, resumed her bipolar and anxiety meds. CTH negative. UCx growing negative rods, on empiric ceftriaxone, pending sensitivities. Inpatient psych placement after daily PT and deemed stable for transfer for inpatient psych.      Bipolar depression and anxiety  Nonadhereance to home medications  -  following   - c/w 1:1 constant observation (pending inpatient psych admission)  - c/w Cymbalta 60mg BID, lithium 150mg qhs, gabapentin 100mg TID  - c/w Trazodone 50mg and melatonin 6mg PRN for insomnia and Atarax 25mg TID PRN for anxiety   - c/w Seroquel 150 mg po at bedtime   - c/w Clonazepam 0.5mg AM and 1mg PM  - atarax 25mg TID PRN    Acute metabolic encephalopathy 2/2 UTI  afebrile and WBC WNL  - Ucxs Gram negative rods, completed 3 days of ceftriaxone   - BCx NGTD  - Holding Myrbetriq and oxybutynin as both can cause delirium in elderly pts   - CTH negative for acute pathologies     Electrolyte imbalance  - s/p repletion 5/21/24    HTN   - c/w Losartan 50mg po qd    Other home med  - c/w Plavix 75mg po QD, unable to find the reason to be on via chart review      VTE ppx: Lovenox SQ  Diet: DASH, d/c'd PPI   Dispo: pending inpatient psych placement

## 2024-05-23 NOTE — BH CONSULTATION LIAISON PROGRESS NOTE - NSBHFUPINTERVALHXFT_PSY_A_CORE
Patient is a 74 year old, female; domiciled with her adult son and ex-;  (2003); noncaregiver; unemployed on SSI; past psychiatric history of bipolar depression and anxiety; currently under the care of [facility]; per chart review- 2 reported prior psychiatric hospitalizations at [facility] over 10 years ago; no known suicide attempts; no known history of violence or arrests; no active substance abuse or known history of complicated withdrawal; PMH of arthritis, fibromyalgia; brought in by EMS; called by ex- (with whom she lives); presenting with anxiety and reports of vomiting x several days.    Patient was seen and examined by the psychiatric treatment team today with 1:1 present. Patient observed to be sleeping in bed with breakfast tray untouched. 1:1 reports she attempted to wake the patient for breakfast, however, she refused. Patient was easily arousable and stated she was not hungry- she adds that she has been eating other meals but the taste is not great. Today, patient was observed to be more anxious, tense and she was overheard to be making the gulping noises again. She feels her anxiety has worsened again and when asking for any triggers, she states "I miss Sagar" referring to her ex-. Patient reports she has been spending time out of bed and in her chair or walking with a walker, however, majority of her time is still spent in bed. She continues to endorse the same level of depression without suicidal ideations. Patient informed again about pending transfer and was understandable.

## 2024-05-23 NOTE — PROGRESS NOTE ADULT - SUBJECTIVE AND OBJECTIVE BOX
SUBJECTIVE:    Chief Complaint: Patient is a 74y old  Female who presents with a chief complaint of AMS (22 May 2024 14:32)    BRIEF HOSPITAL COURSE:   74F w/ PMHx of panic attacks, HTN, depression, anxiety, fibromyalgia, breast CA, HLD, presented with c/o emesis, dysuria, frequent urination, poor po intake, N/V and not taking meds for few days. Admitted for acute encephalopathy suspected 2/2 to UTI and dehydration, also suspected underlying psych condition, BH following, resumed her bipolar and anxiety meds. CTH negative. UCx growing negative rods, on empiric ceftriaxone, pending sensitivities. Inpatient psych placement once resolution of UTI.   PT recs [facility]- Need daily PT for psych placement.      INTERVAL HPI/LAST 24HRS EVENTS: Patient seen and examined at bedside at AM. No clinically acute event overnight.   Denies any chest pain, palpitations, SOB, PND, orthopnea, leg edema, N/V/D, or any other complaints.     MEDICATIONS  (STANDING):  clonazePAM  Tablet 0.5 milliGRAM(s) Oral <User Schedule>  clonazePAM  Tablet 1 milliGRAM(s) Oral at bedtime  clopidogrel Tablet 75 milliGRAM(s) Oral daily  DULoxetine 60 milliGRAM(s) Oral two times a day  enoxaparin Injectable 40 milliGRAM(s) SubCutaneous every 24 hours  gabapentin 100 milliGRAM(s) Oral three times a day  lithium 150 milliGRAM(s) Oral at bedtime  losartan 50 milliGRAM(s) Oral daily  melatonin 6 milliGRAM(s) Oral at bedtime  QUEtiapine 150 milliGRAM(s) Oral at bedtime    MEDICATIONS  (PRN):  acetaminophen     Tablet .. 650 milliGRAM(s) Oral every 6 hours PRN Temp greater or equal to 38C (100.4F), Mild Pain (1 - 3)  albuterol    90 MICROgram(s) HFA Inhaler 1 Puff(s) Inhalation two times a day PRN for shortness of breath and/or wheezing  aluminum hydroxide/magnesium hydroxide/simethicone Suspension 30 milliLiter(s) Oral every 4 hours PRN Dyspepsia  hydrOXYzine hydrochloride 25 milliGRAM(s) Oral three times a day PRN Anxiety  melatonin 3 milliGRAM(s) Oral at bedtime PRN Insomnia  traZODone 50 milliGRAM(s) Oral at bedtime PRN Insomnia      Allergies    Cipro (Stomach Upset; Vomiting)  Demerol HCl (Stomach Upset; Vomiting)    Intolerances      REVIEW OF SYSTEMS:  CONSTITUTIONAL: No fever, weight loss, or fatigue  RESPIRATORY: No cough, wheezing, chills or hemoptysis; No shortness of breath  CARDIOVASCULAR: No chest pain, palpitations, dizziness, or leg swelling  GASTROINTESTINAL: No abdominal or epigastric pain. No nausea, vomiting, or hematemesis; No diarrhea or constipation. No melena or hematochezia.  NEUROLOGICAL: No headaches, loss of strength, numbness, or tremors  MUSCULOSKELETAL: No joint pain or swelling; No muscle, back, or extremity pain    OBJECTIVE:    Vital Signs Last 24 Hrs  T(C): 36.5 (23 May 2024 10:19), Max: 36.7 (23 May 2024 04:02)  T(F): 97.7 (23 May 2024 10:19), Max: 98 (23 May 2024 04:02)  HR: 78 (23 May 2024 04:02) (76 - 84)  BP: 107/67 (23 May 2024 10:19) (107/67 - 136/80)  BP(mean): --  RR: 18 (23 May 2024 10:19) (18 - 18)  SpO2: 98% (23 May 2024 10:19) (94% - 98%)    Parameters below as of 23 May 2024 10:19  Patient On (Oxygen Delivery Method): room air      PHYSICAL EXAM:  Constitutional: Alert, interactive, comfortable, NAD  Head and Face: Atraumatic, head and face were normal in appearance  Eyes: Sclera and conjunctiva were normal, pupils were equal in size, round, eyelids normal  ENT: Ears and nose were normal in appearance  Neck: Appearance was normal, neck was supple, No JVD  Pulmonary: Clear to auscultation bilaterally, no rales/crackles, or wheezing  Heart: Regular rate and rhythm, no murmurs, gallops, or pericardial rubs  Abdominal: Soft, nontender, nondistended. No appreciable hepatosplenomegaly. Bowel sounds normal  Skin: Normal color and intact without appreciable rash or abnormal skin lesion  Extremities: Warm without edema. No clubbing.  Pulse: 2+ radial pulse      Lab/ Imaging:    LABS:                        11.9   7.51  )-----------( 245      ( 23 May 2024 03:52 )             36.2     05-23    139  |  101  |  16.6  ----------------------------<  108<H>  3.9   |  23.0  |  0.80    Ca    9.4      23 May 2024 03:52  Phos  4.1     05-23  Mg     1.9     05-23        Urinalysis Basic - ( 23 May 2024 03:52 )    Color: x / Appearance: x / SG: x / pH: x  Gluc: 108 mg/dL / Ketone: x  / Bili: x / Urobili: x   Blood: x / Protein: x / Nitrite: x   Leuk Esterase: x / RBC: x / WBC x   Sq Epi: x / Non Sq Epi: x / Bacteria: x

## 2024-05-23 NOTE — BH CONSULTATION LIAISON PROGRESS NOTE - NSBHASSESSMENTFT_PSY_ALL_CORE
Patient is a 74 year old, female; domiciled with her adult son and ex-;  (2003); noncaregiver; unemployed on SSI; past psychiatric history of bipolar depression and anxiety; currently under the care of [facility]; per chart review- 2 reported prior psychiatric hospitalizations at [facility] over 10 years ago; no known suicide attempts; no known history of violence or arrests; no active substance abuse or known history of complicated withdrawal; PMH of arthritis, fibromyalgia; brought in by EMS; called by ex- (with whom she lives); presenting with anxiety and reports of vomiting x several days.    5/20: Patient continues to present with symptoms of depression and anxiety on observation with unchanged clinical presentation, however, she is reporting improvement. Due to patient somnolence, will recommend decreasing AM Klonopin to 0.5 mg. Will also recommend patient stay on 1:1 due to poor ADLs and pending inpatient psychiatric admission.     5/21: Patient with some improvement in her anxiety and unchanged depression with poor ADLs and decreased activity. Patient's sedation improving as medications are being tapered, however, her current symptoms of depression are likely contributing as well. Patient pending and agreeable to inpatient psychiatric admission. Patient encouraged to manage her ADLs, be more active and spend time out of bed    5/22: Patient seen for f/u and her ambulation has been improving with PT but still with severe depression/ anhedonia and poor ADLs. patient still unable to care for self and would benefit from inpatient psych     5/23: Patient continues to present with heightened anxiety (recent worsening compared to the last few encounters) and depressive symptoms. Patient encouraged to spend time out of bed, however, she continues to have decreased activity and low energy with poor ADLs. Patient pending transfer to inpatient psychiatry.     Labs: Li level 0.14 (5/14)    Recommendations:  - CONSTANT OBSERVATION 1:1 (patient pending inpatient psych admission)    - Continue Seroquel 150 mg QHS   - Continue Lithium 150 mg QHS   - Continue Cymbalta 60 mg BID  - Continue Melatonin 6 mg QHS   - Continue PRN Trazodone 50 mg 1-2 hours after melatonin administration  - Continue PRN Atarax 25 mg TID  - Continue Gabapentin 100 mg TID   - Continue Melatonin 3 mg QHS   - Continue Klonopin 0.5 mg/1 mg     - Patient pending inpatient psychiatric admission once medically cleared  - Maintain delirium precautions   - Avoid anticholinergic agents, benzos, opioid as they can further perpetuate confusion   - Frequent re orientation, Hydration, try to avoid restraints and if possible, mobilize patient and PT involvement

## 2024-05-24 ENCOUNTER — TRANSCRIPTION ENCOUNTER (OUTPATIENT)
Age: 74
End: 2024-05-24

## 2024-05-24 PROCEDURE — 99231 SBSQ HOSP IP/OBS SF/LOW 25: CPT

## 2024-05-24 PROCEDURE — 99232 SBSQ HOSP IP/OBS MODERATE 35: CPT

## 2024-05-24 RX ORDER — LANOLIN ALCOHOL/MO/W.PET/CERES
1 CREAM (GRAM) TOPICAL
Qty: 90 | Refills: 0
Start: 2024-05-24 | End: 2024-08-21

## 2024-05-24 RX ORDER — QUETIAPINE FUMARATE 200 MG/1
1 TABLET, FILM COATED ORAL
Qty: 90 | Refills: 0
Start: 2024-05-24 | End: 2024-08-21

## 2024-05-24 RX ORDER — QUETIAPINE FUMARATE 200 MG/1
150 TABLET, FILM COATED ORAL ONCE
Refills: 0 | Status: DISCONTINUED | OUTPATIENT
Start: 2024-05-24 | End: 2024-05-29

## 2024-05-24 RX ORDER — CLONAZEPAM 1 MG
1 TABLET ORAL
Qty: 30 | Refills: 0
Start: 2024-05-24 | End: 2024-06-22

## 2024-05-24 RX ORDER — CLONAZEPAM 1 MG
1 TABLET ORAL
Refills: 0 | DISCHARGE

## 2024-05-24 RX ORDER — TRAZODONE HCL 50 MG
1 TABLET ORAL
Qty: 0 | Refills: 0 | DISCHARGE
Start: 2024-05-24

## 2024-05-24 RX ORDER — QUETIAPINE FUMARATE 200 MG/1
1 TABLET, FILM COATED ORAL
Refills: 0 | DISCHARGE

## 2024-05-24 RX ADMIN — GABAPENTIN 100 MILLIGRAM(S): 400 CAPSULE ORAL at 14:07

## 2024-05-24 RX ADMIN — Medication 0.5 MILLIGRAM(S): at 05:14

## 2024-05-24 RX ADMIN — LITHIUM CARBONATE 150 MILLIGRAM(S): 300 TABLET, EXTENDED RELEASE ORAL at 21:45

## 2024-05-24 RX ADMIN — DULOXETINE HYDROCHLORIDE 60 MILLIGRAM(S): 30 CAPSULE, DELAYED RELEASE ORAL at 05:13

## 2024-05-24 RX ADMIN — GABAPENTIN 100 MILLIGRAM(S): 400 CAPSULE ORAL at 21:45

## 2024-05-24 RX ADMIN — ENOXAPARIN SODIUM 40 MILLIGRAM(S): 100 INJECTION SUBCUTANEOUS at 14:07

## 2024-05-24 RX ADMIN — GABAPENTIN 100 MILLIGRAM(S): 400 CAPSULE ORAL at 05:13

## 2024-05-24 RX ADMIN — QUETIAPINE FUMARATE 150 MILLIGRAM(S): 200 TABLET, FILM COATED ORAL at 21:46

## 2024-05-24 RX ADMIN — Medication 6 MILLIGRAM(S): at 21:46

## 2024-05-24 RX ADMIN — Medication 25 MILLIGRAM(S): at 16:53

## 2024-05-24 RX ADMIN — LOSARTAN POTASSIUM 50 MILLIGRAM(S): 100 TABLET, FILM COATED ORAL at 05:13

## 2024-05-24 RX ADMIN — DULOXETINE HYDROCHLORIDE 60 MILLIGRAM(S): 30 CAPSULE, DELAYED RELEASE ORAL at 18:52

## 2024-05-24 RX ADMIN — Medication 1 MILLIGRAM(S): at 21:46

## 2024-05-24 RX ADMIN — CLOPIDOGREL BISULFATE 75 MILLIGRAM(S): 75 TABLET, FILM COATED ORAL at 13:05

## 2024-05-24 NOTE — DISCHARGE NOTE PROVIDER - NSDCMRMEDTOKEN_GEN_ALL_CORE_FT
Albuterol (Eqv-ProAir HFA) 90 mcg/inh inhalation aerosol: 1 puff(s) inhaled 2 times a day as needed for  shortness of breath and/or wheezing  celecoxib 200 mg oral capsule: 1 cap(s) orally once a day  clonazePAM 0.5 mg oral tablet: 1 tab(s) orally once a day (in the morning) MDD: 4  clonazePAM 1 mg oral tablet: 1 tab(s) orally once a day (at bedtime) MDD: 4  DULoxetine 60 mg oral delayed release capsule: 1 cap(s) orally 2 times a day  gabapentin 100 mg oral tablet: orally 3 times a day  hydrOXYzine hydrochloride 25 mg oral tablet: 1 tab(s) orally 3 times a day as needed for  anxiety  ibuprofen 800 mg oral tablet: 1 tab(s) orally every 6 hours as needed for pain  lithium 150 mg oral capsule: 1 cap(s) orally once a day (at bedtime)  losartan 50 mg oral tablet: 1 tab(s) orally once a day  Melatonin 5 mg oral tablet: 1 tab(s) orally once a day (at bedtime)  Myrbetriq 50 mg oral tablet, extended release: 1 tab(s) orally once a day  oxyBUTYnin 10 mg/24 hr oral tablet, extended release: 1 tab(s) orally once a day (at bedtime)  Plavix 75 mg oral tablet: 1 tab(s) orally once a day  QUEtiapine 150 mg oral tablet: 1 tab(s) orally once a day (at bedtime)  traZODone 50 mg oral tablet: 1 tab(s) orally once a day (at bedtime) As needed Insomnia   Albuterol (Eqv-ProAir HFA) 90 mcg/inh inhalation aerosol: 1 puff(s) inhaled 2 times a day as needed for  shortness of breath and/or wheezing  celecoxib 200 mg oral capsule: 1 cap(s) orally once a day  DULoxetine 60 mg oral delayed release capsule: 1 cap(s) orally 2 times a day  gabapentin 100 mg oral tablet: orally 3 times a day  hydrOXYzine hydrochloride 25 mg oral tablet: 1 tab(s) orally 3 times a day as needed for  anxiety  ibuprofen 800 mg oral tablet: 1 tab(s) orally every 6 hours as needed for pain  KlonoPIN 0.5 mg oral tablet: 0.5 tab(s) orally once a day (in the morning)  KlonoPIN 1 mg oral tablet: 1 tab(s) orally once a day (at bedtime)  lithium 150 mg oral capsule: 1 cap(s) orally once a day (at bedtime)  losartan 50 mg oral tablet: 1 tab(s) orally once a day  Melatonin 5 mg oral tablet: 1 tab(s) orally once a day (at bedtime)  Myrbetriq 50 mg oral tablet, extended release: 1 tab(s) orally once a day  oxyBUTYnin 10 mg/24 hr oral tablet, extended release: 1 tab(s) orally once a day (at bedtime)  Plavix 75 mg oral tablet: 1 tab(s) orally once a day  QUEtiapine 150 mg oral tablet: 150 tab(s) orally once a day (at bedtime)  traZODone 50 mg oral tablet: 1 tab(s) orally once a day (at bedtime) As needed Insomnia

## 2024-05-24 NOTE — BH CONSULTATION LIAISON PROGRESS NOTE - NSBHASSESSMENTFT_PSY_ALL_CORE
Patient is a 74 year old, female; domiciled with her adult son and ex-;  (2003); noncaregiver; unemployed on SSI; past psychiatric history of bipolar depression and anxiety; currently under the care of [facility]; per chart review- 2 reported prior psychiatric hospitalizations at [facility] over 10 years ago; no known suicide attempts; no known history of violence or arrests; no active substance abuse or known history of complicated withdrawal; PMH of arthritis, fibromyalgia; brought in by EMS; called by ex- (with whom she lives); presenting with anxiety and reports of vomiting x several days.    5/20: Patient continues to present with symptoms of depression and anxiety on observation with unchanged clinical presentation, however, she is reporting improvement. Due to patient somnolence, will recommend decreasing AM Klonopin to 0.5 mg. Will also recommend patient stay on 1:1 due to poor ADLs and pending inpatient psychiatric admission.     5/21: Patient with some improvement in her anxiety and unchanged depression with poor ADLs and decreased activity. Patient's sedation improving as medications are being tapered, however, her current symptoms of depression are likely contributing as well. Patient pending and agreeable to inpatient psychiatric admission. Patient encouraged to manage her ADLs, be more active and spend time out of bed    5/22: Patient seen for f/u and her ambulation has been improving with PT but still with severe depression/ anhedonia and poor ADLs. patient still unable to care for self and would benefit from inpatient psych     5/23: Patient continues to present with heightened anxiety (recent worsening compared to the last few encounters) and depressive symptoms. Patient encouraged to spend time out of bed, however, she continues to have decreased activity and low energy with poor ADLs. Patient pending transfer to inpatient psychiatry.     5/24: Patient with unchanged clinical presentation of severe depression and anxiety with decreased activity, spending majority of her time in bed. She has also been noted to have decreased PO intake recently. Patient encouraged to eat and spend time awake/OOB. Pending inpatient psychiatric admission.     Labs: Li level 0.14 (5/14)    Recommendations:  - CONSTANT OBSERVATION 1:1 (patient pending inpatient psych admission)    - Continue Seroquel 150 mg QHS   - Continue Lithium 150 mg QHS   - Continue Cymbalta 60 mg BID  - Continue Melatonin 6 mg QHS   - Continue PRN Trazodone 50 mg 1-2 hours after melatonin administration  - Continue PRN Atarax 25 mg TID  - Continue Gabapentin 100 mg TID   - Continue Melatonin 3 mg QHS   - Continue Klonopin 0.5 mg/1 mg     - Patient pending inpatient psychiatric admission once medically cleared  - Maintain delirium precautions   - Avoid anticholinergic agents, benzos, opioid as they can further perpetuate confusion   - Frequent re orientation, Hydration, try to avoid restraints and if possible, mobilize patient and PT involvement

## 2024-05-24 NOTE — DISCHARGE NOTE PROVIDER - ATTENDING DISCHARGE PHYSICAL EXAMINATION:
Vital Signs Last 24 Hrs  T(C): 37.1 (24 May 2024 09:30), Max: 37.1 (24 May 2024 09:30)  T(F): 98.7 (24 May 2024 09:30), Max: 98.7 (24 May 2024 09:30)  HR: 86 (24 May 2024 09:30) (84 - 86)  BP: 119/64 (24 May 2024 09:30) (108/70 - 121/78)  BP(mean): --  RR: 18 (24 May 2024 09:30) (18 - 18)  SpO2: 92% (24 May 2024 09:30) (91% - 93%)    Parameters below as of 24 May 2024 09:30  Patient On (Oxygen Delivery Method): room air    PHYSICAL EXAM:  Constitutional: Alert, interactive, comfortable, NAD  Head and Face: Atraumatic, head and face were normal in appearance  Eyes: Sclera and conjunctiva were normal, pupils were equal in size, round, eyelids normal  ENT: Ears and nose were normal in appearance  Neck: Appearance was normal, neck was supple, No JVD  Pulmonary: Clear to auscultation bilaterally, no rales/crackles, or wheezing  Heart: Regular rate and rhythm, no murmurs, gallops, or pericardial rubs  Abdominal: Soft, nontender, nondistended. Bowel sounds normal  Skin: Normal color and intact without appreciable rash or abnormal skin lesion  Extremities: Warm without edema. No clubbing.  Pulse: 2+ radial pulse Vital Signs Last 24 Hrs  T(C): 36.6 (28 May 2024 09:50), Max: 36.9 (27 May 2024 20:00)  T(F): 97.8 (28 May 2024 09:50), Max: 98.4 (27 May 2024 20:00)  HR: 81 (28 May 2024 09:50) (65 - 81)  BP: 128/80 (28 May 2024 09:50) (103/68 - 128/80)  BP(mean): --  RR: 17 (28 May 2024 09:50) (17 - 18)  SpO2: 95% (28 May 2024 09:50) (93% - 96%)    Parameters below as of 28 May 2024 09:50  Patient On (Oxygen Delivery Method): room air    PHYSICAL EXAM:  Constitutional: Alert, comfortable, NAD  Head and Face: Atraumatic, head and face were normal in appearance  Eyes: Sclera and conjunctiva were normal, pupils were equal in size, round, eyelids normal  ENT: Ears and nose were normal in appearance  Neck: Appearance was normal, neck was supple, No JVD  Pulmonary: Clear to auscultation bilaterally, no rales/crackles, or wheezing  Heart: Regular rate and rhythm, no murmurs, gallops, or pericardial rubs  Abdominal: Soft, nontender, nondistended. No appreciable hepatosplenomegaly. Bowel sounds normal  Skin: Normal color and intact without appreciable rash or abnormal skin lesion  Extremities: Warm without edema. No clubbing.  Pulse: 2+ radial pulse PHYSICAL EXAM:  Constitutional: Alert, comfortable, NAD  Head and Face: Atraumatic, head and face were normal in appearance  Eyes: Sclera and conjunctiva were normal, pupils were equal in size, round, eyelids normal  ENT: Ears and nose were normal in appearance  Neck: Appearance was normal, neck was supple, No JVD  Pulmonary: Clear to auscultation bilaterally, no rales/crackles, or wheezing  Heart: Regular rate and rhythm, no murmurs, gallops, or pericardial rubs  Abdominal: Soft, nontender, nondistended. No appreciable hepatosplenomegaly. Bowel sounds normal  Skin: Normal color and intact without appreciable rash or abnormal skin lesion  Extremities: Warm without edema. No clubbing.  Pulse: 2+ radial pulse Vital Signs Last 24 Hrs  T(C): 36.6 (29 May 2024 08:12), Max: 37.4 (28 May 2024 20:33)  T(F): 97.9 (29 May 2024 08:12), Max: 99.3 (28 May 2024 20:33)  HR: 89 (29 May 2024 08:12) (76 - 93)  BP: 114/73 (29 May 2024 08:12) (100/64 - 121/79)  BP(mean): --  RR: 19 (29 May 2024 08:12) (18 - 19)  SpO2: 95% (29 May 2024 08:12) (91% - 96%)    Parameters below as of 29 May 2024 08:12  Patient On (Oxygen Delivery Method): room air      PHYSICAL EXAM:  Constitutional: Alert, comfortable, NAD  Head and Face: Atraumatic, head and face were normal in appearance  Eyes: Sclera and conjunctiva were normal, pupils were equal in size, round, eyelids normal  ENT: Ears and nose were normal in appearance  Neck: Appearance was normal, neck was supple, No JVD  Pulmonary: Clear to auscultation bilaterally, no rales/crackles, or wheezing  Heart: Regular rate and rhythm, no murmurs, gallops, or pericardial rubs  Abdominal: Soft, nontender, nondistended. No appreciable hepatosplenomegaly. Bowel sounds normal  Skin: Normal color and intact without appreciable rash or abnormal skin lesion  Extremities: Warm without edema. No clubbing.  Pulse: 2+ radial pulse

## 2024-05-24 NOTE — DISCHARGE NOTE PROVIDER - HOSPITAL COURSE
74F w/ PMHx of panic attacks, HTN, depression, anxiety, fibromyalgia, breast CA, HLD, presented with c/o emesis, dysuria, frequent urination, poor po intake, N/V and not taking meds for few days. Admitted for acute encephalopathy suspected 2/2 to UTI and dehydration, also suspected underlying psych condition, BH following, resumed her bipolar and anxiety meds. CTH negative. UCx growing negative rods and completed Abx with infection resolution. PT recommended [facility] however due to insurance issue, daily PT was provided and pt is now optimized and stable to be discharge to inpatient psych.

## 2024-05-24 NOTE — DISCHARGE NOTE PROVIDER - NSDCCPCAREPLAN_GEN_ALL_CORE_FT
PRINCIPAL DISCHARGE DIAGNOSIS  Diagnosis: Metabolic encephalopathy  Assessment and Plan of Treatment: 2/2 to UTI and dehydration, resolved      SECONDARY DISCHARGE DIAGNOSES  Diagnosis: Urinary tract infection  Assessment and Plan of Treatment: Resolved     PRINCIPAL DISCHARGE DIAGNOSIS  Diagnosis: Metabolic encephalopathy  Assessment and Plan of Treatment: 2/2 to UTI and dehydration, resolved      SECONDARY DISCHARGE DIAGNOSES  Diagnosis: Urinary tract infection  Assessment and Plan of Treatment: Resolved    Diagnosis: Severe recurrent major depression  Assessment and Plan of Treatment: Plan for inpatient psychiatric hospitalization    Diagnosis: H/O urinary retention  Assessment and Plan of Treatment:

## 2024-05-24 NOTE — PROGRESS NOTE ADULT - SUBJECTIVE AND OBJECTIVE BOX
SUBJECTIVE:    Chief Complaint: Patient is a 74y old  Female who presents with a chief complaint of AMS (23 May 2024 14:45)    BRIEF HOSPITAL COURSE:   74F w/ PMHx of panic attacks, HTN, depression, anxiety, fibromyalgia, breast CA, HLD, presented with c/o emesis, dysuria, frequent urination, poor po intake, N/V and not taking meds for few days. Admitted for acute encephalopathy suspected 2/2 to UTI and dehydration, also suspected underlying psych condition, BH following, resumed her bipolar and anxiety meds. CTH negative. UCx growing negative rods, on empiric ceftriaxone, pending sensitivities. Inpatient psych placement once resolution of UTI.   PT recs [facility]- Need daily PT for psych placement.      INTERVAL HPI/LAST 24HRS EVENTS: Patient seen and examined at bedside at AM. No clinically acute event overnight.   Denies any chest pain, palpitations, SOB, PND, orthopnea, leg edema, N/V/D, or any other complaints.     MEDICATIONS  (STANDING):  clonazePAM  Tablet 0.5 milliGRAM(s) Oral <User Schedule>  clonazePAM  Tablet 1 milliGRAM(s) Oral at bedtime  clopidogrel Tablet 75 milliGRAM(s) Oral daily  DULoxetine 60 milliGRAM(s) Oral two times a day  enoxaparin Injectable 40 milliGRAM(s) SubCutaneous every 24 hours  gabapentin 100 milliGRAM(s) Oral three times a day  lithium 150 milliGRAM(s) Oral at bedtime  losartan 50 milliGRAM(s) Oral daily  melatonin 6 milliGRAM(s) Oral at bedtime  QUEtiapine 150 milliGRAM(s) Oral at bedtime    MEDICATIONS  (PRN):  acetaminophen     Tablet .. 650 milliGRAM(s) Oral every 6 hours PRN Temp greater or equal to 38C (100.4F), Mild Pain (1 - 3)  albuterol    90 MICROgram(s) HFA Inhaler 1 Puff(s) Inhalation two times a day PRN for shortness of breath and/or wheezing  aluminum hydroxide/magnesium hydroxide/simethicone Suspension 30 milliLiter(s) Oral every 4 hours PRN Dyspepsia  hydrOXYzine hydrochloride 25 milliGRAM(s) Oral three times a day PRN Anxiety  melatonin 3 milliGRAM(s) Oral at bedtime PRN Insomnia  traZODone 50 milliGRAM(s) Oral at bedtime PRN Insomnia      Allergies    Cipro (Stomach Upset; Vomiting)  Demerol HCl (Stomach Upset; Vomiting)    Intolerances        REVIEW OF SYSTEMS:  CONSTITUTIONAL: No fever, weight loss, or fatigue  RESPIRATORY: No cough, wheezing, chills or hemoptysis; No shortness of breath  CARDIOVASCULAR: No chest pain, palpitations, dizziness, or leg swelling  GASTROINTESTINAL: No abdominal or epigastric pain. No nausea, vomiting, or hematemesis; No diarrhea or constipation. No melena or hematochezia.  NEUROLOGICAL: No headaches, loss of strength, numbness, or tremors  MUSCULOSKELETAL: No joint pain or swelling; No muscle, back, or extremity pain    OBJECTIVE:    Vital Signs Last 24 Hrs  T(C): 36.5 (24 May 2024 04:00), Max: 36.6 (23 May 2024 20:06)  T(F): 97.7 (24 May 2024 04:00), Max: 97.8 (23 May 2024 20:06)  HR: 84 (24 May 2024 04:00) (84 - 84)  BP: 121/78 (24 May 2024 04:00) (107/67 - 121/78)  BP(mean): --  RR: 18 (24 May 2024 04:00) (18 - 18)  SpO2: 91% (24 May 2024 04:00) (91% - 98%)    Parameters below as of 24 May 2024 04:00  Patient On (Oxygen Delivery Method): room air        PHYSICAL EXAM:  Constitutional: Alert, interactive, comfortable, NAD  Head and Face: Atraumatic, head and face were normal in appearance  Eyes: Sclera and conjunctiva were normal, pupils were equal in size, round, eyelids normal  ENT: Ears and nose were normal in appearance  Neck: Appearance was normal, neck was supple, No JVD  Pulmonary: Clear to auscultation bilaterally, no rales/crackles, or wheezing  Heart: Regular rate and rhythm, no murmurs, gallops, or pericardial rubs  Abdominal: Soft, nontender, nondistended. No appreciable hepatosplenomegaly. Bowel sounds normal  Skin: Normal color and intact without appreciable rash or abnormal skin lesion  Extremities: Warm without edema. No clubbing.  Pulse: 2+ radial pulse    Lab/ Imaging:    LABS:                        11.9   7.51  )-----------( 245      ( 23 May 2024 03:52 )             36.2     05-23    139  |  101  |  16.6  ----------------------------<  108<H>  3.9   |  23.0  |  0.80    Ca    9.4      23 May 2024 03:52  Phos  4.1     05-23  Mg     1.9     05-23        Urinalysis Basic - ( 23 May 2024 03:52 )    Color: x / Appearance: x / SG: x / pH: x  Gluc: 108 mg/dL / Ketone: x  / Bili: x / Urobili: x   Blood: x / Protein: x / Nitrite: x   Leuk Esterase: x / RBC: x / WBC x   Sq Epi: x / Non Sq Epi: x / Bacteria: x

## 2024-05-24 NOTE — BH CONSULTATION LIAISON PROGRESS NOTE - NSBHFUPINTERVALHXFT_PSY_A_CORE
Patient is a 74 year old, female; domiciled with her adult son and ex-;  (2003); noncaregiver; unemployed on SSI; past psychiatric history of bipolar depression and anxiety; currently under the care of [facility]; per chart review- 2 reported prior psychiatric hospitalizations at [facility] over 10 years ago; no known suicide attempts; no known history of violence or arrests; no active substance abuse or known history of complicated withdrawal; PMH of arthritis, fibromyalgia; brought in by EMS; called by ex- (with whom she lives); presenting with anxiety and reports of vomiting x several days.    Patient was seen and examined by the psychiatric treatment team today with 1:1 present. Patient observed to be sleeping in bed. Per 1:1, patient ate one bite of her sandwich and then went back to sleep. 1:1 also mentions that the patient did get out of bed briefly yesterday, however, continues to spend majority of the day sleeping. Patient's son also came to visit her yesterday, however briefly, as patient remained in bed and was sleeping. Today she endorses severe depression and ongoing anxiety. On observation, patient was tense with speech latency and gulping noises. Patient informed of likelihood of transfer today and was agreeable. Patient encouraged to spend time out of bed and awake.

## 2024-05-24 NOTE — BH CONSULTATION LIAISON PROGRESS NOTE - OTHER
Difficulty with speech production

## 2024-05-25 PROCEDURE — 99232 SBSQ HOSP IP/OBS MODERATE 35: CPT

## 2024-05-25 PROCEDURE — 99233 SBSQ HOSP IP/OBS HIGH 50: CPT

## 2024-05-25 RX ADMIN — QUETIAPINE FUMARATE 150 MILLIGRAM(S): 200 TABLET, FILM COATED ORAL at 21:53

## 2024-05-25 RX ADMIN — CLOPIDOGREL BISULFATE 75 MILLIGRAM(S): 75 TABLET, FILM COATED ORAL at 14:24

## 2024-05-25 RX ADMIN — Medication 6 MILLIGRAM(S): at 21:53

## 2024-05-25 RX ADMIN — GABAPENTIN 100 MILLIGRAM(S): 400 CAPSULE ORAL at 14:24

## 2024-05-25 RX ADMIN — LOSARTAN POTASSIUM 50 MILLIGRAM(S): 100 TABLET, FILM COATED ORAL at 05:55

## 2024-05-25 RX ADMIN — ENOXAPARIN SODIUM 40 MILLIGRAM(S): 100 INJECTION SUBCUTANEOUS at 14:23

## 2024-05-25 RX ADMIN — DULOXETINE HYDROCHLORIDE 60 MILLIGRAM(S): 30 CAPSULE, DELAYED RELEASE ORAL at 05:55

## 2024-05-25 RX ADMIN — Medication 0.5 MILLIGRAM(S): at 05:55

## 2024-05-25 RX ADMIN — LITHIUM CARBONATE 150 MILLIGRAM(S): 300 TABLET, EXTENDED RELEASE ORAL at 21:53

## 2024-05-25 RX ADMIN — GABAPENTIN 100 MILLIGRAM(S): 400 CAPSULE ORAL at 05:55

## 2024-05-25 RX ADMIN — Medication 1 MILLIGRAM(S): at 21:53

## 2024-05-25 RX ADMIN — GABAPENTIN 100 MILLIGRAM(S): 400 CAPSULE ORAL at 21:53

## 2024-05-25 RX ADMIN — DULOXETINE HYDROCHLORIDE 60 MILLIGRAM(S): 30 CAPSULE, DELAYED RELEASE ORAL at 17:34

## 2024-05-25 NOTE — BH CONSULTATION LIAISON PROGRESS NOTE - NSBHFUPINTERVALHXFT_PSY_A_CORE
Patient is a 74 year old, female; domiciled with her adult son and ex-;  (2003); noncaregiver; unemployed on SSI; past psychiatric history of bipolar depression and anxiety; currently under the care of [facility]; per chart review- 2 reported prior psychiatric hospitalizations at [facility] over 10 years ago; no known suicide attempts; no known history of violence or arrests; no active substance abuse or known history of complicated withdrawal; PMH of arthritis, fibromyalgia; brought in by EMS; called by ex- (with whom she lives); presenting with anxiety and reports of vomiting x several days.    Patient was seen and examined by the psychiatric treatment team today with 1:1 present. Patient seen laying in bed in constricted position. patient asking writer to be left alone. Writer attempting to engage patient despite resistance. patient stating she is depressed and has "nothing". patient states her ex  visited her yesterday and it went "awful" and he wants nothing to do with her. patient stating she has no interest in doing anything and has no life. patient reports poor sleep and poor appetite and states she did not eat yesterday but will try to eat today. when asked about s/h ideation she refuses to respond but denies any AVH.     Writer attempted cognitive screening with patient but patient minimally cooperative and terminated screen mid way   Oriented : knows the year, season, date, day and month. also knows the state, country , town, hospital and floor   Patient then refused to answer any further questions

## 2024-05-25 NOTE — PROGRESS NOTE ADULT - SUBJECTIVE AND OBJECTIVE BOX
HOSPITALIST PROGRESS NOTE    FAYE VILLANUEVA  [id number]  74yFemale    Patient is a 74y old  Female who presents with a chief complaint of AMS (24 May 2024 11:15)      SUBJECTIVE:   Chart reviewed since last visit.  Discussed with Behavioural Health FAUSTO Alaniz  Patient seen and examined at bedside for severe depression.  Withdrawn, wants to go to sleep  Didnt eat as per aide at bedside      OBJECTIVE:  Vital Signs Last 24 Hrs  T(C): 36.7 (25 May 2024 14:23), Max: 37.1 (25 May 2024 04:22)  T(F): 98 (25 May 2024 14:23), Max: 98.7 (25 May 2024 04:22)  HR: 70 (25 May 2024 14:23) (58 - 82)  BP: 102/68 (25 May 2024 14:23) (102/68 - 128/63)  BP(mean): --  RR: 18 (25 May 2024 04:22) (18 - 18)  SpO2: 98% (25 May 2024 14:23) (95% - 98%)    Parameters below as of 25 May 2024 04:22  Patient On (Oxygen Delivery Method): room air        PHYSICAL EXAMINATION  General: Obese female lying in bed, comfortable  HEENT:  intact movement  NECK:  supple  CVS: regular rate and rhythm S1 S2  RESP: clear to auscultation bilaterally    GI:  soft nondistended nontender BS+  : No suprapubic tenderness  MSK:  moves all extremities  CNS:  withdrawn, refusing examination  INTEG:  warm dry skin  PSYCH:  withdrawn    MONITOR:  CAPILLARY BLOOD GLUCOSE            I&O's Summary                      Culture:    TTE:    RADIOLOGY        MEDICATIONS  (STANDING):  clonazePAM  Tablet 0.5 milliGRAM(s) Oral <User Schedule>  clonazePAM  Tablet 1 milliGRAM(s) Oral at bedtime  clopidogrel Tablet 75 milliGRAM(s) Oral daily  DULoxetine 60 milliGRAM(s) Oral two times a day  enoxaparin Injectable 40 milliGRAM(s) SubCutaneous every 24 hours  gabapentin 100 milliGRAM(s) Oral three times a day  lithium 150 milliGRAM(s) Oral at bedtime  losartan 50 milliGRAM(s) Oral daily  melatonin 6 milliGRAM(s) Oral at bedtime  QUEtiapine 150 milliGRAM(s) Oral once  QUEtiapine 150 milliGRAM(s) Oral at bedtime      MEDICATIONS  (PRN):  acetaminophen     Tablet .. 650 milliGRAM(s) Oral every 6 hours PRN Temp greater or equal to 38C (100.4F), Mild Pain (1 - 3)  albuterol    90 MICROgram(s) HFA Inhaler 1 Puff(s) Inhalation two times a day PRN for shortness of breath and/or wheezing  aluminum hydroxide/magnesium hydroxide/simethicone Suspension 30 milliLiter(s) Oral every 4 hours PRN Dyspepsia  hydrOXYzine hydrochloride 25 milliGRAM(s) Oral three times a day PRN Anxiety  melatonin 3 milliGRAM(s) Oral at bedtime PRN Insomnia  traZODone 50 milliGRAM(s) Oral at bedtime PRN Insomnia

## 2024-05-25 NOTE — PROGRESS NOTE ADULT - ASSESSMENT
74F w/ PMHx of panic attacks, HTN, depression, anxiety, fibromyalgia, breast CA, HLD, presented with c/o emesis, dysuria, frequent urination, poor po intake, N/V and not taking meds for few days. Admitted for acute encephalopathy suspected 2/2 to UTI and dehydration, also suspected underlying psych condition,  following, resumed her bipolar and anxiety meds. CTH negative. UCx growing negative rods, on empiric ceftriaxone, pending sensitivities. Inpatient psych placement after daily PT and deemed stable for transfer for inpatient psych.      Bipolar depression and anxiety  Nonadherence to home medications  -  following   - c/w 1:1 constant observation (pending inpatient psych admission)  - c/w Cymbalta 60mg BID, lithium 150mg qhs, gabapentin 100mg TID  - c/w Trazodone 50mg and melatonin 6mg PRN for insomnia and Atarax 25mg TID PRN for anxiety   - c/w Seroquel 150 mg po at bedtime   - c/w Clonazepam 0.5mg AM and 1mg PM  - atarax 25mg TID PRN    Acute metabolic encephalopathy 2/2 UTI  afebrile and WBC WNL  - Ucxs Gram negative rods, completed 3 days of ceftriaxone   - BCx NGTD  - Holding Myrbetriq and oxybutynin as both can cause delirium in elderly pts   - CTH negative for acute pathologies     Electrolyte imbalance  - s/p repletion 5/21/24    HTN   - c/w Losartan 50mg po qd    Other home med  - c/w Plavix 75mg po QD, unable to find the reason to be on via chart review      VTE ppx: Lovenox SQ  Diet: DASH, d/c'd PPI   Dispo: pending inpatient psych placement     Discussed with FAUSTO Alaniz from Psych; needs to have adequate intake prior to acceptance by facility. Will check calorie count

## 2024-05-25 NOTE — BH CONSULTATION LIAISON PROGRESS NOTE - NSBHASSESSMENTFT_PSY_ALL_CORE
Patient is a 74 year old, female; domiciled with her adult son and ex-;  (2003); noncaregiver; unemployed on SSI; past psychiatric history of bipolar depression and anxiety; currently under the care of [facility]; per chart review- 2 reported prior psychiatric hospitalizations at [facility] over 10 years ago; no known suicide attempts; no known history of violence or arrests; no active substance abuse or known history of complicated withdrawal; PMH of arthritis, fibromyalgia; brought in by EMS; called by ex- (with whom she lives); presenting with anxiety and reports of vomiting x several days.    5/20: Patient continues to present with symptoms of depression and anxiety on observation with unchanged clinical presentation, however, she is reporting improvement. Due to patient somnolence, will recommend decreasing AM Klonopin to 0.5 mg. Will also recommend patient stay on 1:1 due to poor ADLs and pending inpatient psychiatric admission.     5/21: Patient with some improvement in her anxiety and unchanged depression with poor ADLs and decreased activity. Patient's sedation improving as medications are being tapered, however, her current symptoms of depression are likely contributing as well. Patient pending and agreeable to inpatient psychiatric admission. Patient encouraged to manage her ADLs, be more active and spend time out of bed    5/22: Patient seen for f/u and her ambulation has been improving with PT but still with severe depression/ anhedonia and poor ADLs. patient still unable to care for self and would benefit from inpatient psych     5/23: Patient continues to present with heightened anxiety (recent worsening compared to the last few encounters) and depressive symptoms. Patient encouraged to spend time out of bed, however, she continues to have decreased activity and low energy with poor ADLs. Patient pending transfer to inpatient psychiatry.     5/24: Patient with unchanged clinical presentation of severe depression and anxiety with decreased activity, spending majority of her time in bed. She has also been noted to have decreased PO intake recently. Patient encouraged to eat and spend time awake/OOB. Pending inpatient psychiatric admission.     5/25: patient seen and found to be laying in bed constricted. discussed with MD and RN , patient needs encouragement to eat and OOB to chair. plan for inpatient psych once med available     Labs: Li level 0.14 (5/14)    Recommendations:  - CONSTANT OBSERVATION 1:1 (patient pending inpatient psych admission)    - Continue Seroquel 150 mg QHS   - Continue Lithium 150 mg QHS   - Continue Cymbalta 60 mg BID  - Continue Melatonin 6 mg QHS   - Continue PRN Trazodone 50 mg 1-2 hours after melatonin administration  - Continue PRN Atarax 25 mg TID  - Continue Gabapentin 100 mg TID   - Continue Melatonin 3 mg QHS   - Continue Klonopin 0.5 mg/1 mg   - Patient pending inpatient psychiatric admission once medically cleared

## 2024-05-26 LAB
ANION GAP SERPL CALC-SCNC: 15 MMOL/L — SIGNIFICANT CHANGE UP (ref 5–17)
BUN SERPL-MCNC: 13.5 MG/DL — SIGNIFICANT CHANGE UP (ref 8–20)
CALCIUM SERPL-MCNC: 9 MG/DL — SIGNIFICANT CHANGE UP (ref 8.4–10.5)
CHLORIDE SERPL-SCNC: 102 MMOL/L — SIGNIFICANT CHANGE UP (ref 96–108)
CO2 SERPL-SCNC: 19 MMOL/L — LOW (ref 22–29)
CREAT SERPL-MCNC: 0.88 MG/DL — SIGNIFICANT CHANGE UP (ref 0.5–1.3)
EGFR: 70 ML/MIN/1.73M2 — SIGNIFICANT CHANGE UP
GLUCOSE SERPL-MCNC: 105 MG/DL — HIGH (ref 70–99)
POTASSIUM SERPL-MCNC: 3.5 MMOL/L — SIGNIFICANT CHANGE UP (ref 3.5–5.3)
POTASSIUM SERPL-SCNC: 3.5 MMOL/L — SIGNIFICANT CHANGE UP (ref 3.5–5.3)
SODIUM SERPL-SCNC: 136 MMOL/L — SIGNIFICANT CHANGE UP (ref 135–145)

## 2024-05-26 PROCEDURE — 93010 ELECTROCARDIOGRAM REPORT: CPT

## 2024-05-26 PROCEDURE — 99232 SBSQ HOSP IP/OBS MODERATE 35: CPT

## 2024-05-26 RX ADMIN — CLOPIDOGREL BISULFATE 75 MILLIGRAM(S): 75 TABLET, FILM COATED ORAL at 13:18

## 2024-05-26 RX ADMIN — GABAPENTIN 100 MILLIGRAM(S): 400 CAPSULE ORAL at 20:48

## 2024-05-26 RX ADMIN — GABAPENTIN 100 MILLIGRAM(S): 400 CAPSULE ORAL at 05:39

## 2024-05-26 RX ADMIN — Medication 1 MILLIGRAM(S): at 20:48

## 2024-05-26 RX ADMIN — Medication 6 MILLIGRAM(S): at 20:48

## 2024-05-26 RX ADMIN — QUETIAPINE FUMARATE 150 MILLIGRAM(S): 200 TABLET, FILM COATED ORAL at 20:47

## 2024-05-26 RX ADMIN — Medication 25 MILLIGRAM(S): at 21:44

## 2024-05-26 RX ADMIN — ENOXAPARIN SODIUM 40 MILLIGRAM(S): 100 INJECTION SUBCUTANEOUS at 17:39

## 2024-05-26 RX ADMIN — GABAPENTIN 100 MILLIGRAM(S): 400 CAPSULE ORAL at 13:18

## 2024-05-26 RX ADMIN — LOSARTAN POTASSIUM 50 MILLIGRAM(S): 100 TABLET, FILM COATED ORAL at 05:40

## 2024-05-26 RX ADMIN — Medication 0.5 MILLIGRAM(S): at 05:39

## 2024-05-26 RX ADMIN — LITHIUM CARBONATE 150 MILLIGRAM(S): 300 TABLET, EXTENDED RELEASE ORAL at 20:47

## 2024-05-26 RX ADMIN — DULOXETINE HYDROCHLORIDE 60 MILLIGRAM(S): 30 CAPSULE, DELAYED RELEASE ORAL at 17:40

## 2024-05-26 RX ADMIN — DULOXETINE HYDROCHLORIDE 60 MILLIGRAM(S): 30 CAPSULE, DELAYED RELEASE ORAL at 05:39

## 2024-05-26 NOTE — PROGRESS NOTE ADULT - ASSESSMENT
74F w/ PMHx of panic attacks, HTN, depression, anxiety, fibromyalgia, breast CA, HLD, presented with c/o emesis, dysuria, frequent urination, poor po intake, N/V and not taking meds for few days. Admitted for acute encephalopathy suspected 2/2 to UTI and dehydration, also suspected underlying psych condition,  following, resumed her bipolar and anxiety meds. CTH negative. UCx growing negative rods, on empiric ceftriaxone, pending sensitivities. Inpatient psych placement after daily PT and deemed stable for transfer for inpatient psych.      Bipolar depression and anxiety  Nonadherence to home medications  -  following   - c/w 1:1 constant observation (pending inpatient psych admission)  - c/w Cymbalta 60mg BID, lithium 150mg qhs, gabapentin 100mg TID  - c/w Trazodone 50mg and melatonin 6mg PRN for insomnia and Atarax 25mg TID PRN for anxiety   - c/w Seroquel 150 mg po at bedtime   - c/w Clonazepam 0.5mg AM and 1mg PM  - atarax 25mg TID PRN    Acute metabolic encephalopathy 2/2 UTI  afebrile and WBC WNL  - Ucxs Gram negative rods, completed 3 days of ceftriaxone   - BCx NGTD  - Holding Myrbetriq and oxybutynin as both can cause delirium in elderly pts   - CTH negative for acute pathologies     Electrolyte imbalance  - s/p repletion 5/21/24    HTN   - c/w Losartan 50mg po qd    Other home med  - c/w Plavix 75mg po QD, unable to find the reason to be on via chart review      VTE ppx: Lovenox SQ  Diet: DASH  Dispo: Optimize intake for inpatient psych 74F w/ PMHx of panic attacks, HTN, depression, anxiety, fibromyalgia, breast CA, HLD, presented with c/o emesis, dysuria, frequent urination, poor po intake, N/V and not taking meds for few days. Admitted for acute encephalopathy suspected 2/2 to UTI and dehydration, also suspected underlying psych condition,  following, resumed her bipolar and anxiety meds. CTH negative. UCx growing negative rods, on empiric ceftriaxone, pending sensitivities. Inpatient psych placement after daily PT and deemed stable for transfer for inpatient psych.      Bipolar depression and anxiety  Nonadherence to home medications  -  following   - c/w 1:1 constant observation (pending inpatient psych admission)  - c/w Cymbalta 60mg BID, lithium 150mg qhs, gabapentin 100mg TID  - c/w Trazodone 50mg and melatonin 6mg PRN for insomnia and Atarax 25mg TID PRN for anxiety   - c/w Seroquel 150 mg po at bedtime   - c/w Clonazepam 0.5mg AM and 1mg PM  - Atarax 25mg TID PRN  - Ordered EKG for QTc interval monitoring     Acute metabolic encephalopathy 2/2 UTI  afebrile and WBC WNL  - Ucxs Gram negative rods, completed 3 days of ceftriaxone   - BCx NGTD  - Holding Myrbetriq and oxybutynin as both can cause delirium in elderly pts   - CTH negative for acute pathologies     Electrolyte imbalance  - s/p repletion 5/21/24    HTN   - c/w Losartan 50mg po qd    Other home med  - c/w Plavix 75mg po QD, unable to find the reason to be on via chart review      VTE ppx: Lovenox SQ  Diet: DASH  Dispo: Optimize intake, OOB for inpatient psych

## 2024-05-26 NOTE — PROGRESS NOTE ADULT - SUBJECTIVE AND OBJECTIVE BOX
SUBJECTIVE:    Chief Complaint: Patient is a 74y old  Female who presents with a chief complaint of AMS (25 May 2024 16:12)    BRIEF HOSPITAL COURSE:   74F w/ PMHx of panic attacks, HTN, depression, anxiety, fibromyalgia, breast CA, HLD, presented with c/o emesis, dysuria, frequent urination, poor po intake, N/V and not taking meds for few days. Admitted for acute encephalopathy suspected 2/2 to UTI and dehydration, also suspected underlying psych condition, BH following, resumed her bipolar and anxiety meds. CTH negative. UCx growing negative rods, on empiric ceftriaxone, pending sensitivities. Inpatient psych placement once resolution of UTI.   PT recs TULIO- received daily PT for psych placement.      INTERVAL HPI/LAST 24HRS EVENTS: Patient seen and examined at bedside at AM. No clinically acute event overnight.   Denies any chest pain, palpitations, SOB, PND, orthopnea, leg edema, N/V/D, or any other complaints.     MEDICATIONS  (STANDING):  clonazePAM  Tablet 0.5 milliGRAM(s) Oral <User Schedule>  clonazePAM  Tablet 1 milliGRAM(s) Oral at bedtime  clopidogrel Tablet 75 milliGRAM(s) Oral daily  DULoxetine 60 milliGRAM(s) Oral two times a day  enoxaparin Injectable 40 milliGRAM(s) SubCutaneous every 24 hours  gabapentin 100 milliGRAM(s) Oral three times a day  lithium 150 milliGRAM(s) Oral at bedtime  losartan 50 milliGRAM(s) Oral daily  melatonin 6 milliGRAM(s) Oral at bedtime  QUEtiapine 150 milliGRAM(s) Oral once  QUEtiapine 150 milliGRAM(s) Oral at bedtime    MEDICATIONS  (PRN):  acetaminophen     Tablet .. 650 milliGRAM(s) Oral every 6 hours PRN Temp greater or equal to 38C (100.4F), Mild Pain (1 - 3)  albuterol    90 MICROgram(s) HFA Inhaler 1 Puff(s) Inhalation two times a day PRN for shortness of breath and/or wheezing  aluminum hydroxide/magnesium hydroxide/simethicone Suspension 30 milliLiter(s) Oral every 4 hours PRN Dyspepsia  hydrOXYzine hydrochloride 25 milliGRAM(s) Oral three times a day PRN Anxiety  melatonin 3 milliGRAM(s) Oral at bedtime PRN Insomnia  traZODone 50 milliGRAM(s) Oral at bedtime PRN Insomnia      Allergies    Cipro (Stomach Upset; Vomiting)  Demerol HCl (Stomach Upset; Vomiting)    Intolerances      REVIEW OF SYSTEMS:  REVIEW OF SYSTEMS:  CONSTITUTIONAL: No fever, weight loss, or fatigue  RESPIRATORY: No cough, wheezing, chills or hemoptysis; No shortness of breath  CARDIOVASCULAR: No chest pain, palpitations, dizziness, or leg swelling  GASTROINTESTINAL: No abdominal or epigastric pain. No nausea, vomiting, or hematemesis; No diarrhea or constipation. No melena or hematochezia.  NEUROLOGICAL: No headaches, loss of strength, numbness, or tremors  MUSCULOSKELETAL: No joint pain or swelling; No muscle, back, or extremity pain    OBJECTIVE:    Vital Signs Last 24 Hrs  T(C): 36.9 (26 May 2024 08:10), Max: 36.9 (26 May 2024 03:17)  T(F): 98.4 (26 May 2024 08:10), Max: 98.5 (26 May 2024 03:17)  HR: 80 (26 May 2024 08:10) (70 - 80)  BP: 109/71 (26 May 2024 08:10) (102/68 - 109/71)  BP(mean): --  RR: 18 (26 May 2024 08:10) (18 - 20)  SpO2: 92% (26 May 2024 08:10) (92% - 100%)    Parameters below as of 26 May 2024 08:10  Patient On (Oxygen Delivery Method): room air      PHYSICAL EXAM:  Constitutional: Alert, interactive, comfortable, NAD  Head and Face: Atraumatic, head and face were normal in appearance  Eyes: Sclera and conjunctiva were normal, pupils were equal in size, round, eyelids normal  ENT: Ears and nose were normal in appearance  Neck: Appearance was normal, neck was supple, No JVD  Pulmonary: Clear to auscultation bilaterally, no rales/crackles, or wheezing  Heart: Regular rate and rhythm, no murmurs, gallops, or pericardial rubs  Abdominal: Soft, nontender, nondistended. No appreciable hepatosplenomegaly. Bowel sounds normal  Skin: Normal color and intact without appreciable rash or abnormal skin lesion  Extremities: Warm without edema. No clubbing.  Pulse: 2+ radial pulse    Lab/ Imaging:    LABS:    05-26    136  |  102  |  13.5  ----------------------------<  105<H>  3.5   |  19.0<L>  |  0.88    Ca    9.0      26 May 2024 04:45        Urinalysis Basic - ( 26 May 2024 04:45 )    Color: x / Appearance: x / SG: x / pH: x  Gluc: 105 mg/dL / Ketone: x  / Bili: x / Urobili: x   Blood: x / Protein: x / Nitrite: x   Leuk Esterase: x / RBC: x / WBC x   Sq Epi: x / Non Sq Epi: x / Bacteria: x      CAPILLARY BLOOD GLUCOSE              Imaging Personally Reviewed:  [ ] YES  [ ] NO    Consultant(s) Notes Reviewed:  [ ] YES  [ ] NO    Care Discussed with Consultants/Other Providers [ ] YES  [ ] NO    Plan of Care discussed with H[facility]est[facility] [ ]YES [ ] NO SUBJECTIVE:    Chief Complaint: Patient is a 74y old  Female who presents with a chief complaint of AMS (25 May 2024 16:12)    BRIEF HOSPITAL COURSE:   74F w/ PMHx of panic attacks, HTN, depression, anxiety, fibromyalgia, breast CA, HLD, presented with c/o emesis, dysuria, frequent urination, poor po intake, N/V and not taking meds for few days. Admitted for acute encephalopathy suspected 2/2 to UTI and dehydration, also suspected underlying psych condition, BH following, resumed her bipolar and anxiety meds. CTH negative. UCx growing negative rods, on empiric ceftriaxone, pending sensitivities. Inpatient psych placement once resolution of UTI.   PT recs [facility]ULIO- received daily PT for psych placement.      INTERVAL HPI/LAST 24HRS EVENTS: Patient seen and examined at bedside at AM. No clinically acute event overnight. Pt is still refusing to eat despite encouragement however when asked what she would like to eat she said penne vodka. Denies pain or nausea.    Denies any chest pain, palpitations, SOB, PND, orthopnea, leg edema, V/D, or any other complaints.     MEDICATIONS  (STANDING):  clonazePAM  Tablet 0.5 milliGRAM(s) Oral <User Schedule>  clonazePAM  Tablet 1 milliGRAM(s) Oral at bedtime  clopidogrel Tablet 75 milliGRAM(s) Oral daily  DULoxetine 60 milliGRAM(s) Oral two times a day  enoxaparin Injectable 40 milliGRAM(s) SubCutaneous every 24 hours  gabapentin 100 milliGRAM(s) Oral three times a day  lithium 150 milliGRAM(s) Oral at bedtime  losartan 50 milliGRAM(s) Oral daily  melatonin 6 milliGRAM(s) Oral at bedtime  QUEtiapine 150 milliGRAM(s) Oral once  QUEtiapine 150 milliGRAM(s) Oral at bedtime    MEDICATIONS  (PRN):  acetaminophen     Tablet .. 650 milliGRAM(s) Oral every 6 hours PRN Temp greater or equal to 38C (100.4F), Mild Pain (1 - 3)  albuterol    90 MICROgram(s) HFA Inhaler 1 Puff(s) Inhalation two times a day PRN for shortness of breath and/or wheezing  aluminum hydroxide/magnesium hydroxide/simethicone Suspension 30 milliLiter(s) Oral every 4 hours PRN Dyspepsia  hydrOXYzine hydrochloride 25 milliGRAM(s) Oral three times a day PRN Anxiety  melatonin 3 milliGRAM(s) Oral at bedtime PRN Insomnia  traZODone 50 milliGRAM(s) Oral at bedtime PRN Insomnia      Allergies    Cipro (Stomach Upset; Vomiting)  Demerol HCl (Stomach Upset; Vomiting)    Intolerances      REVIEW OF SYSTEMS:  REVIEW OF SYSTEMS:  CONSTITUTIONAL: No fever, weight loss, or fatigue  RESPIRATORY: No cough, wheezing, chills or hemoptysis; No shortness of breath  CARDIOVASCULAR: No chest pain, palpitations, dizziness, or leg swelling  GASTROINTESTINAL: No abdominal or epigastric pain. No nausea, vomiting, or hematemesis; No diarrhea or constipation. No melena or hematochezia.  NEUROLOGICAL: No headaches, loss of strength, numbness, or tremors  MUSCULOSKELETAL: No joint pain or swelling; No muscle, back, or extremity pain    OBJECTIVE:    Vital Signs Last 24 Hrs  T(C): 36.9 (26 May 2024 08:10), Max: 36.9 (26 May 2024 03:17)  T(F): 98.4 (26 May 2024 08:10), Max: 98.5 (26 May 2024 03:17)  HR: 80 (26 May 2024 08:10) (70 - 80)  BP: 109/71 (26 May 2024 08:10) (102/68 - 109/71)  BP(mean): --  RR: 18 (26 May 2024 08:10) (18 - 20)  SpO2: 92% (26 May 2024 08:10) (92% - 100%)    Parameters below as of 26 May 2024 08:10  Patient On (Oxygen Delivery Method): room air      PHYSICAL EXAM:  Constitutional: Alert, comfortable, NAD, anxious  Head and Face: Atraumatic, head and face were normal in appearance  Eyes: Sclera and conjunctiva were normal, pupils were equal in size, round, eyelids normal  ENT: Ears and nose were normal in appearance  Neck: Appearance was normal, neck was supple, No JVD  Pulmonary: Clear to auscultation bilaterally, no rales/crackles, or wheezing  Heart: Regular rate and rhythm, no murmurs, gallops, or pericardial rubs  Abdominal: Soft, nontender, nondistended. No appreciable hepatosplenomegaly. Bowel sounds normal  Skin: Normal color and intact without appreciable rash or abnormal skin lesion  Extremities: Warm without edema. No clubbing.  Pulse: 2+ radial pulse    Lab/ Imaging:    LABS:    05-26    136  |  102  |  13.5  ----------------------------<  105<H>  3.5   |  19.0<L>  |  0.88    Ca    9.0      26 May 2024 04:45        Urinalysis Basic - ( 26 May 2024 04:45 )    Color: x / Appearance: x / SG: x / pH: x  Gluc: 105 mg/dL / Ketone: x  / Bili: x / Urobili: x   Blood: x / Protein: x / Nitrite: x   Leuk Esterase: x / RBC: x / WBC x   Sq Epi: x / Non Sq Epi: x / Bacteria: x

## 2024-05-27 LAB
ANION GAP SERPL CALC-SCNC: 12 MMOL/L — SIGNIFICANT CHANGE UP (ref 5–17)
BUN SERPL-MCNC: 22.1 MG/DL — HIGH (ref 8–20)
CALCIUM SERPL-MCNC: 9.1 MG/DL — SIGNIFICANT CHANGE UP (ref 8.4–10.5)
CHLORIDE SERPL-SCNC: 102 MMOL/L — SIGNIFICANT CHANGE UP (ref 96–108)
CO2 SERPL-SCNC: 21 MMOL/L — LOW (ref 22–29)
CREAT SERPL-MCNC: 1.03 MG/DL — SIGNIFICANT CHANGE UP (ref 0.5–1.3)
EGFR: 58 ML/MIN/1.73M2 — LOW
GLUCOSE SERPL-MCNC: 109 MG/DL — HIGH (ref 70–99)
POTASSIUM SERPL-MCNC: 3.8 MMOL/L — SIGNIFICANT CHANGE UP (ref 3.5–5.3)
POTASSIUM SERPL-SCNC: 3.8 MMOL/L — SIGNIFICANT CHANGE UP (ref 3.5–5.3)
SODIUM SERPL-SCNC: 135 MMOL/L — SIGNIFICANT CHANGE UP (ref 135–145)

## 2024-05-27 PROCEDURE — 99232 SBSQ HOSP IP/OBS MODERATE 35: CPT

## 2024-05-27 RX ORDER — HYDROXYZINE HCL 10 MG
25 TABLET ORAL ONCE
Refills: 0 | Status: COMPLETED | OUTPATIENT
Start: 2024-05-27 | End: 2024-05-27

## 2024-05-27 RX ADMIN — Medication 0.5 MILLIGRAM(S): at 05:09

## 2024-05-27 RX ADMIN — GABAPENTIN 100 MILLIGRAM(S): 400 CAPSULE ORAL at 14:58

## 2024-05-27 RX ADMIN — ENOXAPARIN SODIUM 40 MILLIGRAM(S): 100 INJECTION SUBCUTANEOUS at 14:59

## 2024-05-27 RX ADMIN — Medication 1 MILLIGRAM(S): at 21:27

## 2024-05-27 RX ADMIN — QUETIAPINE FUMARATE 150 MILLIGRAM(S): 200 TABLET, FILM COATED ORAL at 21:27

## 2024-05-27 RX ADMIN — LOSARTAN POTASSIUM 50 MILLIGRAM(S): 100 TABLET, FILM COATED ORAL at 05:09

## 2024-05-27 RX ADMIN — Medication 25 MILLIGRAM(S): at 14:59

## 2024-05-27 RX ADMIN — Medication 25 MILLIGRAM(S): at 12:39

## 2024-05-27 RX ADMIN — GABAPENTIN 100 MILLIGRAM(S): 400 CAPSULE ORAL at 05:09

## 2024-05-27 RX ADMIN — GABAPENTIN 100 MILLIGRAM(S): 400 CAPSULE ORAL at 21:27

## 2024-05-27 RX ADMIN — CLOPIDOGREL BISULFATE 75 MILLIGRAM(S): 75 TABLET, FILM COATED ORAL at 14:58

## 2024-05-27 RX ADMIN — DULOXETINE HYDROCHLORIDE 60 MILLIGRAM(S): 30 CAPSULE, DELAYED RELEASE ORAL at 05:09

## 2024-05-27 RX ADMIN — DULOXETINE HYDROCHLORIDE 60 MILLIGRAM(S): 30 CAPSULE, DELAYED RELEASE ORAL at 17:50

## 2024-05-27 RX ADMIN — Medication 6 MILLIGRAM(S): at 21:27

## 2024-05-27 RX ADMIN — LITHIUM CARBONATE 150 MILLIGRAM(S): 300 TABLET, EXTENDED RELEASE ORAL at 21:27

## 2024-05-27 RX ADMIN — Medication 25 MILLIGRAM(S): at 17:50

## 2024-05-27 NOTE — CHART NOTE - NSCHARTNOTEFT_GEN_A_CORE
Spoke with ex- who was concerned about the timing of the hydroxyzine. Spoke with patient who had capacity and would like to have the hydroxyzine as needed and not as standing.   We discussed that we could give her medication before her anxiety episodes by assessing her with constant observation / symptoms as noted by patient.

## 2024-05-27 NOTE — PROGRESS NOTE ADULT - SUBJECTIVE AND OBJECTIVE BOX
SUBJECTIVE:    Chief Complaint: Patient is a 74y old  Female who presents with a chief complaint of AMS (26 May 2024 08:19)    BRIEF HOSPITAL COURSE:   74F w/ PMHx of panic attacks, HTN, depression, anxiety, fibromyalgia, breast CA, HLD, presented with c/o emesis, dysuria, frequent urination, poor po intake, N/V and not taking meds for few days. Admitted for acute encephalopathy suspected 2/2 to UTI and dehydration, also suspected underlying psych condition, BH following, resumed her bipolar and anxiety meds. CTH negative. UCx growing negative rods, on empiric ceftriaxone, pending sensitivities. Inpatient psych placement once resolution of UTI.   PT recs [facility]ULIO- received daily PT for psych placement.      INTERVAL HPI/LAST 24HRS EVENTS: Patient seen and examined at bedside at AM. No clinically acute event overnight.   Denies any chest pain, palpitations, SOB, PND, orthopnea, leg edema, N/V/D, or any other complaints.     MEDICATIONS  (STANDING):  clonazePAM  Tablet 1 milliGRAM(s) Oral at bedtime  clopidogrel Tablet 75 milliGRAM(s) Oral daily  DULoxetine 60 milliGRAM(s) Oral two times a day  enoxaparin Injectable 40 milliGRAM(s) SubCutaneous every 24 hours  gabapentin 100 milliGRAM(s) Oral three times a day  lithium 150 milliGRAM(s) Oral at bedtime  losartan 50 milliGRAM(s) Oral daily  melatonin 6 milliGRAM(s) Oral at bedtime  QUEtiapine 150 milliGRAM(s) Oral once  QUEtiapine 150 milliGRAM(s) Oral at bedtime    MEDICATIONS  (PRN):  acetaminophen     Tablet .. 650 milliGRAM(s) Oral every 6 hours PRN Temp greater or equal to 38C (100.4F), Mild Pain (1 - 3)  albuterol    90 MICROgram(s) HFA Inhaler 1 Puff(s) Inhalation two times a day PRN for shortness of breath and/or wheezing  aluminum hydroxide/magnesium hydroxide/simethicone Suspension 30 milliLiter(s) Oral every 4 hours PRN Dyspepsia  hydrOXYzine hydrochloride 25 milliGRAM(s) Oral three times a day PRN Anxiety  melatonin 3 milliGRAM(s) Oral at bedtime PRN Insomnia  traZODone 50 milliGRAM(s) Oral at bedtime PRN Insomnia      Allergies    Cipro (Stomach Upset; Vomiting)  Demerol HCl (Stomach Upset; Vomiting)    Intolerances      REVIEW OF SYSTEMS:  CONSTITUTIONAL: No fever, weight loss, or fatigue  RESPIRATORY: No cough, wheezing, chills or hemoptysis; No shortness of breath  CARDIOVASCULAR: No chest pain, palpitations, dizziness, or leg swelling  GASTROINTESTINAL: No abdominal or epigastric pain. No nausea, vomiting, or hematemesis; No diarrhea or constipation. No melena or hematochezia.  NEUROLOGICAL: No headaches, loss of strength, numbness, or tremors  MUSCULOSKELETAL: No joint pain or swelling; No muscle, back, or extremity     OBJECTIVE:    Vital Signs Last 24 Hrs  T(C): 36.7 (27 May 2024 04:12), Max: 36.9 (26 May 2024 08:10)  T(F): 98 (27 May 2024 04:12), Max: 98.4 (26 May 2024 08:10)  HR: 78 (27 May 2024 04:12) (73 - 80)  BP: 101/61 (27 May 2024 04:12) (101/61 - 115/75)  BP(mean): --  RR: 18 (27 May 2024 04:12) (18 - 18)  SpO2: 91% (27 May 2024 04:12) (91% - 95%)    Parameters below as of 27 May 2024 04:12  Patient On (Oxygen Delivery Method): room air        PHYSICAL EXAM:  Constitutional: Alert, comfortable, NAD, anxious  Head and Face: Atraumatic, head and face were normal in appearance  Eyes: Sclera and conjunctiva were normal, pupils were equal in size, round, eyelids normal  ENT: Ears and nose were normal in appearance  Neck: Appearance was normal, neck was supple, No JVD  Pulmonary: Clear to auscultation bilaterally, no rales/crackles, or wheezing  Heart: Regular rate and rhythm, no murmurs, gallops, or pericardial rubs  Abdominal: Soft, nontender, nondistended. No appreciable hepatosplenomegaly. Bowel sounds normal  Skin: Normal color and intact without appreciable rash or abnormal skin lesion  Extremities: Warm without edema. No clubbing.  Pulse: 2+ radial pulse    Lab/ Imaging:    LABS:    05-27    135  |  102  |  22.1<H>  ----------------------------<  109<H>  3.8   |  21.0<L>  |  1.03    Ca    9.1      27 May 2024 04:35        Urinalysis Basic - ( 27 May 2024 04:35 )    Color: x / Appearance: x / SG: x / pH: x  Gluc: 109 mg/dL / Ketone: x  / Bili: x / Urobili: x   Blood: x / Protein: x / Nitrite: x   Leuk Esterase: x / RBC: x / WBC x   Sq Epi: x / Non Sq Epi: x / Bacteria: x   SUBJECTIVE:    Chief Complaint: Patient is a 74y old  Female who presents with a chief complaint of AMS (26 May 2024 08:19)    BRIEF HOSPITAL COURSE:   74F w/ PMHx of panic attacks, HTN, depression, anxiety, fibromyalgia, breast CA, HLD, presented with c/o emesis, dysuria, frequent urination, poor po intake, N/V and not taking meds for few days. Admitted for acute encephalopathy suspected 2/2 to UTI and dehydration, also suspected underlying psych condition, BH following, resumed her bipolar and anxiety meds. CTH negative. UCx growing negative rods, on empiric ceftriaxone, pending sensitivities. Inpatient psych placement once resolution of UTI.   PT recs [facility]- received daily PT for psych placement.      INTERVAL HPI/LAST 24HRS EVENTS: Patient seen and examined at bedside at AM. No clinically acute event overnight. Pt has been out of bed and sitting on chair and eating most of her meals in the past 24hrs (50-75% of her meals). Denies any chest pain, palpitations, SOB, PND, orthopnea, leg edema, N/V/D, or any other complaints.     MEDICATIONS  (STANDING):  clonazePAM  Tablet 1 milliGRAM(s) Oral at bedtime  clopidogrel Tablet 75 milliGRAM(s) Oral daily  DULoxetine 60 milliGRAM(s) Oral two times a day  enoxaparin Injectable 40 milliGRAM(s) SubCutaneous every 24 hours  gabapentin 100 milliGRAM(s) Oral three times a day  lithium 150 milliGRAM(s) Oral at bedtime  losartan 50 milliGRAM(s) Oral daily  melatonin 6 milliGRAM(s) Oral at bedtime  QUEtiapine 150 milliGRAM(s) Oral once  QUEtiapine 150 milliGRAM(s) Oral at bedtime    MEDICATIONS  (PRN):  acetaminophen     Tablet .. 650 milliGRAM(s) Oral every 6 hours PRN Temp greater or equal to 38C (100.4F), Mild Pain (1 - 3)  albuterol    90 MICROgram(s) HFA Inhaler 1 Puff(s) Inhalation two times a day PRN for shortness of breath and/or wheezing  aluminum hydroxide/magnesium hydroxide/simethicone Suspension 30 milliLiter(s) Oral every 4 hours PRN Dyspepsia  hydrOXYzine hydrochloride 25 milliGRAM(s) Oral three times a day PRN Anxiety  melatonin 3 milliGRAM(s) Oral at bedtime PRN Insomnia  traZODone 50 milliGRAM(s) Oral at bedtime PRN Insomnia      Allergies    Cipro (Stomach Upset; Vomiting)  Demerol HCl (Stomach Upset; Vomiting)    Intolerances      REVIEW OF SYSTEMS:  CONSTITUTIONAL: No fever, weight loss, or fatigue  RESPIRATORY: No cough, wheezing, chills or hemoptysis; No shortness of breath  CARDIOVASCULAR: No chest pain, palpitations, dizziness, or leg swelling  GASTROINTESTINAL: No abdominal or epigastric pain. No nausea, vomiting, or hematemesis; No diarrhea or constipation. No melena or hematochezia.  NEUROLOGICAL: No headaches, loss of strength, numbness, or tremors  MUSCULOSKELETAL: No joint pain or swelling; No muscle, back, or extremity     OBJECTIVE:    Vital Signs Last 24 Hrs  T(C): 36.7 (27 May 2024 04:12), Max: 36.9 (26 May 2024 08:10)  T(F): 98 (27 May 2024 04:12), Max: 98.4 (26 May 2024 08:10)  HR: 78 (27 May 2024 04:12) (73 - 80)  BP: 101/61 (27 May 2024 04:12) (101/61 - 115/75)  BP(mean): --  RR: 18 (27 May 2024 04:12) (18 - 18)  SpO2: 91% (27 May 2024 04:12) (91% - 95%)    Parameters below as of 27 May 2024 04:12  Patient On (Oxygen Delivery Method): room air        PHYSICAL EXAM:  Constitutional: Alert, comfortable, NAD  Head and Face: Atraumatic, head and face were normal in appearance  Eyes: Sclera and conjunctiva were normal, pupils were equal in size, round, eyelids normal  ENT: Ears and nose were normal in appearance  Neck: Appearance was normal, neck was supple, No JVD  Pulmonary: Clear to auscultation bilaterally, no rales/crackles, or wheezing  Heart: Regular rate and rhythm, no murmurs, gallops, or pericardial rubs  Abdominal: Soft, nontender, nondistended. No appreciable hepatosplenomegaly. Bowel sounds normal  Skin: Normal color and intact without appreciable rash or abnormal skin lesion  Extremities: Warm without edema. No clubbing.  Pulse: 2+ radial pulse    Lab/ Imaging:    LABS:    05-27    135  |  102  |  22.1<H>  ----------------------------<  109<H>  3.8   |  21.0<L>  |  1.03    Ca    9.1      27 May 2024 04:35        Urinalysis Basic - ( 27 May 2024 04:35 )    Color: x / Appearance: x / SG: x / pH: x  Gluc: 109 mg/dL / Ketone: x  / Bili: x / Urobili: x   Blood: x / Protein: x / Nitrite: x   Leuk Esterase: x / RBC: x / WBC x   Sq Epi: x / Non Sq Epi: x / Bacteria: x

## 2024-05-27 NOTE — PROGRESS NOTE ADULT - ASSESSMENT
74F w/ PMHx of panic attacks, HTN, depression, anxiety, fibromyalgia, breast CA, HLD, presented with c/o emesis, dysuria, frequent urination, poor po intake, N/V and not taking meds for few days. Admitted for acute encephalopathy suspected 2/2 to UTI and dehydration, also suspected underlying psych condition,  following, resumed her bipolar and anxiety meds. CTH negative. UCx growing negative rods, on empiric ceftriaxone, pending sensitivities. Inpatient psych placement after daily PT and deemed stable for transfer for inpatient psych.      Bipolar depression and anxiety  Nonadherence to home medications  -  following   - c/w 1:1 constant observation (pending inpatient psych admission)  - c/w Cymbalta 60mg BID, lithium 150mg qhs, gabapentin 100mg TID  - c/w Trazodone 50mg and melatonin 6mg PRN for insomnia and Atarax 25mg TID PRN for anxiety   - c/w Seroquel 150 mg po at bedtime   - c/w Clonazepam 0.5mg AM and 1mg PM  - Atarax 25mg TID PRN  - Ordered EKG for QTc interval monitoring     Acute metabolic encephalopathy 2/2 UTI  afebrile and WBC WNL  - Ucxs Gram negative rods, completed 3 days of ceftriaxone   - BCx NGTD  - Holding Myrbetriq and oxybutynin as both can cause delirium in elderly pts   - CTH negative for acute pathologies     Electrolyte imbalance  - s/p repletion 5/21/24    HTN   - c/w Losartan 50mg po qd    Other home med  - c/w Plavix 75mg po QD, unable to find the reason to be on via chart review      VTE ppx: Lovenox SQ  Diet: DASH  Dispo: Optimize intake, OOB for inpatient psych

## 2024-05-28 LAB — SARS-COV-2 RNA SPEC QL NAA+PROBE: SIGNIFICANT CHANGE UP

## 2024-05-28 PROCEDURE — 99232 SBSQ HOSP IP/OBS MODERATE 35: CPT

## 2024-05-28 RX ORDER — CLONAZEPAM 1 MG
1 TABLET ORAL AT BEDTIME
Refills: 0 | Status: DISCONTINUED | OUTPATIENT
Start: 2024-05-28 | End: 2024-05-29

## 2024-05-28 RX ADMIN — LOSARTAN POTASSIUM 50 MILLIGRAM(S): 100 TABLET, FILM COATED ORAL at 06:09

## 2024-05-28 RX ADMIN — GABAPENTIN 100 MILLIGRAM(S): 400 CAPSULE ORAL at 12:59

## 2024-05-28 RX ADMIN — Medication 25 MILLIGRAM(S): at 20:34

## 2024-05-28 RX ADMIN — CLOPIDOGREL BISULFATE 75 MILLIGRAM(S): 75 TABLET, FILM COATED ORAL at 13:00

## 2024-05-28 RX ADMIN — GABAPENTIN 100 MILLIGRAM(S): 400 CAPSULE ORAL at 06:08

## 2024-05-28 RX ADMIN — LITHIUM CARBONATE 150 MILLIGRAM(S): 300 TABLET, EXTENDED RELEASE ORAL at 20:34

## 2024-05-28 RX ADMIN — QUETIAPINE FUMARATE 150 MILLIGRAM(S): 200 TABLET, FILM COATED ORAL at 20:34

## 2024-05-28 RX ADMIN — ENOXAPARIN SODIUM 40 MILLIGRAM(S): 100 INJECTION SUBCUTANEOUS at 12:59

## 2024-05-28 RX ADMIN — DULOXETINE HYDROCHLORIDE 60 MILLIGRAM(S): 30 CAPSULE, DELAYED RELEASE ORAL at 17:14

## 2024-05-28 RX ADMIN — GABAPENTIN 100 MILLIGRAM(S): 400 CAPSULE ORAL at 20:33

## 2024-05-28 RX ADMIN — Medication 25 MILLIGRAM(S): at 13:00

## 2024-05-28 RX ADMIN — Medication 6 MILLIGRAM(S): at 20:34

## 2024-05-28 RX ADMIN — DULOXETINE HYDROCHLORIDE 60 MILLIGRAM(S): 30 CAPSULE, DELAYED RELEASE ORAL at 06:09

## 2024-05-28 NOTE — BH CONSULTATION LIAISON PROGRESS NOTE - NS ED [facility] REVIEW OF ED CHART AVAILABLE INVESTIGATIONS REVIEWED
None available
Yes
Yes
None available
Yes
None available
None available
Yes

## 2024-05-28 NOTE — BH CONSULTATION LIAISON PROGRESS NOTE - NSBHMSEMOOD_PSY_A_CORE
Depressed/Anxious
Depressed
Depressed/Anxious

## 2024-05-28 NOTE — PROGRESS NOTE ADULT - PROVIDER SPECIALTY LIST ADULT
Internal Medicine
Hospitalist
Hospitalist
Internal Medicine
Hospitalist
Internal Medicine

## 2024-05-28 NOTE — BH CONSULTATION LIAISON PROGRESS NOTE - NSBHFUPINTERVALCCFT_PSY_A_CORE
"I'm sleeping"
I miss my exsband
"I think it was the medications"
"I want to go back to bed"
"I'm so depressed!"
"I want to go home"
I walked 
"I'm not hungry"
"Yeah I slept okay"
"I'm so depressed!"

## 2024-05-28 NOTE — BH CONSULTATION LIAISON PROGRESS NOTE - NSBHCHARTREVIEWVS_PSY_A_CORE FT
Vital Signs Last 24 Hrs  T(C): 36.7 (23 May 2024 04:02), Max: 36.7 (23 May 2024 04:02)  T(F): 98 (23 May 2024 04:02), Max: 98 (23 May 2024 04:02)  HR: 78 (23 May 2024 04:02) (76 - 84)  BP: 108/61 (23 May 2024 04:02) (108/61 - 136/80)  BP(mean): --  RR: 18 (23 May 2024 04:02) (18 - 18)  SpO2: 96% (23 May 2024 04:02) (94% - 96%)    Parameters below as of 23 May 2024 04:02  Patient On (Oxygen Delivery Method): room air    
Vital Signs Last 24 Hrs  T(C): 36.8 (18 May 2024 09:31), Max: 36.8 (18 May 2024 09:31)  T(F): 98.2 (18 May 2024 09:31), Max: 98.2 (18 May 2024 09:31)  HR: 96 (18 May 2024 09:31) (80 - 96)  BP: 100/63 (18 May 2024 09:31) (100/63 - 121/71)  BP(mean): --  RR: 18 (18 May 2024 09:31) (18 - 18)  SpO2: 93% (18 May 2024 09:31) (92% - 93%)    Parameters below as of 18 May 2024 09:31  Patient On (Oxygen Delivery Method): room air    
Vital Signs Last 24 Hrs  T(C): 36.4 (16 May 2024 04:32), Max: 37.1 (15 May 2024 19:27)  T(F): 97.5 (16 May 2024 04:32), Max: 98.7 (15 May 2024 19:27)  HR: 73 (16 May 2024 04:32) (73 - 86)  BP: 133/79 (16 May 2024 04:32) (121/74 - 156/68)  BP(mean): 97 (15 May 2024 11:17) (97 - 97)  RR: 18 (16 May 2024 04:32) (18 - 20)  SpO2: 93% (16 May 2024 04:32) (93% - 96%)    Parameters below as of 16 May 2024 04:32  Patient On (Oxygen Delivery Method): room air    
Vital Signs Last 24 Hrs  T(C): 36.2 (17 May 2024 08:02), Max: 36.4 (16 May 2024 16:43)  T(F): 97.2 (17 May 2024 08:02), Max: 97.6 (16 May 2024 16:43)  HR: 82 (17 May 2024 08:02) (72 - 84)  BP: 110/61 (17 May 2024 08:02) (110/61 - 150/80)  BP(mean): --  RR: 18 (17 May 2024 08:02) (18 - 18)  SpO2: 91% (17 May 2024 08:02) (91% - 93%)    Parameters below as of 17 May 2024 08:02  Patient On (Oxygen Delivery Method): room air    
Vital Signs Last 24 Hrs  T(C): 36.6 (28 May 2024 09:50), Max: 36.9 (27 May 2024 20:00)  T(F): 97.8 (28 May 2024 09:50), Max: 98.4 (27 May 2024 20:00)  HR: 81 (28 May 2024 09:50) (65 - 81)  BP: 128/80 (28 May 2024 09:50) (103/68 - 128/80)  BP(mean): --  RR: 17 (28 May 2024 09:50) (17 - 18)  SpO2: 95% (28 May 2024 09:50) (93% - 96%)    Parameters below as of 28 May 2024 09:50  Patient On (Oxygen Delivery Method): room air    
Vital Signs Last 24 Hrs  T(C): 36.6 (21 May 2024 07:44), Max: 36.9 (20 May 2024 16:20)  T(F): 97.8 (21 May 2024 07:44), Max: 98.4 (20 May 2024 16:20)  HR: 74 (21 May 2024 07:44) (74 - 83)  BP: 100/57 (21 May 2024 07:44) (97/62 - 144/75)  BP(mean): --  RR: 18 (21 May 2024 07:44) (18 - 18)  SpO2: 90% (21 May 2024 07:44) (90% - 94%)    Parameters below as of 21 May 2024 07:44  Patient On (Oxygen Delivery Method): room air    
Vital Signs Last 24 Hrs  T(C): 36.6 (20 May 2024 08:06), Max: 36.9 (19 May 2024 10:30)  T(F): 97.8 (20 May 2024 08:06), Max: 98.4 (19 May 2024 10:30)  HR: 74 (20 May 2024 08:06) (74 - 91)  BP: 129/76 (20 May 2024 08:06) (107/66 - 138/78)  BP(mean): --  RR: 18 (20 May 2024 08:06) (18 - 18)  SpO2: 93% (20 May 2024 08:06) (92% - 94%)    Parameters below as of 20 May 2024 08:06  Patient On (Oxygen Delivery Method): room air    
Vital Signs Last 24 Hrs  T(C): 37.1 (25 May 2024 04:22), Max: 37.1 (25 May 2024 04:22)  T(F): 98.7 (25 May 2024 04:22), Max: 98.7 (25 May 2024 04:22)  HR: 58 (25 May 2024 04:22) (58 - 82)  BP: 113/74 (25 May 2024 04:22) (113/74 - 128/63)  BP(mean): --  RR: 18 (25 May 2024 04:22) (18 - 18)  SpO2: 98% (25 May 2024 04:22) (95% - 98%)    Parameters below as of 25 May 2024 04:22  Patient On (Oxygen Delivery Method): room air    
Vital Signs Last 24 Hrs  T(C): 37.1 (24 May 2024 09:30), Max: 37.1 (24 May 2024 09:30)  T(F): 98.7 (24 May 2024 09:30), Max: 98.7 (24 May 2024 09:30)  HR: 86 (24 May 2024 09:30) (84 - 86)  BP: 119/64 (24 May 2024 09:30) (108/70 - 121/78)  BP(mean): --  RR: 18 (24 May 2024 09:30) (18 - 18)  SpO2: 92% (24 May 2024 09:30) (91% - 93%)    Parameters below as of 24 May 2024 09:30  Patient On (Oxygen Delivery Method): room air    
Vital Signs Last 24 Hrs  T(C): 36.7 (22 May 2024 08:24), Max: 36.8 (21 May 2024 20:02)  T(F): 98.1 (22 May 2024 08:24), Max: 98.2 (21 May 2024 20:02)  HR: 80 (22 May 2024 08:24) (74 - 82)  BP: 118/74 (22 May 2024 08:24) (105/65 - 145/76)  BP(mean): --  RR: 18 (22 May 2024 08:24) (18 - 18)  SpO2: 91% (22 May 2024 08:24) (91% - 97%)    Parameters below as of 22 May 2024 08:24  Patient On (Oxygen Delivery Method): room air

## 2024-05-28 NOTE — BH CONSULTATION LIAISON PROGRESS NOTE - MSE OPTIONS
Structured MSE
Structured MSE
Unstructured MSE
Structured MSE

## 2024-05-28 NOTE — BH CONSULTATION LIAISON PROGRESS NOTE - NSBHMSELANG_PSY_A_CORE
No abnormalities noted
No abnormalities noted
No abnormalities noted/Other
No abnormalities noted
No abnormalities noted/Other

## 2024-05-28 NOTE — BH CONSULTATION LIAISON PROGRESS NOTE - NSBHMSEMUSCLE_PSY_A_CORE
Unable to assess
Normal muscle tone/strength
Unable to assess

## 2024-05-28 NOTE — PROGRESS NOTE ADULT - SUBJECTIVE AND OBJECTIVE BOX
SUBJECTIVE:    Chief Complaint: Patient is a 74y old  Female who presents with a chief complaint of AMS (27 May 2024 07:03)    BRIEF HOSPITAL COURSE:   74F w/ PMHx of panic attacks, HTN, depression, anxiety, fibromyalgia, breast CA, HLD, presented with c/o emesis, dysuria, frequent urination, poor po intake, N/V and not taking meds for few days. Admitted for acute encephalopathy suspected 2/2 to UTI and dehydration, also suspected underlying psych condition, BH following, resumed her bipolar and anxiety meds. CTH negative. UCx growing negative rods, on empiric ceftriaxone, pending sensitivities. Inpatient psych placement once resolution of UTI.   PT recs [facility]ULIO- received daily PT for psych placement.      INTERVAL HPI/LAST 24HRS EVENTS: Patient seen and examined at bedside at AM. No clinically acute event overnight.   Denies any chest pain, palpitations, SOB, PND, orthopnea, leg edema, N/V/D, or any other complaints.     MEDICATIONS  (STANDING):  clopidogrel Tablet 75 milliGRAM(s) Oral daily  DULoxetine 60 milliGRAM(s) Oral two times a day  enoxaparin Injectable 40 milliGRAM(s) SubCutaneous every 24 hours  gabapentin 100 milliGRAM(s) Oral three times a day  lithium 150 milliGRAM(s) Oral at bedtime  losartan 50 milliGRAM(s) Oral daily  melatonin 6 milliGRAM(s) Oral at bedtime  QUEtiapine 150 milliGRAM(s) Oral once  QUEtiapine 150 milliGRAM(s) Oral at bedtime    MEDICATIONS  (PRN):  acetaminophen     Tablet .. 650 milliGRAM(s) Oral every 6 hours PRN Temp greater or equal to 38C (100.4F), Mild Pain (1 - 3)  albuterol    90 MICROgram(s) HFA Inhaler 1 Puff(s) Inhalation two times a day PRN for shortness of breath and/or wheezing  aluminum hydroxide/magnesium hydroxide/simethicone Suspension 30 milliLiter(s) Oral every 4 hours PRN Dyspepsia  hydrOXYzine hydrochloride 25 milliGRAM(s) Oral three times a day PRN Anxiety  melatonin 3 milliGRAM(s) Oral at bedtime PRN Insomnia  traZODone 50 milliGRAM(s) Oral at bedtime PRN Insomnia      Allergies    Cipro (Stomach Upset; Vomiting)  Demerol HCl (Stomach Upset; Vomiting)    Intolerances        REVIEW OF SYSTEMS:  CONSTITUTIONAL: No fever, weight loss, or fatigue  RESPIRATORY: No cough, wheezing, chills or hemoptysis; No shortness of breath  CARDIOVASCULAR: No chest pain, palpitations, dizziness, or leg swelling  GASTROINTESTINAL: No abdominal or epigastric pain. No nausea, vomiting, or hematemesis; No diarrhea or constipation. No melena or hematochezia.  NEUROLOGICAL: No headaches, loss of strength, numbness, or tremors  MUSCULOSKELETAL: No joint pain or swelling; No muscle, back, or extremity     OBJECTIVE:    Vital Signs Last 24 Hrs  T(C): 36.6 (28 May 2024 09:50), Max: 36.9 (27 May 2024 20:00)  T(F): 97.8 (28 May 2024 09:50), Max: 98.4 (27 May 2024 20:00)  HR: 81 (28 May 2024 09:50) (65 - 81)  BP: 128/80 (28 May 2024 09:50) (103/68 - 128/80)  BP(mean): --  RR: 17 (28 May 2024 09:50) (17 - 18)  SpO2: 95% (28 May 2024 09:50) (93% - 96%)    Parameters below as of 28 May 2024 09:50  Patient On (Oxygen Delivery Method): room air        PHYSICAL EXAM:  Constitutional: Alert, comfortable, NAD  Head and Face: Atraumatic, head and face were normal in appearance  Eyes: Sclera and conjunctiva were normal, pupils were equal in size, round, eyelids normal  ENT: Ears and nose were normal in appearance  Neck: Appearance was normal, neck was supple, No JVD  Pulmonary: Clear to auscultation bilaterally, no rales/crackles, or wheezing  Heart: Regular rate and rhythm, no murmurs, gallops, or pericardial rubs  Abdominal: Soft, nontender, nondistended. No appreciable hepatosplenomegaly. Bowel sounds normal  Skin: Normal color and intact without appreciable rash or abnormal skin lesion  Extremities: Warm without edema. No clubbing.  Pulse: 2+ radial pulse    Lab/ Imaging:    LABS:    05-27    135  |  102  |  22.1<H>  ----------------------------<  109<H>  3.8   |  21.0<L>  |  1.03    Ca    9.1      27 May 2024 04:35        Urinalysis Basic - ( 27 May 2024 04:35 )    Color: x / Appearance: x / SG: x / pH: x  Gluc: 109 mg/dL / Ketone: x  / Bili: x / Urobili: x   Blood: x / Protein: x / Nitrite: x   Leuk Esterase: x / RBC: x / WBC x   Sq Epi: x / Non Sq Epi: x / Bacteria: x

## 2024-05-28 NOTE — BH CONSULTATION LIAISON PROGRESS NOTE - NSBHMSETHTCONTENT_PSY_A_CORE
Unremarkable
Unremarkable
Hopelessness
Unremarkable
Hopelessness
Preoccupations

## 2024-05-28 NOTE — BH CONSULTATION LIAISON PROGRESS NOTE - NSBHFUPMEDSE_PSY_A_CORE
Unable to assess
None known

## 2024-05-28 NOTE — PROGRESS NOTE ADULT - ATTENDING COMMENTS
Agree with above   Patient seen and examined together with medical residents. She is very sleepy and not able to participate in conversation. Falling a sleep during conversation.   Per RN, she is sleeping through the day and awake at night   Vital signs,  labs and imaging  reviewed   Assessment and plan discussed       hx Bipolar depression and anxiety  - Anxiety/depression w/ behavioral disturbances   Decrease the dose of SEROQUEL to 150 mg po at night only. dc morning dose  c/w w Cymbalta 60mg BID, lithium 150mg qhs, klonopin 1mg BID, gabapentin 100mg TID      Acute metabolic encephalopathy 2/2 UTI  Completed a course of antibiotics   Holding Myrbetriq and oxybutynin as both can cause delirium in elderly pts     HTN - BP controlled   c/w Losartan     DVT prophylaxis     Medically stable for dc to psych facility
74 year old female with Hypertension, Depression, Anxiety, Fibromyalgia and breast cancer presented with confusion and admitted and treated for encephalopathy secondary to UTI. Severe depression - psychotropics adjusted by [facility].    Patient was interviewed and examined at bedside with residents physicians earlier today.  Sitting up in chair having breakfast    # Bipolar Depression  # Encephalopathy. Resolved  # UTI. Completed treatment  # HTN  Medically stable    - Psychotropic medications as listed; Psychiatry follow up  - Encourage PO intake, calorie count  - Delirium precautions  - Antihypertensive  Disposition - May discharge to [facility]  Legal Paperwork filled out - discussed with SW and CCC
Bipolar Disorder, Anxiety. Non compliant with medications - meds restarted here. BH following   UTI - on ctx pending Ucx.    Inpt psych once medically cleared from Infectious standpoint
Agree with above   Patient seen and examined together with medical residents   Vital signs,  labs and imaging  reviewed   Assessment and plan as above and discussed with residents   Stable for discharge
Agree with above   Patient seen and examined together with medical residents. No complains. Laying in bed, does not want to get out of bed   Vital signs,  labs and imaging  reviewed   Assessment and plan as above and discussed with residents   Stable for discharge to [facility] facility
Bipolar Disorder, Anxiety. Non compliant with medications - meds restarted here. BH following   UTI - on ctx pending Ucx.sens    Inpt psych once medically cleared from Infectious standpoint.
74 year old female with Hypertension, Depression, Anxiety, Fibromyalgia and breast cancer presented with confusion and admitted and treated for encephalopathy secondary to UTI. Severe depression - psychotropics adjusted by [facility].    Patient was interviewed and examined at bedside with residents physicians earlier today.  Patient very withdrawn - ' I want to sleep'  Not eating as per aide at bedside.    # Bipolar Depression  # Encephalopathy. Resolved  # UTI. Completed treatment  # HTN  Medically stable however with poor oral intake  - Psychotropic medications as listed; Psychiatry follow up  - Encourage PO intake, calorie count  - Delirium precautions  - Antihypertensive  Disposition - May discharge to [facility]
74 year old female with Hypertension, Depression, Anxiety, Fibromyalgia and breast cancer presented with confusion and admitted and treated for encephalopathy secondary to UTI. Severe depression - psychotropics adjusted by [facility].    Patient was interviewed and examined at bedside with resident JOSE A Abraham earlier today.  Patient very withdrawn - ' I want to sleep'  Not eating as per aide at bedside    # Bipolar Depression  # Encephalopathy  # UTI  # HTN  Medically stable however with poor oral intake  - Psychotropic medications as listed; Psychiatry follow up  - Encourage PO intake, calorie count  - Delirium precautions  - Antihypertensive  Disposition - May discharge to [facility]
Agree with above   Patient seen and examined together with residents. This morning she is more awake and responsive. No complains, just reports feeling sad and missing her ex , Crying during conversation   Vital signs,  labs and imaging  reviewed   Assessment and plan as above and discussed with residents and Dr. Santillan from psych   Get her out of bed to chair   Daily PT   Stable for dc to psych facility

## 2024-05-28 NOTE — BH CONSULTATION LIAISON PROGRESS NOTE - CURRENT MEDICATION
MEDICATIONS  (STANDING):  clonazePAM  Tablet 0.5 milliGRAM(s) Oral <User Schedule>  clonazePAM  Tablet 1 milliGRAM(s) Oral at bedtime  clopidogrel Tablet 75 milliGRAM(s) Oral daily  DULoxetine 60 milliGRAM(s) Oral two times a day  enoxaparin Injectable 40 milliGRAM(s) SubCutaneous every 24 hours  gabapentin 100 milliGRAM(s) Oral three times a day  lactated ringers. 1000 milliLiter(s) (85 mL/Hr) IV Continuous <Continuous>  lithium 150 milliGRAM(s) Oral at bedtime  losartan 50 milliGRAM(s) Oral daily  melatonin 6 milliGRAM(s) Oral at bedtime  pantoprazole    Tablet 40 milliGRAM(s) Oral before breakfast  QUEtiapine 150 milliGRAM(s) Oral at bedtime    MEDICATIONS  (PRN):  acetaminophen     Tablet .. 650 milliGRAM(s) Oral every 6 hours PRN Temp greater or equal to 38C (100.4F), Mild Pain (1 - 3)  albuterol    90 MICROgram(s) HFA Inhaler 1 Puff(s) Inhalation two times a day PRN for shortness of breath and/or wheezing  aluminum hydroxide/magnesium hydroxide/simethicone Suspension 30 milliLiter(s) Oral every 4 hours PRN Dyspepsia  hydrOXYzine hydrochloride 25 milliGRAM(s) Oral three times a day PRN Anxiety  melatonin 3 milliGRAM(s) Oral at bedtime PRN Insomnia  traZODone 50 milliGRAM(s) Oral at bedtime PRN Insomnia  
MEDICATIONS  (STANDING):  clonazePAM  Tablet 0.5 milliGRAM(s) Oral <User Schedule>  clonazePAM  Tablet 1 milliGRAM(s) Oral at bedtime  clopidogrel Tablet 75 milliGRAM(s) Oral daily  DULoxetine 60 milliGRAM(s) Oral two times a day  enoxaparin Injectable 40 milliGRAM(s) SubCutaneous every 24 hours  gabapentin 100 milliGRAM(s) Oral three times a day  lithium 150 milliGRAM(s) Oral at bedtime  losartan 50 milliGRAM(s) Oral daily  melatonin 6 milliGRAM(s) Oral at bedtime  QUEtiapine 150 milliGRAM(s) Oral at bedtime    MEDICATIONS  (PRN):  acetaminophen     Tablet .. 650 milliGRAM(s) Oral every 6 hours PRN Temp greater or equal to 38C (100.4F), Mild Pain (1 - 3)  albuterol    90 MICROgram(s) HFA Inhaler 1 Puff(s) Inhalation two times a day PRN for shortness of breath and/or wheezing  aluminum hydroxide/magnesium hydroxide/simethicone Suspension 30 milliLiter(s) Oral every 4 hours PRN Dyspepsia  hydrOXYzine hydrochloride 25 milliGRAM(s) Oral three times a day PRN Anxiety  melatonin 3 milliGRAM(s) Oral at bedtime PRN Insomnia  traZODone 50 milliGRAM(s) Oral at bedtime PRN Insomnia  
MEDICATIONS  (STANDING):  cefTRIAXone Injectable.      cefTRIAXone Injectable. 1000 milliGRAM(s) IV Push every 24 hours  clonazePAM  Tablet 1 milliGRAM(s) Oral two times a day  clopidogrel Tablet 75 milliGRAM(s) Oral daily  enoxaparin Injectable 40 milliGRAM(s) SubCutaneous every 24 hours  gabapentin 100 milliGRAM(s) Oral three times a day  lactated ringers. 1000 milliLiter(s) (85 mL/Hr) IV Continuous <Continuous>  losartan 50 milliGRAM(s) Oral daily  pantoprazole    Tablet 40 milliGRAM(s) Oral before breakfast    MEDICATIONS  (PRN):  acetaminophen     Tablet .. 650 milliGRAM(s) Oral every 6 hours PRN Temp greater or equal to 38C (100.4F), Mild Pain (1 - 3)  albuterol    90 MICROgram(s) HFA Inhaler 1 Puff(s) Inhalation two times a day PRN for shortness of breath and/or wheezing  aluminum hydroxide/magnesium hydroxide/simethicone Suspension 30 milliLiter(s) Oral every 4 hours PRN Dyspepsia  hydrOXYzine hydrochloride 25 milliGRAM(s) Oral three times a day PRN Anxiety  melatonin 3 milliGRAM(s) Oral at bedtime PRN Insomnia  
MEDICATIONS  (STANDING):  clonazePAM  Tablet 1 milliGRAM(s) Oral two times a day  clopidogrel Tablet 75 milliGRAM(s) Oral daily  DULoxetine 60 milliGRAM(s) Oral two times a day  enoxaparin Injectable 40 milliGRAM(s) SubCutaneous every 24 hours  gabapentin 100 milliGRAM(s) Oral three times a day  lactated ringers. 1000 milliLiter(s) (85 mL/Hr) IV Continuous <Continuous>  lithium 150 milliGRAM(s) Oral at bedtime  losartan 50 milliGRAM(s) Oral daily  melatonin 6 milliGRAM(s) Oral at bedtime  pantoprazole    Tablet 40 milliGRAM(s) Oral before breakfast  potassium chloride   Powder 20 milliEquivalent(s) Oral every 2 hours  QUEtiapine 150 milliGRAM(s) Oral at bedtime    MEDICATIONS  (PRN):  acetaminophen     Tablet .. 650 milliGRAM(s) Oral every 6 hours PRN Temp greater or equal to 38C (100.4F), Mild Pain (1 - 3)  albuterol    90 MICROgram(s) HFA Inhaler 1 Puff(s) Inhalation two times a day PRN for shortness of breath and/or wheezing  aluminum hydroxide/magnesium hydroxide/simethicone Suspension 30 milliLiter(s) Oral every 4 hours PRN Dyspepsia  hydrOXYzine hydrochloride 25 milliGRAM(s) Oral three times a day PRN Anxiety  melatonin 3 milliGRAM(s) Oral at bedtime PRN Insomnia  traZODone 50 milliGRAM(s) Oral at bedtime PRN Insomnia  
MEDICATIONS  (STANDING):  clonazePAM  Tablet 0.5 milliGRAM(s) Oral <User Schedule>  clonazePAM  Tablet 1 milliGRAM(s) Oral at bedtime  clopidogrel Tablet 75 milliGRAM(s) Oral daily  DULoxetine 60 milliGRAM(s) Oral two times a day  enoxaparin Injectable 40 milliGRAM(s) SubCutaneous every 24 hours  gabapentin 100 milliGRAM(s) Oral three times a day  lithium 150 milliGRAM(s) Oral at bedtime  losartan 50 milliGRAM(s) Oral daily  melatonin 6 milliGRAM(s) Oral at bedtime  QUEtiapine 150 milliGRAM(s) Oral at bedtime    MEDICATIONS  (PRN):  acetaminophen     Tablet .. 650 milliGRAM(s) Oral every 6 hours PRN Temp greater or equal to 38C (100.4F), Mild Pain (1 - 3)  albuterol    90 MICROgram(s) HFA Inhaler 1 Puff(s) Inhalation two times a day PRN for shortness of breath and/or wheezing  aluminum hydroxide/magnesium hydroxide/simethicone Suspension 30 milliLiter(s) Oral every 4 hours PRN Dyspepsia  hydrOXYzine hydrochloride 25 milliGRAM(s) Oral three times a day PRN Anxiety  melatonin 3 milliGRAM(s) Oral at bedtime PRN Insomnia  traZODone 50 milliGRAM(s) Oral at bedtime PRN Insomnia  
MEDICATIONS  (STANDING):  clonazePAM  Tablet 0.5 milliGRAM(s) Oral <User Schedule>  clonazePAM  Tablet 1 milliGRAM(s) Oral at bedtime  clopidogrel Tablet 75 milliGRAM(s) Oral daily  DULoxetine 60 milliGRAM(s) Oral two times a day  enoxaparin Injectable 40 milliGRAM(s) SubCutaneous every 24 hours  gabapentin 100 milliGRAM(s) Oral three times a day  lithium 150 milliGRAM(s) Oral at bedtime  losartan 50 milliGRAM(s) Oral daily  melatonin 6 milliGRAM(s) Oral at bedtime  QUEtiapine 150 milliGRAM(s) Oral once  QUEtiapine 150 milliGRAM(s) Oral at bedtime    MEDICATIONS  (PRN):  acetaminophen     Tablet .. 650 milliGRAM(s) Oral every 6 hours PRN Temp greater or equal to 38C (100.4F), Mild Pain (1 - 3)  albuterol    90 MICROgram(s) HFA Inhaler 1 Puff(s) Inhalation two times a day PRN for shortness of breath and/or wheezing  aluminum hydroxide/magnesium hydroxide/simethicone Suspension 30 milliLiter(s) Oral every 4 hours PRN Dyspepsia  hydrOXYzine hydrochloride 25 milliGRAM(s) Oral three times a day PRN Anxiety  melatonin 3 milliGRAM(s) Oral at bedtime PRN Insomnia  traZODone 50 milliGRAM(s) Oral at bedtime PRN Insomnia  
MEDICATIONS  (STANDING):  clonazePAM  Tablet 0.5 milliGRAM(s) Oral <User Schedule>  clonazePAM  Tablet 1 milliGRAM(s) Oral at bedtime  clopidogrel Tablet 75 milliGRAM(s) Oral daily  DULoxetine 60 milliGRAM(s) Oral two times a day  enoxaparin Injectable 40 milliGRAM(s) SubCutaneous every 24 hours  gabapentin 100 milliGRAM(s) Oral three times a day  lithium 150 milliGRAM(s) Oral at bedtime  losartan 50 milliGRAM(s) Oral daily  melatonin 6 milliGRAM(s) Oral at bedtime  QUEtiapine 150 milliGRAM(s) Oral at bedtime    MEDICATIONS  (PRN):  acetaminophen     Tablet .. 650 milliGRAM(s) Oral every 6 hours PRN Temp greater or equal to 38C (100.4F), Mild Pain (1 - 3)  albuterol    90 MICROgram(s) HFA Inhaler 1 Puff(s) Inhalation two times a day PRN for shortness of breath and/or wheezing  aluminum hydroxide/magnesium hydroxide/simethicone Suspension 30 milliLiter(s) Oral every 4 hours PRN Dyspepsia  hydrOXYzine hydrochloride 25 milliGRAM(s) Oral three times a day PRN Anxiety  melatonin 3 milliGRAM(s) Oral at bedtime PRN Insomnia  traZODone 50 milliGRAM(s) Oral at bedtime PRN Insomnia  
MEDICATIONS  (STANDING):  clopidogrel Tablet 75 milliGRAM(s) Oral daily  DULoxetine 60 milliGRAM(s) Oral two times a day  enoxaparin Injectable 40 milliGRAM(s) SubCutaneous every 24 hours  gabapentin 100 milliGRAM(s) Oral three times a day  lithium 150 milliGRAM(s) Oral at bedtime  losartan 50 milliGRAM(s) Oral daily  melatonin 6 milliGRAM(s) Oral at bedtime  QUEtiapine 150 milliGRAM(s) Oral once  QUEtiapine 150 milliGRAM(s) Oral at bedtime    MEDICATIONS  (PRN):  acetaminophen     Tablet .. 650 milliGRAM(s) Oral every 6 hours PRN Temp greater or equal to 38C (100.4F), Mild Pain (1 - 3)  albuterol    90 MICROgram(s) HFA Inhaler 1 Puff(s) Inhalation two times a day PRN for shortness of breath and/or wheezing  aluminum hydroxide/magnesium hydroxide/simethicone Suspension 30 milliLiter(s) Oral every 4 hours PRN Dyspepsia  hydrOXYzine hydrochloride 25 milliGRAM(s) Oral three times a day PRN Anxiety  melatonin 3 milliGRAM(s) Oral at bedtime PRN Insomnia  traZODone 50 milliGRAM(s) Oral at bedtime PRN Insomnia  
MEDICATIONS  (STANDING):  cefTRIAXone Injectable.      cefTRIAXone Injectable. 1000 milliGRAM(s) IV Push every 24 hours  clonazePAM  Tablet 1 milliGRAM(s) Oral two times a day  clopidogrel Tablet 75 milliGRAM(s) Oral daily  DULoxetine 60 milliGRAM(s) Oral two times a day  enoxaparin Injectable 40 milliGRAM(s) SubCutaneous every 24 hours  gabapentin 100 milliGRAM(s) Oral three times a day  lactated ringers. 1000 milliLiter(s) (85 mL/Hr) IV Continuous <Continuous>  lithium 150 milliGRAM(s) Oral at bedtime  losartan 50 milliGRAM(s) Oral daily  melatonin 6 milliGRAM(s) Oral at bedtime  pantoprazole    Tablet 40 milliGRAM(s) Oral before breakfast  QUEtiapine 150 milliGRAM(s) Oral two times a day    MEDICATIONS  (PRN):  acetaminophen     Tablet .. 650 milliGRAM(s) Oral every 6 hours PRN Temp greater or equal to 38C (100.4F), Mild Pain (1 - 3)  albuterol    90 MICROgram(s) HFA Inhaler 1 Puff(s) Inhalation two times a day PRN for shortness of breath and/or wheezing  aluminum hydroxide/magnesium hydroxide/simethicone Suspension 30 milliLiter(s) Oral every 4 hours PRN Dyspepsia  hydrOXYzine hydrochloride 25 milliGRAM(s) Oral three times a day PRN Anxiety  melatonin 3 milliGRAM(s) Oral at bedtime PRN Insomnia  traZODone 50 milliGRAM(s) Oral at bedtime PRN Insomnia  
MEDICATIONS  (STANDING):  cefTRIAXone Injectable. 1000 milliGRAM(s) IV Push every 24 hours  cefTRIAXone Injectable.      clonazePAM  Tablet 1 milliGRAM(s) Oral two times a day  clopidogrel Tablet 75 milliGRAM(s) Oral daily  DULoxetine 60 milliGRAM(s) Oral two times a day  enoxaparin Injectable 40 milliGRAM(s) SubCutaneous every 24 hours  gabapentin 100 milliGRAM(s) Oral three times a day  lactated ringers. 1000 milliLiter(s) (85 mL/Hr) IV Continuous <Continuous>  lithium 150 milliGRAM(s) Oral at bedtime  losartan 50 milliGRAM(s) Oral daily  melatonin 6 milliGRAM(s) Oral at bedtime  pantoprazole    Tablet 40 milliGRAM(s) Oral before breakfast  QUEtiapine 300 milliGRAM(s) Oral at bedtime    MEDICATIONS  (PRN):  acetaminophen     Tablet .. 650 milliGRAM(s) Oral every 6 hours PRN Temp greater or equal to 38C (100.4F), Mild Pain (1 - 3)  albuterol    90 MICROgram(s) HFA Inhaler 1 Puff(s) Inhalation two times a day PRN for shortness of breath and/or wheezing  aluminum hydroxide/magnesium hydroxide/simethicone Suspension 30 milliLiter(s) Oral every 4 hours PRN Dyspepsia  hydrOXYzine hydrochloride 25 milliGRAM(s) Oral three times a day PRN Anxiety  melatonin 3 milliGRAM(s) Oral at bedtime PRN Insomnia  traZODone 50 milliGRAM(s) Oral at bedtime PRN Insomnia

## 2024-05-28 NOTE — BH CONSULTATION LIAISON PROGRESS NOTE - NSBHFUPINTERVALHXFT_PSY_A_CORE
Patient is a 74 year old, female; domiciled with her adult son and ex-;  (2003); noncaregiver; unemployed on SSI; past psychiatric history of bipolar depression and anxiety; currently under the care of [facility]; per chart review- 2 reported prior psychiatric hospitalizations at [facility] over 10 years ago; no known suicide attempts; no known history of violence or arrests; no active substance abuse or known history of complicated withdrawal; PMH of arthritis, fibromyalgia; brought in by EMS; called by ex- (with whom she lives); presenting with anxiety and reports of vomiting x several days.    Patient was seen today at bedside with one to one staff present, noted to be sitting in recliner.  Patient requesting to "go back to bed."  Reports depression 8/10, anxiety "even higher than that" (10 being highest). Reminded patient that she can take PRN vistaril- notes she took it last night and "it helped."  Patient cannot identify triggers for anxiety.  When asked about it, patient noted to be making "gulping" sound consistent with previous indications of anxiety.   Patient noted to be eating breakfast, per patient and staff, has eaten more than half of her pancakes.  Patient denies any SI/HI, intent or plan when asked directly.     Discussed with patient treatment options- ie changing medications.  Discussed potential recommendation for abilify, which patient reports "caused weight gain."  Discussed concerns that current medications are not helping.  Additionally discussed potential for ECT.  Patient reports this was mentioned to her in the past by outpatient providers.  When writer attempted to discuss this, patient became more anxious, relaying that she "didn't' want to go far from home."

## 2024-05-28 NOTE — BH CONSULTATION LIAISON PROGRESS NOTE - NSICDXBHPRIMARYDX_PSY_ALL_CORE
Delirium   R41.0  
Bipolar depression   F31.9  
Delirium   R41.0  
Bipolar depression   F31.9  
Bipolar disorder   F31.9  
Bipolar depression   F31.9  
Delirium   R41.0

## 2024-05-28 NOTE — BH CONSULTATION LIAISON PROGRESS NOTE - NSBHMSEATTEN_PSY_A_CORE
Pharmacy called stating that ICD-10 code J45.40 is not passing insurance coverage. Asking for different DX to be put on script and resent.      OV 4/28/20:    ASSESSMENT:  · Asthma -mod persistent - with frequent exacerbations contributed to y not taking her medications  · Chronic Allergic Rhinitis  · Post nasal gtt  · Cough  · Possible dementia     PLAN:   · Singulair  · Mucinex BID PRN  · Encouraged compliance to pt and daughter  · Continue Budesonide and Perforomist nebs - poor compliance due to memory  · Start flonase 2 sprays daily and claritin daily  · Continue prn albuterol nebs and INH  · F/u in 4 months   
Use icd10 code for moderate persistent asthma
Normal

## 2024-05-28 NOTE — BH CONSULTATION LIAISON PROGRESS NOTE - NSBHMSEKNOW_PSY_A_CORE
Unable to assess
Normal
Unable to assess

## 2024-05-28 NOTE — BH CONSULTATION LIAISON PROGRESS NOTE - NSBHCONSULTMEDANXIETY_PSY_A_CORE FT
Atarax 25 mg TID PRN 

## 2024-05-28 NOTE — PROGRESS NOTE ADULT - ASSESSMENT
74F w/ PMHx of panic attacks, HTN, depression, anxiety, fibromyalgia, breast CA, HLD, presented with c/o emesis, dysuria, frequent urination, poor po intake, N/V and not taking meds for few days. Admitted for acute encephalopathy suspected 2/2 to UTI and dehydration, also suspected underlying psych condition,  following, resumed her bipolar and anxiety meds. CTH negative. UCx growing negative rods, on empiric ceftriaxone, pending sensitivities. Inpatient psych placement after daily PT and deemed stable for transfer for inpatient psych.      Bipolar depression and anxiety  Nonadherence to home medications  -  following   - c/w 1:1 constant observation (pending inpatient psych admission)  - c/w Cymbalta 60mg BID, lithium 150mg qhs, gabapentin 100mg TID  - c/w Trazodone 50mg and melatonin 6mg PRN for insomnia and Atarax 25mg TID PRN for anxiety   - c/w Seroquel 150 mg po at bedtime   - c/w Clonazepam 0.5mg AM and 1mg PM  - Atarax 25mg TID PRN    Acute metabolic encephalopathy 2/2 UTI  afebrile and WBC WNL  - Ucxs Gram negative rods, completed 3 days of ceftriaxone   - BCx NGTD  - Holding Myrbetriq and oxybutynin as both can cause delirium in elderly pts   - CTH negative for acute pathologies     Electrolyte imbalance  - s/p repletion 5/21/24    HTN   - c/w Losartan 50mg po qd    Other home med  - c/w Plavix 75mg po QD, unable to find the reason to be on via chart review      VTE ppx: Lovenox SQ  Diet: DASH  Dispo: D/c to inpatient psych tomorrow

## 2024-05-28 NOTE — BH CONSULTATION LIAISON PROGRESS NOTE - NSBHMSESPABN_PSY_A_CORE
Decreased productivity
Soft volume/Increased latency
Decreased productivity

## 2024-05-28 NOTE — BH CONSULTATION LIAISON PROGRESS NOTE - NSBHCHARTREVIEWINVESTIGATE_PSY_A_CORE FT
Ventricular Rate 82 BPM  Atrial Rate 82 BPM  P-R Interval 180 ms  QRS Duration 104 ms  Q-T Interval 358 ms  QTC Calculation(Bazett) 418 ms  P Axis 45 degrees  R Axis -1 degrees  T Axis -24 degrees
Ventricular Rate 82 BPM    Atrial Rate 82 BPM    P-R Interval 180 ms    QRS Duration 104 ms    Q-T Interval 358 ms    QTC Calculation(Bazett) 418 ms    P Axis 45 degrees    R Axis -1 degrees    T Axis -24 degrees    
< from: 12 Lead ECG (05.14.24 @ 21:12) >      Ventricular Rate 82 BPM    Atrial Rate 82 BPM    P-R Interval 180 ms    QRS Duration 104 ms    Q-T Interval 358 ms    QTC Calculation(Bazett) 418 ms    P Axis 45 degrees    R Axis -1 degrees    T Axis -24 degrees    Diagnosis Line Normal sinus rhythm  Minimal voltage criteria for LVH, may be normal variant  Cannot rule out Anterior infarct , age undetermined  Abnormal ECG    Confirmed by Vik Vivas ([id number]) on 5/15/2024 7:58:24 AM    < end of copied text >    
< from: 12 Lead ECG (05.14.24 @ 21:12) >      Ventricular Rate 82 BPM    Atrial Rate 82 BPM    P-R Interval 180 ms    QRS Duration 104 ms    Q-T Interval 358 ms    QTC Calculation(Bazett) 418 ms    P Axis 45 degrees    R Axis -1 degrees    T Axis -24 degrees    Diagnosis Line Normal sinus rhythm  Minimal voltage criteria for LVH, may be normal variant  Cannot rule out Anterior infarct , age undetermined  Abnormal ECG    Confirmed by Vik Vivas ([id number]) on 5/15/2024 7:58:24 AM    < end of copied text >

## 2024-05-28 NOTE — BH CONSULTATION LIAISON PROGRESS NOTE - NSBHCONSULTMEDSLEEP_PSY_A_CORE FT
Trazodone 50 mg QHS PRN 

## 2024-05-28 NOTE — BH CONSULTATION LIAISON PROGRESS NOTE - NSBHFUPREASONCONS_PSY_A_CORE
delirium/anxiety
anxiety/depression
delirium/anxiety/depression
anxiety/depression
delirium/anxiety/depression
delirium/anxiety/depression
anxiety/depression
delirium/anxiety/depression

## 2024-05-28 NOTE — BH CONSULTATION LIAISON PROGRESS NOTE - NSBHCONSULTMEDPRNREASON_PSY_A_CORE
anxiety...
anxiety.../sleep...
anxiety...
anxiety.../sleep...
anxiety...

## 2024-05-28 NOTE — BH CONSULTATION LIAISON PROGRESS NOTE - NSBHATTESTBILLING_PSY_A_CORE
Billing in another system

## 2024-05-28 NOTE — BH CONSULTATION LIAISON PROGRESS NOTE - NSBHASSESSMENTFT_PSY_ALL_CORE
Patient is a 74 year old, female; domiciled with her adult son and ex-;  (2003); noncaregiver; unemployed on SSI; past psychiatric history of bipolar depression and anxiety; currently under the care of [facility]; per chart review- 2 reported prior psychiatric hospitalizations at [facility] over 10 years ago; no known suicide attempts; no known history of violence or arrests; no active substance abuse or known history of complicated withdrawal; PMH of arthritis, fibromyalgia; brought in by EMS; called by ex- (with whom she lives); presenting with anxiety and reports of vomiting x several days.    Patient currently awaiting bed placement for inpatient psychiatry.  Patient remains depressed and anxious.  Insight poor.  Fixated on "returning home to my " (whom she is no longer  to).  Appetite noted to be improved based off of portion of food eaten.  Patient denies any SI/HI, intent or plan when asked directly. Discussed potentially changing meds if bed does not become available soon- patient in agreement. Discussed ECT but patient does not want to go to hospitals that provide it (based off of distance).     Recommendations:  - CONSTANT OBSERVATION 1:1 (patient pending inpatient psych admission)    - Continue Seroquel 150 mg QHS   - Continue Lithium 150 mg QHS   - Continue Cymbalta 60 mg BID  - Continue Melatonin 6 mg QHS   - Continue PRN Trazodone 50 mg 1-2 hours after melatonin administration  - Continue PRN Atarax 25 mg TID  - Continue Gabapentin 100 mg TID   - Continue Melatonin 3 mg QHS   - Continue Klonopin 0.5 mg/1 mg   - Patient pending inpatient psychiatric admission once medically cleared

## 2024-05-28 NOTE — BH CONSULTATION LIAISON PROGRESS NOTE - NSBHMSEAFFQUAL_PSY_A_CORE
Depressed
oriented to person, place and time, cooperative with exam
Depressed

## 2024-05-28 NOTE — BH CONSULTATION LIAISON PROGRESS NOTE - NSBHCHARTREVIEWLAB_PSY_A_CORE FT
Basic Metabolic Panel (05.18.24 @ 06:04)   Sodium: 138 mmol/L  Potassium: 3.9 mmol/L  Chloride: 104: Chloride reference range changed from ..10/26/2022   . mmol/L  Carbon Dioxide: 20.0 mmol/L  Anion Gap: 14 mmol/L  Blood Urea Nitrogen: 15.3 mg/dL  Creatinine: 0.78 mg/dL  Glucose: 96 mg/dL  Calcium: 9.0 mg/dL  eGFR: 81: The estimated glomerular filtration rate (eGFR) is calculated using the   
Basic Metabolic Panel (05.23.24 @ 03:52)    Sodium: 139 mmol/L    Potassium: 3.9 mmol/L    Chloride: 101: Chloride reference range changed from ..10/26/2022 mmol/L    Carbon Dioxide: 23.0 mmol/L    Anion Gap: 15 mmol/L    Blood Urea Nitrogen: 16.6 mg/dL    Creatinine: 0.80 mg/dL    Glucose: 108 mg/dL    Calcium: 9.4 mg/dL    eGFR: 78: The estimated glomerular filtration rate (eGFR) is calculated using the  2021 CKD-EPI creatinine equation, which does not have a coefficient for  race and is validated in individuals 18 years of age and older (N Engl J  Med 2021; 385:6369-0059). Creatinine-based eGFR may be inaccurate in  various situations including but not limited to extremes of muscle mass,  altered dietary protein intake, or medications that affect renal tubular  creatinine secretion. mL/min/1.73m2  COVID (-)
Basic Metabolic Panel (05.23.24 @ 03:52)   Sodium: 139 mmol/L  Potassium: 3.9 mmol/L  Chloride: 101: Chloride reference range changed from ..10/26/2022 mmol/L  Carbon Dioxide: 23.0 mmol/L  Anion Gap: 15 mmol/L  Blood Urea Nitrogen: 16.6 mg/dL  Creatinine: 0.80 mg/dL  Glucose: 108 mg/dL  Calcium: 9.4 mg/dL  eGFR: 78
CBC/CMP wnl 
Basic Metabolic Panel in AM (05.27.24 @ 04:35)   Sodium: 135 mmol/L  Potassium: 3.8 mmol/L  Chloride: 102: Chloride reference range changed from ..10/26/2022 mmol/L  Carbon Dioxide: 21.0 mmol/L  Anion Gap: 12 mmol/L  Blood Urea Nitrogen: 22.1 mg/dL  Creatinine: 1.03 mg/dL  Glucose: 109 mg/dL  Calcium: 9.1 mg/dL  eGFR: 58: The estimated glomerular filtration rate (eGFR) is calculated using the   2021 CKD-EPI creatinine equation, which does not have a coefficient for   race and is validated in individuals 18 years of age and older (N Engl J   Med 2021; 385:9230-6046). Creatinine-based eGFR may be inaccurate in   various situations including but not limited to extremes of muscle mass,   altered dietary protein intake, or medications that affect renal tubular   creatinine secretion. mL/min/1.73m2
K 3.4
COVID (-)

## 2024-05-28 NOTE — BH CONSULTATION LIAISON PROGRESS NOTE - NSBHINDICATION_PSY_ALL_CORE
Pt safety/pending inpatient psych admission
Pt safety

## 2024-05-28 NOTE — BH CONSULTATION LIAISON PROGRESS NOTE - NSBHPTASSESSDT_PSY_A_CORE
23-May-2024 09:49
21-May-2024 10:23
16-May-2024 11:11
28-May-2024 13:11
17-May-2024 10:40
18-May-2024 12:10
22-May-2024 14:51
25-May-2024 12:52
20-May-2024 10:20
24-May-2024 10:21

## 2024-05-29 ENCOUNTER — INPATIENT (INPATIENT)
Facility: HOSPITAL | Age: 74
LOS: 28 days | Discharge: HOME | DRG: 885 | End: 2024-06-27
Attending: PSYCHIATRY & NEUROLOGY | Admitting: PSYCHIATRY & NEUROLOGY
Payer: MEDICARE

## 2024-05-29 ENCOUNTER — TRANSCRIPTION ENCOUNTER (OUTPATIENT)
Age: 74
End: 2024-05-29

## 2024-05-29 VITALS — TEMPERATURE: 98 F | WEIGHT: 201.94 LBS | HEIGHT: 62 IN | OXYGEN SATURATION: 96 % | RESPIRATION RATE: 18 BRPM

## 2024-05-29 VITALS
HEART RATE: 96 BPM | TEMPERATURE: 98 F | RESPIRATION RATE: 18 BRPM | SYSTOLIC BLOOD PRESSURE: 151 MMHG | OXYGEN SATURATION: 96 % | DIASTOLIC BLOOD PRESSURE: 85 MMHG

## 2024-05-29 DIAGNOSIS — F31.30 BIPOLAR DISORDER, CURRENT EPISODE DEPRESSED, MILD OR MODERATE SEVERITY, UNSPECIFIED: ICD-10-CM

## 2024-05-29 DIAGNOSIS — I10 ESSENTIAL (PRIMARY) HYPERTENSION: ICD-10-CM

## 2024-05-29 DIAGNOSIS — F31.9 BIPOLAR DISORDER, UNSPECIFIED: ICD-10-CM

## 2024-05-29 DIAGNOSIS — Z85.3 PERSONAL HISTORY OF MALIGNANT NEOPLASM OF BREAST: ICD-10-CM

## 2024-05-29 DIAGNOSIS — E78.5 HYPERLIPIDEMIA, UNSPECIFIED: ICD-10-CM

## 2024-05-29 DIAGNOSIS — Z90.49 ACQUIRED ABSENCE OF OTHER SPECIFIED PARTS OF DIGESTIVE TRACT: Chronic | ICD-10-CM

## 2024-05-29 DIAGNOSIS — Z79.899 OTHER LONG TERM (CURRENT) DRUG THERAPY: ICD-10-CM

## 2024-05-29 DIAGNOSIS — Z90.49 ACQUIRED ABSENCE OF OTHER SPECIFIED PARTS OF DIGESTIVE TRACT: ICD-10-CM

## 2024-05-29 DIAGNOSIS — F41.0 PANIC DISORDER [EPISODIC PAROXYSMAL ANXIETY]: ICD-10-CM

## 2024-05-29 PROCEDURE — 70450 CT HEAD/BRAIN W/O DYE: CPT | Mod: MC

## 2024-05-29 PROCEDURE — 82746 ASSAY OF FOLIC ACID SERUM: CPT

## 2024-05-29 PROCEDURE — 99239 HOSP IP/OBS DSCHRG MGMT >30: CPT

## 2024-05-29 PROCEDURE — 80178 ASSAY OF LITHIUM: CPT

## 2024-05-29 PROCEDURE — 87077 CULTURE AEROBIC IDENTIFY: CPT

## 2024-05-29 PROCEDURE — 87040 BLOOD CULTURE FOR BACTERIA: CPT

## 2024-05-29 PROCEDURE — 84100 ASSAY OF PHOSPHORUS: CPT

## 2024-05-29 PROCEDURE — 83036 HEMOGLOBIN GLYCOSYLATED A1C: CPT

## 2024-05-29 PROCEDURE — 81001 URINALYSIS AUTO W/SCOPE: CPT

## 2024-05-29 PROCEDURE — 97116 GAIT TRAINING THERAPY: CPT

## 2024-05-29 PROCEDURE — 80053 COMPREHEN METABOLIC PANEL: CPT

## 2024-05-29 PROCEDURE — 80061 LIPID PANEL: CPT

## 2024-05-29 PROCEDURE — 36415 COLL VENOUS BLD VENIPUNCTURE: CPT

## 2024-05-29 PROCEDURE — 85025 COMPLETE CBC W/AUTO DIFF WBC: CPT

## 2024-05-29 PROCEDURE — 93005 ELECTROCARDIOGRAM TRACING: CPT

## 2024-05-29 PROCEDURE — 82607 VITAMIN B-12: CPT

## 2024-05-29 PROCEDURE — 97530 THERAPEUTIC ACTIVITIES: CPT | Mod: GP

## 2024-05-29 PROCEDURE — 87086 URINE CULTURE/COLONY COUNT: CPT

## 2024-05-29 PROCEDURE — 83735 ASSAY OF MAGNESIUM: CPT

## 2024-05-29 PROCEDURE — 87635 SARS-COV-2 COVID-19 AMP PRB: CPT

## 2024-05-29 PROCEDURE — 84443 ASSAY THYROID STIM HORMONE: CPT

## 2024-05-29 PROCEDURE — 86769 SARS-COV-2 COVID-19 ANTIBODY: CPT

## 2024-05-29 PROCEDURE — 85027 COMPLETE CBC AUTOMATED: CPT

## 2024-05-29 PROCEDURE — 99223 1ST HOSP IP/OBS HIGH 75: CPT

## 2024-05-29 PROCEDURE — 97161 PT EVAL LOW COMPLEX 20 MIN: CPT | Mod: GP

## 2024-05-29 PROCEDURE — 80048 BASIC METABOLIC PNL TOTAL CA: CPT

## 2024-05-29 PROCEDURE — 97110 THERAPEUTIC EXERCISES: CPT

## 2024-05-29 PROCEDURE — 97116 GAIT TRAINING THERAPY: CPT | Mod: GP

## 2024-05-29 PROCEDURE — 99285 EMERGENCY DEPT VISIT HI MDM: CPT | Mod: 25

## 2024-05-29 PROCEDURE — 87186 SC STD MICRODIL/AGAR DIL: CPT

## 2024-05-29 RX ORDER — CLONAZEPAM 1 MG
1 TABLET ORAL
Qty: 0 | Refills: 0 | DISCHARGE
Start: 2024-05-29

## 2024-05-29 RX ORDER — QUETIAPINE FUMARATE 200 MG/1
150 TABLET, FILM COATED ORAL
Qty: 0 | Refills: 0 | DISCHARGE
Start: 2024-05-29

## 2024-05-29 RX ORDER — MAGNESIUM, ALUMINUM HYDROXIDE 400-400
30 TABLET,CHEWABLE ORAL EVERY 4 HOURS
Refills: 0 | Status: DISCONTINUED | OUTPATIENT
Start: 2024-05-29 | End: 2024-06-27

## 2024-05-29 RX ORDER — LITHIUM CARBONATE 300 MG
150 TABLET ORAL AT BEDTIME
Refills: 0 | Status: DISCONTINUED | OUTPATIENT
Start: 2024-05-29 | End: 2024-06-01

## 2024-05-29 RX ORDER — CLONAZEPAM 2 MG/1
0.5 TABLET ORAL AT BEDTIME
Refills: 0 | Status: DISCONTINUED | OUTPATIENT
Start: 2024-05-29 | End: 2024-06-05

## 2024-05-29 RX ORDER — GABAPENTIN
100 POWDER (GRAM) MISCELLANEOUS THREE TIMES A DAY
Refills: 0 | Status: DISCONTINUED | OUTPATIENT
Start: 2024-05-29 | End: 2024-06-25

## 2024-05-29 RX ORDER — TRAZODONE HYDROCHLORIDE 50 MG/1
50 TABLET, FILM COATED ORAL AT BEDTIME
Refills: 0 | Status: DISCONTINUED | OUTPATIENT
Start: 2024-05-29 | End: 2024-06-27

## 2024-05-29 RX ORDER — ACETAMINOPHEN 325 MG
650 TABLET ORAL EVERY 6 HOURS
Refills: 0 | Status: DISCONTINUED | OUTPATIENT
Start: 2024-05-29 | End: 2024-06-27

## 2024-05-29 RX ORDER — CLOPIDOGREL BISULFATE 75 MG/1
75 TABLET, FILM COATED ORAL DAILY
Refills: 0 | Status: DISCONTINUED | OUTPATIENT
Start: 2024-05-29 | End: 2024-06-27

## 2024-05-29 RX ORDER — ALBUTEROL 90 MCG
1 AEROSOL REFILL (GRAM) INHALATION
Refills: 0 | Status: DISCONTINUED | OUTPATIENT
Start: 2024-05-29 | End: 2024-06-27

## 2024-05-29 RX ORDER — LANOLIN ALCOHOL/MO/W.PET/CERES
1 CREAM (GRAM) TOPICAL
Qty: 0 | Refills: 0 | DISCHARGE
Start: 2024-05-29

## 2024-05-29 RX ORDER — DULOXETINE HYDROCHLORIDE 20 MG/1
60 CAPSULE, DELAYED RELEASE ORAL
Refills: 0 | Status: DISCONTINUED | OUTPATIENT
Start: 2024-05-29 | End: 2024-06-05

## 2024-05-29 RX ORDER — ENOXAPARIN SODIUM 100 MG/ML
40 INJECTION SUBCUTANEOUS EVERY 24 HOURS
Refills: 0 | Status: DISCONTINUED | OUTPATIENT
Start: 2024-05-29 | End: 2024-06-27

## 2024-05-29 RX ORDER — HYDROXYZINE PAMOATE 50 MG/1
25 CAPSULE ORAL THREE TIMES A DAY
Refills: 0 | Status: DISCONTINUED | OUTPATIENT
Start: 2024-05-29 | End: 2024-06-27

## 2024-05-29 RX ORDER — CLONAZEPAM 1 MG
0.5 TABLET ORAL
Qty: 0 | Refills: 0 | DISCHARGE
Start: 2024-05-29

## 2024-05-29 RX ORDER — LORAZEPAM 0.5 MG
0.5 TABLET ORAL EVERY 8 HOURS
Refills: 0 | Status: DISCONTINUED | OUTPATIENT
Start: 2024-05-29 | End: 2024-06-05

## 2024-05-29 RX ADMIN — Medication 150 MILLIGRAM(S): at 20:32

## 2024-05-29 RX ADMIN — DULOXETINE HYDROCHLORIDE 60 MILLIGRAM(S): 30 CAPSULE, DELAYED RELEASE ORAL at 05:00

## 2024-05-29 RX ADMIN — CLOPIDOGREL BISULFATE 75 MILLIGRAM(S): 75 TABLET, FILM COATED ORAL at 11:46

## 2024-05-29 RX ADMIN — DULOXETINE HYDROCHLORIDE 60 MILLIGRAM(S): 20 CAPSULE, DELAYED RELEASE ORAL at 20:31

## 2024-05-29 RX ADMIN — Medication 25 MILLIGRAM(S): at 11:48

## 2024-05-29 RX ADMIN — CLONAZEPAM 0.5 MILLIGRAM(S): 2 TABLET ORAL at 20:31

## 2024-05-29 RX ADMIN — Medication 100 MILLIGRAM(S): at 20:32

## 2024-05-29 RX ADMIN — Medication 150 MILLIGRAM(S): at 20:31

## 2024-05-29 RX ADMIN — GABAPENTIN 100 MILLIGRAM(S): 400 CAPSULE ORAL at 05:00

## 2024-05-29 RX ADMIN — GABAPENTIN 100 MILLIGRAM(S): 400 CAPSULE ORAL at 11:46

## 2024-05-29 RX ADMIN — ENOXAPARIN SODIUM 40 MILLIGRAM(S): 100 INJECTION SUBCUTANEOUS at 11:47

## 2024-05-29 RX ADMIN — Medication 0.5 MILLIGRAM(S): at 15:48

## 2024-05-29 NOTE — DISCHARGE NOTE NURSING/CASE MANAGEMENT/SOCIAL WORK - PATIENT PORTAL LINK FT
You can access the FollowMyHealth Patient Portal offered by [facility] by registering at the following website: [email or web address]. By joining [email or web address]’s FollowMyHealth portal, you will also be able to view your health information using other applications (apps) compatible with our system.

## 2024-05-29 NOTE — DISCHARGE NOTE NURSING/CASE MANAGEMENT/SOCIAL WORK - NSDCFUADDAPPT_GEN_ALL_CORE_FT
Patient will be transferred to [facility] for inpatient psych tx.

## 2024-05-30 ENCOUNTER — EMERGENCY (EMERGENCY)
Facility: HOSPITAL | Age: 74
LOS: 0 days | Discharge: PSYCHIATRIC OR SUBSTANCE USE FACILITY | End: 2024-05-30
Attending: EMERGENCY MEDICINE
Payer: MEDICARE

## 2024-05-30 VITALS
OXYGEN SATURATION: 95 % | HEART RATE: 96 BPM | RESPIRATION RATE: 18 BRPM | DIASTOLIC BLOOD PRESSURE: 82 MMHG | SYSTOLIC BLOOD PRESSURE: 132 MMHG

## 2024-05-30 VITALS
DIASTOLIC BLOOD PRESSURE: 85 MMHG | HEART RATE: 89 BPM | SYSTOLIC BLOOD PRESSURE: 111 MMHG | RESPIRATION RATE: 16 BRPM | WEIGHT: 199.96 LBS | TEMPERATURE: 98 F | HEIGHT: 62 IN | OXYGEN SATURATION: 97 %

## 2024-05-30 DIAGNOSIS — M48.54XA COLLAPSED VERTEBRA, NOT ELSEWHERE CLASSIFIED, THORACIC REGION, INITIAL ENCOUNTER FOR FRACTURE: ICD-10-CM

## 2024-05-30 DIAGNOSIS — Z88.1 ALLERGY STATUS TO OTHER ANTIBIOTIC AGENTS STATUS: ICD-10-CM

## 2024-05-30 DIAGNOSIS — F31.9 BIPOLAR DISORDER, UNSPECIFIED: ICD-10-CM

## 2024-05-30 DIAGNOSIS — Z88.5 ALLERGY STATUS TO NARCOTIC AGENT: ICD-10-CM

## 2024-05-30 DIAGNOSIS — R10.11 RIGHT UPPER QUADRANT PAIN: ICD-10-CM

## 2024-05-30 DIAGNOSIS — Z90.49 ACQUIRED ABSENCE OF OTHER SPECIFIED PARTS OF DIGESTIVE TRACT: ICD-10-CM

## 2024-05-30 LAB
A1C WITH ESTIMATED AVERAGE GLUCOSE RESULT: 5.4 % — SIGNIFICANT CHANGE UP (ref 4–5.6)
ALBUMIN SERPL ELPH-MCNC: 3 G/DL — LOW (ref 3.3–5)
ALP SERPL-CCNC: 97 U/L — SIGNIFICANT CHANGE UP (ref 40–120)
ALT FLD-CCNC: 32 U/L — SIGNIFICANT CHANGE UP (ref 12–78)
ANION GAP SERPL CALC-SCNC: 6 MMOL/L — SIGNIFICANT CHANGE UP (ref 5–17)
APPEARANCE UR: ABNORMAL
AST SERPL-CCNC: 18 U/L — SIGNIFICANT CHANGE UP (ref 15–37)
BACTERIA # UR AUTO: ABNORMAL /HPF
BASOPHILS # BLD AUTO: 0.07 K/UL — SIGNIFICANT CHANGE UP (ref 0–0.2)
BASOPHILS NFR BLD AUTO: 1.2 % — SIGNIFICANT CHANGE UP (ref 0–2)
BILIRUB SERPL-MCNC: 0.4 MG/DL — SIGNIFICANT CHANGE UP (ref 0.2–1.2)
BILIRUB UR-MCNC: NEGATIVE — SIGNIFICANT CHANGE UP
BUN SERPL-MCNC: 17 MG/DL — SIGNIFICANT CHANGE UP (ref 7–23)
CALCIUM SERPL-MCNC: 9.1 MG/DL — SIGNIFICANT CHANGE UP (ref 8.5–10.1)
CAST: 3 /LPF — SIGNIFICANT CHANGE UP (ref 0–4)
CHLORIDE SERPL-SCNC: 109 MMOL/L — HIGH (ref 96–108)
CHOLEST SERPL-MCNC: 213 MG/DL — HIGH
CO2 SERPL-SCNC: 22 MMOL/L — SIGNIFICANT CHANGE UP (ref 22–31)
COLOR SPEC: SIGNIFICANT CHANGE UP
COVID-19 SPIKE DOMAIN AB INTERP: POSITIVE
COVID-19 SPIKE DOMAIN ANTIBODY RESULT: >250 U/ML — HIGH
CREAT SERPL-MCNC: 0.88 MG/DL — SIGNIFICANT CHANGE UP (ref 0.5–1.3)
DIFF PNL FLD: NEGATIVE — SIGNIFICANT CHANGE UP
EGFR: 70 ML/MIN/1.73M2 — SIGNIFICANT CHANGE UP
EOSINOPHIL # BLD AUTO: 0.17 K/UL — SIGNIFICANT CHANGE UP (ref 0–0.5)
EOSINOPHIL NFR BLD AUTO: 2.9 % — SIGNIFICANT CHANGE UP (ref 0–6)
ESTIMATED AVERAGE GLUCOSE: 108 MG/DL — SIGNIFICANT CHANGE UP (ref 68–114)
GLUCOSE SERPL-MCNC: 116 MG/DL — HIGH (ref 70–99)
GLUCOSE UR QL: NEGATIVE MG/DL — SIGNIFICANT CHANGE UP
HCT VFR BLD CALC: 36.5 % — SIGNIFICANT CHANGE UP (ref 34.5–45)
HDLC SERPL-MCNC: 44 MG/DL — LOW
HGB BLD-MCNC: 11.7 G/DL — SIGNIFICANT CHANGE UP (ref 11.5–15.5)
HYALINE CASTS # UR AUTO: PRESENT
IMM GRANULOCYTES NFR BLD AUTO: 0.3 % — SIGNIFICANT CHANGE UP (ref 0–0.9)
KETONES UR-MCNC: ABNORMAL MG/DL
LEUKOCYTE ESTERASE UR-ACNC: ABNORMAL
LIPID PNL WITH DIRECT LDL SERPL: 154 MG/DL — HIGH
LITHIUM SERPL-MCNC: 0.3 MMOL/L — LOW (ref 0.6–1.2)
LYMPHOCYTES # BLD AUTO: 1.69 K/UL — SIGNIFICANT CHANGE UP (ref 1–3.3)
LYMPHOCYTES # BLD AUTO: 28.8 % — SIGNIFICANT CHANGE UP (ref 13–44)
MCHC RBC-ENTMCNC: 27 PG — SIGNIFICANT CHANGE UP (ref 27–34)
MCHC RBC-ENTMCNC: 32.1 GM/DL — SIGNIFICANT CHANGE UP (ref 32–36)
MCV RBC AUTO: 84.3 FL — SIGNIFICANT CHANGE UP (ref 80–100)
MONOCYTES # BLD AUTO: 0.69 K/UL — SIGNIFICANT CHANGE UP (ref 0–0.9)
MONOCYTES NFR BLD AUTO: 11.8 % — SIGNIFICANT CHANGE UP (ref 2–14)
NEUTROPHILS # BLD AUTO: 3.23 K/UL — SIGNIFICANT CHANGE UP (ref 1.8–7.4)
NEUTROPHILS NFR BLD AUTO: 55 % — SIGNIFICANT CHANGE UP (ref 43–77)
NITRITE UR-MCNC: NEGATIVE — SIGNIFICANT CHANGE UP
NON HDL CHOLESTEROL: 169 MG/DL — HIGH
PH UR: 5.5 — SIGNIFICANT CHANGE UP (ref 5–8)
PLATELET # BLD AUTO: 247 K/UL — SIGNIFICANT CHANGE UP (ref 150–400)
POTASSIUM SERPL-MCNC: 3.8 MMOL/L — SIGNIFICANT CHANGE UP (ref 3.5–5.3)
POTASSIUM SERPL-SCNC: 3.8 MMOL/L — SIGNIFICANT CHANGE UP (ref 3.5–5.3)
PROT SERPL-MCNC: 6.5 GM/DL — SIGNIFICANT CHANGE UP (ref 6–8.3)
PROT UR-MCNC: SIGNIFICANT CHANGE UP MG/DL
RBC # BLD: 4.33 M/UL — SIGNIFICANT CHANGE UP (ref 3.8–5.2)
RBC # FLD: 14.9 % — HIGH (ref 10.3–14.5)
RBC CASTS # UR COMP ASSIST: 2 /HPF — SIGNIFICANT CHANGE UP (ref 0–4)
SARS-COV-2 IGG+IGM SERPL QL IA: >250 U/ML — HIGH
SARS-COV-2 IGG+IGM SERPL QL IA: POSITIVE
SODIUM SERPL-SCNC: 137 MMOL/L — SIGNIFICANT CHANGE UP (ref 135–145)
SP GR SPEC: 1.03 — SIGNIFICANT CHANGE UP (ref 1–1.03)
SQUAMOUS # UR AUTO: 22 /HPF — HIGH (ref 0–5)
TRIGL SERPL-MCNC: 87 MG/DL — SIGNIFICANT CHANGE UP
TSH SERPL-MCNC: 2.43 UU/ML — SIGNIFICANT CHANGE UP (ref 0.34–4.82)
UROBILINOGEN FLD QL: 1 MG/DL — SIGNIFICANT CHANGE UP (ref 0.2–1)
WBC # BLD: 5.87 K/UL — SIGNIFICANT CHANGE UP (ref 3.8–10.5)
WBC # FLD AUTO: 5.87 K/UL — SIGNIFICANT CHANGE UP (ref 3.8–10.5)
WBC UR QL: 3 /HPF — SIGNIFICANT CHANGE UP (ref 0–5)

## 2024-05-30 PROCEDURE — 99221 1ST HOSP IP/OBS SF/LOW 40: CPT

## 2024-05-30 PROCEDURE — 74176 CT ABD & PELVIS W/O CONTRAST: CPT | Mod: MC

## 2024-05-30 PROCEDURE — 74176 CT ABD & PELVIS W/O CONTRAST: CPT | Mod: 26,MC

## 2024-05-30 PROCEDURE — 81001 URINALYSIS AUTO W/SCOPE: CPT

## 2024-05-30 PROCEDURE — 99284 EMERGENCY DEPT VISIT MOD MDM: CPT | Mod: FS

## 2024-05-30 PROCEDURE — 99285 EMERGENCY DEPT VISIT HI MDM: CPT | Mod: 25

## 2024-05-30 PROCEDURE — 93010 ELECTROCARDIOGRAM REPORT: CPT

## 2024-05-30 PROCEDURE — 93005 ELECTROCARDIOGRAM TRACING: CPT

## 2024-05-30 PROCEDURE — 36000 PLACE NEEDLE IN VEIN: CPT

## 2024-05-30 PROCEDURE — 99222 1ST HOSP IP/OBS MODERATE 55: CPT

## 2024-05-30 PROCEDURE — 99232 SBSQ HOSP IP/OBS MODERATE 35: CPT

## 2024-05-30 RX ORDER — SENNOSIDES 8.6 MG
2 TABLET ORAL AT BEDTIME
Refills: 0 | Status: DISCONTINUED | OUTPATIENT
Start: 2024-05-30 | End: 2024-06-27

## 2024-05-30 RX ORDER — POLYETHYLENE GLYCOL 3350 1 G/G
17 POWDER ORAL DAILY
Refills: 0 | Status: DISCONTINUED | OUTPATIENT
Start: 2024-05-30 | End: 2024-06-27

## 2024-05-30 RX ORDER — SIMETHICONE 40MG/0.6ML
80 SUSPENSION, DROPS(FINAL DOSAGE FORM)(ML) ORAL THREE TIMES A DAY
Refills: 0 | Status: DISCONTINUED | OUTPATIENT
Start: 2024-05-30 | End: 2024-06-27

## 2024-05-30 RX ORDER — SODIUM CHLORIDE 9 MG/ML
1000 INJECTION INTRAMUSCULAR; INTRAVENOUS; SUBCUTANEOUS ONCE
Refills: 0 | Status: COMPLETED | OUTPATIENT
Start: 2024-05-30 | End: 2024-05-30

## 2024-05-30 RX ADMIN — Medication 150 MILLIGRAM(S): at 22:47

## 2024-05-30 RX ADMIN — DULOXETINE HYDROCHLORIDE 60 MILLIGRAM(S): 20 CAPSULE, DELAYED RELEASE ORAL at 22:44

## 2024-05-30 RX ADMIN — CLONAZEPAM 0.5 MILLIGRAM(S): 2 TABLET ORAL at 22:47

## 2024-05-30 RX ADMIN — Medication 5 MILLIGRAM(S): at 22:47

## 2024-05-30 RX ADMIN — DULOXETINE HYDROCHLORIDE 60 MILLIGRAM(S): 20 CAPSULE, DELAYED RELEASE ORAL at 08:19

## 2024-05-30 RX ADMIN — HYDROXYZINE PAMOATE 25 MILLIGRAM(S): 50 CAPSULE ORAL at 17:02

## 2024-05-30 RX ADMIN — Medication 0.5 MILLIGRAM(S): at 17:31

## 2024-05-30 RX ADMIN — ENOXAPARIN SODIUM 40 MILLIGRAM(S): 100 INJECTION SUBCUTANEOUS at 08:18

## 2024-05-30 RX ADMIN — Medication 100 MILLIGRAM(S): at 22:47

## 2024-05-30 RX ADMIN — Medication 150 MILLIGRAM(S): at 08:18

## 2024-05-30 RX ADMIN — CLOPIDOGREL BISULFATE 75 MILLIGRAM(S): 75 TABLET, FILM COATED ORAL at 08:18

## 2024-05-30 RX ADMIN — Medication 2 TABLET(S): at 22:47

## 2024-05-30 RX ADMIN — Medication 100 MILLIGRAM(S): at 08:18

## 2024-05-30 NOTE — ED ADULT NURSE NOTE - CHIEF COMPLAINT QUOTE
Pt brought to ED from [address] [place] [address] on a 1:1 c/o upper abdominal pain x1 day. Pt has not eaten all day. Denies n/v/d, fevers. Pt grunting on assessment. Pt received 25mg Atarax 1 hour PTA and 0.5mg Ativan 30 minutes PTA. PMH of anxiety, depression, bipolar disorder, and breast cancer.

## 2024-05-30 NOTE — ED PROVIDER NOTE - PATIENT PORTAL LINK FT
You can access the FollowMyHealth Patient Portal offered by [facility] by registering at the following website: [email or web address]. By joining [employer]’s FollowMyHealth portal, you will also be able to view your health information using other applications (apps) compatible with our system.

## 2024-05-30 NOTE — ED PROVIDER NOTE - PROGRESS NOTE DETAILS
Case discussed with RN in .  Pt was sent to ED for evaluation of Abdominal pain.  Pt was seen in ED by Dr. GALLEGOS.  CT aBd and pelvis Shows: Age-indeterminate compression fracture at the superior endplate of T10,   favored to be subacute or chronic. Correlate for point tenderness.   Otherwise, no acute pathology in the abdomen or pelvis.  U/A cloudy with trace ketones.  Trace leuk esterase.  Results of Urine cx can be followed on .  Pt can be dc'ed back to  to continue Psychiatric Care on .  Toma Smiley PA-C Case discussed with RN in .  Pt was sent to ED for evaluation of Abdominal pain.  Pt was seen in ED by Dr. GALLEGOS.  Documented RUQ abd pain in pt with h/o Cholecystectomy.  CT aBd and pelvis Shows: Age-indeterminate compression fracture at the superior endplate of T10,   favored to be subacute or chronic. Correlate for point tenderness.   Otherwise, no acute pathology in the abdomen or pelvis.  s/p Choley and normal caliber of bile ducts.   U/A cloudy with trace ketones.  Trace leuk esterase.  Results of Urine cx can be followed on .  Pt can be dc'ed back to  to continue Psychiatric Care on .  Toma Smiley PA-C

## 2024-05-30 NOTE — ED ADULT TRIAGE NOTE - CHIEF COMPLAINT QUOTE
Pt brought to ED from [address] [place] [address] on a 1:1 c/o upper abdominal pain x1 day. Pt has not eaten all day. Denies n/v/d, fevers. Pt grunting on assessment. Pt received 25mg Atarax 1 hour PTA and 0.5mg Ativan 30 minutes PTA. PMH of anxiety, depression, bipolar disorder, and breast cancer.

## 2024-05-30 NOTE — ED PROVIDER NOTE - ATTENDING APP SHARED VISIT CONTRIBUTION OF CARE
Dr. Duque: I performed a face to face bedside interview with patient regarding history of present illness, review of symptoms and past medical history. I completed an independent physical exam.  I have discussed patient's plan of care with PA.   I agree with note as stated above, having amended the EMR as needed to reflect my findings.   This includes HISTORY OF PRESENT ILLNESS, HIV, PAST MEDICAL/SURGICAL/FAMILY/SOCIAL HISTORY, ALLERGIES AND HOME MEDICATIONS, REVIEW OF SYSTEMS, PHYSICAL EXAM, and any PROGRESS NOTES during the time I functioned as the attending physician for this patient.  CAMDEN Duque DO

## 2024-05-30 NOTE — ED ADULT NURSE NOTE - NSFALLRISKINTERV_ED_ALL_ED

## 2024-05-30 NOTE — ED PROVIDER NOTE - OBJECTIVE STATEMENT
73 y/o female with PMHx of depression, anxiety, bipolar cholecystectomy, currently admitted for psychiatric reasons presents to the ED c/o abdominal pain, decreased PO intake. no fevers, no N/V/D.

## 2024-05-30 NOTE — ED ADULT NURSE NOTE - OBJECTIVE STATEMENT
Pt brought to ED from [address] [place] [address] on a 1:1 c/o upper abdominal pain x1 day. Pt has not eaten all day. Denies n/v/d, fevers. Pt grunting on assessment. Pt received 25mg Atarax 1 hour PTA and 0.5mg Ativan 30 minutes PTA. PMH of anxiety, depression, bipolar disorder, and breast cancer.

## 2024-05-30 NOTE — ED PROVIDER NOTE - PHYSICAL EXAMINATION
Gen:  Well appearing in NAD  Head:  NC/AT  HEENT: pupils perrl,no pharyngeal erythema, uvula midline  Cardiac: S1S2, RRR  Abd: Soft, + RUQ ttp  Resp: No distress, CTA   musculoskeletal:: no deformities, no swelling, strength +5/+5  Skin: warm and dry as visualized, no rashes  Neuro: jessee FONTAINE x 4

## 2024-05-30 NOTE — ED PROVIDER NOTE - CARE PLAN
1 Principal Discharge DX:	Abdominal pain  Secondary Diagnosis:	Nontraumatic compression fracture of T10 vertebra

## 2024-05-31 PROCEDURE — 99232 SBSQ HOSP IP/OBS MODERATE 35: CPT

## 2024-05-31 RX ORDER — LOPERAMIDE HCL 2 MG
2 CAPSULE ORAL EVERY 6 HOURS
Refills: 0 | Status: DISCONTINUED | OUTPATIENT
Start: 2024-05-31 | End: 2024-06-27

## 2024-05-31 RX ADMIN — Medication 100 MILLIGRAM(S): at 09:41

## 2024-05-31 RX ADMIN — CLONAZEPAM 0.5 MILLIGRAM(S): 2 TABLET ORAL at 19:11

## 2024-05-31 RX ADMIN — DULOXETINE HYDROCHLORIDE 60 MILLIGRAM(S): 20 CAPSULE, DELAYED RELEASE ORAL at 09:41

## 2024-05-31 RX ADMIN — CLOPIDOGREL BISULFATE 75 MILLIGRAM(S): 75 TABLET, FILM COATED ORAL at 09:40

## 2024-05-31 RX ADMIN — Medication 150 MILLIGRAM(S): at 19:11

## 2024-05-31 RX ADMIN — Medication 100 MILLIGRAM(S): at 17:34

## 2024-05-31 RX ADMIN — Medication 2 MILLIGRAM(S): at 19:11

## 2024-05-31 RX ADMIN — Medication 2 TABLET(S): at 19:11

## 2024-05-31 RX ADMIN — Medication 100 MILLIGRAM(S): at 19:10

## 2024-05-31 RX ADMIN — DULOXETINE HYDROCHLORIDE 60 MILLIGRAM(S): 20 CAPSULE, DELAYED RELEASE ORAL at 19:54

## 2024-05-31 RX ADMIN — Medication 150 MILLIGRAM(S): at 09:41

## 2024-05-31 RX ADMIN — ENOXAPARIN SODIUM 40 MILLIGRAM(S): 100 INJECTION SUBCUTANEOUS at 09:41

## 2024-05-31 RX ADMIN — Medication 0.5 MILLIGRAM(S): at 14:38

## 2024-06-01 PROCEDURE — 99232 SBSQ HOSP IP/OBS MODERATE 35: CPT

## 2024-06-01 RX ORDER — LITHIUM CARBONATE 300 MG
300 TABLET ORAL AT BEDTIME
Refills: 0 | Status: DISCONTINUED | OUTPATIENT
Start: 2024-06-01 | End: 2024-06-11

## 2024-06-01 RX ADMIN — Medication 300 MILLIGRAM(S): at 20:29

## 2024-06-01 RX ADMIN — CLONAZEPAM 0.5 MILLIGRAM(S): 2 TABLET ORAL at 20:29

## 2024-06-01 RX ADMIN — DULOXETINE HYDROCHLORIDE 60 MILLIGRAM(S): 20 CAPSULE, DELAYED RELEASE ORAL at 20:30

## 2024-06-01 RX ADMIN — Medication 150 MILLIGRAM(S): at 10:21

## 2024-06-01 RX ADMIN — Medication 100 MILLIGRAM(S): at 10:21

## 2024-06-01 RX ADMIN — Medication 100 MILLIGRAM(S): at 20:29

## 2024-06-01 RX ADMIN — CLOPIDOGREL BISULFATE 75 MILLIGRAM(S): 75 TABLET, FILM COATED ORAL at 10:22

## 2024-06-01 RX ADMIN — Medication 150 MILLIGRAM(S): at 20:29

## 2024-06-01 RX ADMIN — Medication 2 TABLET(S): at 20:29

## 2024-06-01 RX ADMIN — DULOXETINE HYDROCHLORIDE 60 MILLIGRAM(S): 20 CAPSULE, DELAYED RELEASE ORAL at 10:21

## 2024-06-01 RX ADMIN — ENOXAPARIN SODIUM 40 MILLIGRAM(S): 100 INJECTION SUBCUTANEOUS at 10:22

## 2024-06-01 RX ADMIN — Medication 100 MILLIGRAM(S): at 14:57

## 2024-06-01 RX ADMIN — HYDROXYZINE PAMOATE 25 MILLIGRAM(S): 50 CAPSULE ORAL at 10:21

## 2024-06-02 PROCEDURE — 99232 SBSQ HOSP IP/OBS MODERATE 35: CPT

## 2024-06-02 RX ORDER — BUPROPION HYDROCHLORIDE 150 MG/1
37.5 TABLET, EXTENDED RELEASE ORAL DAILY
Refills: 0 | Status: DISCONTINUED | OUTPATIENT
Start: 2024-06-03 | End: 2024-06-04

## 2024-06-02 RX ADMIN — Medication 300 MILLIGRAM(S): at 21:12

## 2024-06-02 RX ADMIN — DULOXETINE HYDROCHLORIDE 60 MILLIGRAM(S): 20 CAPSULE, DELAYED RELEASE ORAL at 21:13

## 2024-06-02 RX ADMIN — Medication 150 MILLIGRAM(S): at 21:13

## 2024-06-02 RX ADMIN — ENOXAPARIN SODIUM 40 MILLIGRAM(S): 100 INJECTION SUBCUTANEOUS at 09:52

## 2024-06-02 RX ADMIN — Medication 100 MILLIGRAM(S): at 12:37

## 2024-06-02 RX ADMIN — DULOXETINE HYDROCHLORIDE 60 MILLIGRAM(S): 20 CAPSULE, DELAYED RELEASE ORAL at 09:52

## 2024-06-02 RX ADMIN — Medication 100 MILLIGRAM(S): at 21:13

## 2024-06-02 RX ADMIN — Medication 150 MILLIGRAM(S): at 09:52

## 2024-06-02 RX ADMIN — CLONAZEPAM 0.5 MILLIGRAM(S): 2 TABLET ORAL at 21:13

## 2024-06-02 RX ADMIN — Medication 2 TABLET(S): at 21:13

## 2024-06-02 RX ADMIN — Medication 100 MILLIGRAM(S): at 09:52

## 2024-06-02 RX ADMIN — CLOPIDOGREL BISULFATE 75 MILLIGRAM(S): 75 TABLET, FILM COATED ORAL at 09:52

## 2024-06-02 RX ADMIN — HYDROXYZINE PAMOATE 25 MILLIGRAM(S): 50 CAPSULE ORAL at 09:52

## 2024-06-03 PROCEDURE — 99232 SBSQ HOSP IP/OBS MODERATE 35: CPT

## 2024-06-03 RX ADMIN — Medication 150 MILLIGRAM(S): at 09:44

## 2024-06-03 RX ADMIN — Medication 300 MILLIGRAM(S): at 20:48

## 2024-06-03 RX ADMIN — DULOXETINE HYDROCHLORIDE 60 MILLIGRAM(S): 20 CAPSULE, DELAYED RELEASE ORAL at 20:48

## 2024-06-03 RX ADMIN — BUPROPION HYDROCHLORIDE 37.5 MILLIGRAM(S): 150 TABLET, EXTENDED RELEASE ORAL at 09:44

## 2024-06-03 RX ADMIN — Medication 100 MILLIGRAM(S): at 20:49

## 2024-06-03 RX ADMIN — ENOXAPARIN SODIUM 40 MILLIGRAM(S): 100 INJECTION SUBCUTANEOUS at 09:45

## 2024-06-03 RX ADMIN — CLONAZEPAM 0.5 MILLIGRAM(S): 2 TABLET ORAL at 20:48

## 2024-06-03 RX ADMIN — Medication 100 MILLIGRAM(S): at 09:45

## 2024-06-03 RX ADMIN — Medication 150 MILLIGRAM(S): at 20:48

## 2024-06-03 RX ADMIN — CLOPIDOGREL BISULFATE 75 MILLIGRAM(S): 75 TABLET, FILM COATED ORAL at 09:45

## 2024-06-03 RX ADMIN — DULOXETINE HYDROCHLORIDE 60 MILLIGRAM(S): 20 CAPSULE, DELAYED RELEASE ORAL at 09:45

## 2024-06-03 RX ADMIN — Medication 100 MILLIGRAM(S): at 12:09

## 2024-06-03 RX ADMIN — POLYETHYLENE GLYCOL 3350 17 GRAM(S): 1 POWDER ORAL at 09:45

## 2024-06-03 RX ADMIN — Medication 2 TABLET(S): at 20:49

## 2024-06-04 LAB
APPEARANCE UR: ABNORMAL
BACTERIA # UR AUTO: ABNORMAL /HPF
BILIRUB UR-MCNC: NEGATIVE — SIGNIFICANT CHANGE UP
CAST: 1 /LPF — SIGNIFICANT CHANGE UP (ref 0–4)
COLOR SPEC: YELLOW — SIGNIFICANT CHANGE UP
COMMENT - URINE: SIGNIFICANT CHANGE UP
DIFF PNL FLD: NEGATIVE — SIGNIFICANT CHANGE UP
GLUCOSE UR QL: NEGATIVE MG/DL — SIGNIFICANT CHANGE UP
KETONES UR-MCNC: ABNORMAL MG/DL
LEUKOCYTE ESTERASE UR-ACNC: ABNORMAL
NITRITE UR-MCNC: NEGATIVE — SIGNIFICANT CHANGE UP
PH UR: 5.5 — SIGNIFICANT CHANGE UP (ref 5–8)
PROT UR-MCNC: SIGNIFICANT CHANGE UP MG/DL
RBC CASTS # UR COMP ASSIST: 2 /HPF — SIGNIFICANT CHANGE UP (ref 0–4)
SP GR SPEC: 1.02 — SIGNIFICANT CHANGE UP (ref 1–1.03)
SQUAMOUS # UR AUTO: 24 /HPF — HIGH (ref 0–5)
UROBILINOGEN FLD QL: 0.2 MG/DL — SIGNIFICANT CHANGE UP (ref 0.2–1)
WBC UR QL: 8 /HPF — HIGH (ref 0–5)

## 2024-06-04 PROCEDURE — 99232 SBSQ HOSP IP/OBS MODERATE 35: CPT

## 2024-06-04 RX ORDER — BUPROPION HYDROCHLORIDE 150 MG/1
75 TABLET, EXTENDED RELEASE ORAL DAILY
Refills: 0 | Status: DISCONTINUED | OUTPATIENT
Start: 2024-06-05 | End: 2024-06-06

## 2024-06-04 RX ORDER — NYSTATIN 100000/G
1 POWDER (GRAM) TOPICAL
Refills: 0 | Status: DISCONTINUED | OUTPATIENT
Start: 2024-06-04 | End: 2024-06-27

## 2024-06-04 RX ADMIN — Medication 2 TABLET(S): at 21:21

## 2024-06-04 RX ADMIN — Medication 150 MILLIGRAM(S): at 10:57

## 2024-06-04 RX ADMIN — CLONAZEPAM 0.5 MILLIGRAM(S): 2 TABLET ORAL at 21:30

## 2024-06-04 RX ADMIN — Medication 100 MILLIGRAM(S): at 10:57

## 2024-06-04 RX ADMIN — Medication 100 MILLIGRAM(S): at 21:21

## 2024-06-04 RX ADMIN — DULOXETINE HYDROCHLORIDE 60 MILLIGRAM(S): 20 CAPSULE, DELAYED RELEASE ORAL at 10:58

## 2024-06-04 RX ADMIN — Medication 150 MILLIGRAM(S): at 21:21

## 2024-06-04 RX ADMIN — Medication 0.5 MILLIGRAM(S): at 18:47

## 2024-06-04 RX ADMIN — Medication 1 APPLICATION(S): at 21:40

## 2024-06-04 RX ADMIN — Medication 100 MILLIGRAM(S): at 18:23

## 2024-06-04 RX ADMIN — CLOPIDOGREL BISULFATE 75 MILLIGRAM(S): 75 TABLET, FILM COATED ORAL at 10:57

## 2024-06-04 RX ADMIN — HYDROXYZINE PAMOATE 25 MILLIGRAM(S): 50 CAPSULE ORAL at 10:58

## 2024-06-04 RX ADMIN — Medication 300 MILLIGRAM(S): at 21:20

## 2024-06-04 RX ADMIN — DULOXETINE HYDROCHLORIDE 60 MILLIGRAM(S): 20 CAPSULE, DELAYED RELEASE ORAL at 21:20

## 2024-06-04 RX ADMIN — ENOXAPARIN SODIUM 40 MILLIGRAM(S): 100 INJECTION SUBCUTANEOUS at 10:57

## 2024-06-05 PROCEDURE — 99232 SBSQ HOSP IP/OBS MODERATE 35: CPT

## 2024-06-05 RX ORDER — DULOXETINE HYDROCHLORIDE 20 MG/1
60 CAPSULE, DELAYED RELEASE ORAL DAILY
Refills: 0 | Status: DISCONTINUED | OUTPATIENT
Start: 2024-06-06 | End: 2024-06-06

## 2024-06-05 RX ORDER — ESCITALOPRAM OXALATE 20 MG/1
5 TABLET, FILM COATED ORAL DAILY
Refills: 0 | Status: DISCONTINUED | OUTPATIENT
Start: 2024-06-06 | End: 2024-06-10

## 2024-06-05 RX ADMIN — Medication 100 MILLIGRAM(S): at 12:21

## 2024-06-05 RX ADMIN — Medication 100 MILLIGRAM(S): at 09:17

## 2024-06-05 RX ADMIN — Medication 2 TABLET(S): at 21:04

## 2024-06-05 RX ADMIN — Medication 300 MILLIGRAM(S): at 21:04

## 2024-06-05 RX ADMIN — ENOXAPARIN SODIUM 40 MILLIGRAM(S): 100 INJECTION SUBCUTANEOUS at 09:16

## 2024-06-05 RX ADMIN — BUPROPION HYDROCHLORIDE 75 MILLIGRAM(S): 150 TABLET, EXTENDED RELEASE ORAL at 09:18

## 2024-06-05 RX ADMIN — Medication 0.5 MILLIGRAM(S): at 09:29

## 2024-06-05 RX ADMIN — DULOXETINE HYDROCHLORIDE 60 MILLIGRAM(S): 20 CAPSULE, DELAYED RELEASE ORAL at 09:16

## 2024-06-05 RX ADMIN — CLONAZEPAM 0.5 MILLIGRAM(S): 2 TABLET ORAL at 21:03

## 2024-06-05 RX ADMIN — Medication 150 MILLIGRAM(S): at 09:17

## 2024-06-05 RX ADMIN — Medication 1 APPLICATION(S): at 09:15

## 2024-06-05 RX ADMIN — Medication 100 MILLIGRAM(S): at 21:04

## 2024-06-05 RX ADMIN — Medication 150 MILLIGRAM(S): at 21:04

## 2024-06-05 RX ADMIN — POLYETHYLENE GLYCOL 3350 17 GRAM(S): 1 POWDER ORAL at 09:18

## 2024-06-05 RX ADMIN — CLOPIDOGREL BISULFATE 75 MILLIGRAM(S): 75 TABLET, FILM COATED ORAL at 09:17

## 2024-06-05 RX ADMIN — Medication 1 APPLICATION(S): at 21:04

## 2024-06-06 PROCEDURE — 99232 SBSQ HOSP IP/OBS MODERATE 35: CPT

## 2024-06-06 RX ORDER — BUPROPION HYDROCHLORIDE 150 MG/1
150 TABLET, EXTENDED RELEASE ORAL DAILY
Refills: 0 | Status: DISCONTINUED | OUTPATIENT
Start: 2024-06-07 | End: 2024-06-12

## 2024-06-06 RX ADMIN — Medication 2 TABLET(S): at 20:21

## 2024-06-06 RX ADMIN — Medication 1 APPLICATION(S): at 08:58

## 2024-06-06 RX ADMIN — Medication 300 MILLIGRAM(S): at 20:22

## 2024-06-06 RX ADMIN — ESCITALOPRAM OXALATE 5 MILLIGRAM(S): 20 TABLET, FILM COATED ORAL at 08:57

## 2024-06-06 RX ADMIN — HYDROXYZINE PAMOATE 25 MILLIGRAM(S): 50 CAPSULE ORAL at 09:00

## 2024-06-06 RX ADMIN — BUPROPION HYDROCHLORIDE 75 MILLIGRAM(S): 150 TABLET, EXTENDED RELEASE ORAL at 08:58

## 2024-06-06 RX ADMIN — DULOXETINE HYDROCHLORIDE 60 MILLIGRAM(S): 20 CAPSULE, DELAYED RELEASE ORAL at 08:58

## 2024-06-06 RX ADMIN — Medication 100 MILLIGRAM(S): at 08:58

## 2024-06-06 RX ADMIN — CLOPIDOGREL BISULFATE 75 MILLIGRAM(S): 75 TABLET, FILM COATED ORAL at 08:57

## 2024-06-06 RX ADMIN — Medication 100 MILLIGRAM(S): at 20:22

## 2024-06-06 RX ADMIN — ENOXAPARIN SODIUM 40 MILLIGRAM(S): 100 INJECTION SUBCUTANEOUS at 08:57

## 2024-06-06 RX ADMIN — HYDROXYZINE PAMOATE 25 MILLIGRAM(S): 50 CAPSULE ORAL at 23:03

## 2024-06-06 RX ADMIN — POLYETHYLENE GLYCOL 3350 17 GRAM(S): 1 POWDER ORAL at 08:58

## 2024-06-06 RX ADMIN — Medication 150 MILLIGRAM(S): at 20:21

## 2024-06-06 RX ADMIN — Medication 1 APPLICATION(S): at 20:56

## 2024-06-06 RX ADMIN — Medication 150 MILLIGRAM(S): at 08:58

## 2024-06-07 PROCEDURE — 99232 SBSQ HOSP IP/OBS MODERATE 35: CPT

## 2024-06-07 RX ORDER — CLONAZEPAM 2 MG/1
0.5 TABLET ORAL
Refills: 0 | Status: DISCONTINUED | OUTPATIENT
Start: 2024-06-07 | End: 2024-06-14

## 2024-06-07 RX ADMIN — Medication 150 MILLIGRAM(S): at 20:28

## 2024-06-07 RX ADMIN — Medication 300 MILLIGRAM(S): at 20:29

## 2024-06-07 RX ADMIN — TRAZODONE HYDROCHLORIDE 50 MILLIGRAM(S): 50 TABLET, FILM COATED ORAL at 20:30

## 2024-06-07 RX ADMIN — Medication 100 MILLIGRAM(S): at 12:37

## 2024-06-07 RX ADMIN — BUPROPION HYDROCHLORIDE 150 MILLIGRAM(S): 150 TABLET, EXTENDED RELEASE ORAL at 09:42

## 2024-06-07 RX ADMIN — HYDROXYZINE PAMOATE 25 MILLIGRAM(S): 50 CAPSULE ORAL at 20:29

## 2024-06-07 RX ADMIN — CLOPIDOGREL BISULFATE 75 MILLIGRAM(S): 75 TABLET, FILM COATED ORAL at 09:42

## 2024-06-07 RX ADMIN — ENOXAPARIN SODIUM 40 MILLIGRAM(S): 100 INJECTION SUBCUTANEOUS at 09:43

## 2024-06-07 RX ADMIN — Medication 100 MILLIGRAM(S): at 09:42

## 2024-06-07 RX ADMIN — CLONAZEPAM 0.5 MILLIGRAM(S): 2 TABLET ORAL at 20:29

## 2024-06-07 RX ADMIN — Medication 2 TABLET(S): at 20:28

## 2024-06-07 RX ADMIN — HYDROXYZINE PAMOATE 25 MILLIGRAM(S): 50 CAPSULE ORAL at 09:42

## 2024-06-07 RX ADMIN — Medication 150 MILLIGRAM(S): at 09:42

## 2024-06-07 RX ADMIN — ESCITALOPRAM OXALATE 5 MILLIGRAM(S): 20 TABLET, FILM COATED ORAL at 09:42

## 2024-06-07 RX ADMIN — Medication 1 APPLICATION(S): at 09:43

## 2024-06-07 RX ADMIN — Medication 100 MILLIGRAM(S): at 20:29

## 2024-06-08 RX ADMIN — Medication 150 MILLIGRAM(S): at 09:30

## 2024-06-08 RX ADMIN — HYDROXYZINE PAMOATE 25 MILLIGRAM(S): 50 CAPSULE ORAL at 20:05

## 2024-06-08 RX ADMIN — BUPROPION HYDROCHLORIDE 150 MILLIGRAM(S): 150 TABLET, EXTENDED RELEASE ORAL at 09:29

## 2024-06-08 RX ADMIN — CLONAZEPAM 0.5 MILLIGRAM(S): 2 TABLET ORAL at 09:30

## 2024-06-08 RX ADMIN — Medication 100 MILLIGRAM(S): at 20:06

## 2024-06-08 RX ADMIN — Medication 300 MILLIGRAM(S): at 20:06

## 2024-06-08 RX ADMIN — Medication 100 MILLIGRAM(S): at 12:12

## 2024-06-08 RX ADMIN — CLOPIDOGREL BISULFATE 75 MILLIGRAM(S): 75 TABLET, FILM COATED ORAL at 09:30

## 2024-06-08 RX ADMIN — Medication 5 MILLIGRAM(S): at 20:05

## 2024-06-08 RX ADMIN — Medication 2 TABLET(S): at 20:06

## 2024-06-08 RX ADMIN — POLYETHYLENE GLYCOL 3350 17 GRAM(S): 1 POWDER ORAL at 09:30

## 2024-06-08 RX ADMIN — Medication 100 MILLIGRAM(S): at 09:29

## 2024-06-08 RX ADMIN — Medication 1 APPLICATION(S): at 09:30

## 2024-06-08 RX ADMIN — ENOXAPARIN SODIUM 40 MILLIGRAM(S): 100 INJECTION SUBCUTANEOUS at 09:30

## 2024-06-08 RX ADMIN — ESCITALOPRAM OXALATE 5 MILLIGRAM(S): 20 TABLET, FILM COATED ORAL at 09:29

## 2024-06-08 RX ADMIN — CLONAZEPAM 0.5 MILLIGRAM(S): 2 TABLET ORAL at 22:01

## 2024-06-08 RX ADMIN — Medication 150 MILLIGRAM(S): at 20:05

## 2024-06-09 RX ADMIN — Medication 150 MILLIGRAM(S): at 20:19

## 2024-06-09 RX ADMIN — ESCITALOPRAM OXALATE 5 MILLIGRAM(S): 20 TABLET, FILM COATED ORAL at 09:13

## 2024-06-09 RX ADMIN — HYDROXYZINE PAMOATE 25 MILLIGRAM(S): 50 CAPSULE ORAL at 09:15

## 2024-06-09 RX ADMIN — POLYETHYLENE GLYCOL 3350 17 GRAM(S): 1 POWDER ORAL at 09:14

## 2024-06-09 RX ADMIN — CLONAZEPAM 0.5 MILLIGRAM(S): 2 TABLET ORAL at 20:20

## 2024-06-09 RX ADMIN — Medication 2 TABLET(S): at 20:20

## 2024-06-09 RX ADMIN — CLONAZEPAM 0.5 MILLIGRAM(S): 2 TABLET ORAL at 09:13

## 2024-06-09 RX ADMIN — CLOPIDOGREL BISULFATE 75 MILLIGRAM(S): 75 TABLET, FILM COATED ORAL at 09:14

## 2024-06-09 RX ADMIN — Medication 100 MILLIGRAM(S): at 09:14

## 2024-06-09 RX ADMIN — Medication 5 MILLIGRAM(S): at 20:19

## 2024-06-09 RX ADMIN — HYDROXYZINE PAMOATE 25 MILLIGRAM(S): 50 CAPSULE ORAL at 20:19

## 2024-06-09 RX ADMIN — Medication 150 MILLIGRAM(S): at 09:14

## 2024-06-09 RX ADMIN — ENOXAPARIN SODIUM 40 MILLIGRAM(S): 100 INJECTION SUBCUTANEOUS at 09:14

## 2024-06-09 RX ADMIN — Medication 100 MILLIGRAM(S): at 13:59

## 2024-06-09 RX ADMIN — Medication 300 MILLIGRAM(S): at 20:20

## 2024-06-09 RX ADMIN — TRAZODONE HYDROCHLORIDE 50 MILLIGRAM(S): 50 TABLET, FILM COATED ORAL at 20:20

## 2024-06-09 RX ADMIN — BUPROPION HYDROCHLORIDE 150 MILLIGRAM(S): 150 TABLET, EXTENDED RELEASE ORAL at 09:14

## 2024-06-09 RX ADMIN — Medication 100 MILLIGRAM(S): at 20:19

## 2024-06-09 RX ADMIN — Medication 1 APPLICATION(S): at 09:14

## 2024-06-10 PROCEDURE — 99232 SBSQ HOSP IP/OBS MODERATE 35: CPT

## 2024-06-10 RX ORDER — ESCITALOPRAM OXALATE 20 MG/1
10 TABLET, FILM COATED ORAL DAILY
Refills: 0 | Status: DISCONTINUED | OUTPATIENT
Start: 2024-06-11 | End: 2024-06-26

## 2024-06-10 RX ADMIN — CLOPIDOGREL BISULFATE 75 MILLIGRAM(S): 75 TABLET, FILM COATED ORAL at 08:53

## 2024-06-10 RX ADMIN — Medication 300 MILLIGRAM(S): at 21:20

## 2024-06-10 RX ADMIN — POLYETHYLENE GLYCOL 3350 17 GRAM(S): 1 POWDER ORAL at 08:54

## 2024-06-10 RX ADMIN — Medication 100 MILLIGRAM(S): at 12:41

## 2024-06-10 RX ADMIN — CLONAZEPAM 0.5 MILLIGRAM(S): 2 TABLET ORAL at 21:20

## 2024-06-10 RX ADMIN — ENOXAPARIN SODIUM 40 MILLIGRAM(S): 100 INJECTION SUBCUTANEOUS at 08:53

## 2024-06-10 RX ADMIN — Medication 150 MILLIGRAM(S): at 21:20

## 2024-06-10 RX ADMIN — Medication 150 MILLIGRAM(S): at 08:53

## 2024-06-10 RX ADMIN — Medication 2 TABLET(S): at 21:21

## 2024-06-10 RX ADMIN — Medication 100 MILLIGRAM(S): at 08:54

## 2024-06-10 RX ADMIN — Medication 1 APPLICATION(S): at 21:21

## 2024-06-10 RX ADMIN — Medication 100 MILLIGRAM(S): at 21:20

## 2024-06-10 RX ADMIN — Medication 1 APPLICATION(S): at 08:54

## 2024-06-10 RX ADMIN — BUPROPION HYDROCHLORIDE 150 MILLIGRAM(S): 150 TABLET, EXTENDED RELEASE ORAL at 08:53

## 2024-06-10 RX ADMIN — ESCITALOPRAM OXALATE 5 MILLIGRAM(S): 20 TABLET, FILM COATED ORAL at 08:53

## 2024-06-10 RX ADMIN — CLONAZEPAM 0.5 MILLIGRAM(S): 2 TABLET ORAL at 08:56

## 2024-06-11 PROCEDURE — 99232 SBSQ HOSP IP/OBS MODERATE 35: CPT

## 2024-06-11 RX ORDER — LITHIUM CARBONATE 300 MG
450 TABLET ORAL AT BEDTIME
Refills: 0 | Status: DISCONTINUED | OUTPATIENT
Start: 2024-06-11 | End: 2024-06-27

## 2024-06-11 RX ADMIN — ESCITALOPRAM OXALATE 10 MILLIGRAM(S): 20 TABLET, FILM COATED ORAL at 09:48

## 2024-06-11 RX ADMIN — Medication 150 MILLIGRAM(S): at 09:48

## 2024-06-11 RX ADMIN — CLOPIDOGREL BISULFATE 75 MILLIGRAM(S): 75 TABLET, FILM COATED ORAL at 09:48

## 2024-06-11 RX ADMIN — Medication 100 MILLIGRAM(S): at 09:48

## 2024-06-11 RX ADMIN — Medication 1 APPLICATION(S): at 09:49

## 2024-06-11 RX ADMIN — CLONAZEPAM 0.5 MILLIGRAM(S): 2 TABLET ORAL at 20:40

## 2024-06-11 RX ADMIN — Medication 1 APPLICATION(S): at 21:09

## 2024-06-11 RX ADMIN — Medication 450 MILLIGRAM(S): at 20:41

## 2024-06-11 RX ADMIN — Medication 150 MILLIGRAM(S): at 20:41

## 2024-06-11 RX ADMIN — Medication 100 MILLIGRAM(S): at 20:41

## 2024-06-11 RX ADMIN — CLONAZEPAM 0.5 MILLIGRAM(S): 2 TABLET ORAL at 09:49

## 2024-06-11 RX ADMIN — BUPROPION HYDROCHLORIDE 150 MILLIGRAM(S): 150 TABLET, EXTENDED RELEASE ORAL at 09:48

## 2024-06-11 RX ADMIN — Medication 100 MILLIGRAM(S): at 15:29

## 2024-06-11 RX ADMIN — HYDROXYZINE PAMOATE 25 MILLIGRAM(S): 50 CAPSULE ORAL at 09:48

## 2024-06-11 RX ADMIN — Medication 2 TABLET(S): at 20:41

## 2024-06-11 RX ADMIN — ENOXAPARIN SODIUM 40 MILLIGRAM(S): 100 INJECTION SUBCUTANEOUS at 09:47

## 2024-06-12 PROCEDURE — 99232 SBSQ HOSP IP/OBS MODERATE 35: CPT

## 2024-06-12 RX ORDER — BUPROPION HYDROCHLORIDE 150 MG/1
300 TABLET, EXTENDED RELEASE ORAL DAILY
Refills: 0 | Status: DISCONTINUED | OUTPATIENT
Start: 2024-06-13 | End: 2024-06-27

## 2024-06-12 RX ADMIN — Medication 100 MILLIGRAM(S): at 09:40

## 2024-06-12 RX ADMIN — CLONAZEPAM 0.5 MILLIGRAM(S): 2 TABLET ORAL at 09:40

## 2024-06-12 RX ADMIN — Medication 450 MILLIGRAM(S): at 20:11

## 2024-06-12 RX ADMIN — Medication 2 TABLET(S): at 20:12

## 2024-06-12 RX ADMIN — ENOXAPARIN SODIUM 40 MILLIGRAM(S): 100 INJECTION SUBCUTANEOUS at 09:39

## 2024-06-12 RX ADMIN — BUPROPION HYDROCHLORIDE 150 MILLIGRAM(S): 150 TABLET, EXTENDED RELEASE ORAL at 09:39

## 2024-06-12 RX ADMIN — CLONAZEPAM 0.5 MILLIGRAM(S): 2 TABLET ORAL at 20:11

## 2024-06-12 RX ADMIN — Medication 100 MILLIGRAM(S): at 12:40

## 2024-06-12 RX ADMIN — Medication 150 MILLIGRAM(S): at 09:40

## 2024-06-12 RX ADMIN — Medication 200 MILLIGRAM(S): at 20:11

## 2024-06-12 RX ADMIN — ESCITALOPRAM OXALATE 10 MILLIGRAM(S): 20 TABLET, FILM COATED ORAL at 09:40

## 2024-06-12 RX ADMIN — CLOPIDOGREL BISULFATE 75 MILLIGRAM(S): 75 TABLET, FILM COATED ORAL at 09:40

## 2024-06-12 RX ADMIN — Medication 1 APPLICATION(S): at 20:12

## 2024-06-12 RX ADMIN — Medication 100 MILLIGRAM(S): at 20:11

## 2024-06-12 RX ADMIN — Medication 5 MILLIGRAM(S): at 20:11

## 2024-06-12 RX ADMIN — POLYETHYLENE GLYCOL 3350 17 GRAM(S): 1 POWDER ORAL at 09:40

## 2024-06-13 PROCEDURE — 99232 SBSQ HOSP IP/OBS MODERATE 35: CPT

## 2024-06-13 RX ORDER — AMOXICILLIN/POTASSIUM CLAV 250-125 MG
1 TABLET ORAL
Refills: 0 | Status: COMPLETED | OUTPATIENT
Start: 2024-06-13 | End: 2024-06-18

## 2024-06-13 RX ADMIN — HYDROXYZINE PAMOATE 25 MILLIGRAM(S): 50 CAPSULE ORAL at 09:29

## 2024-06-13 RX ADMIN — Medication 450 MILLIGRAM(S): at 20:43

## 2024-06-13 RX ADMIN — Medication 2 TABLET(S): at 20:44

## 2024-06-13 RX ADMIN — CLONAZEPAM 0.5 MILLIGRAM(S): 2 TABLET ORAL at 20:45

## 2024-06-13 RX ADMIN — BUPROPION HYDROCHLORIDE 300 MILLIGRAM(S): 150 TABLET, EXTENDED RELEASE ORAL at 09:29

## 2024-06-13 RX ADMIN — ENOXAPARIN SODIUM 40 MILLIGRAM(S): 100 INJECTION SUBCUTANEOUS at 09:29

## 2024-06-13 RX ADMIN — Medication 100 MILLIGRAM(S): at 14:56

## 2024-06-13 RX ADMIN — ESCITALOPRAM OXALATE 10 MILLIGRAM(S): 20 TABLET, FILM COATED ORAL at 09:29

## 2024-06-13 RX ADMIN — Medication 200 MILLIGRAM(S): at 20:44

## 2024-06-13 RX ADMIN — Medication 1 APPLICATION(S): at 20:45

## 2024-06-13 RX ADMIN — CLONAZEPAM 0.5 MILLIGRAM(S): 2 TABLET ORAL at 09:29

## 2024-06-13 RX ADMIN — CLOPIDOGREL BISULFATE 75 MILLIGRAM(S): 75 TABLET, FILM COATED ORAL at 09:29

## 2024-06-13 RX ADMIN — Medication 100 MILLIGRAM(S): at 09:29

## 2024-06-13 RX ADMIN — Medication 100 MILLIGRAM(S): at 09:28

## 2024-06-13 RX ADMIN — Medication 1 APPLICATION(S): at 09:29

## 2024-06-13 RX ADMIN — Medication 100 MILLIGRAM(S): at 20:43

## 2024-06-14 PROCEDURE — 99232 SBSQ HOSP IP/OBS MODERATE 35: CPT

## 2024-06-14 RX ORDER — SODIUM CHLORIDE 0.65 %
2 AEROSOL, SPRAY (ML) NASAL EVERY 4 HOURS
Refills: 0 | Status: DISCONTINUED | OUTPATIENT
Start: 2024-06-14 | End: 2024-06-27

## 2024-06-14 RX ADMIN — POLYETHYLENE GLYCOL 3350 17 GRAM(S): 1 POWDER ORAL at 10:07

## 2024-06-14 RX ADMIN — Medication 2 SPRAY(S): at 12:10

## 2024-06-14 RX ADMIN — Medication 100 MILLIGRAM(S): at 20:51

## 2024-06-14 RX ADMIN — CLOPIDOGREL BISULFATE 75 MILLIGRAM(S): 75 TABLET, FILM COATED ORAL at 09:48

## 2024-06-14 RX ADMIN — ESCITALOPRAM OXALATE 10 MILLIGRAM(S): 20 TABLET, FILM COATED ORAL at 09:48

## 2024-06-14 RX ADMIN — Medication 1 APPLICATION(S): at 09:48

## 2024-06-14 RX ADMIN — Medication 30 MILLILITER(S): at 15:35

## 2024-06-14 RX ADMIN — Medication 5 MILLIGRAM(S): at 20:50

## 2024-06-14 RX ADMIN — Medication 100 MILLIGRAM(S): at 09:48

## 2024-06-14 RX ADMIN — CLONAZEPAM 0.5 MILLIGRAM(S): 2 TABLET ORAL at 09:48

## 2024-06-14 RX ADMIN — Medication 100 MILLIGRAM(S): at 12:10

## 2024-06-14 RX ADMIN — Medication 2 TABLET(S): at 20:50

## 2024-06-14 RX ADMIN — Medication 1 APPLICATION(S): at 21:00

## 2024-06-14 RX ADMIN — CLONAZEPAM 0.5 MILLIGRAM(S): 2 TABLET ORAL at 20:50

## 2024-06-14 RX ADMIN — ENOXAPARIN SODIUM 40 MILLIGRAM(S): 100 INJECTION SUBCUTANEOUS at 09:47

## 2024-06-14 RX ADMIN — Medication 200 MILLIGRAM(S): at 20:50

## 2024-06-14 RX ADMIN — Medication 450 MILLIGRAM(S): at 20:50

## 2024-06-14 RX ADMIN — Medication 1 TABLET(S): at 20:50

## 2024-06-14 RX ADMIN — BUPROPION HYDROCHLORIDE 300 MILLIGRAM(S): 150 TABLET, EXTENDED RELEASE ORAL at 09:48

## 2024-06-14 RX ADMIN — Medication 1 TABLET(S): at 09:48

## 2024-06-15 RX ADMIN — BUPROPION HYDROCHLORIDE 300 MILLIGRAM(S): 150 TABLET, EXTENDED RELEASE ORAL at 09:03

## 2024-06-15 RX ADMIN — ESCITALOPRAM OXALATE 10 MILLIGRAM(S): 20 TABLET, FILM COATED ORAL at 09:02

## 2024-06-15 RX ADMIN — CLOPIDOGREL BISULFATE 75 MILLIGRAM(S): 75 TABLET, FILM COATED ORAL at 09:03

## 2024-06-15 RX ADMIN — Medication 1 APPLICATION(S): at 09:03

## 2024-06-15 RX ADMIN — Medication 1 TABLET(S): at 23:14

## 2024-06-15 RX ADMIN — ENOXAPARIN SODIUM 40 MILLIGRAM(S): 100 INJECTION SUBCUTANEOUS at 09:03

## 2024-06-15 RX ADMIN — Medication 2 SPRAY(S): at 09:03

## 2024-06-15 RX ADMIN — POLYETHYLENE GLYCOL 3350 17 GRAM(S): 1 POWDER ORAL at 09:15

## 2024-06-15 RX ADMIN — Medication 2 TABLET(S): at 23:14

## 2024-06-15 RX ADMIN — Medication 100 MILLIGRAM(S): at 12:16

## 2024-06-15 RX ADMIN — Medication 100 MILLIGRAM(S): at 09:03

## 2024-06-15 RX ADMIN — Medication 1 TABLET(S): at 09:02

## 2024-06-16 PROCEDURE — 99232 SBSQ HOSP IP/OBS MODERATE 35: CPT

## 2024-06-16 RX ORDER — CLONAZEPAM 2 MG/1
0.5 TABLET ORAL ONCE
Refills: 0 | Status: DISCONTINUED | OUTPATIENT
Start: 2024-06-16 | End: 2024-06-16

## 2024-06-16 RX ORDER — CLONAZEPAM 2 MG/1
0.5 TABLET ORAL THREE TIMES A DAY
Refills: 0 | Status: DISCONTINUED | OUTPATIENT
Start: 2024-06-16 | End: 2024-06-23

## 2024-06-16 RX ORDER — CLONAZEPAM 2 MG/1
0.5 TABLET ORAL
Refills: 0 | Status: DISCONTINUED | OUTPATIENT
Start: 2024-06-16 | End: 2024-06-16

## 2024-06-16 RX ORDER — LORAZEPAM 0.5 MG
1 TABLET ORAL ONCE
Refills: 0 | Status: DISCONTINUED | OUTPATIENT
Start: 2024-06-16 | End: 2024-06-16

## 2024-06-16 RX ADMIN — Medication 100 MILLIGRAM(S): at 21:09

## 2024-06-16 RX ADMIN — Medication 1 TABLET(S): at 21:07

## 2024-06-16 RX ADMIN — BUPROPION HYDROCHLORIDE 300 MILLIGRAM(S): 150 TABLET, EXTENDED RELEASE ORAL at 09:20

## 2024-06-16 RX ADMIN — Medication 200 MILLIGRAM(S): at 21:08

## 2024-06-16 RX ADMIN — Medication 100 MILLIGRAM(S): at 12:18

## 2024-06-16 RX ADMIN — ENOXAPARIN SODIUM 40 MILLIGRAM(S): 100 INJECTION SUBCUTANEOUS at 09:18

## 2024-06-16 RX ADMIN — CLOPIDOGREL BISULFATE 75 MILLIGRAM(S): 75 TABLET, FILM COATED ORAL at 09:20

## 2024-06-16 RX ADMIN — CLONAZEPAM 0.5 MILLIGRAM(S): 2 TABLET ORAL at 21:10

## 2024-06-16 RX ADMIN — Medication 1 APPLICATION(S): at 09:21

## 2024-06-16 RX ADMIN — Medication 100 MILLIGRAM(S): at 09:21

## 2024-06-16 RX ADMIN — Medication 100 MILLIGRAM(S): at 09:20

## 2024-06-16 RX ADMIN — ESCITALOPRAM OXALATE 10 MILLIGRAM(S): 20 TABLET, FILM COATED ORAL at 09:20

## 2024-06-16 RX ADMIN — Medication 450 MILLIGRAM(S): at 21:08

## 2024-06-16 RX ADMIN — CLONAZEPAM 0.5 MILLIGRAM(S): 2 TABLET ORAL at 12:18

## 2024-06-16 RX ADMIN — Medication 1 TABLET(S): at 09:21

## 2024-06-16 RX ADMIN — Medication 1 MILLIGRAM(S): at 17:43

## 2024-06-16 RX ADMIN — Medication 2 TABLET(S): at 21:07

## 2024-06-16 RX ADMIN — HYDROXYZINE PAMOATE 25 MILLIGRAM(S): 50 CAPSULE ORAL at 09:20

## 2024-06-17 PROCEDURE — 99232 SBSQ HOSP IP/OBS MODERATE 35: CPT

## 2024-06-17 RX ADMIN — CLONAZEPAM 0.5 MILLIGRAM(S): 2 TABLET ORAL at 20:33

## 2024-06-17 RX ADMIN — CLONAZEPAM 0.5 MILLIGRAM(S): 2 TABLET ORAL at 12:22

## 2024-06-17 RX ADMIN — Medication 100 MILLIGRAM(S): at 08:56

## 2024-06-17 RX ADMIN — CLONAZEPAM 0.5 MILLIGRAM(S): 2 TABLET ORAL at 08:56

## 2024-06-17 RX ADMIN — Medication 1 APPLICATION(S): at 20:34

## 2024-06-17 RX ADMIN — CLOPIDOGREL BISULFATE 75 MILLIGRAM(S): 75 TABLET, FILM COATED ORAL at 08:56

## 2024-06-17 RX ADMIN — Medication 100 MILLIGRAM(S): at 20:33

## 2024-06-17 RX ADMIN — ESCITALOPRAM OXALATE 10 MILLIGRAM(S): 20 TABLET, FILM COATED ORAL at 08:56

## 2024-06-17 RX ADMIN — Medication 2 TABLET(S): at 20:34

## 2024-06-17 RX ADMIN — Medication 200 MILLIGRAM(S): at 20:33

## 2024-06-17 RX ADMIN — Medication 2 SPRAY(S): at 08:55

## 2024-06-17 RX ADMIN — Medication 1 APPLICATION(S): at 08:55

## 2024-06-17 RX ADMIN — POLYETHYLENE GLYCOL 3350 17 GRAM(S): 1 POWDER ORAL at 08:55

## 2024-06-17 RX ADMIN — ENOXAPARIN SODIUM 40 MILLIGRAM(S): 100 INJECTION SUBCUTANEOUS at 08:55

## 2024-06-17 RX ADMIN — Medication 100 MILLIGRAM(S): at 12:22

## 2024-06-17 RX ADMIN — Medication 1 TABLET(S): at 08:55

## 2024-06-17 RX ADMIN — Medication 1 TABLET(S): at 20:33

## 2024-06-17 RX ADMIN — Medication 5 MILLIGRAM(S): at 20:33

## 2024-06-17 RX ADMIN — Medication 450 MILLIGRAM(S): at 20:34

## 2024-06-17 RX ADMIN — BUPROPION HYDROCHLORIDE 300 MILLIGRAM(S): 150 TABLET, EXTENDED RELEASE ORAL at 08:56

## 2024-06-18 PROCEDURE — 99232 SBSQ HOSP IP/OBS MODERATE 35: CPT

## 2024-06-18 RX ADMIN — Medication 100 MILLIGRAM(S): at 08:59

## 2024-06-18 RX ADMIN — Medication 450 MILLIGRAM(S): at 20:59

## 2024-06-18 RX ADMIN — Medication 200 MILLIGRAM(S): at 20:59

## 2024-06-18 RX ADMIN — ESCITALOPRAM OXALATE 10 MILLIGRAM(S): 20 TABLET, FILM COATED ORAL at 08:59

## 2024-06-18 RX ADMIN — Medication 1 TABLET(S): at 08:59

## 2024-06-18 RX ADMIN — BUPROPION HYDROCHLORIDE 300 MILLIGRAM(S): 150 TABLET, EXTENDED RELEASE ORAL at 08:58

## 2024-06-18 RX ADMIN — CLONAZEPAM 0.5 MILLIGRAM(S): 2 TABLET ORAL at 08:59

## 2024-06-18 RX ADMIN — Medication 100 MILLIGRAM(S): at 20:59

## 2024-06-18 RX ADMIN — CLOPIDOGREL BISULFATE 75 MILLIGRAM(S): 75 TABLET, FILM COATED ORAL at 08:59

## 2024-06-18 RX ADMIN — POLYETHYLENE GLYCOL 3350 17 GRAM(S): 1 POWDER ORAL at 09:00

## 2024-06-18 RX ADMIN — Medication 2 SPRAY(S): at 08:59

## 2024-06-18 RX ADMIN — CLONAZEPAM 0.5 MILLIGRAM(S): 2 TABLET ORAL at 13:09

## 2024-06-18 RX ADMIN — Medication 100 MILLIGRAM(S): at 13:09

## 2024-06-18 RX ADMIN — Medication 1 TABLET(S): at 20:59

## 2024-06-18 RX ADMIN — Medication 1 APPLICATION(S): at 20:59

## 2024-06-18 RX ADMIN — Medication 1 APPLICATION(S): at 09:00

## 2024-06-18 RX ADMIN — Medication 2 TABLET(S): at 20:59

## 2024-06-18 RX ADMIN — ENOXAPARIN SODIUM 40 MILLIGRAM(S): 100 INJECTION SUBCUTANEOUS at 08:58

## 2024-06-18 RX ADMIN — CLONAZEPAM 0.5 MILLIGRAM(S): 2 TABLET ORAL at 20:59

## 2024-06-19 PROCEDURE — 99232 SBSQ HOSP IP/OBS MODERATE 35: CPT

## 2024-06-19 RX ADMIN — Medication 200 MILLIGRAM(S): at 21:03

## 2024-06-19 RX ADMIN — Medication 2 TABLET(S): at 21:03

## 2024-06-19 RX ADMIN — ESCITALOPRAM OXALATE 10 MILLIGRAM(S): 20 TABLET, FILM COATED ORAL at 08:45

## 2024-06-19 RX ADMIN — Medication 100 MILLIGRAM(S): at 13:38

## 2024-06-19 RX ADMIN — Medication 100 MILLIGRAM(S): at 21:02

## 2024-06-19 RX ADMIN — BUPROPION HYDROCHLORIDE 300 MILLIGRAM(S): 150 TABLET, EXTENDED RELEASE ORAL at 08:45

## 2024-06-19 RX ADMIN — Medication 5 MILLIGRAM(S): at 21:03

## 2024-06-19 RX ADMIN — CLONAZEPAM 0.5 MILLIGRAM(S): 2 TABLET ORAL at 21:02

## 2024-06-19 RX ADMIN — Medication 1 APPLICATION(S): at 08:46

## 2024-06-19 RX ADMIN — CLONAZEPAM 0.5 MILLIGRAM(S): 2 TABLET ORAL at 13:38

## 2024-06-19 RX ADMIN — Medication 100 MILLIGRAM(S): at 08:45

## 2024-06-19 RX ADMIN — Medication 450 MILLIGRAM(S): at 21:03

## 2024-06-19 RX ADMIN — ENOXAPARIN SODIUM 40 MILLIGRAM(S): 100 INJECTION SUBCUTANEOUS at 08:45

## 2024-06-19 RX ADMIN — Medication 100 MILLIGRAM(S): at 08:44

## 2024-06-19 RX ADMIN — CLONAZEPAM 0.5 MILLIGRAM(S): 2 TABLET ORAL at 08:45

## 2024-06-19 RX ADMIN — POLYETHYLENE GLYCOL 3350 17 GRAM(S): 1 POWDER ORAL at 08:45

## 2024-06-19 RX ADMIN — CLOPIDOGREL BISULFATE 75 MILLIGRAM(S): 75 TABLET, FILM COATED ORAL at 08:45

## 2024-06-20 PROCEDURE — 99232 SBSQ HOSP IP/OBS MODERATE 35: CPT

## 2024-06-20 RX ADMIN — CLONAZEPAM 0.5 MILLIGRAM(S): 2 TABLET ORAL at 08:54

## 2024-06-20 RX ADMIN — Medication 1 APPLICATION(S): at 20:23

## 2024-06-20 RX ADMIN — Medication 450 MILLIGRAM(S): at 20:22

## 2024-06-20 RX ADMIN — Medication 100 MILLIGRAM(S): at 20:21

## 2024-06-20 RX ADMIN — Medication 2 TABLET(S): at 20:21

## 2024-06-20 RX ADMIN — BUPROPION HYDROCHLORIDE 300 MILLIGRAM(S): 150 TABLET, EXTENDED RELEASE ORAL at 08:54

## 2024-06-20 RX ADMIN — Medication 650 MILLIGRAM(S): at 13:25

## 2024-06-20 RX ADMIN — POLYETHYLENE GLYCOL 3350 17 GRAM(S): 1 POWDER ORAL at 08:56

## 2024-06-20 RX ADMIN — CLOPIDOGREL BISULFATE 75 MILLIGRAM(S): 75 TABLET, FILM COATED ORAL at 08:53

## 2024-06-20 RX ADMIN — CLONAZEPAM 0.5 MILLIGRAM(S): 2 TABLET ORAL at 20:21

## 2024-06-20 RX ADMIN — Medication 100 MILLIGRAM(S): at 09:04

## 2024-06-20 RX ADMIN — CLONAZEPAM 0.5 MILLIGRAM(S): 2 TABLET ORAL at 12:22

## 2024-06-20 RX ADMIN — Medication 650 MILLIGRAM(S): at 12:25

## 2024-06-20 RX ADMIN — Medication 100 MILLIGRAM(S): at 12:23

## 2024-06-20 RX ADMIN — Medication 5 MILLIGRAM(S): at 20:22

## 2024-06-20 RX ADMIN — Medication 2 SPRAY(S): at 08:53

## 2024-06-20 RX ADMIN — Medication 200 MILLIGRAM(S): at 20:21

## 2024-06-20 RX ADMIN — Medication 100 MILLIGRAM(S): at 08:54

## 2024-06-20 RX ADMIN — Medication 1 APPLICATION(S): at 09:04

## 2024-06-20 RX ADMIN — ESCITALOPRAM OXALATE 10 MILLIGRAM(S): 20 TABLET, FILM COATED ORAL at 08:54

## 2024-06-20 RX ADMIN — ENOXAPARIN SODIUM 40 MILLIGRAM(S): 100 INJECTION SUBCUTANEOUS at 08:53

## 2024-06-21 PROCEDURE — 99232 SBSQ HOSP IP/OBS MODERATE 35: CPT

## 2024-06-21 RX ADMIN — Medication 450 MILLIGRAM(S): at 20:12

## 2024-06-21 RX ADMIN — HYDROXYZINE PAMOATE 25 MILLIGRAM(S): 50 CAPSULE ORAL at 20:12

## 2024-06-21 RX ADMIN — ESCITALOPRAM OXALATE 10 MILLIGRAM(S): 20 TABLET, FILM COATED ORAL at 09:11

## 2024-06-21 RX ADMIN — Medication 100 MILLIGRAM(S): at 09:10

## 2024-06-21 RX ADMIN — Medication 100 MILLIGRAM(S): at 09:11

## 2024-06-21 RX ADMIN — Medication 2 TABLET(S): at 20:12

## 2024-06-21 RX ADMIN — BUPROPION HYDROCHLORIDE 300 MILLIGRAM(S): 150 TABLET, EXTENDED RELEASE ORAL at 09:11

## 2024-06-21 RX ADMIN — Medication 1 APPLICATION(S): at 20:13

## 2024-06-21 RX ADMIN — CLONAZEPAM 0.5 MILLIGRAM(S): 2 TABLET ORAL at 12:28

## 2024-06-21 RX ADMIN — CLONAZEPAM 0.5 MILLIGRAM(S): 2 TABLET ORAL at 20:13

## 2024-06-21 RX ADMIN — Medication 100 MILLIGRAM(S): at 12:28

## 2024-06-21 RX ADMIN — CLONAZEPAM 0.5 MILLIGRAM(S): 2 TABLET ORAL at 09:10

## 2024-06-21 RX ADMIN — CLOPIDOGREL BISULFATE 75 MILLIGRAM(S): 75 TABLET, FILM COATED ORAL at 09:11

## 2024-06-21 RX ADMIN — Medication 200 MILLIGRAM(S): at 20:12

## 2024-06-21 RX ADMIN — Medication 5 MILLIGRAM(S): at 20:12

## 2024-06-21 RX ADMIN — ENOXAPARIN SODIUM 40 MILLIGRAM(S): 100 INJECTION SUBCUTANEOUS at 09:10

## 2024-06-21 RX ADMIN — Medication 100 MILLIGRAM(S): at 20:12

## 2024-06-22 LAB
ALBUMIN SERPL ELPH-MCNC: 3.2 G/DL — LOW (ref 3.3–5)
ALP SERPL-CCNC: 101 U/L — SIGNIFICANT CHANGE UP (ref 40–120)
ALT FLD-CCNC: 46 U/L — SIGNIFICANT CHANGE UP (ref 12–78)
ANION GAP SERPL CALC-SCNC: 6 MMOL/L — SIGNIFICANT CHANGE UP (ref 5–17)
AST SERPL-CCNC: 25 U/L — SIGNIFICANT CHANGE UP (ref 15–37)
BASOPHILS # BLD AUTO: 0.08 K/UL — SIGNIFICANT CHANGE UP (ref 0–0.2)
BASOPHILS NFR BLD AUTO: 1 % — SIGNIFICANT CHANGE UP (ref 0–2)
BILIRUB SERPL-MCNC: 0.4 MG/DL — SIGNIFICANT CHANGE UP (ref 0.2–1.2)
BUN SERPL-MCNC: 15 MG/DL — SIGNIFICANT CHANGE UP (ref 7–23)
CALCIUM SERPL-MCNC: 9.7 MG/DL — SIGNIFICANT CHANGE UP (ref 8.5–10.1)
CHLORIDE SERPL-SCNC: 108 MMOL/L — SIGNIFICANT CHANGE UP (ref 96–108)
CO2 SERPL-SCNC: 23 MMOL/L — SIGNIFICANT CHANGE UP (ref 22–31)
CREAT SERPL-MCNC: 1.23 MG/DL — SIGNIFICANT CHANGE UP (ref 0.5–1.3)
EGFR: 47 ML/MIN/1.73M2 — LOW
EOSINOPHIL # BLD AUTO: 0.2 K/UL — SIGNIFICANT CHANGE UP (ref 0–0.5)
EOSINOPHIL NFR BLD AUTO: 2.5 % — SIGNIFICANT CHANGE UP (ref 0–6)
GLUCOSE SERPL-MCNC: 110 MG/DL — HIGH (ref 70–99)
HCT VFR BLD CALC: 36.1 % — SIGNIFICANT CHANGE UP (ref 34.5–45)
HGB BLD-MCNC: 11.9 G/DL — SIGNIFICANT CHANGE UP (ref 11.5–15.5)
IMM GRANULOCYTES NFR BLD AUTO: 0.5 % — SIGNIFICANT CHANGE UP (ref 0–0.9)
LYMPHOCYTES # BLD AUTO: 2.8 K/UL — SIGNIFICANT CHANGE UP (ref 1–3.3)
LYMPHOCYTES # BLD AUTO: 35.5 % — SIGNIFICANT CHANGE UP (ref 13–44)
MCHC RBC-ENTMCNC: 28.1 PG — SIGNIFICANT CHANGE UP (ref 27–34)
MCHC RBC-ENTMCNC: 33 GM/DL — SIGNIFICANT CHANGE UP (ref 32–36)
MCV RBC AUTO: 85.3 FL — SIGNIFICANT CHANGE UP (ref 80–100)
MONOCYTES # BLD AUTO: 0.73 K/UL — SIGNIFICANT CHANGE UP (ref 0–0.9)
MONOCYTES NFR BLD AUTO: 9.3 % — SIGNIFICANT CHANGE UP (ref 2–14)
NEUTROPHILS # BLD AUTO: 4.03 K/UL — SIGNIFICANT CHANGE UP (ref 1.8–7.4)
NEUTROPHILS NFR BLD AUTO: 51.2 % — SIGNIFICANT CHANGE UP (ref 43–77)
PLATELET # BLD AUTO: 277 K/UL — SIGNIFICANT CHANGE UP (ref 150–400)
POTASSIUM SERPL-MCNC: 4.1 MMOL/L — SIGNIFICANT CHANGE UP (ref 3.5–5.3)
POTASSIUM SERPL-SCNC: 4.1 MMOL/L — SIGNIFICANT CHANGE UP (ref 3.5–5.3)
PROT SERPL-MCNC: 6.8 GM/DL — SIGNIFICANT CHANGE UP (ref 6–8.3)
RBC # BLD: 4.23 M/UL — SIGNIFICANT CHANGE UP (ref 3.8–5.2)
RBC # FLD: 18.2 % — HIGH (ref 10.3–14.5)
SODIUM SERPL-SCNC: 137 MMOL/L — SIGNIFICANT CHANGE UP (ref 135–145)
WBC # BLD: 7.88 K/UL — SIGNIFICANT CHANGE UP (ref 3.8–10.5)
WBC # FLD AUTO: 7.88 K/UL — SIGNIFICANT CHANGE UP (ref 3.8–10.5)

## 2024-06-22 RX ADMIN — POLYETHYLENE GLYCOL 3350 17 GRAM(S): 1 POWDER ORAL at 09:08

## 2024-06-22 RX ADMIN — Medication 2 TABLET(S): at 20:27

## 2024-06-22 RX ADMIN — Medication 5 MILLIGRAM(S): at 20:26

## 2024-06-22 RX ADMIN — Medication 100 MILLIGRAM(S): at 12:19

## 2024-06-22 RX ADMIN — Medication 100 MILLIGRAM(S): at 09:09

## 2024-06-22 RX ADMIN — CLONAZEPAM 0.5 MILLIGRAM(S): 2 TABLET ORAL at 09:09

## 2024-06-22 RX ADMIN — Medication 1 APPLICATION(S): at 09:08

## 2024-06-22 RX ADMIN — Medication 2 SPRAY(S): at 09:08

## 2024-06-22 RX ADMIN — Medication 200 MILLIGRAM(S): at 20:27

## 2024-06-22 RX ADMIN — Medication 450 MILLIGRAM(S): at 20:26

## 2024-06-22 RX ADMIN — CLONAZEPAM 0.5 MILLIGRAM(S): 2 TABLET ORAL at 20:26

## 2024-06-22 RX ADMIN — ENOXAPARIN SODIUM 40 MILLIGRAM(S): 100 INJECTION SUBCUTANEOUS at 09:08

## 2024-06-22 RX ADMIN — ESCITALOPRAM OXALATE 10 MILLIGRAM(S): 20 TABLET, FILM COATED ORAL at 09:09

## 2024-06-22 RX ADMIN — BUPROPION HYDROCHLORIDE 300 MILLIGRAM(S): 150 TABLET, EXTENDED RELEASE ORAL at 09:08

## 2024-06-22 RX ADMIN — TRAZODONE HYDROCHLORIDE 50 MILLIGRAM(S): 50 TABLET, FILM COATED ORAL at 20:26

## 2024-06-22 RX ADMIN — CLOPIDOGREL BISULFATE 75 MILLIGRAM(S): 75 TABLET, FILM COATED ORAL at 09:09

## 2024-06-22 RX ADMIN — Medication 100 MILLIGRAM(S): at 20:26

## 2024-06-22 RX ADMIN — CLONAZEPAM 0.5 MILLIGRAM(S): 2 TABLET ORAL at 12:19

## 2024-06-23 PROCEDURE — 99232 SBSQ HOSP IP/OBS MODERATE 35: CPT

## 2024-06-23 RX ADMIN — HYDROXYZINE PAMOATE 25 MILLIGRAM(S): 50 CAPSULE ORAL at 17:27

## 2024-06-23 RX ADMIN — Medication 100 MILLIGRAM(S): at 12:50

## 2024-06-23 RX ADMIN — Medication 450 MILLIGRAM(S): at 22:18

## 2024-06-23 RX ADMIN — Medication 5 MILLIGRAM(S): at 22:19

## 2024-06-23 RX ADMIN — Medication 1 APPLICATION(S): at 09:08

## 2024-06-23 RX ADMIN — CLONAZEPAM 0.5 MILLIGRAM(S): 2 TABLET ORAL at 08:55

## 2024-06-23 RX ADMIN — ENOXAPARIN SODIUM 40 MILLIGRAM(S): 100 INJECTION SUBCUTANEOUS at 08:54

## 2024-06-23 RX ADMIN — Medication 100 MILLIGRAM(S): at 22:18

## 2024-06-23 RX ADMIN — Medication 2 SPRAY(S): at 08:55

## 2024-06-23 RX ADMIN — CLOPIDOGREL BISULFATE 75 MILLIGRAM(S): 75 TABLET, FILM COATED ORAL at 08:55

## 2024-06-23 RX ADMIN — Medication 100 MILLIGRAM(S): at 08:55

## 2024-06-23 RX ADMIN — Medication 200 MILLIGRAM(S): at 22:19

## 2024-06-23 RX ADMIN — CLONAZEPAM 0.5 MILLIGRAM(S): 2 TABLET ORAL at 22:19

## 2024-06-23 RX ADMIN — Medication 2 TABLET(S): at 22:18

## 2024-06-23 RX ADMIN — ESCITALOPRAM OXALATE 10 MILLIGRAM(S): 20 TABLET, FILM COATED ORAL at 08:55

## 2024-06-23 RX ADMIN — BUPROPION HYDROCHLORIDE 300 MILLIGRAM(S): 150 TABLET, EXTENDED RELEASE ORAL at 08:55

## 2024-06-23 RX ADMIN — TRAZODONE HYDROCHLORIDE 50 MILLIGRAM(S): 50 TABLET, FILM COATED ORAL at 22:19

## 2024-06-23 RX ADMIN — CLONAZEPAM 0.5 MILLIGRAM(S): 2 TABLET ORAL at 12:50

## 2024-06-23 RX ADMIN — POLYETHYLENE GLYCOL 3350 17 GRAM(S): 1 POWDER ORAL at 08:54

## 2024-06-24 PROCEDURE — 99232 SBSQ HOSP IP/OBS MODERATE 35: CPT

## 2024-06-24 RX ORDER — CLONAZEPAM 2 MG/1
0.5 TABLET ORAL
Refills: 0 | Status: DISCONTINUED | OUTPATIENT
Start: 2024-06-24 | End: 2024-06-27

## 2024-06-24 RX ADMIN — TRAZODONE HYDROCHLORIDE 50 MILLIGRAM(S): 50 TABLET, FILM COATED ORAL at 21:14

## 2024-06-24 RX ADMIN — POLYETHYLENE GLYCOL 3350 17 GRAM(S): 1 POWDER ORAL at 09:25

## 2024-06-24 RX ADMIN — Medication 2 TABLET(S): at 21:14

## 2024-06-24 RX ADMIN — BUPROPION HYDROCHLORIDE 300 MILLIGRAM(S): 150 TABLET, EXTENDED RELEASE ORAL at 09:25

## 2024-06-24 RX ADMIN — CLOPIDOGREL BISULFATE 75 MILLIGRAM(S): 75 TABLET, FILM COATED ORAL at 09:25

## 2024-06-24 RX ADMIN — CLONAZEPAM 0.5 MILLIGRAM(S): 2 TABLET ORAL at 21:14

## 2024-06-24 RX ADMIN — Medication 100 MILLIGRAM(S): at 21:14

## 2024-06-24 RX ADMIN — Medication 100 MILLIGRAM(S): at 09:25

## 2024-06-24 RX ADMIN — Medication 1 APPLICATION(S): at 09:26

## 2024-06-24 RX ADMIN — Medication 100 MILLIGRAM(S): at 12:51

## 2024-06-24 RX ADMIN — ENOXAPARIN SODIUM 40 MILLIGRAM(S): 100 INJECTION SUBCUTANEOUS at 09:25

## 2024-06-24 RX ADMIN — Medication 200 MILLIGRAM(S): at 21:14

## 2024-06-24 RX ADMIN — Medication 450 MILLIGRAM(S): at 21:15

## 2024-06-24 RX ADMIN — CLONAZEPAM 0.5 MILLIGRAM(S): 2 TABLET ORAL at 12:51

## 2024-06-24 RX ADMIN — Medication 5 MILLIGRAM(S): at 21:15

## 2024-06-24 RX ADMIN — ESCITALOPRAM OXALATE 10 MILLIGRAM(S): 20 TABLET, FILM COATED ORAL at 09:25

## 2024-06-25 PROCEDURE — 99232 SBSQ HOSP IP/OBS MODERATE 35: CPT

## 2024-06-25 RX ORDER — GABAPENTIN
100 POWDER (GRAM) MISCELLANEOUS
Refills: 0 | Status: DISCONTINUED | OUTPATIENT
Start: 2024-06-25 | End: 2024-06-27

## 2024-06-25 RX ADMIN — CLOPIDOGREL BISULFATE 75 MILLIGRAM(S): 75 TABLET, FILM COATED ORAL at 08:59

## 2024-06-25 RX ADMIN — HYDROXYZINE PAMOATE 25 MILLIGRAM(S): 50 CAPSULE ORAL at 08:57

## 2024-06-25 RX ADMIN — Medication 100 MILLIGRAM(S): at 08:58

## 2024-06-25 RX ADMIN — ESCITALOPRAM OXALATE 10 MILLIGRAM(S): 20 TABLET, FILM COATED ORAL at 08:58

## 2024-06-25 RX ADMIN — Medication 200 MILLIGRAM(S): at 21:15

## 2024-06-25 RX ADMIN — Medication 1 APPLICATION(S): at 21:16

## 2024-06-25 RX ADMIN — Medication 100 MILLIGRAM(S): at 21:15

## 2024-06-25 RX ADMIN — POLYETHYLENE GLYCOL 3350 17 GRAM(S): 1 POWDER ORAL at 08:57

## 2024-06-25 RX ADMIN — CLONAZEPAM 0.5 MILLIGRAM(S): 2 TABLET ORAL at 08:58

## 2024-06-25 RX ADMIN — Medication 2 TABLET(S): at 21:15

## 2024-06-25 RX ADMIN — CLONAZEPAM 0.5 MILLIGRAM(S): 2 TABLET ORAL at 21:15

## 2024-06-25 RX ADMIN — ENOXAPARIN SODIUM 40 MILLIGRAM(S): 100 INJECTION SUBCUTANEOUS at 08:59

## 2024-06-25 RX ADMIN — BUPROPION HYDROCHLORIDE 300 MILLIGRAM(S): 150 TABLET, EXTENDED RELEASE ORAL at 08:58

## 2024-06-25 RX ADMIN — Medication 450 MILLIGRAM(S): at 21:15

## 2024-06-26 PROCEDURE — 99232 SBSQ HOSP IP/OBS MODERATE 35: CPT

## 2024-06-26 RX ORDER — BUPROPION HYDROCHLORIDE 150 MG/1
1 TABLET, EXTENDED RELEASE ORAL
Qty: 30 | Refills: 0
Start: 2024-06-26 | End: 2024-07-25

## 2024-06-26 RX ORDER — CLOPIDOGREL BISULFATE 75 MG/1
1 TABLET, FILM COATED ORAL
Refills: 0 | DISCHARGE

## 2024-06-26 RX ORDER — GABAPENTIN
1 POWDER (GRAM) MISCELLANEOUS
Qty: 60 | Refills: 0
Start: 2024-06-26 | End: 2024-08-21

## 2024-06-26 RX ORDER — ALBUTEROL 90 MCG
1 AEROSOL REFILL (GRAM) INHALATION
Qty: 1 | Refills: 0
Start: 2024-06-26 | End: 2024-08-21

## 2024-06-26 RX ORDER — ESCITALOPRAM OXALATE 20 MG/1
10 TABLET, FILM COATED ORAL ONCE
Refills: 0 | Status: COMPLETED | OUTPATIENT
Start: 2024-06-26 | End: 2024-06-26

## 2024-06-26 RX ORDER — IBUPROFEN 200 MG
1 TABLET ORAL
Refills: 0 | DISCHARGE

## 2024-06-26 RX ORDER — GABAPENTIN 400 MG/1
0 CAPSULE ORAL
Refills: 0 | DISCHARGE

## 2024-06-26 RX ORDER — CLOPIDOGREL BISULFATE 75 MG/1
1 TABLET, FILM COATED ORAL
Qty: 30 | Refills: 0
Start: 2024-06-26 | End: 2024-08-21

## 2024-06-26 RX ORDER — CLONAZEPAM 1 MG
0.5 TABLET ORAL
Qty: 60 | Refills: 0
Start: 2024-06-26 | End: 2024-10-16

## 2024-06-26 RX ORDER — DULOXETINE HYDROCHLORIDE 30 MG/1
1 CAPSULE, DELAYED RELEASE ORAL
Refills: 0 | DISCHARGE

## 2024-06-26 RX ORDER — CELECOXIB 200 MG/1
1 CAPSULE ORAL
Refills: 0 | DISCHARGE

## 2024-06-26 RX ORDER — BUPROPION HYDROCHLORIDE 150 MG/1
1 TABLET, EXTENDED RELEASE ORAL
Qty: 30 | Refills: 0
Start: 2024-06-26 | End: 2024-08-21

## 2024-06-26 RX ORDER — CLONAZEPAM 2 MG/1
0.5 TABLET ORAL
Qty: 60 | Refills: 0
Start: 2024-06-26 | End: 2024-08-21

## 2024-06-26 RX ORDER — HYDROXYZINE HCL 10 MG
1 TABLET ORAL
Refills: 0 | DISCHARGE

## 2024-06-26 RX ORDER — POLYETHYLENE GLYCOL 3350 1 G/G
17 POWDER ORAL
Qty: 510 | Refills: 0
Start: 2024-06-26 | End: 2024-07-25

## 2024-06-26 RX ORDER — CLONAZEPAM 2 MG/1
0.5 TABLET ORAL
Qty: 60 | Refills: 0
Start: 2024-06-26 | End: 2024-07-25

## 2024-06-26 RX ORDER — SODIUM CHLORIDE 0.65 %
2 AEROSOL, SPRAY (ML) NASAL
Qty: 1 | Refills: 0
Start: 2024-06-26 | End: 2024-07-25

## 2024-06-26 RX ORDER — LOSARTAN POTASSIUM 100 MG/1
1 TABLET, FILM COATED ORAL
Refills: 0 | DISCHARGE

## 2024-06-26 RX ORDER — LITHIUM CARBONATE 300 MG
1 TABLET ORAL
Qty: 30 | Refills: 0
Start: 2024-06-26 | End: 2024-07-25

## 2024-06-26 RX ORDER — GABAPENTIN
1 POWDER (GRAM) MISCELLANEOUS
Qty: 60 | Refills: 0
Start: 2024-06-26 | End: 2024-07-25

## 2024-06-26 RX ORDER — SIMETHICONE 40MG/0.6ML
1 SUSPENSION, DROPS(FINAL DOSAGE FORM)(ML) ORAL
Qty: 60 | Refills: 0
Start: 2024-06-26 | End: 2024-07-25

## 2024-06-26 RX ORDER — ALBUTEROL 90 UG/1
1 AEROSOL, METERED ORAL
Refills: 0 | DISCHARGE

## 2024-06-26 RX ORDER — SENNOSIDES 8.6 MG
2 TABLET ORAL
Qty: 60 | Refills: 0
Start: 2024-06-26 | End: 2024-08-21

## 2024-06-26 RX ORDER — SENNOSIDES 8.6 MG
2 TABLET ORAL
Qty: 60 | Refills: 0
Start: 2024-06-26 | End: 2024-07-25

## 2024-06-26 RX ORDER — ESCITALOPRAM OXALATE 20 MG/1
1 TABLET, FILM COATED ORAL
Qty: 30 | Refills: 0
Start: 2024-06-26 | End: 2024-07-25

## 2024-06-26 RX ORDER — LITHIUM CARBONATE 300 MG/1
1 TABLET, EXTENDED RELEASE ORAL
Refills: 0 | DISCHARGE

## 2024-06-26 RX ORDER — POLYETHYLENE GLYCOL 3350 1 G/G
17 POWDER ORAL
Qty: 510 | Refills: 0
Start: 2024-06-26 | End: 2024-08-21

## 2024-06-26 RX ORDER — SIMETHICONE 40MG/0.6ML
1 SUSPENSION, DROPS(FINAL DOSAGE FORM)(ML) ORAL
Qty: 60 | Refills: 0
Start: 2024-06-26 | End: 2024-08-21

## 2024-06-26 RX ORDER — ESCITALOPRAM OXALATE 20 MG/1
1 TABLET, FILM COATED ORAL
Qty: 30 | Refills: 0
Start: 2024-06-26 | End: 2024-08-21

## 2024-06-26 RX ORDER — CLOPIDOGREL BISULFATE 75 MG/1
1 TABLET, FILM COATED ORAL
Qty: 30 | Refills: 0
Start: 2024-06-26 | End: 2024-07-25

## 2024-06-26 RX ORDER — LITHIUM CARBONATE 300 MG
1 TABLET ORAL
Qty: 30 | Refills: 0
Start: 2024-06-26 | End: 2024-08-21

## 2024-06-26 RX ORDER — MIRABEGRON 50 MG/1
1 TABLET, EXTENDED RELEASE ORAL
Refills: 0 | DISCHARGE

## 2024-06-26 RX ORDER — ALBUTEROL 90 MCG
1 AEROSOL REFILL (GRAM) INHALATION
Qty: 1 | Refills: 0
Start: 2024-06-26 | End: 2024-07-25

## 2024-06-26 RX ORDER — OXYBUTYNIN CHLORIDE 5 MG
1 TABLET ORAL
Refills: 0 | DISCHARGE

## 2024-06-26 RX ORDER — ESCITALOPRAM OXALATE 20 MG/1
20 TABLET, FILM COATED ORAL DAILY
Refills: 0 | Status: DISCONTINUED | OUTPATIENT
Start: 2024-06-27 | End: 2024-06-27

## 2024-06-26 RX ORDER — SODIUM CHLORIDE 0.65 %
2 AEROSOL, SPRAY (ML) NASAL
Qty: 1 | Refills: 0
Start: 2024-06-26 | End: 2024-08-21

## 2024-06-26 RX ADMIN — CLOPIDOGREL BISULFATE 75 MILLIGRAM(S): 75 TABLET, FILM COATED ORAL at 09:45

## 2024-06-26 RX ADMIN — Medication 450 MILLIGRAM(S): at 21:59

## 2024-06-26 RX ADMIN — ESCITALOPRAM OXALATE 10 MILLIGRAM(S): 20 TABLET, FILM COATED ORAL at 14:33

## 2024-06-26 RX ADMIN — Medication 1 APPLICATION(S): at 22:02

## 2024-06-26 RX ADMIN — CLONAZEPAM 0.5 MILLIGRAM(S): 2 TABLET ORAL at 09:45

## 2024-06-26 RX ADMIN — Medication 100 MILLIGRAM(S): at 21:59

## 2024-06-26 RX ADMIN — Medication 200 MILLIGRAM(S): at 21:59

## 2024-06-26 RX ADMIN — Medication 1 APPLICATION(S): at 09:45

## 2024-06-26 RX ADMIN — ENOXAPARIN SODIUM 40 MILLIGRAM(S): 100 INJECTION SUBCUTANEOUS at 09:45

## 2024-06-26 RX ADMIN — Medication 100 MILLIGRAM(S): at 09:45

## 2024-06-26 RX ADMIN — Medication 2 TABLET(S): at 21:59

## 2024-06-26 RX ADMIN — ESCITALOPRAM OXALATE 10 MILLIGRAM(S): 20 TABLET, FILM COATED ORAL at 09:44

## 2024-06-26 RX ADMIN — BUPROPION HYDROCHLORIDE 300 MILLIGRAM(S): 150 TABLET, EXTENDED RELEASE ORAL at 09:44

## 2024-06-26 RX ADMIN — CLONAZEPAM 0.5 MILLIGRAM(S): 2 TABLET ORAL at 22:00

## 2024-06-27 VITALS — OXYGEN SATURATION: 98 % | RESPIRATION RATE: 16 BRPM | TEMPERATURE: 98 F

## 2024-06-27 PROCEDURE — 99239 HOSP IP/OBS DSCHRG MGMT >30: CPT

## 2024-06-27 RX ORDER — CLONAZEPAM 2 MG/1
0.5 TABLET ORAL ONCE
Refills: 0 | Status: DISCONTINUED | OUTPATIENT
Start: 2024-06-27 | End: 2024-06-27

## 2024-06-27 RX ORDER — ATORVASTATIN CALCIUM 20 MG/1
1 TABLET, FILM COATED ORAL
Qty: 30 | Refills: 0
Start: 2024-06-27 | End: 2024-07-26

## 2024-06-27 RX ORDER — ATORVASTATIN CALCIUM 20 MG/1
1 TABLET, FILM COATED ORAL
Refills: 0 | DISCHARGE

## 2024-06-27 RX ORDER — ATORVASTATIN CALCIUM 20 MG/1
1 TABLET, FILM COATED ORAL
Qty: 30 | Refills: 0
Start: 2024-06-27 | End: 2024-08-21

## 2024-06-27 RX ADMIN — BUPROPION HYDROCHLORIDE 300 MILLIGRAM(S): 150 TABLET, EXTENDED RELEASE ORAL at 10:06

## 2024-06-27 RX ADMIN — CLONAZEPAM 0.5 MILLIGRAM(S): 2 TABLET ORAL at 12:56

## 2024-06-27 RX ADMIN — CLOPIDOGREL BISULFATE 75 MILLIGRAM(S): 75 TABLET, FILM COATED ORAL at 10:06

## 2024-06-27 RX ADMIN — CLONAZEPAM 0.5 MILLIGRAM(S): 2 TABLET ORAL at 10:06

## 2024-06-27 RX ADMIN — Medication 100 MILLIGRAM(S): at 10:05

## 2024-06-27 RX ADMIN — Medication 1 APPLICATION(S): at 10:06

## 2024-06-27 RX ADMIN — ESCITALOPRAM OXALATE 20 MILLIGRAM(S): 20 TABLET, FILM COATED ORAL at 10:05

## 2024-07-29 NOTE — ED BEHAVIORAL HEALTH ASSESSMENT NOTE - DESCRIPTION
Patient Seen in: [facility] Emergency Department    History   No chief complaint on file.      HPI    85-year-old female with a complicated history of ITP for which she is undergoing management with hematology here at [place] as well as at [place] and is scheduled for a platelet transfusion later this afternoon for her thrombocytopenia but reports that during her recent evaluation at [place], a cardiologist evaluated her and noted that she had PVCs and started her on metoprolol 5 days ago and this was discontinued 2 days ago given that the daughter noticed that her heart rate was low.  The patient denies any syncope or any dizziness or any weakness whatsoever.  No chest pain or dyspnea.  After the patient was discontinued off metoprolol, her heart rate normalized according to the daughter.    History reviewed.   Past Medical History:    Essential hypertension    High blood pressure    Thyroid disease       History reviewed.   Past Surgical History:   Procedure Laterality Date    Removal gallbladder           Medications :  (Not in a hospital admission)       No family history on file.    Smoking Status:   Social History     Socioeconomic History    Marital status:    Tobacco Use    Smoking status: Former     Types: Cigarettes    Smokeless tobacco: Never   Vaping Use    Vaping status: Never Used   Substance and Sexual Activity    Alcohol use: Not Currently    Drug use: Never       Constitutional and vital signs reviewed.      Social History and Family History elements reviewed from today, pertinent positives to the presenting problem noted.    Physical Exam     ED Triage Vitals [07/29/24 1117]   BP (!) 207/93   Pulse 79   Resp 20   Temp 98 °F (36.7 °C)   Temp src Temporal   SpO2 95 %   O2 Device None (Room air)       All measures to prevent infection transmission during my interaction with the patient were taken. The patient was already wearing a droplet mask on my arrival to the room. Personal  protective equipment was worn throughout the duration of the exam.  Handwashing was performed prior to and after the exam.  Stethoscope and any equipment used during my examination was cleaned with super sani-cloth germicidal wipes following the exam.     Physical Exam    General: NAD  Head:  Prior ecchymoses of the face is chronic at baseline  Eyes: Conjunctivae and EOM are normal.   Neck: Normal range of motion. Supple.   Cardiovascular: Normal rate, bigeminy rhythm, normal heart sounds.  Respiratory/Chest: Clear and equal bilaterally. Exhibits no tenderness.  Gastrointestinal: Soft, non-tender, non-distended. Bowel sounds are normal.   Musculoskeletal:No swelling or deformity.  There are purpura and petechiae at the leg  Neurological: Alert and appropriate. No focal deficits.   Skin: Skin is warm and dry. No pallor.        ED Course        Labs Reviewed - No data to display    EKG    Rate, intervals and axes as noted on EKG Report.  Rate: 80  Rhythm: Sinus Rhythm  Reading: Sinus rhythm higher likelihood than junctional with bigeminy involving PVCs, no STEMI.  This is my interpretation           As Interpreted by me    Imaging Results Available and Reviewed while in ED: No results found.  ED Medications Administered: Medications - No data to display      MDM     Vitals:    07/29/24 1117 07/29/24 1200   BP: (!) 207/93    Pulse: 79 94   Resp: 20 13   Temp: 98 °F (36.7 °C)    TempSrc: Temporal    SpO2: 95% 93%   Weight: 50.3 kg      *I personally reviewed and interpreted all ED vitals.    Pulse Ox: 95%, Room air, Normal     Monitor Interpretation:    Bigeminy  as interpreted by me.  The cardiac monitor was ordered given family concern about bradycardia.      Medical Decision Making      Differential Diagnosis/ Diagnostic Considerations: Metoprolol related bradycardia    Complicating Factors: The patient already has ITP to contribute to the complexity of this ED evaluation.    I reviewed prior chart records including  telephone communication note from earlier today prompting referral to the emergency department.  The patient here with a normal heart rate, and bigeminy on telemetry and EKG.  She is hemodynamically stable.  She is not symptomatic.  She was recently bradycardic after she was started on metoprolol but since discontinuation, she is having a normal heart rate.  The daughter was quite hesitant to have any blood work performed.  They have seen purpura and petechiae previously as the patient has had a complicated workup for ITP and are about to have a transfusion at the hematology transfusion center this afternoon.  They understand to follow-up very closely with PCP and cardiologist.  Discharged home in stable condition with the daughter who is also comfortable with the plan.    Disposition and Plan     Clinical Impression:  1. Adverse effect of metoprolol, initial encounter        Disposition:  Discharge    Follow-up:  Leno Bass  [address]  [facility] [address] [phone]    Schedule an appointment as soon as possible for a visit in 1 day(s)      Jose Luis Her MD  [address] [place] [address]  [facility] [id number] [phone]    Schedule an appointment as soon as possible for a visit in 1 day(s)        Medications Prescribed:  Discharge Medication List as of 7/29/2024 12:08 PM                       arthritis, fibromyalgia Completed HS,  around 2003,  has one adult son who lives in [place] and is . Has worked as a hairdresser and other jobs.  Raised by both parents.  Had 3 sisters (one passed away) and one brother (passed away) Patient was cooperative, pleasant with dysphoric affect when talking about death of friend, but was able to smile about other topics and express herself appropriately.  She has denied any S/H I/I/P.  She reported some stomach discomfort and headache.     ICU Vital Signs Last 24 Hrs  T(C): 36.8 (20 Apr 2022 08:56), Max: 36.8 (20 Apr 2022 08:56)  T(F): 98.2 (20 Apr 2022 08:56), Max: 98.2 (20 Apr 2022 08:56)  HR: 93 (20 Apr 2022 08:56) (93 - 93)  BP: 108/64 (20 Apr 2022 08:56) (108/64 - 108/64)  BP(mean): --  ABP: --  ABP(mean): --  RR: 18 (20 Apr 2022 08:56) (18 - 18)  SpO2: 100% (20 Apr 2022 08:56) (100% - 100%)

## 2024-09-11 ENCOUNTER — INPATIENT (INPATIENT)
Facility: HOSPITAL | Age: 74
LOS: 5 days | Discharge: NURSING, REHAB OR LONG-TERM CARE | DRG: 884 | End: 2024-09-17
Attending: GENERAL ACUTE CARE HOSPITAL | Admitting: HOSPITALIST
Payer: MEDICARE

## 2024-09-11 VITALS
OXYGEN SATURATION: 96 % | HEIGHT: 63 IN | TEMPERATURE: 98 F | HEART RATE: 76 BPM | WEIGHT: 199.96 LBS | SYSTOLIC BLOOD PRESSURE: 126 MMHG | RESPIRATION RATE: 18 BRPM | DIASTOLIC BLOOD PRESSURE: 76 MMHG

## 2024-09-11 DIAGNOSIS — Z90.49 ACQUIRED ABSENCE OF OTHER SPECIFIED PARTS OF DIGESTIVE TRACT: Chronic | ICD-10-CM

## 2024-09-11 LAB
ALBUMIN SERPL ELPH-MCNC: 3.8 G/DL — SIGNIFICANT CHANGE UP (ref 3.3–5.2)
ALP SERPL-CCNC: 97 U/L — SIGNIFICANT CHANGE UP (ref 40–120)
ALT FLD-CCNC: 40 U/L — HIGH
ANION GAP SERPL CALC-SCNC: 11 MMOL/L — SIGNIFICANT CHANGE UP (ref 5–17)
APPEARANCE UR: CLEAR — SIGNIFICANT CHANGE UP
AST SERPL-CCNC: 34 U/L — HIGH
BACTERIA # UR AUTO: NEGATIVE /HPF — SIGNIFICANT CHANGE UP
BASOPHILS # BLD AUTO: 0.06 K/UL — SIGNIFICANT CHANGE UP (ref 0–0.2)
BASOPHILS NFR BLD AUTO: 0.7 % — SIGNIFICANT CHANGE UP (ref 0–2)
BILIRUB SERPL-MCNC: 0.6 MG/DL — SIGNIFICANT CHANGE UP (ref 0.4–2)
BILIRUB UR-MCNC: NEGATIVE — SIGNIFICANT CHANGE UP
BUN SERPL-MCNC: 8 MG/DL — SIGNIFICANT CHANGE UP (ref 8–20)
CALCIUM SERPL-MCNC: 9.9 MG/DL — SIGNIFICANT CHANGE UP (ref 8.4–10.5)
CAST: 0 /LPF — SIGNIFICANT CHANGE UP (ref 0–4)
CHLORIDE SERPL-SCNC: 102 MMOL/L — SIGNIFICANT CHANGE UP (ref 96–108)
CO2 SERPL-SCNC: 23 MMOL/L — SIGNIFICANT CHANGE UP (ref 22–29)
COLOR SPEC: YELLOW — SIGNIFICANT CHANGE UP
CREAT SERPL-MCNC: 1.09 MG/DL — SIGNIFICANT CHANGE UP (ref 0.5–1.3)
DIFF PNL FLD: NEGATIVE — SIGNIFICANT CHANGE UP
EGFR: 54 ML/MIN/1.73M2 — LOW
EOSINOPHIL # BLD AUTO: 0.25 K/UL — SIGNIFICANT CHANGE UP (ref 0–0.5)
EOSINOPHIL NFR BLD AUTO: 2.9 % — SIGNIFICANT CHANGE UP (ref 0–6)
FLUAV AG NPH QL: SIGNIFICANT CHANGE UP
FLUBV AG NPH QL: SIGNIFICANT CHANGE UP
GLUCOSE SERPL-MCNC: 89 MG/DL — SIGNIFICANT CHANGE UP (ref 70–99)
GLUCOSE UR QL: NEGATIVE MG/DL — SIGNIFICANT CHANGE UP
HCT VFR BLD CALC: 42.6 % — SIGNIFICANT CHANGE UP (ref 34.5–45)
HGB BLD-MCNC: 13.7 G/DL — SIGNIFICANT CHANGE UP (ref 11.5–15.5)
IMM GRANULOCYTES NFR BLD AUTO: 0.2 % — SIGNIFICANT CHANGE UP (ref 0–0.9)
KETONES UR-MCNC: NEGATIVE MG/DL — SIGNIFICANT CHANGE UP
LEUKOCYTE ESTERASE UR-ACNC: NEGATIVE — SIGNIFICANT CHANGE UP
LITHIUM SERPL-MCNC: 0.94 MMOL/L — SIGNIFICANT CHANGE UP (ref 0.5–1.5)
LYMPHOCYTES # BLD AUTO: 2.17 K/UL — SIGNIFICANT CHANGE UP (ref 1–3.3)
LYMPHOCYTES # BLD AUTO: 24.7 % — SIGNIFICANT CHANGE UP (ref 13–44)
MCHC RBC-ENTMCNC: 30.5 PG — SIGNIFICANT CHANGE UP (ref 27–34)
MCHC RBC-ENTMCNC: 32.2 GM/DL — SIGNIFICANT CHANGE UP (ref 32–36)
MCV RBC AUTO: 94.9 FL — SIGNIFICANT CHANGE UP (ref 80–100)
MONOCYTES # BLD AUTO: 0.93 K/UL — HIGH (ref 0–0.9)
MONOCYTES NFR BLD AUTO: 10.6 % — SIGNIFICANT CHANGE UP (ref 2–14)
NEUTROPHILS # BLD AUTO: 5.34 K/UL — SIGNIFICANT CHANGE UP (ref 1.8–7.4)
NEUTROPHILS NFR BLD AUTO: 60.9 % — SIGNIFICANT CHANGE UP (ref 43–77)
NITRITE UR-MCNC: NEGATIVE — SIGNIFICANT CHANGE UP
PH UR: 7 — SIGNIFICANT CHANGE UP (ref 5–8)
PLATELET # BLD AUTO: 213 K/UL — SIGNIFICANT CHANGE UP (ref 150–400)
POTASSIUM SERPL-MCNC: 4.9 MMOL/L — SIGNIFICANT CHANGE UP (ref 3.5–5.3)
POTASSIUM SERPL-SCNC: 4.9 MMOL/L — SIGNIFICANT CHANGE UP (ref 3.5–5.3)
PROT SERPL-MCNC: 7.3 G/DL — SIGNIFICANT CHANGE UP (ref 6.6–8.7)
PROT UR-MCNC: NEGATIVE MG/DL — SIGNIFICANT CHANGE UP
RBC # BLD: 4.49 M/UL — SIGNIFICANT CHANGE UP (ref 3.8–5.2)
RBC # FLD: 15.3 % — HIGH (ref 10.3–14.5)
RBC CASTS # UR COMP ASSIST: 0 /HPF — SIGNIFICANT CHANGE UP (ref 0–4)
RSV RNA NPH QL NAA+NON-PROBE: SIGNIFICANT CHANGE UP
SARS-COV-2 RNA SPEC QL NAA+PROBE: SIGNIFICANT CHANGE UP
SODIUM SERPL-SCNC: 136 MMOL/L — SIGNIFICANT CHANGE UP (ref 135–145)
SP GR SPEC: 1.01 — SIGNIFICANT CHANGE UP (ref 1–1.03)
SQUAMOUS # UR AUTO: 4 /HPF — SIGNIFICANT CHANGE UP (ref 0–5)
UROBILINOGEN FLD QL: 1 MG/DL — SIGNIFICANT CHANGE UP (ref 0.2–1)
WBC # BLD: 8.77 K/UL — SIGNIFICANT CHANGE UP (ref 3.8–10.5)
WBC # FLD AUTO: 8.77 K/UL — SIGNIFICANT CHANGE UP (ref 3.8–10.5)
WBC UR QL: 0 /HPF — SIGNIFICANT CHANGE UP (ref 0–5)

## 2024-09-11 PROCEDURE — 36410 VNPNXR 3YR/> PHY/QHP DX/THER: CPT

## 2024-09-11 PROCEDURE — 99223 1ST HOSP IP/OBS HIGH 75: CPT | Mod: FS,25

## 2024-09-11 RX ORDER — ESCITALOPRAM OXALATE 10 MG/1
20 TABLET ORAL DAILY
Refills: 0 | Status: DISCONTINUED | OUTPATIENT
Start: 2024-09-11 | End: 2024-09-17

## 2024-09-11 RX ORDER — OXYBUTYNIN CHLORIDE 5 MG/1
10 TABLET ORAL AT BEDTIME
Refills: 0 | Status: DISCONTINUED | OUTPATIENT
Start: 2024-09-11 | End: 2024-09-12

## 2024-09-11 RX ORDER — POLYETHYLENE GLYCOL 3350 17 G/17G
17 POWDER, FOR SOLUTION ORAL DAILY
Refills: 0 | Status: DISCONTINUED | OUTPATIENT
Start: 2024-09-11 | End: 2024-09-17

## 2024-09-11 RX ORDER — LOSARTAN POTASSIUM 50 MG/1
50 TABLET ORAL DAILY
Refills: 0 | Status: DISCONTINUED | OUTPATIENT
Start: 2024-09-11 | End: 2024-09-17

## 2024-09-11 RX ORDER — LITHIUM CARBONATE 150 MG/1
450 CAPSULE ORAL ONCE
Refills: 0 | Status: DISCONTINUED | OUTPATIENT
Start: 2024-09-11 | End: 2024-09-12

## 2024-09-11 RX ORDER — BUPROPION HYDROCHLORIDE 150 MG/1
300 TABLET ORAL DAILY
Refills: 0 | Status: DISCONTINUED | OUTPATIENT
Start: 2024-09-11 | End: 2024-09-13

## 2024-09-11 RX ORDER — CLONAZEPAM 1 MG
0.25 TABLET ORAL
Refills: 0 | Status: DISCONTINUED | OUTPATIENT
Start: 2024-09-11 | End: 2024-09-17

## 2024-09-11 RX ORDER — GABAPENTIN 100 MG
100 CAPSULE ORAL
Refills: 0 | Status: DISCONTINUED | OUTPATIENT
Start: 2024-09-11 | End: 2024-09-17

## 2024-09-11 NOTE — ED ADULT TRIAGE NOTE - BMI (KG/M2)
She comes into the office today for lab review.  We discussed her CBC results while she was an office.  She denies fevers or bleeding.  She will get Neulasta and Zoladex today.  She returns on Thursday for repeat labs.  Her CMP was reviewed after she left the office and was unremarkable.   She was having some discomfort in the mediastinal area as she did after her last cycle of treatment.  This was somewhat increased as compared to the last cycle although she did get an increased dose this time and this is the area of disease.She will notify us for any infectious symptoms, concerns, or fevers.       Results from last 7 days  Lab Units 07/16/18  0839 07/15/18  0424 07/14/18  0200   WBC 10*3/mm3 2.76* 2.89* 4.42*   NEUTROS ABS 10*3/mm3 2.31 2.52 4.11   HEMOGLOBIN g/dL 11.8 10.8* 11.4*   HEMATOCRIT % 36.3 31.3* 34.0*   PLATELETS 10*3/mm3 243 208 202       Results from last 7 days  Lab Units 07/16/18  0839 07/15/18  0424 07/14/18  0200   SODIUM mmol/L 137 138 142   POTASSIUM mmol/L 3.6 3.1* 3.4*   CHLORIDE mmol/L 99 98 101   CO2 mmol/L 26.2 26.3 26.5   BUN mg/dL 14 16 13   CREATININE mg/dL 0.64 0.59 0.58   CALCIUM mg/dL 9.3 9.2 9.5   ALBUMIN g/dL 4.00 4.20 4.30   BILIRUBIN mg/dL 0.4 0.5 0.4   ALK PHOS U/L 47 44 49   ALT (SGPT) U/L 48* 60* 91*   AST (SGOT) U/L 17 14 28   GLUCOSE mg/dL 103 83 132*   MAGNESIUM mg/dL  --   --  2.3            35.4

## 2024-09-11 NOTE — ED ADULT NURSE NOTE - NSFALLRISKINTERV_ED_ALL_ED
Assistance OOB with selected safe patient handling equipment if applicable/Assistance with ambulation/Communicate fall risk and risk factors to all staff, patient, and family/Monitor gait and stability/Provide visual cue: yellow wristband, yellow gown, etc/Reinforce activity limits and safety measures with patient and family/Call bell, personal items and telephone in reach/Instruct patient to call for assistance before getting out of bed/chair/stretcher/Non-slip footwear applied when patient is off stretcher/[place] to call system/Physically safe environment - no spills, clutter or unnecessary equipment/Purposeful Proactive Rounding/Room/bathroom lighting operational, light cord in reach

## 2024-09-11 NOTE — ED PROVIDER NOTE - OBJECTIVE STATEMENT
74-year-old female with past medical history of  anxiety, breast cancer, hyperlipidemia, depression, recent psychiatric admission for depression, presenting with generalized weakness, back pain, and inability to ambulate.  Patient  had admission in May 2024 for metabolic encephalopathy  secondary to UTI, followed by an inpatient psychiatric admission for depression.  She states that since then, she has been working with a physical therapist in her home for chronic back pain.  She is accompanied by a family member at bedside, who states that since Saturday, patient has become progressively weak, to the point where she cannot walk.  She is normally able to ambulate with a walker.  She fell while getting out of bed yesterday, onto her buttocks.  Denies hitting head.  Today, patient was unable to get down the stairs in her condo, which prompted her family member to bring her to the hospital.  Denies any fevers or chills, does endorse urinary frequency and urgency, endorses low back pain radiating down both legs.  Denies saddle anesthesia, numbness tingling weakness, urinary incontinence or urinary retention.  Fecal incontinence.  Denies recent injections to back.

## 2024-09-11 NOTE — ED ADULT NURSE NOTE - PRIMARY CARE PROVIDER
Patient had abscess removed  On 4/9/21 and believes it is coming back.       Please call patient at [phone] unk

## 2024-09-11 NOTE — ED CDU PROVIDER INITIAL DAY NOTE - CLINICAL SUMMARY MEDICAL DECISION MAKING FREE TEXT BOX
75 yo female with pmhx anxiety, breast CA, HTN, depression, recent psychiatric admission for depression, presenting with generalized weakness, back pain, and difficulties ambulating. Family also having difficulties caring for her at home. neurovascularly intact, no fnd's. labs unremarkable. here is severe right   neural foraminal stenosis at L2-L3 and L4-L5. Severe left neural foraminal stenosis at L5-S1. Pt placed in obs for PT eval and SW. MRI ordered for severe foraminal stenosis and new onset worsening generalized weakness and difficulties ambulating.

## 2024-09-11 NOTE — ED PROVIDER NOTE - PATIENT'S PREFERRED PRONOUN
Fax from [facility], asking for PA for zolpidem, work comp, called [facility], disregard since work comp  Vikki Livingston RN, BSN  Message handled by CLINIC NURSE.     Her/She

## 2024-09-11 NOTE — ED CDU PROVIDER INITIAL DAY NOTE - ATTENDING APP SHARED VISIT CONTRIBUTION OF CARE
I, Alberto Manzano, performed a face to face bedside interview with this patient regarding history of present illness, and completed an independent physical examination. I personally made/approved the management plan and take responsibility for the patient management. I have communicated the patient’s plan of care and disposition with the ACP.  pt placed on obs for chronic back pain and difficulty with ambulation for pain control, PT, possible TULIO  Gen: NAD, well appearing  CV: RRR  Pul: CTA b/l  Abd: Soft, non-distended, non-tender  Neuro: no focal deficits

## 2024-09-11 NOTE — ED PROVIDER NOTE - PHYSICAL EXAMINATION
Gen: NAD, AOx3  Head: NCAT  HEENT: oral mucosa moist, normal conjunctiva, oropharynx clear without exudate or erythema  Lung: CTAB, no respiratory distress, no wheezing, rales, rhonchi  CV: normal s1/s2, rrr, no murmurs, Normal perfusion, pulses 2+ throughout  Abd: soft, NTND  MSK: No edema, no visible deformities, full range of motion in all 4 extremities  Neuro: strength 5 out of 5 in bilateral lower extremities, midline tenderness to L-spine, sensation intact throughout, CN II-XII grossly intact, No focal neurologic deficits  Skin: No rash   Psych: normal affect

## 2024-09-11 NOTE — ED CDU PROVIDER INITIAL DAY NOTE - PHYSICAL EXAMINATION
Gen: No acute distress, non toxic  HEENT: Mucous membranes moist, pink conjunctivae, EOMI. PERRL. Airway patent  CV: RRR, nl s1/s2.  Resp: CTAB, normal rate and effort  GI: Abdomen soft, NT, ND. No rebound, no guarding  Neuro: A&O x4, MAEx4. 5/5 str ext x 4. Sensation intact, symmetric throughout. No fnd's.   MSK: +ttp to the midline lumbar area. FROM UE and LE b/l. compartments soft/compressible.   Vascular: Radial and dorsalis pedal pulses 2+ b/l.

## 2024-09-11 NOTE — ED CDU PROVIDER INITIAL DAY NOTE - OBJECTIVE STATEMENT
75 yo female with pmhx anxiety, breast CA, HTN, depression, recent psychiatric admission for depression, presenting with generalized weakness, back pain, and difficulties ambulating.    Denies fever, chills, body aches, dizziness, LOC, vision changes, cp, palpitations, sob, abd pain, n/v/c/d, dysuria, hematuria, paresthesias in the extremities, rashes. 73 yo female with pmhx anxiety, breast CA, HTN, depression, recent psychiatric admission for depression, presenting with generalized weakness, back pain, and difficulties ambulating. Pt had admission in May 2024 for metabolic encephalopathy secondary to UTI, followed by an inpt psychiatric admission for depression. She states that since then, she has been working with a physical therapist in her home for chronic back pain. She is accompanied by a family member at bedside, who states that since Saturday, patient has become progressively weak(generalized), to the point where she cannot walk. She is normally able to ambulate with a walker. She fell while getting out of bed yesterday, onto her buttocks.  Denies hitting head.  Today, patient was unable to get down the stairs in her condo, which prompted her family member to bring her to the hospital.  Pt does also endorse urinary frequency and urgency, endorses low back pain radiating down both legs.  Denies recent injections to back. Denies fever, chills, body aches, dizziness, LOC, vision changes, cp, palpitations, sob, abd pain, n/v/c/d, dysuria, hematuria, saddle paresthesias, urinary/ paresthesias in the extremities, rashes.

## 2024-09-11 NOTE — ED ADULT NURSE NOTE - NSSEPSISSUSPECTED_ED_A_ED
Lactation follow up call since OPC on 9-24-20.    Mother happy with progress since OPC. She used temporary SNS (supplemental nursing system) at breast with formula for 24hrs, she reports baby's suck and suction improved due to SNS.   She has to break baby's suction to take her off of breast now. Mother ordered permanent SNS via mail to continue supplementing at breast.   Over weekend, she  10 min each breast, then immediately is able to take 2-3oz formula via bottle and is tolerating better, smaller amounts of spit up. Mother states baby has 8 feedings in 24hrs, taking 2-3oz every 3-4hr. Baby also seems happier and more content per mother.     Baby was seen by pediatrician, Dr. Johnathon Oglesby, in office on Friday, 9-25-20 and was 8lb 3oz with clothes on per mother's report. MD ok with feeding plan to breastfeed, then supplement with formula via SNS or bottle. Mother states pediatrician will see baby again at end of October or sooner as needed. Mother ordered baby scale and plans to call MD with weekly weights being called in by mother to ensure adequate weight gain. SLP and PT/OT consults ordered by MD to [facility].   LC encouraged mother to continue nursing 10min each breast, then supplement with 2-3oz formula vis SNS or bottle. Encouraged to pump post nursing to protect milk supply. Mother started takign Legend[employer] supplements and reports increase supply.       Pt's mother has lactation warmline for any further questions or support.           
No

## 2024-09-11 NOTE — ED PROVIDER NOTE - CLINICAL SUMMARY MEDICAL DECISION MAKING FREE TEXT BOX
74-year-old female with past medical history of breast cancer, anxiety, depression, hyperlipidemia, chronic back pain, recent admission to [facility] for depression presenting with generalized weakness and inability to care for self at home.  Patient is accompanied by her family member at bedside, who is doing the majority of her care.  He states that over the past month, patient has been complaining of weakness and dizziness, has seen her primary care physician, who started her on  promethazine liquid and a steroid.    Her symptoms have been  progressively worse, to the point where she cannot take more than a few steps without having to stop.  Since Saturday, patient has been unable to walk at all.  She slipped out of bed and fell onto her buttocks.  She states that the reason why she is having weakness is because she has pain in her back and her legs.  Her family member states that she is generally weak.  On exam, patient well-appearing no acute distress.  Heart regular rate and rhythm lungs clear to auscultation bilaterally.  Abdomen soft nontender nondistended.  Her neurologic exam is normal, she has full strength in bilateral lower extremities, sensation intact throughout.  Has no bowel or bladder incontinence or urinary retention, and no risk factors to suggest cauda equina syndrome or acute cord compression.  Her labs reviewed, and were nonactionable.  She does not have a urinary tract infection, she does not have pneumonia.  Her EKG was reviewed and was normal.  A CT of her head and lumbar spine were performed, no acute intracranial hemorrhage, and no fractures noted to lumbar spine.  She does have foraminal stenosis, which is likely contributing to her back pain.  Patient's family member expressed that he is unable to care for her at home, as she is unable to walk and requires care that he is not able to provide at this time.  Plan to place in observation for physical therapy evaluation, and social work assessment for safe discharge planning.  It is possible that patient may require subacute rehab.

## 2024-09-11 NOTE — ED ADULT NURSE NOTE - OBJECTIVE STATEMENT
Pt states she recently got released from [facility] inpatient psych for bipolar depressive disorder. After release pt states she could not ambulate due to pain in b/l legs. Also c/o abd pain. Denies fevers, chills, cp, sob, n/v/d

## 2024-09-11 NOTE — ED CDU PROVIDER INITIAL DAY NOTE - NS ED ROS FT
Gen: denies fever, chills, fatigue, weight loss  Skin: denies rashes, laceration, bruising  HEENT: denies visual changes, ear pain, nasal congestion, throat pain  Respiratory: denies IVERSON, SOB, cough, wheezing  Cardiovascular: denies chest pain, palpitations, diaphoresis, LE edema  GI: denies abdominal pain, n/v/d  : denies dysuria, frequency, urgency, bowel/bladder incontinence  MSK: +back pain. denies joint swelling/pain, neck pain  Neuro: +generalized weakness. denies headache, dizziness, LOC, numbness  Psych: denies anxiety, depression, SI/HI, visual/auditory hallucinations

## 2024-09-12 DIAGNOSIS — F03.90 UNSPECIFIED DEMENTIA, UNSPECIFIED SEVERITY, WITHOUT BEHAVIORAL DISTURBANCE, PSYCHOTIC DISTURBANCE, MOOD DISTURBANCE, AND ANXIETY: ICD-10-CM

## 2024-09-12 DIAGNOSIS — R53.1 WEAKNESS: ICD-10-CM

## 2024-09-12 DIAGNOSIS — F32.A DEPRESSION, UNSPECIFIED: ICD-10-CM

## 2024-09-12 DIAGNOSIS — F31.9 BIPOLAR DISORDER, UNSPECIFIED: ICD-10-CM

## 2024-09-12 PROCEDURE — 90792 PSYCH DIAG EVAL W/MED SRVCS: CPT

## 2024-09-12 PROCEDURE — 99233 SBSQ HOSP IP/OBS HIGH 50: CPT | Mod: FS

## 2024-09-12 PROCEDURE — 99223 1ST HOSP IP/OBS HIGH 75: CPT

## 2024-09-12 RX ORDER — LITHIUM CARBONATE 150 MG/1
450 CAPSULE ORAL DAILY
Refills: 0 | Status: DISCONTINUED | OUTPATIENT
Start: 2024-09-12 | End: 2024-09-17

## 2024-09-12 RX ADMIN — ESCITALOPRAM OXALATE 20 MILLIGRAM(S): 10 TABLET ORAL at 11:36

## 2024-09-12 RX ADMIN — Medication 20 MILLIGRAM(S): at 22:04

## 2024-09-12 RX ADMIN — Medication 100 MILLIGRAM(S): at 06:25

## 2024-09-12 RX ADMIN — LOSARTAN POTASSIUM 50 MILLIGRAM(S): 50 TABLET ORAL at 06:25

## 2024-09-12 RX ADMIN — Medication 100 MILLIGRAM(S): at 11:35

## 2024-09-12 RX ADMIN — Medication 0.25 MILLIGRAM(S): at 06:26

## 2024-09-12 RX ADMIN — Medication 0.25 MILLIGRAM(S): at 17:29

## 2024-09-12 RX ADMIN — Medication 100 MILLIGRAM(S): at 17:29

## 2024-09-12 RX ADMIN — BUPROPION HYDROCHLORIDE 300 MILLIGRAM(S): 150 TABLET ORAL at 11:36

## 2024-09-12 RX ADMIN — POLYETHYLENE GLYCOL 3350 17 GRAM(S): 17 POWDER, FOR SOLUTION ORAL at 11:36

## 2024-09-12 RX ADMIN — Medication 5 MILLIGRAM(S): at 22:04

## 2024-09-12 RX ADMIN — Medication 200 MILLIGRAM(S): at 22:04

## 2024-09-12 RX ADMIN — Medication 75 MILLIGRAM(S): at 11:36

## 2024-09-12 NOTE — ED ADULT NURSE REASSESSMENT NOTE - NS ED NURSE REASSESS COMMENT FT1
Pt A&Ox4, however slightly confused. Pt denies pain, SOB, and chest pain. Breathing unlabored and VSS. Pt requires assistance with mobility in bed and assistance with ambulation. Pt states she is able to make her needs known. Safety measures maintained, call bell in reach, pt is in no acute distress.

## 2024-09-12 NOTE — ED ADULT NURSE REASSESSMENT NOTE - NS ED NURSE REASSESS COMMENT FT1
report given to anuradha ESTRADA from [facility] [facility]. patient remains AA+O2-3 at baseline. forgetful. confused at times. VSS afebrile. on RA  no acute distress noted at this time.  awaiting on bed to be clean, awaiting on transport.

## 2024-09-12 NOTE — ED BEHAVIORAL HEALTH ASSESSMENT NOTE - NS ED BHA SUICIDALITY PRESENT CURRENT PASSIVE IDEATION
Message from Sonalighthart:  Original authorizing provider: MD Cris Franco would like a refill of the following medications:  atorvastatin (LIPITOR) 20 MG tablet [Sonny Mott MD]    Preferred pharmacy: [id number] [place] [address]  [address]     Comment:     Yes

## 2024-09-12 NOTE — ED BEHAVIORAL HEALTH ASSESSMENT NOTE - HPI (INCLUDE ILLNESS QUALITY, SEVERITY, DURATION, TIMING, CONTEXT, MODIFYING FACTORS, ASSOCIATED SIGNS AND SYMPTOMS)
Patient is a 74 year old, female; domiciled with her adult son and ex-;  (2003); noncaregiver; unemployed on SSI; past psychiatric history of bipolar depression and anxiety; recently under the care of [facility], now at [facility] x 1 visit with ROXY Mason, 3  prior psychiatric hospitalizations  last at  in June 2024,  [facility] and [place] over 10 years ago; no known suicide attempts; no known history of violence or arrests; no active substance abuse or known history of complicated withdrawal; [facility] of arthritis, fibromyalgia; on Plavix, brought in by EMS; called by ex- due to weakness.  Psych initially was consulted for TULIO clearance.  Pt seen in ED in bed, presents lethargic but cooperative and able to engage in interview.  She stated she is very depressed and was tearful during eval.  Pt oriented to month and person, but does not know date, stated year is 2000 and claimed to be at [facility].  She reports she has been very depressed for approx 2 weeks without known precipitant. claims is compliant with meds and  reportedly manages her meds.  Pt reports sleep is good and per reports spends a lot of time in bed.  Pt states she is able to participate with ADLs and cooking which is inconsistent with collateral.  Pt endorsing passive SI but denies SIIP and no h/o self harm.  Pt reports she has a new provider and when asked how she gets there said, "Bicycle".  Pt able to give mo of year but skipped Nov and in reverse was not able 11/2.  Attempted to do same with days of week but unable.  Discussed psych admission for depression and med management but declined and said she wants to go home.  Spoke with  who reports Pt was overmedicated and Pt new provider wants to lover some of her meds(Seroquel?).   concerns she is weak and cannot walk and PT eval recommended TULIO.   agreeable to TULIO and does not feel she needs psych admission but wants her provider to address her meds causing "oversedation".   requesting [employer] for TULIO as she did the best when there.  No evidence of psychosis or verenice.  Mood is dysphoric and tearful.  Pt to be admitted medically for 3 days as required prior to TULIO placement.  Psych to follow.

## 2024-09-12 NOTE — ED BEHAVIORAL HEALTH ASSESSMENT NOTE - CURRENT MEDICATION
Patient with med list:  losartan 50mg po qdaily, celecoxib 200mg po qdaily, albuterol 1 puff twice a day, gabapentin cap 100mg po TID,   Oxybutynin 5 mg qd, klonopin 0.5 bid, Celecoxib 200 qd, Senna 8.6 2 tabs hs, Seroquel 100 am and 200 hs, Clopidogrel 75 qd, Atorvastatin 20 mg hs, Buproprion  mg qd, Polyeth Glyc powder 17 Gm qd, Lexapro 20 mg qd, Lithium Carb 450 hs     mybetriq 50mg po qdaily   oxybutynin ER 10mg po qHS  lithium 150mg po caps qHS -- list says "new"  clonazepam 1mg po BID  vitamin D3 5000unit 1 caps daily  clopidogrel 75mg po qdaily  duloxetine 60mg po BID  ibuprofen 800mg po q6hrs PRN  clindamycin 150mg PO q 6hrs

## 2024-09-12 NOTE — ED CDU PROVIDER SUBSEQUENT DAY NOTE - HISTORY
Pt resting comfortably at time of re-assessment. No events overnight. Pending PT eval and MRI. Will continue to monitor.

## 2024-09-12 NOTE — ED BEHAVIORAL HEALTH ASSESSMENT NOTE - FAMILY DETAILS
living with adult son and exhusband since decline in functioning (Husb and son live in [place]) living with adult son and ex  since decline in functioning (Husb and son live in [place])

## 2024-09-12 NOTE — ED BEHAVIORAL HEALTH NOTE - BEHAVIORAL HEALTH NOTE
Spoke with  Sagar who showed dc noted from  and found Pt had been taking meds that were listed as discontinued Vraylar 1.5 qd and Cymbalta 60 bid since  leaving .  Both meds  were discontinued 9/7.  Dr Santillan notified.

## 2024-09-12 NOTE — ED BEHAVIORAL HEALTH ASSESSMENT NOTE - SUMMARY
Patient is a 74 year old, female; domiciled with her adult son and ex-;  (2003); noncaregiver; unemployed on SSI; past psychiatric history of bipolar depression and anxiety; recently under the care of [facility], now at [facility] x 1 visit with ROXY Mason, 3  prior psychiatric hospitalizations  last at  in June 2024,  [facility] and [place] over 10 years ago; no known suicide attempts; no known history of violence or arrests; no active substance abuse or known history of complicated withdrawal; [facility] of arthritis, fibromyalgia; on Plavix, brought in by EMS; called by ex- due to weakness.  Psych initially was consulted for TULIO clearance.  Pt seen in ED in bed, presents lethargic but cooperative and able to engage in interview.  She stated she is very depressed and was tearful during eval.  Pt oriented to month and person, but does not know date, stated year is 2000 and claimed to be at [facility].  She reports she has been very depressed for approx 2 weeks without known precipitant. claims is compliant with meds and  reportedly manages her meds.   No evidence of psychosis or verenice.  Decline Voluntary psych admission.  No acute psych condition which requires involuntary psych admission.   Pt to be admitted medically for 3 days as required prior to TULIO placement.  Psych to follow.  Recommend lowering Seroquel to 50 qam and 100 hs.  Psych to follow.

## 2024-09-12 NOTE — ED BEHAVIORAL HEALTH ASSESSMENT NOTE - NSBHMSEINTELL_PSY_A_CORE
[New Patient/Consultation] : a new patient/consultation for [Neutropenia] : neutropenia [Mother] : mother [Medical Records] : medical records Average

## 2024-09-12 NOTE — ED ADULT NURSE REASSESSMENT NOTE - NS ED NURSE REASSESS COMMENT FT1
Patient remains AA&Ox4, resting comfortably in bed, awaiting MRI. No signs of acute distress noted, safety maintained.

## 2024-09-12 NOTE — PHYSICAL THERAPY INITIAL EVALUATION ADULT - ADDITIONAL COMMENTS
Pt reports living in a 2nd floor condo with 13 steps to enter. Lives with ex-, who assists patient. Pt reports amb with RW and needing assist with ADLs and self care.

## 2024-09-12 NOTE — ED ADULT NURSE REASSESSMENT NOTE - NS ED NURSE REASSESS COMMENT FT1
Patient remains AA&Ox4, resting comfortably in bed, awaiting MRI. No signs of acute distress noted, safety maintained. Patient remains AA&Ox2-3, resting comfortably in bed, awaiting MRI. No signs of acute distress noted, safety maintained.

## 2024-09-12 NOTE — ED CDU PROVIDER SUBSEQUENT DAY NOTE - CLINICAL SUMMARY MEDICAL DECISION MAKING FREE TEXT BOX
73 yo female with pmhx anxiety, breast CA, HTN, depression, recent psychiatric admission for depression, presenting with generalized weakness, back pain, and difficulties ambulating. Family also having difficulties caring for her at home. neurovascularly intact, no fnd's. labs unremarkable. here is severe right neural foraminal stenosis at L2-L3 and L4-L5. Severe left neural foraminal stenosis at L5-S1. Pt placed in obs for PT eval and SW. MRI ordered for severe foraminal stenosis and new onset worsening generalized weakness and difficulties ambulating.

## 2024-09-12 NOTE — H&P ADULT - ASSESSMENT
74F w/ PMHx of panic attacks, HTN, depression, anxiety, fibromyalgia, breast CA, HLD, presented with worsening weakness , poor po intake,   for several days. not able to take care of her very basic needs since discharge from [facility]     In ability to Ambulate back pain generalized weakness  generalized weakness and inability to care for self at home   weakness  symptoms have been  progressively worse,   has been unable to walk at all.    CT head negative CXR negative , UA negatiuve  CBC and CMP WNL- slight levated LFTs   CT spine + Foraminal stenosis- MRI pending       Bipolar depression and anxiety  Nonadherence to home medications- doesn't know names has them in a pill box-   not sure which ones she took    different days have different amounts in pill box-  meds from chart   - c/w Cymbalta , lithium , gabapentin  - c/w Seroquel   - c/w Clonazepam   - CTH negative for acute pathologies     hold oxybutynin in elderly     HTN   - c/w Losartan 50mg po qd    Other home med  - c/w Plavix 75mg po QD, home med- unclear why on it    VTE ppx: Lovenox SQ  Diet: DASH

## 2024-09-12 NOTE — H&P ADULT - NSHPPHYSICALEXAM_GEN_ALL_CORE
GENERAL: NAD, frail anxious elderly deconditioned  HEAD:  Atraumatic, Normocephalic  EYES:  conjunctiva and sclera clear  NECK: Supple,  NERVOUS SYSTEM:  Alert & Oriented X3, anxious Moves in bed B/L upper and lower extremities;  CHEST/LUNG: Clear to percussion bilaterally; No rales, rhonchi, wheezing, or rubs  HEART: Regular rate and rhythm; No murmurs, rubs, or gallops  ABDOMEN: Soft, Nontender, Nondistended; Bowel sounds present  EXTREMITIES:  2+ Peripheral Pulses,

## 2024-09-12 NOTE — ED ADULT NURSE REASSESSMENT NOTE - NS ED NURSE REASSESS COMMENT FT1
No acute changes over night. Pt waxes and weens and has moments of confusion. PA Pat aware. Pt is resting comfortably in bed, VSS. Safety measures maintained, pt offers no complaints.

## 2024-09-12 NOTE — ED CDU PROVIDER DISPOSITION NOTE - ATTENDING CONTRIBUTION TO CARE
75 y/o F c/o generalized weakness getting progressively worse, no acute findings on Ed workup. PT recommending TULIO. will admit for further management

## 2024-09-12 NOTE — PROVIDER CONTACT NOTE (OTHER) - ASSESSMENT
Pt with 10/10 low back pain before, during and after RX.  Pt will benefit from PT to maximize functional independence.  Will continue to follow.  Pt left supine in bed in no apparent distress and call bell within reach. Nurse aware

## 2024-09-12 NOTE — ED CDU PROVIDER DISPOSITION NOTE - CLINICAL COURSE
75 y/o F c/o generalized weakness getting progressively worse - labs, and imaging negative - physical therapy recommends TULIO

## 2024-09-12 NOTE — ED BEHAVIORAL HEALTH ASSESSMENT NOTE - DESCRIPTION
T(C): 36.8 (09-12-24 @ 15:27), Max: 36.9 (09-12-24 @ 05:19)  T(F): 98.2 (09-12-24 @ 15:27), Max: 98.4 (09-12-24 @ 05:19)  HR: 73 (09-12-24 @ 17:23) (70 - 77)  BP: 113/60 (09-12-24 @ 17:23) (108/69 - 159/81)  RR: 18 (09-12-24 @ 17:23) (18 - 18)  SpO2: 93% (09-12-24 @ 17:23) (91% - 98%) living with ex- and son see HPI

## 2024-09-12 NOTE — ED ADULT NURSE REASSESSMENT NOTE - NS ED NURSE REASSESS COMMENT FT1
Assumed care of patient at 0700 as stated in report from NATALIE Collins Charting as noted. Patient AA&O 3 periods of confusion. presents to ED c/o difficulty walking At time of assessment, patient denies pain/discomfort, denies CP/SOB. Patient updated on the plan of care, awaiting TULIO  Stretcher locked in lowest position, IV site flushed w/ NS. No redness, swelling or pain noted to site. No signs of acute distress noted, safety maintained. Assumed care of patient at 0700 as stated in report from NATALIE Collins Charting as noted. Patient AA&O2-3 periods of confusion. very forgetful. presents to ED c/o difficulty walking At time of assessment, patient denies pain/discomfort, denies CP/SOB.  incont of urine. prima fit in place. Patient updated on the plan of care, awaiting TULIO  Stretcher locked in lowest position, IV site flushed w/ NS. No redness, swelling or pain noted to site. No signs of acute distress noted, safety maintained.

## 2024-09-12 NOTE — H&P ADULT - HISTORY OF PRESENT ILLNESS
74 year old female with significant psych hx - multiple psych admissionof panic attacks, HTN, depression, anxiety, fibromyalgia, breast CA, HLD, presented with  74 year old female with significant psych hx - multiple psych admissions for  panic attacks, HTN, depression, anxiety, fibromyalgia, breast CA, HLD, presented with worsening generalized weakness and inability to care for self at home.   states her family member  is doing the majority of her care over the past month, patient has been  seen by her primary care physician, who started her on  promethazine liquid and a steroid. but her symptoms have been  progressively worse, to the point where she cannot take more than couple steps without having to stop.  Endorses back pain that's much worse so in ED she had -   A CT of her head and lumbar spine showed  no acute intracranial hemorrhage, and no fractures noted to lumbar spine. foraminal stenosis, which is likely contributing to her back pain.    Patient expressed that he is unable to care for her at home as she is unable to walk and requires care that he is not able to provide at this time.    Plan to place in observation for physical therapy evaluation, and social work

## 2024-09-13 LAB
ALBUMIN SERPL ELPH-MCNC: 3.2 G/DL — LOW (ref 3.3–5.2)
ALP SERPL-CCNC: 79 U/L — SIGNIFICANT CHANGE UP (ref 40–120)
ALT FLD-CCNC: 31 U/L — SIGNIFICANT CHANGE UP
ANION GAP SERPL CALC-SCNC: 11 MMOL/L — SIGNIFICANT CHANGE UP (ref 5–17)
AST SERPL-CCNC: 21 U/L — SIGNIFICANT CHANGE UP
BILIRUB SERPL-MCNC: 0.5 MG/DL — SIGNIFICANT CHANGE UP (ref 0.4–2)
BUN SERPL-MCNC: 13.4 MG/DL — SIGNIFICANT CHANGE UP (ref 8–20)
CALCIUM SERPL-MCNC: 9 MG/DL — SIGNIFICANT CHANGE UP (ref 8.4–10.5)
CHLORIDE SERPL-SCNC: 107 MMOL/L — SIGNIFICANT CHANGE UP (ref 96–108)
CO2 SERPL-SCNC: 23 MMOL/L — SIGNIFICANT CHANGE UP (ref 22–29)
CREAT SERPL-MCNC: 1.18 MG/DL — SIGNIFICANT CHANGE UP (ref 0.5–1.3)
EGFR: 49 ML/MIN/1.73M2 — LOW
GLUCOSE SERPL-MCNC: 102 MG/DL — HIGH (ref 70–99)
HCT VFR BLD CALC: 36.2 % — SIGNIFICANT CHANGE UP (ref 34.5–45)
HGB BLD-MCNC: 11.7 G/DL — SIGNIFICANT CHANGE UP (ref 11.5–15.5)
MCHC RBC-ENTMCNC: 30.8 PG — SIGNIFICANT CHANGE UP (ref 27–34)
MCHC RBC-ENTMCNC: 32.3 GM/DL — SIGNIFICANT CHANGE UP (ref 32–36)
MCV RBC AUTO: 95.3 FL — SIGNIFICANT CHANGE UP (ref 80–100)
PLATELET # BLD AUTO: 200 K/UL — SIGNIFICANT CHANGE UP (ref 150–400)
POTASSIUM SERPL-MCNC: 3.9 MMOL/L — SIGNIFICANT CHANGE UP (ref 3.5–5.3)
POTASSIUM SERPL-SCNC: 3.9 MMOL/L — SIGNIFICANT CHANGE UP (ref 3.5–5.3)
PROT SERPL-MCNC: 5.8 G/DL — LOW (ref 6.6–8.7)
RBC # BLD: 3.8 M/UL — SIGNIFICANT CHANGE UP (ref 3.8–5.2)
RBC # FLD: 15.7 % — HIGH (ref 10.3–14.5)
SODIUM SERPL-SCNC: 141 MMOL/L — SIGNIFICANT CHANGE UP (ref 135–145)
WBC # BLD: 7.72 K/UL — SIGNIFICANT CHANGE UP (ref 3.8–10.5)
WBC # FLD AUTO: 7.72 K/UL — SIGNIFICANT CHANGE UP (ref 3.8–10.5)

## 2024-09-13 PROCEDURE — 72148 MRI LUMBAR SPINE W/O DYE: CPT | Mod: 26

## 2024-09-13 PROCEDURE — 99232 SBSQ HOSP IP/OBS MODERATE 35: CPT

## 2024-09-13 PROCEDURE — 99233 SBSQ HOSP IP/OBS HIGH 50: CPT

## 2024-09-13 RX ORDER — BUPROPION HYDROCHLORIDE 150 MG/1
150 TABLET ORAL DAILY
Refills: 0 | Status: DISCONTINUED | OUTPATIENT
Start: 2024-09-13 | End: 2024-09-17

## 2024-09-13 RX ORDER — LORAZEPAM 4 MG/ML
0.5 INJECTION INTRAMUSCULAR; INTRAVENOUS ONCE
Refills: 0 | Status: DISCONTINUED | OUTPATIENT
Start: 2024-09-13 | End: 2024-09-13

## 2024-09-13 RX ADMIN — BUPROPION HYDROCHLORIDE 300 MILLIGRAM(S): 150 TABLET ORAL at 12:09

## 2024-09-13 RX ADMIN — Medication 0.25 MILLIGRAM(S): at 17:34

## 2024-09-13 RX ADMIN — Medication 100 MILLIGRAM(S): at 12:09

## 2024-09-13 RX ADMIN — LORAZEPAM 0.5 MILLIGRAM(S): 4 INJECTION INTRAMUSCULAR; INTRAVENOUS at 02:26

## 2024-09-13 RX ADMIN — Medication 100 MILLIGRAM(S): at 17:34

## 2024-09-13 RX ADMIN — Medication 75 MILLIGRAM(S): at 12:10

## 2024-09-13 RX ADMIN — Medication 100 MILLIGRAM(S): at 05:45

## 2024-09-13 RX ADMIN — LITHIUM CARBONATE 450 MILLIGRAM(S): 150 CAPSULE ORAL at 12:10

## 2024-09-13 RX ADMIN — POLYETHYLENE GLYCOL 3350 17 GRAM(S): 17 POWDER, FOR SOLUTION ORAL at 12:09

## 2024-09-13 RX ADMIN — Medication 20 MILLIGRAM(S): at 21:03

## 2024-09-13 RX ADMIN — LOSARTAN POTASSIUM 50 MILLIGRAM(S): 50 TABLET ORAL at 05:45

## 2024-09-13 RX ADMIN — Medication 5 MILLIGRAM(S): at 21:03

## 2024-09-13 RX ADMIN — Medication 0.25 MILLIGRAM(S): at 05:45

## 2024-09-13 RX ADMIN — Medication 100 MILLIGRAM(S): at 19:52

## 2024-09-13 RX ADMIN — ESCITALOPRAM OXALATE 20 MILLIGRAM(S): 10 TABLET ORAL at 12:10

## 2024-09-13 NOTE — BH CONSULTATION LIAISON ASSESSMENT NOTE - OTHER
unable to name days of the week neutral unrelated at times. perseverative at times regarding depressed mood

## 2024-09-13 NOTE — BH CONSULTATION LIAISON ASSESSMENT NOTE - HPI (INCLUDE ILLNESS QUALITY, SEVERITY, DURATION, TIMING, CONTEXT, MODIFYING FACTORS, ASSOCIATED SIGNS AND SYMPTOMS)
Patient is a 74 year old, female; domiciled with her adult son and ex-;  (2003); noncaregiver; unemployed on SSI; past psychiatric history of bipolar depression and anxiety; recently under the care of [facility], now at [facility] x 1 visit with ROXY Mason, 3  prior psychiatric hospitalizations  last at  in June 2024,  [facility] and [place] over 10 years ago; no known suicide attempts; no known history of violence or arrests; no active substance abuse or known history of complicated withdrawal; [facility] of arthritis, fibromyalgia; on Plavix, brought in by EMS; called by ex- due to weakness.  Psych initially was consulted for TULIO clearance.    Patient seen and assessed at bedside, appears alert and was cooperative with encounter. Patient affectively neutral and without signs of agitation. Patient unable to state current location or time without assistance. Patient states that "I can't think" and reports confusion. When asking patient about emotional issues, she was unable to provide a logical response and stated that she is going on a boat today. Otherwise states that she has felt depressed for a year without further elaboration. Patient believes the hospital is her home, stating that her son Sagar is coming up from [place] to take care of her. Likely referring to her ex- named Sagar. When asking who takes care of her at home, she states birds while seeing a bird on the tv screen. Patient unable to state days of the week. Only acute complaint from patient were regarding b/l knee pain. Otherwise patient denies issues with sleeping or eating and reports medication adherence. No SI/HI reported or observed to writer but depression appears to be an automatic perseveration for her.      Patient is a 74 year old, female; domiciled with her adult son and ex-;  (2003); noncaregiver; unemployed on SSI; past psychiatric history of bipolar depression and anxiety; recently under the care of [facility] R[facility]Y [facility], 3  prior psychiatric hospitalizations  last at  in June 2024,  [facility] and [place] over 10 years ago; no known suicide attempts; no known history of violence or arrests; no active substance abuse or known history of complicated withdrawal; [facility] of arthritis, fibromyalgia; on Plavix, brought in by EMS; called by ex- due to weakness.  Psych initially was consulted for TULIO clearance.    Patient seen and assessed at bedside, appears alert and was cooperative with encounter. Patient affectively neutral and without signs of agitation. Patient unable to state current location or time without assistance. Patient states that "I can't think" and reports confusion. When asking patient about emotional issues, she was unable to provide a logical response and stated that she is going on a boat today. Otherwise states that she has felt depressed for a year without further elaboration. Patient believes the hospital is her home, stating that her son Sagar is coming up from [place] to take care of her. Likely referring to her ex- named Sagar. When asking who takes care of her at home, she states birds while seeing a bird on the tv screen. Patient unable to state days of the week. Only acute complaint from patient were regarding b/l knee pain. Otherwise patient denies issues with sleeping or eating and reports medication adherence. No SI/HI reported or observed to writer but depression appears to be an automatic perseveration for her.     Attempted to reach out to family, no response. Will continue to try and reach.

## 2024-09-13 NOTE — BH CONSULTATION LIAISON ASSESSMENT NOTE - CURRENT MEDICATION
MEDICATIONS  (STANDING):  atorvastatin 20 milliGRAM(s) Oral at bedtime  buPROPion XL (24-Hour) 300 milliGRAM(s) Oral daily  clonazePAM  Tablet 0.25 milliGRAM(s) Oral two times a day  clopidogrel Tablet 75 milliGRAM(s) Oral daily  escitalopram 20 milliGRAM(s) Oral daily  gabapentin 100 milliGRAM(s) Oral two times a day  lithium CR (ESKALITH-CR) 450 milliGRAM(s) Oral daily  losartan 50 milliGRAM(s) Oral daily  melatonin 5 milliGRAM(s) Oral at bedtime  polyethylene glycol 3350 17 Gram(s) Oral daily  QUEtiapine 100 milliGRAM(s) Oral daily  QUEtiapine 200 milliGRAM(s) Oral at bedtime    MEDICATIONS  (PRN):  albuterol    90 MICROgram(s) HFA Inhaler 1 Puff(s) Inhalation two times a day PRN for shortness of breath and/or wheezing  simethicone 80 milliGRAM(s) Chew two times a day PRN gas

## 2024-09-13 NOTE — BH CONSULTATION LIAISON ASSESSMENT NOTE - NSBHCHARTREVIEWVS_PSY_A_CORE FT
Vital Signs Last 24 Hrs  T(C): 36.7 (12 Sep 2024 20:48), Max: 36.8 (12 Sep 2024 15:27)  T(F): 98.1 (12 Sep 2024 20:48), Max: 98.2 (12 Sep 2024 15:27)  HR: 74 (12 Sep 2024 20:48) (73 - 78)  BP: 143/79 (12 Sep 2024 20:48) (111/63 - 143/79)  BP(mean): 79 (12 Sep 2024 17:23) (79 - 79)  RR: 19 (12 Sep 2024 20:48) (18 - 19)  SpO2: 94% (12 Sep 2024 20:48) (92% - 94%)    Parameters below as of 12 Sep 2024 20:48  Patient On (Oxygen Delivery Method): room air

## 2024-09-13 NOTE — PROGRESS NOTE ADULT - SUBJECTIVE AND OBJECTIVE BOX
[facility] Division of Hospital Medicine    Chief Complaint:      SUBJECTIVE / OVERNIGHT EVENTS:    Patient seen and examined at bedside, no acute events overnight, patient denies any new complaints. Seen in conjunction with behavioral health, patient AnOx 2-3 to person and place, partially time, poor insight into current condition and rationale for hospitalization, intermittent confusion. Will adjust meds with Psych assistance, plan for TULIO on DC (requires 3 night stay).     MEDICATIONS  (STANDING):  atorvastatin 20 milliGRAM(s) Oral at bedtime  buPROPion XL (24-Hour) 300 milliGRAM(s) Oral daily  clonazePAM  Tablet 0.25 milliGRAM(s) Oral two times a day  clopidogrel Tablet 75 milliGRAM(s) Oral daily  escitalopram 20 milliGRAM(s) Oral daily  gabapentin 100 milliGRAM(s) Oral two times a day  lithium CR (ESKALITH-CR) 450 milliGRAM(s) Oral daily  losartan 50 milliGRAM(s) Oral daily  melatonin 5 milliGRAM(s) Oral at bedtime  polyethylene glycol 3350 17 Gram(s) Oral daily  QUEtiapine 100 milliGRAM(s) Oral daily  QUEtiapine 200 milliGRAM(s) Oral at bedtime    MEDICATIONS  (PRN):  albuterol    90 MICROgram(s) HFA Inhaler 1 Puff(s) Inhalation two times a day PRN for shortness of breath and/or wheezing  simethicone 80 milliGRAM(s) Chew two times a day PRN gas        I&O's Summary      PHYSICAL EXAM:  Vital Signs Last 24 Hrs  T(C): 36.8 (13 Sep 2024 12:13), Max: 36.8 (12 Sep 2024 15:27)  T(F): 98.2 (13 Sep 2024 12:13), Max: 98.2 (12 Sep 2024 15:27)  HR: 71 (13 Sep 2024 12:13) (71 - 78)  BP: 119/64 (13 Sep 2024 12:13) (111/63 - 143/79)  BP(mean): 79 (12 Sep 2024 17:23) (79 - 79)  RR: 18 (13 Sep 2024 12:13) (18 - 19)  SpO2: 96% (13 Sep 2024 12:13) (92% - 96%)    Parameters below as of 12 Sep 2024 20:48  Patient On (Oxygen Delivery Method): room air      GENERAL: NAD, frail anxious elderly deconditioned  HEAD:  Atraumatic, Normocephalic  EYES:  conjunctiva and sclera clear  NERVOUS SYSTEM:  Alert & Oriented X3, anxious Moves in bed B/L upper and lower extremities;  CHEST/LUNG: Clear to percussion bilaterally; No rales, rhonchi, wheezing, or rubs  HEART: Regular rate and rhythm; No murmurs, rubs, or gallops  ABDOMEN: Soft, Nontender, Nondistended; Bowel sounds present  EXTREMITIES:  2+ Peripheral Pulses  PSYCH: AnOx2-3 to person, place and partially time, poor insight into current conditions    LABS:                        11.7   7.72  )-----------( 200      ( 13 Sep 2024 05:29 )             36.2     09-13    141  |  107  |  13.4  ----------------------------<  102<H>  3.9   |  23.0  |  1.18    Ca    9.0      13 Sep 2024 05:29    TPro  5.8<L>  /  Alb  3.2<L>  /  TBili  0.5  /  DBili  x   /  AST  21  /  ALT  31  /  AlkPhos  79  09-13          Urinalysis Basic - ( 13 Sep 2024 05:29 )    Color: x / Appearance: x / SG: x / pH: x  Gluc: 102 mg/dL / Ketone: x  / Bili: x / Urobili: x   Blood: x / Protein: x / Nitrite: x   Leuk Esterase: x / RBC: x / WBC x   Sq Epi: x / Non Sq Epi: x / Bacteria: x        CAPILLARY BLOOD GLUCOSE            RADIOLOGY & ADDITIONAL TESTS:  Results Reviewed:   Imaging Personally Reviewed:  Electrocardiogram Personally Reviewed:         [facility] Division of Hospital Medicine    Chief Complaint:      SUBJECTIVE / OVERNIGHT EVENTS:    Patient seen and examined at bedside, no acute events overnight, patient denies any new complaints. Seen in conjunction with behavioral health, patient AnOx 2-3 to person and place, partially time, poor insight into current condition and rationale for hospitalization, intermittent confusion. Will adjust meds with Psych assistance, plan for TULIO on DC (requires 3 night stay).   Suspect underlying dementia as cause for part of patients confusion as opposed to true delirium     MEDICATIONS  (STANDING):  atorvastatin 20 milliGRAM(s) Oral at bedtime  buPROPion XL (24-Hour) 300 milliGRAM(s) Oral daily  clonazePAM  Tablet 0.25 milliGRAM(s) Oral two times a day  clopidogrel Tablet 75 milliGRAM(s) Oral daily  escitalopram 20 milliGRAM(s) Oral daily  gabapentin 100 milliGRAM(s) Oral two times a day  lithium CR (ESKALITH-CR) 450 milliGRAM(s) Oral daily  losartan 50 milliGRAM(s) Oral daily  melatonin 5 milliGRAM(s) Oral at bedtime  polyethylene glycol 3350 17 Gram(s) Oral daily  QUEtiapine 100 milliGRAM(s) Oral daily  QUEtiapine 200 milliGRAM(s) Oral at bedtime    MEDICATIONS  (PRN):  albuterol    90 MICROgram(s) HFA Inhaler 1 Puff(s) Inhalation two times a day PRN for shortness of breath and/or wheezing  simethicone 80 milliGRAM(s) Chew two times a day PRN gas        I&O's Summary      PHYSICAL EXAM:  Vital Signs Last 24 Hrs  T(C): 36.8 (13 Sep 2024 12:13), Max: 36.8 (12 Sep 2024 15:27)  T(F): 98.2 (13 Sep 2024 12:13), Max: 98.2 (12 Sep 2024 15:27)  HR: 71 (13 Sep 2024 12:13) (71 - 78)  BP: 119/64 (13 Sep 2024 12:13) (111/63 - 143/79)  BP(mean): 79 (12 Sep 2024 17:23) (79 - 79)  RR: 18 (13 Sep 2024 12:13) (18 - 19)  SpO2: 96% (13 Sep 2024 12:13) (92% - 96%)    Parameters below as of 12 Sep 2024 20:48  Patient On (Oxygen Delivery Method): room air      GENERAL: NAD, frail anxious elderly deconditioned  HEAD:  Atraumatic, Normocephalic  EYES:  conjunctiva and sclera clear  NERVOUS SYSTEM:  Alert & Oriented X3, anxious Moves in bed B/L upper and lower extremities;  CHEST/LUNG: Clear to percussion bilaterally; No rales, rhonchi, wheezing, or rubs  HEART: Regular rate and rhythm; No murmurs, rubs, or gallops  ABDOMEN: Soft, Nontender, Nondistended; Bowel sounds present  EXTREMITIES:  2+ Peripheral Pulses  PSYCH: AnOx2-3 to person, place and partially time, poor insight into current conditions    LABS:                        11.7   7.72  )-----------( 200      ( 13 Sep 2024 05:29 )             36.2     09-13    141  |  107  |  13.4  ----------------------------<  102<H>  3.9   |  23.0  |  1.18    Ca    9.0      13 Sep 2024 05:29    TPro  5.8<L>  /  Alb  3.2<L>  /  TBili  0.5  /  DBili  x   /  AST  21  /  ALT  31  /  AlkPhos  79  09-13          Urinalysis Basic - ( 13 Sep 2024 05:29 )    Color: x / Appearance: x / SG: x / pH: x  Gluc: 102 mg/dL / Ketone: x  / Bili: x / Urobili: x   Blood: x / Protein: x / Nitrite: x   Leuk Esterase: x / RBC: x / WBC x   Sq Epi: x / Non Sq Epi: x / Bacteria: x        CAPILLARY BLOOD GLUCOSE            RADIOLOGY & ADDITIONAL TESTS:  Results Reviewed:   Imaging Personally Reviewed:  Electrocardiogram Personally Reviewed:

## 2024-09-13 NOTE — PROVIDER CONTACT NOTE (MEDICATION) - ASSESSMENT
pt. crying/tearful, anxious, frustrated, & states she feels depressed
pt. confused at baseline, easily startled, but in NAD.

## 2024-09-13 NOTE — PROGRESS NOTE ADULT - TIME BILLING
Chart, labs, orders, vitals, and imaging reviewed and plan of care discussed with consultants and IDR team in detail. Plan of care discussed with patient at bedside.

## 2024-09-13 NOTE — PROVIDER CONTACT NOTE (MEDICATION) - BACKGROUND
pt. admitted for generalized weakness, unable to care for herself
pt. admitted for generalized weakness, states she cannot take care of herself at home

## 2024-09-13 NOTE — PROVIDER CONTACT NOTE (MEDICATION) - SITUATION
pt. pending MRI spine, pt. anxious, confused, & speech is illogical. a&ox1-2
pt. anxious, crying because she cannot get out of bed to the chair due to safety. pt. states she wants to go home & getting increasingly anxious

## 2024-09-13 NOTE — BH CONSULTATION LIAISON ASSESSMENT NOTE - RISK ASSESSMENT
No past suicide history or attempts and no current reported or observed suicidal remarks or statements. Currently presents as low acute risk for self harm.

## 2024-09-13 NOTE — PATIENT PROFILE ADULT - FUNCTIONAL ASSESSMENT - BASIC MOBILITY ASSESSMENT TYPE
Chief Complaint   Patient presents with   • Head Injury Without LOC        HPI:    Patient hit by a line drive baseball in the left temporal region.  No LOC positive nausea mild headache no weakness numbness no neck pain no blurred vision pain is dull constant came on just prior to arrival no known exacerbating relieving factors  Review of Systems   No neck pain, no amensia , no loc      Constitutional symptoms:  No fever, no chills.      Skin symptoms:  No rash,      Eye symptoms:  No blurred vision,      ENMT symptoms: No difficulty swallowing    Respiratory symptoms:  No shortness of breath,    Cardiovascular symptoms:  No chest pain, no diaphoresis.      Gastrointestinal symptoms:  No abdominal pain, no nausea, no vomiting.      : No dysuria, no hematuria    Musculoskeletal symptoms:  No back pain,      Neurologic symptoms:  No headache, no dizziness, no numbness, no tingling, no weakness, no speech problem.      Heme: No easy bruising    PAST MEDICAL HISTORY:  There is no previous medical history on file.    PAST SURGICAL HISTORY:  There is no previous surgical history on file.    Social History     Tobacco Use   • Smoking status: Not on file   • Smokeless tobacco: Not on file   Substance Use Topics   • Alcohol use: Not on file   • Drug use: Not on file        Family History   Problem Relation Age of Onset   • Patient is unaware of any medical problems Mother    • Patient is unaware of any medical problems Father    • Patient is unaware of any medical problems Brother    • Patient is unaware of any medical problems Brother    • Dementia/Alzheimers Maternal Grandmother    • Cancer, Colon Maternal Grandfather    • Patient is unaware of any medical problems Paternal Grandmother    • Hypertension Paternal Grandfather    • Hyperlipidemia Paternal Grandfather    • Diabetes Paternal Grandfather         type 2   • Congestive Heart Failure Paternal Grandfather        ED Triage Vitals [05/12/22 2230]   [facility]       /76      Heart Rate 71      Resp (!) 16      Temp 98.4 °F (36.9 °C)      Temp src Oral      SpO2 94 %      Weight 65 lb 0.6 oz (29.5 kg)      Height       Head Circumference       Peak Flow       Pain Score       Pain Loc       Pain Edu?       Excl. in GC?         Physical Exam   GENERAL: alert, no acute distress  SKIN: warm, dry, pink, no vesicles, no petechiae, no Purpera, no target lesions, no vesicles  HEAD: normocephalic, bruising noted over the left temple  EYES: extraocular movements intact, normal conjunctiva, vision is grossly normal  HEENT: No malocclusion of dentition full range of motion of jaw  NECK:  full range of motion, cervical spine is nontender to palpation flexion-extension rotation or axial load  CARDIO:  normal peripheral perfusion, no edema  RESPIRATORY: non-labored respirations  CHEST WALL:  no deformity  GASTRO: nondistended,   BACK: nontender, normal range of motion, normal alignment  MUSCULOSKELETAL: normal range of motion, normal strength,    NEURO:  Alert and oriented x4  No focal neuro deficits, normal coordination, normal speech  LYMPHATICS: no lymphadenopathy  PSYCH: cooperative, appropriate mood and affect        Labwork:    No results found for this visit on 05/12/22.     Imaging Results          CT HEAD WO CONTRAST (In process)                   Medications - No data to display          Vitals:    05/12/22 2230 05/12/22 2247   BP: 115/76 111/73   BP Location: RUE - Right upper extremity RUE - Right upper extremity   Patient Position: Sitting Sitting   Pulse: 71 68   Resp: (!) 16 (!) 16   Temp: 98.4 °F (36.9 °C) 98 °F (36.7 °C)   TempSrc: Oral Oral   SpO2: 94% 99%   Weight: 29.5 kg (65 lb 0.6 oz)        MDM:  CT scan is negative for any acute pathology      ED Diagnoses        Final diagnoses    Injury of head, initial encounter          Concussion without loss of consciousness, initial encounter                 No follow-up provider specified.       Summary of your  Discharge Medications      You have not been prescribed any medications.               Clay Gonzalez MD  05/13/22 0033     Daily assessment

## 2024-09-13 NOTE — BH CONSULTATION LIAISON ASSESSMENT NOTE - SUMMARY
Patient is a 74 year old, female; domiciled with her adult son and ex-;  (2003); noncaregiver; unemployed on SSI; past psychiatric history of bipolar depression and anxiety; recently under the care of [facility], now at [facility] x 1 visit with ROXY Mason, 3  prior psychiatric hospitalizations  last at  in June 2024,  [facility] and [place] over 10 years ago; no known suicide attempts; no known history of violence or arrests; no active substance abuse or known history of complicated withdrawal; [facility] of arthritis, fibromyalgia; on Plavix, brought in by EMS; called by ex- due to weakness.  Psych initially was consulted for TULIO clearance.    Patient demonstrating clear signs of disorientation and confusion, unclear if secondary to delirious process or possible dementia. CT Head unremarkable for acute process, unclear if medical cause is ruled out at this time. Patient also with significant mood complaints without coherent ability to elaborate. No SI/HI reported or observed. Cannot rule out AVH at this time. Patient's medication regimen will likely require some adjustments, will consider changing medications to decrease daytime sedation. Otherwise, will continue to follow patient. Collateral information required from family, currently without response. Will follow up and update.    Recs:  -   Patient is a 74 year old, female; domiciled with her adult son and ex-;  (2003); noncaregiver; unemployed on SSI; past psychiatric history of bipolar depression and anxiety; recently under the care of [facility], now at [facility] x 1 visit with NP Fannie Mason, 3  prior psychiatric hospitalizations  last at  in June 2024,  [facility] and [place] over 10 years ago; no known suicide attempts; no known history of violence or arrests; no active substance abuse or known history of complicated withdrawal; [facility] of arthritis, fibromyalgia; on Plavix, brought in by EMS; called by ex- due to weakness.  Psych initially was consulted for TULIO clearance.    Patient demonstrating clear signs of disorientation and confusion, unclear if secondary to delirious process or possible dementia. CT Head unremarkable for acute process, unclear if medical cause is ruled out at this time. Patient also with significant mood complaints without coherent ability to elaborate. No SI/HI reported or observed. Cannot rule out AVH at this time. Patient's medication regimen will likely require continued adjustments as pertinent to response, will change medications to decrease daytime sedation/confusion. Otherwise, will continue to follow patient. Collateral information required from family, currently without response. Will follow up and update.    Recs:  Decrease wellbutrin to 150mg qd. Plan to taper off  Decrease seroquel to 50mg qd/100mg qHS  Continue klonopin 0.25mg BID, avoid increase.  Continue lexapro 20mg qd  Continue lithium 450mg qd  Continue gabapentin 100mg BID  -In the case of severe agitation, consider zyprexa IM prn q6. Or po option of seroquel 25mg prn q6  -Maintain delirium precautions   -Avoid anticholinergic agents, benzos, opioid as they can further perpetuate confusion   -Frequent re orientation, Hydration, try to avoid restraints and if possible, mobilize patient and PT involvement   Patient is a 74 year old, female; domiciled with her adult son and ex-;  (2003); noncaregiver; unemployed on SSI; past psychiatric history of bipolar depression and anxiety; recently under the care of [facility], now at [facility] x 1 visit with NP Fannie Mason, 3  prior psychiatric hospitalizations  last at  in June 2024,  [facility] and [place] over 10 years ago; no known suicide attempts; no known history of violence or arrests; no active substance abuse or known history of complicated withdrawal; [facility] of arthritis, fibromyalgia; on Plavix, brought in by EMS; called by ex- due to weakness.  Psych initially was consulted for TULIO clearance.    Patient demonstrating clear signs of disorientation and confusion, unclear if secondary to delirious process or possible dementia. CT Head unremarkable for acute process, unclear if medical cause is ruled out at this time. Patient also with significant mood complaints without coherent ability to elaborate. No SI/HI reported or observed. Cannot rule out AVH at this time. Patient's medication regimen will likely require continued adjustments as pertinent to response, will change medications to decrease daytime sedation/confusion. Otherwise, will continue to follow patient. Collateral information required from family, currently without response. Will follow up and update. agree with primary team, patient would benefit from TULIO     Recs:  Decrease Wellbutrin to 150mg Qd. Plan to taper off  Decrease Seroquel to 50mg qd/100mg qHS  Continue Klonopin 0.25mg BID, avoid increase.  Continue Lexapro 20mg qd  Continue lithium 450mg Qhs   Continue gabapentin 100mg BID  -In the case of severe agitation, consider Zyprexa IM prn q6. Or po option of Seroquel 25mg prn q6  -Maintain delirium precautions   -Avoid anticholinergic agents, benzos, opioid as they can further perpetuate confusion   -Frequent re orientation, Hydration, try to avoid restraints and if possible, mobilize patient and PT involvement

## 2024-09-13 NOTE — PROGRESS NOTE ADULT - ASSESSMENT
74F w/ PMHx of panic attacks, HTN, depression, anxiety, fibromyalgia, breast CA, HLD, presented with worsening weakness , poor po intake,   for several days. not able to take care of her basic needs since discharge from [facility]. Will adjust psychiatric medications with  assistance, plan for TULIO on DC      #Inability to Ambulate back pain generalized weakness  generalized weakness and inability to care for self at home  Appreciated PT Eval rec for TULIO   CT head negative CXR negative , UA negatiuve  CBC and CMP WNL- slight levated LFTs   CT spine + Foraminal stenosis- MRI without acute findings       #Bipolar depression and anxiety  Nonadherence to home medications- doesn't know names has them in a pill box-   not sure which ones she takes   Will adjust meds with assistance of  as patient may have been taking all of her medications from Pre and Post [place] inpatient psych hospitalization   - will decrease wellbutrin to 150, plan to taper off, Decrease Seroquel to 50 mg qd/ 100 mg qHS   Continue Klonopin .25 mg BID, Lexapro 20 mg daily, Continue Lithium 450 mg daily, Continue Gabapentin 100 mg BID  - IN the case of severe agitation, can consider zyprexa IM PRN q6, or PO option of seroquel PRN q6   - CTH negative for acute pathologies   hold oxybutynin in elderly     HTN   - c/w Losartan 50mg po qd    Other home med  - c/w Plavix 75mg po QD, home med- unclear why on it    VTE ppx: Lovenox SQ  Diet: DASH    Dispo: Plan for [facility] after 3 night stay, patient unsure if she wants to go to [facility], however would be unsafe at home without supervision, will continue to discuss with family / BH    74F w/ PMHx of panic attacks, HTN, depression, anxiety, fibromyalgia, breast CA, HLD, presented with worsening weakness , poor po intake,   for several days. not able to take care of her basic needs since discharge from [facility]. Will adjust psychiatric medications with  assistance, plan for TULIO on DC      #Inability to Ambulate back pain generalized weakness  generalized weakness and inability to care for self at home  Appreciated PT Eval rec for TULIO   CT head negative CXR negative , UA negatiuve  CBC and CMP WNL- slight levated LFTs   CT spine + Foraminal stenosis- MRI without acute findings     #Bipolar depression and anxiety  Suspect patient has underlying dementia as cause for intermittent confusion / poor insight as opposed to delirium   Will adjust meds with assistance of  as patient may have been taking all of her medications from Pre and Post [place] inpatient psych hospitalization   - will decrease wellbutrin to 150, plan to taper off, Decrease Seroquel to 50 mg qd/ 100 mg qHS   Continue Klonopin .25 mg BID, Lexapro 20 mg daily, Continue Lithium 450 mg daily, Continue Gabapentin 100 mg BID  - IN the case of severe agitation, can consider zyprexa IM PRN q6, or PO option of seroquel PRN q6   - CTH negative for acute pathologies   hold oxybutynin in elderly     HTN   - c/w Losartan 50mg po qd    Other home med  - c/w Plavix 75mg po QD, home med- unclear why on it    VTE ppx: Lovenox SQ  Diet: DASH    Dispo: Plan for TULIO after 3 night stay, patient unsure if she wants to go to [facility], however would be unsafe at home without supervision, will continue to discuss with family / BH

## 2024-09-13 NOTE — PATIENT PROFILE ADULT - FALL HARM RISK - HARM RISK INTERVENTIONS

## 2024-09-13 NOTE — BH CONSULTATION LIAISON ASSESSMENT NOTE - DIFFERENTIAL
Delirium  Dementia  Depression with psychosis Delirium  Dementia  Depression with psychotic features

## 2024-09-14 LAB
CULTURE RESULTS: SIGNIFICANT CHANGE UP
SPECIMEN SOURCE: SIGNIFICANT CHANGE UP

## 2024-09-14 PROCEDURE — 99233 SBSQ HOSP IP/OBS HIGH 50: CPT

## 2024-09-14 RX ORDER — ENOXAPARIN SODIUM 100 MG/ML
40 INJECTION SUBCUTANEOUS EVERY 24 HOURS
Refills: 0 | Status: DISCONTINUED | OUTPATIENT
Start: 2024-09-14 | End: 2024-09-17

## 2024-09-14 RX ADMIN — Medication 75 MILLIGRAM(S): at 12:30

## 2024-09-14 RX ADMIN — Medication 5 MILLIGRAM(S): at 21:12

## 2024-09-14 RX ADMIN — ESCITALOPRAM OXALATE 20 MILLIGRAM(S): 10 TABLET ORAL at 12:30

## 2024-09-14 RX ADMIN — Medication 50 MILLIGRAM(S): at 12:29

## 2024-09-14 RX ADMIN — Medication 0.25 MILLIGRAM(S): at 16:34

## 2024-09-14 RX ADMIN — Medication 0.25 MILLIGRAM(S): at 06:20

## 2024-09-14 RX ADMIN — LOSARTAN POTASSIUM 50 MILLIGRAM(S): 50 TABLET ORAL at 06:20

## 2024-09-14 RX ADMIN — ENOXAPARIN SODIUM 40 MILLIGRAM(S): 100 INJECTION SUBCUTANEOUS at 16:34

## 2024-09-14 RX ADMIN — Medication 100 MILLIGRAM(S): at 06:20

## 2024-09-14 RX ADMIN — POLYETHYLENE GLYCOL 3350 17 GRAM(S): 17 POWDER, FOR SOLUTION ORAL at 15:06

## 2024-09-14 RX ADMIN — BUPROPION HYDROCHLORIDE 150 MILLIGRAM(S): 150 TABLET ORAL at 12:30

## 2024-09-14 RX ADMIN — Medication 20 MILLIGRAM(S): at 21:12

## 2024-09-14 RX ADMIN — Medication 100 MILLIGRAM(S): at 21:12

## 2024-09-14 RX ADMIN — Medication 100 MILLIGRAM(S): at 16:36

## 2024-09-14 RX ADMIN — LITHIUM CARBONATE 450 MILLIGRAM(S): 150 CAPSULE ORAL at 12:29

## 2024-09-14 NOTE — PROGRESS NOTE ADULT - SUBJECTIVE AND OBJECTIVE BOX
CHIEF COMPLAINT/INTERVAL HISTORY:    Patient is a 74y old  Female who presents with a chief complaint of difficulty ambulating - needs 3 day stay prior to TULIO (13 Sep 2024 15:11)    SUBJECTIVE & OBJECTIVE: Pt seen and examined at bedside. No overnight events. Denies any acute complaints but emotional and states she does not want to go to rehab.    ROS: No chest pain, palpitations, SOB, light headedness, dizziness, headache, nausea/vomiting, fevers/chills, abdominal pain, dysuria.    ICU Vital Signs Last 24 Hrs  T(C): 36.9 (14 Sep 2024 11:25), Max: 36.9 (14 Sep 2024 11:25)  T(F): 98.4 (14 Sep 2024 11:25), Max: 98.4 (14 Sep 2024 11:25)  HR: 79 (14 Sep 2024 11:25) (61 - 79)  BP: 121/61 (14 Sep 2024 11:25) (111/72 - 135/79)  BP(mean): 85 (14 Sep 2024 04:33) (85 - 85)  RR: 18 (14 Sep 2024 11:25) (18 - 18)  SpO2: 94% (14 Sep 2024 11:25) (94% - 94%)    O2 Parameters below as of 14 Sep 2024 04:33  Patient On (Oxygen Delivery Method): room air      MEDICATIONS  (STANDING):  atorvastatin 20 milliGRAM(s) Oral at bedtime  buPROPion XL (24-Hour) . 150 milliGRAM(s) Oral daily  clonazePAM  Tablet 0.25 milliGRAM(s) Oral two times a day  clopidogrel Tablet 75 milliGRAM(s) Oral daily  escitalopram 20 milliGRAM(s) Oral daily  gabapentin 100 milliGRAM(s) Oral two times a day  lithium CR (ESKALITH-CR) 450 milliGRAM(s) Oral daily  losartan 50 milliGRAM(s) Oral daily  melatonin 5 milliGRAM(s) Oral at bedtime  polyethylene glycol 3350 17 Gram(s) Oral daily  QUEtiapine 50 milliGRAM(s) Oral daily  QUEtiapine 100 milliGRAM(s) Oral at bedtime    MEDICATIONS  (PRN):  albuterol    90 MICROgram(s) HFA Inhaler 1 Puff(s) Inhalation two times a day PRN for shortness of breath and/or wheezing  simethicone 80 milliGRAM(s) Chew two times a day PRN gas      LABS:                        11.7   7.72  )-----------( 200      ( 13 Sep 2024 05:29 )             36.2     09-13    141  |  107  |  13.4  ----------------------------<  102<H>  3.9   |  23.0  |  1.18    Ca    9.0      13 Sep 2024 05:29    TPro  5.8<L>  /  Alb  3.2<L>  /  TBili  0.5  /  DBili  x   /  AST  21  /  ALT  31  /  AlkPhos  79  09-13      Urinalysis Basic - ( 13 Sep 2024 05:29 )    Color: x / Appearance: x / SG: x / pH: x  Gluc: 102 mg/dL / Ketone: x  / Bili: x / Urobili: x   Blood: x / Protein: x / Nitrite: x   Leuk Esterase: x / RBC: x / WBC x   Sq Epi: x / Non Sq Epi: x / Bacteria: x    PHYSICAL EXAM:    GENERAL: elderly female, laying in bed, NAD, emotional  HEAD:  Atraumatic, Normocephalic  EYES: EOMI, PERRLA, conjunctiva and sclera clear  ENMT: Moist mucous membranes  NECK: Supple   NERVOUS SYSTEM:  Alert & Oriented X2, Motor Strength 5/5 B/L upper and lower extremities  CHEST/LUNG: coarse breath sounds  HEART: Regular rate and rhythm; + S1/S2  ABDOMEN: Soft, Nontender, Nondistended; Bowel sounds present  EXTREMITIES:  no pedal edema

## 2024-09-14 NOTE — PROGRESS NOTE ADULT - ASSESSMENT
74F w/ PMHx of panic attacks, HTN, depression, anxiety, fibromyalgia, breast CA, HLD, presented with worsening weakness , poor po intake,   for several days. not able to take care of her basic needs since discharge from [facility]. Will adjust psychiatric medications with  assistance, plan for TULIO on DC      #Inability to Ambulate back pain generalized weakness  generalized weakness and inability to care for self at home  Appreciated PT Eval rec for TULIO   CT head negative CXR negative , UA negatiuve  CBC and CMP WNL- slight levated LFTs   CT spine + Foraminal stenosis- MRI without acute findings     #Bipolar depression and anxiety  Suspect patient has underlying dementia as cause for intermittent confusion / poor insight as opposed to delirium   Will adjust meds with assistance of  as patient may have been taking all of her medications from Pre and [facility] psych hospitalization   - will decrease wellbutrin to 150, plan to taper off, Decrease Seroquel to 50 mg qd/ 100 mg qHS   Continue Klonopin .25 mg BID, Lexapro 20 mg daily, Continue Lithium 450 mg daily, Continue Gabapentin 100 mg BID  - IN the case of severe agitation, can consider zyprexa IM PRN q6, or PO option of seroquel PRN q6   - CTH negative for acute pathologies   hold oxybutynin in elderly     HTN   - c/w Losartan 50mg po qd    Other home med  - c/w Plavix 75mg po QD, home med- unclear why on it    VTE ppx: Lovenox SQ  Diet: DASH    Dispo: Plan for TULIO after 3 night stay, patient unsure if she wants to go to [facility], however would be unsafe at home without supervision, will continue to discuss with family / BH    Patient is a 74 year old male with PMH of Panic Attacks, HTN, Depression, Anxiety, Fibromyalgia, Breast CA, HLD presented with worsening weakness and poor po intake for several days. She was unable to take care of her basic needs since discharge from [facility]. Psych meds being adjusted per , otherwise patient is medically stable for TULIO.    #Inability to Ambulate due to generalized weakness  -UA negative for UTI  -CT head negative  -CT spine + Foraminal stenosis- MRI without acute findings   -check B12/TSH/Ammonia levels  -patient unable to perform ADLs independently or care for herself at home  -PT Eval recommending TULIO     #Bipolar depression and anxiety  -mood labile; emotional today  -Suspect patient has underlying dementia as cause for intermittent confusion and poor insight    -Decreased wellbutrin to 150 and Seroquel to 50 mg qd/ 100 mg qHS   -Continue Klonopin .25 mg BID, Lexapro 20 mg daily, Continue Lithium 450 mg daily, Continue Gabapentin 100 mg BID  - IN the case of severe agitation, zyprexa IM PRN q6, or seroquel PRN q6     HTN   - BP stable  -continue Losartan 50mg po qd    HLD  -continue statin    Other home med  - c/w Plavix 75mg po QD  - unclear why patient is on antiplatelets; will clarify with family    DVT ppx: Lovenox SQ     Dispo: Medically stable for TULIO; needs 3 night stay. Intermittently refuses TULIO but has no capacity per .    Plan discussed with patient, RN, SW, DR. Santillan

## 2024-09-15 LAB
AMMONIA BLD-MCNC: 29 UMOL/L — SIGNIFICANT CHANGE UP (ref 11–55)
ANION GAP SERPL CALC-SCNC: 14 MMOL/L — SIGNIFICANT CHANGE UP (ref 5–17)
BASOPHILS # BLD AUTO: 0.06 K/UL — SIGNIFICANT CHANGE UP (ref 0–0.2)
BASOPHILS NFR BLD AUTO: 0.6 % — SIGNIFICANT CHANGE UP (ref 0–2)
BUN SERPL-MCNC: 15.4 MG/DL — SIGNIFICANT CHANGE UP (ref 8–20)
CALCIUM SERPL-MCNC: 9.6 MG/DL — SIGNIFICANT CHANGE UP (ref 8.4–10.5)
CHLORIDE SERPL-SCNC: 101 MMOL/L — SIGNIFICANT CHANGE UP (ref 96–108)
CO2 SERPL-SCNC: 23 MMOL/L — SIGNIFICANT CHANGE UP (ref 22–29)
CREAT SERPL-MCNC: 1.24 MG/DL — SIGNIFICANT CHANGE UP (ref 0.5–1.3)
EGFR: 46 ML/MIN/1.73M2 — LOW
EOSINOPHIL # BLD AUTO: 0.29 K/UL — SIGNIFICANT CHANGE UP (ref 0–0.5)
EOSINOPHIL NFR BLD AUTO: 2.8 % — SIGNIFICANT CHANGE UP (ref 0–6)
GLUCOSE SERPL-MCNC: 119 MG/DL — HIGH (ref 70–99)
HCT VFR BLD CALC: 38.9 % — SIGNIFICANT CHANGE UP (ref 34.5–45)
HGB BLD-MCNC: 12.5 G/DL — SIGNIFICANT CHANGE UP (ref 11.5–15.5)
IMM GRANULOCYTES NFR BLD AUTO: 0.5 % — SIGNIFICANT CHANGE UP (ref 0–0.9)
LYMPHOCYTES # BLD AUTO: 2.17 K/UL — SIGNIFICANT CHANGE UP (ref 1–3.3)
LYMPHOCYTES # BLD AUTO: 21 % — SIGNIFICANT CHANGE UP (ref 13–44)
MAGNESIUM SERPL-MCNC: 2.2 MG/DL — SIGNIFICANT CHANGE UP (ref 1.8–2.6)
MCHC RBC-ENTMCNC: 30.9 PG — SIGNIFICANT CHANGE UP (ref 27–34)
MCHC RBC-ENTMCNC: 32.1 GM/DL — SIGNIFICANT CHANGE UP (ref 32–36)
MCV RBC AUTO: 96.3 FL — SIGNIFICANT CHANGE UP (ref 80–100)
MONOCYTES # BLD AUTO: 1.03 K/UL — HIGH (ref 0–0.9)
MONOCYTES NFR BLD AUTO: 10 % — SIGNIFICANT CHANGE UP (ref 2–14)
NEUTROPHILS # BLD AUTO: 6.72 K/UL — SIGNIFICANT CHANGE UP (ref 1.8–7.4)
NEUTROPHILS NFR BLD AUTO: 65.1 % — SIGNIFICANT CHANGE UP (ref 43–77)
PHOSPHATE SERPL-MCNC: 3.4 MG/DL — SIGNIFICANT CHANGE UP (ref 2.4–4.7)
PLATELET # BLD AUTO: 210 K/UL — SIGNIFICANT CHANGE UP (ref 150–400)
POTASSIUM SERPL-MCNC: 4 MMOL/L — SIGNIFICANT CHANGE UP (ref 3.5–5.3)
POTASSIUM SERPL-SCNC: 4 MMOL/L — SIGNIFICANT CHANGE UP (ref 3.5–5.3)
RBC # BLD: 4.04 M/UL — SIGNIFICANT CHANGE UP (ref 3.8–5.2)
RBC # FLD: 15.3 % — HIGH (ref 10.3–14.5)
SODIUM SERPL-SCNC: 138 MMOL/L — SIGNIFICANT CHANGE UP (ref 135–145)
TSH SERPL-MCNC: 6.68 UIU/ML — HIGH (ref 0.27–4.2)
VIT B12 SERPL-MCNC: 672 PG/ML — SIGNIFICANT CHANGE UP (ref 232–1245)
WBC # BLD: 10.32 K/UL — SIGNIFICANT CHANGE UP (ref 3.8–10.5)
WBC # FLD AUTO: 10.32 K/UL — SIGNIFICANT CHANGE UP (ref 3.8–10.5)

## 2024-09-15 PROCEDURE — 99232 SBSQ HOSP IP/OBS MODERATE 35: CPT

## 2024-09-15 RX ADMIN — Medication 5 MILLIGRAM(S): at 21:17

## 2024-09-15 RX ADMIN — LITHIUM CARBONATE 450 MILLIGRAM(S): 150 CAPSULE ORAL at 11:58

## 2024-09-15 RX ADMIN — ESCITALOPRAM OXALATE 20 MILLIGRAM(S): 10 TABLET ORAL at 11:58

## 2024-09-15 RX ADMIN — LOSARTAN POTASSIUM 50 MILLIGRAM(S): 50 TABLET ORAL at 05:22

## 2024-09-15 RX ADMIN — Medication 0.25 MILLIGRAM(S): at 05:21

## 2024-09-15 RX ADMIN — ENOXAPARIN SODIUM 40 MILLIGRAM(S): 100 INJECTION SUBCUTANEOUS at 17:45

## 2024-09-15 RX ADMIN — Medication 0.25 MILLIGRAM(S): at 17:46

## 2024-09-15 RX ADMIN — Medication 100 MILLIGRAM(S): at 17:57

## 2024-09-15 RX ADMIN — Medication 75 MILLIGRAM(S): at 11:58

## 2024-09-15 RX ADMIN — Medication 100 MILLIGRAM(S): at 05:22

## 2024-09-15 RX ADMIN — BUPROPION HYDROCHLORIDE 150 MILLIGRAM(S): 150 TABLET ORAL at 11:58

## 2024-09-15 RX ADMIN — Medication 20 MILLIGRAM(S): at 21:17

## 2024-09-15 RX ADMIN — Medication 100 MILLIGRAM(S): at 21:17

## 2024-09-15 RX ADMIN — Medication 50 MILLIGRAM(S): at 11:58

## 2024-09-15 NOTE — PROGRESS NOTE ADULT - ASSESSMENT
Patient is a 74 year old male with PMH of Panic Attacks, HTN, Depression, Anxiety, Fibromyalgia, Breast CA, HLD presented with worsening weakness and poor po intake for several days. She was unable to take care of her basic needs since discharge from [facility]. Psych meds being adjusted per , otherwise patient is medically stable for TULIO.    #Inability to Ambulate due to generalized weakness  -UA negative for UTI  -CT head negative  -CT spine + Foraminal stenosis- MRI without acute findings   -check B12/Ammonia within normal range  -TSH slightly elevated; check free T3/T4  -patient unable to perform ADLs independently or care for herself at home  -PT Eval recommending TULIO     #Bipolar depression and anxiety  -mood labile; more calm/cooperative today  -Suspect patient has underlying dementia as cause for intermittent confusion and poor insight    -Decreased wellbutrin to 150 and Seroquel to 50 mg qd/ 100 mg qHS   -Continue Klonopin .25 mg BID, Lexapro 20 mg daily, Continue Lithium 450 mg daily, Continue Gabapentin 100 mg BID  -In the case of severe agitation, zyprexa IM PRN q6, or seroquel PRN q6     HTN   - BP stable  -continue Losartan 50mg po qd    HLD  -continue statin    Other home med  - c/w Plavix 75mg po QD  - unclear why patient is on antiplatelets; will clarify with family    DVT ppx: Lovenox SQ     Dispo: Medically stable for TULIO; needs 3 night stay. Intermittently refuses TULIO but has no capacity per .    Plan discussed with patient, RN, Ex- Sagar Patient is a 74 year old male with PMH of Panic Attacks, HTN, Depression, Anxiety, Fibromyalgia, Breast CA, HLD presented with worsening weakness and poor po intake for several days. She was unable to take care of her basic needs since discharge from [facility]. Psych meds being adjusted per , otherwise patient is medically stable for TULIO.    #Inability to Ambulate due to generalized weakness  -UA negative for UTI  -CT head negative  -CT spine + Foraminal stenosis- MRI without acute findings   -check B12/Ammonia within normal range  -TSH slightly elevated; check free T3/T4  -patient unable to perform ADLs independently or care for herself at home  -PT Eval recommending TULIO     #Bipolar depression and anxiety  -mood labile; more calm/cooperative today  -Suspect patient has underlying dementia as cause for intermittent confusion and poor insight    -Decreased wellbutrin to 150 and Seroquel to 50 mg qd/ 100 mg qHS   -Continue Klonopin .25 mg BID, Lexapro 20 mg daily, Continue Lithium 450 mg daily, Continue Gabapentin 100 mg BID  -In the case of severe agitation, zyprexa IM PRN q6, or seroquel PRN q6     HTN   - BP stable  -continue Losartan 50mg po qd    HLD  -continue statin    Other home med  - c/w Plavix 75mg po QD  - family unclear why patient is on plavix    DVT ppx: Lovenox SQ     Dispo: Medically stable for TULIO; needs 3 night stay. Intermittently refuses TULIO but has no capacity per .    Plan discussed with patient, RN, Ex- Sagar

## 2024-09-15 NOTE — PROGRESS NOTE ADULT - SUBJECTIVE AND OBJECTIVE BOX
CHIEF COMPLAINT/INTERVAL HISTORY:    Patient is a 74y old  Female who presents with a chief complaint of difficulty ambulating - needs 3 day stay prior to TULIO (14 Sep 2024 14:55)    SUBJECTIVE & OBJECTIVE: Pt seen and examined at bedside. No overnight events. Less emotional today.    ROS: No chest pain, palpitations, SOB, light headedness, dizziness, headache, nausea/vomiting, fevers/chills, abdominal pain, dysuria.    ICU Vital Signs Last 24 Hrs  T(C): 36.6 (15 Sep 2024 11:19), Max: 37.6 (14 Sep 2024 17:11)  T(F): 97.9 (15 Sep 2024 11:19), Max: 99.7 (14 Sep 2024 17:11)  HR: 77 (15 Sep 2024 11:19) (77 - 83)  BP: 92/60 (15 Sep 2024 11:19) (92/60 - 122/69)  RR: 18 (15 Sep 2024 04:00) (18 - 18)  SpO2: 91% (15 Sep 2024 11:19) (91% - 96%)    O2 Parameters below as of 15 Sep 2024 11:19  Patient On (Oxygen Delivery Method): room air    MEDICATIONS  (STANDING):  atorvastatin 20 milliGRAM(s) Oral at bedtime  buPROPion XL (24-Hour) . 150 milliGRAM(s) Oral daily  clonazePAM  Tablet 0.25 milliGRAM(s) Oral two times a day  clopidogrel Tablet 75 milliGRAM(s) Oral daily  enoxaparin Injectable 40 milliGRAM(s) SubCutaneous every 24 hours  escitalopram 20 milliGRAM(s) Oral daily  gabapentin 100 milliGRAM(s) Oral two times a day  lithium CR (ESKALITH-CR) 450 milliGRAM(s) Oral daily  losartan 50 milliGRAM(s) Oral daily  melatonin 5 milliGRAM(s) Oral at bedtime  polyethylene glycol 3350 17 Gram(s) Oral daily  QUEtiapine 50 milliGRAM(s) Oral daily  QUEtiapine 100 milliGRAM(s) Oral at bedtime    MEDICATIONS  (PRN):  albuterol    90 MICROgram(s) HFA Inhaler 1 Puff(s) Inhalation two times a day PRN for shortness of breath and/or wheezing  simethicone 80 milliGRAM(s) Chew two times a day PRN gas      LABS:                        12.5   10.32 )-----------( 210      ( 15 Sep 2024 05:27 )             38.9     09-15    138  |  101  |  15.4  ----------------------------<  119<H>  4.0   |  23.0  |  1.24    Ca    9.6      15 Sep 2024 05:27  Phos  3.4     09-15  Mg     2.2     09-15    Urinalysis Basic - ( 15 Sep 2024 05:27 )    Color: x / Appearance: x / SG: x / pH: x  Gluc: 119 mg/dL / Ketone: x  / Bili: x / Urobili: x   Blood: x / Protein: x / Nitrite: x   Leuk Esterase: x / RBC: x / WBC x   Sq Epi: x / Non Sq Epi: x / Bacteria: x    PHYSICAL EXAM:    GENERAL: elderly female, laying in bed, NAD, emotional  HEAD:  Atraumatic, Normocephalic  EYES: EOMI, PERRLA, conjunctiva and sclera clear  ENMT: Moist mucous membranes  NECK: Supple   NERVOUS SYSTEM:  Alert & Oriented X2, Motor Strength 5/5 B/L upper and lower extremities  CHEST/LUNG: coarse breath sounds  HEART: Regular rate and rhythm; + S1/S2  ABDOMEN: Soft, Nontender, Nondistended; Bowel sounds present  EXTREMITIES:  no pedal edema

## 2024-09-16 ENCOUNTER — TRANSCRIPTION ENCOUNTER (OUTPATIENT)
Age: 74
End: 2024-09-16

## 2024-09-16 LAB — GI PCR PANEL: SIGNIFICANT CHANGE UP

## 2024-09-16 PROCEDURE — 99232 SBSQ HOSP IP/OBS MODERATE 35: CPT

## 2024-09-16 PROCEDURE — 99233 SBSQ HOSP IP/OBS HIGH 50: CPT

## 2024-09-16 RX ADMIN — BUPROPION HYDROCHLORIDE 150 MILLIGRAM(S): 150 TABLET ORAL at 12:19

## 2024-09-16 RX ADMIN — LOSARTAN POTASSIUM 50 MILLIGRAM(S): 50 TABLET ORAL at 05:15

## 2024-09-16 RX ADMIN — Medication 0.25 MILLIGRAM(S): at 05:15

## 2024-09-16 RX ADMIN — Medication 20 MILLIGRAM(S): at 21:11

## 2024-09-16 RX ADMIN — LITHIUM CARBONATE 450 MILLIGRAM(S): 150 CAPSULE ORAL at 12:20

## 2024-09-16 RX ADMIN — ENOXAPARIN SODIUM 40 MILLIGRAM(S): 100 INJECTION SUBCUTANEOUS at 17:20

## 2024-09-16 RX ADMIN — Medication 75 MILLIGRAM(S): at 12:19

## 2024-09-16 RX ADMIN — Medication 100 MILLIGRAM(S): at 17:20

## 2024-09-16 RX ADMIN — Medication 50 MILLIGRAM(S): at 12:19

## 2024-09-16 RX ADMIN — Medication 0.25 MILLIGRAM(S): at 17:20

## 2024-09-16 RX ADMIN — ESCITALOPRAM OXALATE 20 MILLIGRAM(S): 10 TABLET ORAL at 12:19

## 2024-09-16 RX ADMIN — Medication 100 MILLIGRAM(S): at 21:11

## 2024-09-16 RX ADMIN — Medication 5 MILLIGRAM(S): at 21:12

## 2024-09-16 RX ADMIN — Medication 100 MILLIGRAM(S): at 05:15

## 2024-09-16 NOTE — BH CONSULTATION LIAISON PROGRESS NOTE - NSICDXBHSECONDARYDX_PSY_ALL_CORE
Dementia   F03.90  Bipolar 1 disorder, depressed   F31.9  
Dementia   F03.90  Bipolar 1 disorder, depressed   F31.9

## 2024-09-16 NOTE — PROGRESS NOTE ADULT - REASON FOR ADMISSION
difficulty ambulating - needs 3 day stay prior to TULIO

## 2024-09-16 NOTE — PROGRESS NOTE ADULT - ASSESSMENT
Patient is a 74 year old male with PMH of Panic Attacks, HTN, Depression, Anxiety, Fibromyalgia, Breast CA, HLD presented with worsening weakness and poor po intake for several days. She was unable to take care of her basic needs since discharge from [facility]. Psych meds being adjusted per , otherwise patient is medically stable for TULIO.    #Inability to Ambulate due to generalized weakness  -UA negative for UTI  -CT head negative  -CT spine + Foraminal stenosis- MRI without acute findings   -check B12/Ammonia within normal range  -TSH slightly elevated; check free T3/T4  -patient unable to perform ADLs independently or care for herself at home  -PT Eval recommending TULIO     #Bipolar depression and anxiety  -mood labile  -Suspect patient has underlying dementia as cause for intermittent confusion and poor insight    -Decreased wellbutrin to 150 and Seroquel to 50 mg qd/ 100 mg qHS   -Continue Klonopin .25 mg BID, Lexapro 20 mg daily, Continue Lithium 450 mg daily, Continue Gabapentin 100 mg BID  -In the case of severe agitation, zyprexa IM PRN q6, or seroquel PRN q6     HTN   - BP stable  -continue Losartan 50mg po qd    HLD  -continue statin    Other home med  - c/w Plavix 75mg po QD  - family unclear why patient is on plavix    DVT ppx: Lovenox SQ     Dispo: Medically stable for TULIO.    Plan discussed with patient, RN, SW (unable to reach Sagar despite multiple attempts)

## 2024-09-16 NOTE — PROGRESS NOTE ADULT - SUBJECTIVE AND OBJECTIVE BOX
CHIEF COMPLAINT/INTERVAL HISTORY:    Patient is a 74y old  Female who presents with a chief complaint of difficulty ambulating - needs 3 day stay prior to TULIO (16 Sep 2024 08:32)    SUBJECTIVE & OBJECTIVE: Pt seen and examined at bedside. No overnight events. Denies any new complaints; AAO x 2. Awaiting placement.    ROS: No chest pain, palpitations, SOB, light headedness, dizziness, headache, nausea/vomiting, fevers/chills, abdominal pain.    ICU Vital Signs Last 24 Hrs  T(C): 37 (16 Sep 2024 11:05), Max: 37.1 (15 Sep 2024 17:21)  T(F): 98.6 (16 Sep 2024 11:05), Max: 98.7 (15 Sep 2024 17:21)  HR: 85 (16 Sep 2024 11:05) (60 - 85)  BP: 126/81 (16 Sep 2024 11:05) (123/72 - 148/78)  RR: 17 (16 Sep 2024 04:52) (17 - 18)  SpO2: 96% (16 Sep 2024 11:05) (92% - 100%)    O2 Parameters below as of 16 Sep 2024 11:05  Patient On (Oxygen Delivery Method): room air      MEDICATIONS  (STANDING):  atorvastatin 20 milliGRAM(s) Oral at bedtime  buPROPion XL (24-Hour) . 150 milliGRAM(s) Oral daily  clonazePAM  Tablet 0.25 milliGRAM(s) Oral two times a day  clopidogrel Tablet 75 milliGRAM(s) Oral daily  enoxaparin Injectable 40 milliGRAM(s) SubCutaneous every 24 hours  escitalopram 20 milliGRAM(s) Oral daily  gabapentin 100 milliGRAM(s) Oral two times a day  lithium CR (ESKALITH-CR) 450 milliGRAM(s) Oral daily  losartan 50 milliGRAM(s) Oral daily  melatonin 5 milliGRAM(s) Oral at bedtime  polyethylene glycol 3350 17 Gram(s) Oral daily  QUEtiapine 50 milliGRAM(s) Oral daily  QUEtiapine 100 milliGRAM(s) Oral at bedtime    MEDICATIONS  (PRN):  albuterol    90 MICROgram(s) HFA Inhaler 1 Puff(s) Inhalation two times a day PRN for shortness of breath and/or wheezing  simethicone 80 milliGRAM(s) Chew two times a day PRN gas      LABS:                        12.5   10.32 )-----------( 210      ( 15 Sep 2024 05:27 )             38.9     09-15    138  |  101  |  15.4  ----------------------------<  119<H>  4.0   |  23.0  |  1.24    Ca    9.6      15 Sep 2024 05:27  Phos  3.4     09-15  Mg     2.2     09-15        Urinalysis Basic - ( 15 Sep 2024 05:27 )    Color: x / Appearance: x / SG: x / pH: x  Gluc: 119 mg/dL / Ketone: x  / Bili: x / Urobili: x   Blood: x / Protein: x / Nitrite: x   Leuk Esterase: x / RBC: x / WBC x   Sq Epi: x / Non Sq Epi: x / Bacteria: x         PHYSICAL EXAM:    GENERAL: elderly female, laying in bed, NAD, emotional  HEAD:  Atraumatic, Normocephalic  EYES: EOMI, PERRLA, conjunctiva and sclera clear  ENMT: Moist mucous membranes  NECK: Supple   NERVOUS SYSTEM:  Alert & Oriented X2, Motor Strength 5/5 B/L upper and lower extremities  CHEST/LUNG: coarse breath sounds  HEART: Regular rate and rhythm; + S1/S2  ABDOMEN: Soft, Nontender, Nondistended; Bowel sounds present  EXTREMITIES:  no pedal edema

## 2024-09-16 NOTE — BH CONSULTATION LIAISON PROGRESS NOTE - NSBHCHARTREVIEWVS_PSY_A_CORE FT
Vital Signs Last 24 Hrs  T(C): 37 (16 Sep 2024 11:05), Max: 37.1 (15 Sep 2024 17:21)  T(F): 98.6 (16 Sep 2024 11:05), Max: 98.7 (15 Sep 2024 17:21)  HR: 85 (16 Sep 2024 11:05) (60 - 85)  BP: 126/81 (16 Sep 2024 11:05) (123/72 - 148/78)  BP(mean): --  RR: 17 (16 Sep 2024 04:52) (17 - 18)  SpO2: 96% (16 Sep 2024 11:05) (92% - 100%)    Parameters below as of 16 Sep 2024 11:05  Patient On (Oxygen Delivery Method): room air    
Vital Signs Last 24 Hrs  T(C): 36.9 (14 Sep 2024 11:25), Max: 36.9 (14 Sep 2024 11:25)  T(F): 98.4 (14 Sep 2024 11:25), Max: 98.4 (14 Sep 2024 11:25)  HR: 79 (14 Sep 2024 11:25) (61 - 79)  BP: 121/61 (14 Sep 2024 11:25) (111/72 - 135/79)  BP(mean): 85 (14 Sep 2024 04:33) (85 - 85)  RR: 18 (14 Sep 2024 11:25) (18 - 18)  SpO2: 94% (14 Sep 2024 11:25) (94% - 94%)    Parameters below as of 14 Sep 2024 04:33  Patient On (Oxygen Delivery Method): room air

## 2024-09-16 NOTE — DISCHARGE NOTE PROVIDER - NSDCCPCAREPLAN_GEN_ALL_CORE_FT
PRINCIPAL DISCHARGE DIAGNOSIS  Diagnosis: General weakness  Assessment and Plan of Treatment: Admission to sub-acute rehab   Follow-up with PCP within two weeks of discharge      SECONDARY DISCHARGE DIAGNOSES  Diagnosis: Bipolar disorder  Assessment and Plan of Treatment: Continue with medications as prescribed   Follow-up with PCP within two weeks of discharge    Diagnosis: HTN (hypertension)  Assessment and Plan of Treatment: Continue with medications as prescribed   DASH diet with low sodium foods; avoid processed foods   Follow-up with PCP within two weeks of discharge    Diagnosis: HLD (hyperlipidemia)  Assessment and Plan of Treatment: Continue with medications as prescribed   DASH diet with low fat foods; avoid processed foods   Follow-up with PCP within two weeks of discharge

## 2024-09-16 NOTE — BH CONSULTATION LIAISON PROGRESS NOTE - NSBHCONSULTFOLLOWAFTERCARE_PSY_A_CORE FT
agree with TULIO, if dc to TULIO then recommend psych f/u 
agree with TULIO, if dc to TULIO then recommend psych f/u

## 2024-09-16 NOTE — BH CONSULTATION LIAISON PROGRESS NOTE - CURRENT MEDICATION
MEDICATIONS  (STANDING):  atorvastatin 20 milliGRAM(s) Oral at bedtime  buPROPion XL (24-Hour) . 150 milliGRAM(s) Oral daily  clonazePAM  Tablet 0.25 milliGRAM(s) Oral two times a day  clopidogrel Tablet 75 milliGRAM(s) Oral daily  escitalopram 20 milliGRAM(s) Oral daily  gabapentin 100 milliGRAM(s) Oral two times a day  lithium CR (ESKALITH-CR) 450 milliGRAM(s) Oral daily  losartan 50 milliGRAM(s) Oral daily  melatonin 5 milliGRAM(s) Oral at bedtime  polyethylene glycol 3350 17 Gram(s) Oral daily  QUEtiapine 50 milliGRAM(s) Oral daily  QUEtiapine 100 milliGRAM(s) Oral at bedtime    MEDICATIONS  (PRN):  albuterol    90 MICROgram(s) HFA Inhaler 1 Puff(s) Inhalation two times a day PRN for shortness of breath and/or wheezing  simethicone 80 milliGRAM(s) Chew two times a day PRN gas  
MEDICATIONS  (STANDING):  atorvastatin 20 milliGRAM(s) Oral at bedtime  buPROPion XL (24-Hour) . 150 milliGRAM(s) Oral daily  clonazePAM  Tablet 0.25 milliGRAM(s) Oral two times a day  clopidogrel Tablet 75 milliGRAM(s) Oral daily  enoxaparin Injectable 40 milliGRAM(s) SubCutaneous every 24 hours  escitalopram 20 milliGRAM(s) Oral daily  gabapentin 100 milliGRAM(s) Oral two times a day  lithium CR (ESKALITH-CR) 450 milliGRAM(s) Oral daily  losartan 50 milliGRAM(s) Oral daily  melatonin 5 milliGRAM(s) Oral at bedtime  polyethylene glycol 3350 17 Gram(s) Oral daily  QUEtiapine 50 milliGRAM(s) Oral daily  QUEtiapine 100 milliGRAM(s) Oral at bedtime    MEDICATIONS  (PRN):  albuterol    90 MICROgram(s) HFA Inhaler 1 Puff(s) Inhalation two times a day PRN for shortness of breath and/or wheezing  simethicone 80 milliGRAM(s) Chew two times a day PRN gas

## 2024-09-16 NOTE — BH CONSULTATION LIAISON PROGRESS NOTE - NSBHFUPINTERVALHXFT_PSY_A_CORE
Patient is a 74 year old, female; domiciled with her adult son and ex-;  (2003); noncaregiver; unemployed on SSI; past psychiatric history of bipolar depression and anxiety; recently under the care of [facility], now at [facility] x 1 visit with ROXY Mason, 3  prior psychiatric hospitalizations  last at  in June 2024,  [facility] and [place] over 10 years ago; no known suicide attempts; no known history of violence or arrests; no active substance abuse or known history of complicated withdrawal; [facility] of arthritis, fibromyalgia; on Plavix, brought in by EMS; called by ex- due to weakness.  Psych initially was consulted for TULIO clearance.    Patient seen for follow up today and found to be calm and cooperative. Patient oriented to person, partially to place (cant remember name of hospital), but not to time (thinks it is july of 2002). patient pleasant today and reports improvement in depression. patient stating the doctor saw her and wants her to go to another facility but not sure why. Patient currently with no reported s/h ideation or aVH . 
Patient is a 74 year old, female; domiciled with her adult son and ex-;  (2003); noncaregiver; unemployed on SSI; past psychiatric history of bipolar depression and anxiety; recently under the care of [facility], now at [facility] x 1 visit with ROXY Mason, 3  prior psychiatric hospitalizations  last at  in June 2024,  [facility] and [place] over 10 years ago; no known suicide attempts; no known history of violence or arrests; no active substance abuse or known history of complicated withdrawal; [facility] of arthritis, fibromyalgia; on Plavix, brought in by EMS; called by ex- due to weakness.  Psych initially was consulted for TULIO clearance.    Patient seen for follow up today and found to be calm and cooperative. Patient oriented to person, partially to place (cant remember name of hospital), but not to time. patient stating she wishes she could go home. does express depression but denies any s/h ideation or AVH     Writer spoke with patients ex  Wilner who still helps care for patient and wilner expresses concern that patient has been getting weak and also been having more memory difficulties.

## 2024-09-16 NOTE — BH CONSULTATION LIAISON PROGRESS NOTE - NSBHASSESSMENTFT_PSY_ALL_CORE
Patient is a 74 year old, female; domiciled with her adult son and ex-;  (2003); noncaregiver; unemployed on SSI; past psychiatric history of bipolar depression and anxiety; recently under the care of [facility], now at [facility] x 1 visit with NP Fannie Msaon, 3  prior psychiatric hospitalizations  last at  in June 2024,  [facility] and [place] over 10 years ago; no known suicide attempts; no known history of violence or arrests; no active substance abuse or known history of complicated withdrawal; [facility] of arthritis, fibromyalgia; on Plavix, brought in by EMS; called by ex- due to weakness.  Psych initially was consulted for TULIO clearance.    Patient demonstrating clear signs of disorientation and confusion, unclear if secondary to delirious process or possible dementia. CT Head unremarkable for acute process, unclear if medical cause is ruled out at this time. Patient also with significant mood complaints without coherent ability to elaborate. No SI/HI reported or observed. Cannot rule out AVH at this time. Patient's medication regimen will likely require continued adjustments as pertinent to response, will change medications to decrease daytime sedation/confusion. Otherwise, will continue to follow patient. Collateral information required from family, currently without response. Will follow up and update. agree with primary team, patient would benefit from TULIO     9/14: patient seen for f/u and found to be calm and cooperative with no reported s/h ideation or symptoms of verenice or psychosis. Patient still with significant confusion which may be consistent with an underlying neurocognitive disorder or delirium. Patient currently does not appear to have capacity to refuse TULIO. recommend to involve family     Recs:  Continue Wellbutrin to 150mg Qd. Plan to taper off  Continue Seroquel to 50mg qd/100mg qHS  Continue Klonopin 0.25mg BID, avoid increase.  Continue Lexapro 20mg qd  Continue lithium 450mg Qhs   Continue gabapentin 100mg BID  -In the case of severe agitation, consider Zyprexa IM prn q6 PRN. Or po option of Seroquel 25mg prn q6 PRN   -Maintain delirium precautions   -Avoid anticholinergic agents, benzos, opioid as they can further perpetuate confusion   -Frequent re orientation, Hydration, try to avoid restraints and if possible, mobilize patient and PT involvement  
Patient is a 74 year old, female; domiciled with her adult son and ex-;  (2003); noncaregiver; unemployed on SSI; past psychiatric history of bipolar depression and anxiety; recently under the care of [facility], now at [facility] x 1 visit with NP Fannie Mason, 3  prior psychiatric hospitalizations  last at  in June 2024,  [facility] and [place] over 10 years ago; no known suicide attempts; no known history of violence or arrests; no active substance abuse or known history of complicated withdrawal; PMH of arthritis, fibromyalgia; on Plavix, brought in by EMS; called by ex- due to weakness.  Psych initially was consulted for TULIO clearance.    Patient demonstrating clear signs of disorientation and confusion, unclear if secondary to delirious process or possible dementia. CT Head unremarkable for acute process, unclear if medical cause is ruled out at this time. Patient also with significant mood complaints without coherent ability to elaborate. No SI/HI reported or observed. Cannot rule out AVH at this time. Patient's medication regimen will likely require continued adjustments as pertinent to response, will change medications to decrease daytime sedation/confusion. Otherwise, will continue to follow patient. Collateral information required from family, currently without response. Will follow up and update. agree with primary team, patient would benefit from TULIO     9/14: patient seen for f/u and found to be calm and cooperative with no reported s/h ideation or symptoms of verenice or psychosis. Patient still with significant confusion which may be consistent with an underlying neurocognitive disorder or delirium. Patient currently does not appear to have capacity to refuse TULIO. recommend to involve family     9/16: patient  seen and similar assessment as above with no reported s/h ideation or AVH     Recs:  Continue Wellbutrin to 150mg Qd. Plan to taper off  Continue Seroquel to 50mg qd/100mg qHS  Continue Klonopin 0.25mg BID, avoid increase.  Continue Lexapro 20mg qd  Continue lithium 450mg Qhs   Continue gabapentin 100mg BID  -In the case of severe agitation, consider Zyprexa IM prn q6 PRN. Or po option of Seroquel 25mg prn q6 PRN   -Maintain delirium precautions   -Avoid anticholinergic agents, benzos, opioid as they can further perpetuate confusion   -Frequent re orientation, Hydration, try to avoid restraints and if possible, mobilize patient and PT involvement  -please reconsult if needed.

## 2024-09-16 NOTE — BH CONSULTATION LIAISON PROGRESS NOTE - NSBHCHARTREVIEWINVESTIGATE_PSY_A_CORE FT
< from: 12 Lead ECG (05.30.24 @ 21:05) >    Ventricular Rate 73 BPM    Atrial Rate 73 BPM    P-R Interval 168 ms    QRS Duration 92 ms    Q-T Interval 386 ms    QTC Calculation(Bazett) 425 ms    P Axis 63 degrees    R Axis 3 degrees    T Axis 7 degrees    Diagnosis Line Normal sinus rhythm  Minimal voltage criteria for LVH, may be normal variant ( [place] product )  Inferior infarct , age undetermined  Confirmed by Niles Kyle ([id number]) on 5/31/2024 4:05:55 PM    < end of copied text >    
< from: 12 Lead ECG (05.30.24 @ 21:05) >    Ventricular Rate 73 BPM    Atrial Rate 73 BPM    P-R Interval 168 ms    QRS Duration 92 ms    Q-T Interval 386 ms    QTC Calculation(Bazett) 425 ms    P Axis 63 degrees    R Axis 3 degrees    T Axis 7 degrees    Diagnosis Line Normal sinus rhythm  Minimal voltage criteria for LVH, may be normal variant ( [place] product )  Inferior infarct , age undetermined  Confirmed by Niles Kyle ([id number]) on 5/31/2024 4:05:55 PM    < end of copied text >

## 2024-09-16 NOTE — DISCHARGE NOTE PROVIDER - NSDCMRMEDTOKEN_GEN_ALL_CORE_FT
Albuterol (Eqv-ProAir HFA) 90 mcg/inh inhalation aerosol: 1 puff(s) inhaled 2 times a day as needed for  shortness of breath and/or wheezing  Bicarsim 80 mg oral tablet, chewable: 1 tab(s) orally 2 times a day as needed for gas  escitalopram 20 mg oral tablet: 1 tab(s) orally once a day  gabapentin 100 mg oral capsule: 1 cap(s) orally 2 times a day  KlonoPIN 0.5 mg oral tablet: 0.5 tab(s) orally 2 times a day To be given at 9 AM and 8 PM MDD: 1 mg/day  Lipitor 20 mg oral tablet: 1 tab(s) orally once a day (at bedtime)  lithium 450 mg oral tablet, extended release: 1 tab(s) orally once a day (at bedtime)  Plavix 75 mg oral tablet: 1 tab(s) orally once a day  polyethylene glycol 3350 oral powder for reconstitution: 17 gram(s) orally once a day  QUEtiapine 100 mg oral tablet: 1 tab(s) orally once a day  QUEtiapine 200 mg oral tablet: 1 tab(s) orally once a day (at bedtime)  senna leaf extract oral tablet: 2 tab(s) orally once a day (at bedtime)  sodium chloride 0.65% nasal spray: 2 spray(s) nasal 2 times a day as needed for nasal dryness  Wellbutrin  mg/24 hours oral tablet, extended release: 1 tab(s) orally once a day   Albuterol (Eqv-ProAir HFA) 90 mcg/inh inhalation aerosol: 1 puff(s) inhaled 2 times a day as needed for  shortness of breath and/or wheezing  atorvastatin 20 mg oral tablet: 1 tab(s) orally once a day (at bedtime)  Bicarsim 80 mg oral tablet, chewable: 1 tab(s) orally 2 times a day as needed for gas  buPROPion 150 mg/24 hours (XL) oral tablet, extended release: 1 tab(s) orally once a day  escitalopram 20 mg oral tablet: 1 tab(s) orally once a day  gabapentin 100 mg oral capsule: 1 cap(s) orally 2 times a day  lithium 450 mg oral tablet, extended release: 1 tab(s) orally once a day  loperamide 2 mg oral capsule: 1 cap(s) orally 3 times a day As needed Diarrhea  losartan 50 mg oral tablet: 1 tab(s) orally once a day  Plavix 75 mg oral tablet: 1 tab(s) orally once a day  QUEtiapine 100 mg oral tablet: 1 tab(s) orally once a day (at bedtime)  QUEtiapine 50 mg oral tablet: 1 tab(s) orally once a day

## 2024-09-16 NOTE — BH CONSULTATION LIAISON PROGRESS NOTE - NSBHCHARTREVIEWLAB_PSY_A_CORE FT
Basic Metabolic Panel in AM (05.27.24 @ 04:35)    Sodium: 135 mmol/L    Potassium: 3.8 mmol/L    Chloride: 102: Chloride reference range changed from ..10/26/2022 mmol/L    Carbon Dioxide: 21.0 mmol/L    Anion Gap: 12 mmol/L    Blood Urea Nitrogen: 22.1 mg/dL    Creatinine: 1.03 mg/dL    Glucose: 109 mg/dL    Calcium: 9.1 mg/dL    eGFR: 58: The estimated glomerular filtration rate (eGFR) is calculated using the  2021 CKD-EPI creatinine equation, which does not have a coefficient for  race and is validated in individuals 18 years of age and older (N Engl J  Med 2021; 385:7657-0630). Creatinine-based eGFR may be inaccurate in  various situations including but not limited to extremes of muscle mass,  altered dietary protein intake, or medications that affect renal tubular  creatinine secretion. mL/min/1.73m2  
Basic Metabolic Panel in AM (05.27.24 @ 04:35)    Sodium: 135 mmol/L    Potassium: 3.8 mmol/L    Chloride: 102: Chloride reference range changed from ..10/26/2022 mmol/L    Carbon Dioxide: 21.0 mmol/L    Anion Gap: 12 mmol/L    Blood Urea Nitrogen: 22.1 mg/dL    Creatinine: 1.03 mg/dL    Glucose: 109 mg/dL    Calcium: 9.1 mg/dL    eGFR: 58: The estimated glomerular filtration rate (eGFR) is calculated using the  2021 CKD-EPI creatinine equation, which does not have a coefficient for  race and is validated in individuals 18 years of age and older (N Engl J  Med 2021; 385:7465-3665). Creatinine-based eGFR may be inaccurate in  various situations including but not limited to extremes of muscle mass,  altered dietary protein intake, or medications that affect renal tubular  creatinine secretion. mL/min/1.73m2

## 2024-09-16 NOTE — DISCHARGE NOTE PROVIDER - HOSPITAL COURSE
Patient is a 74 year old female with PMH of Panic Attacks, HTN, Depression, Anxiety, Fibromyalgia, Breast CA, HLD; presented to the ED with worsening weakness and poor po intake for several days. Patient requiring three night stay prior to TULIO; admitted to medicine for placement for generalized weakness. UA negative for UTI. CT head negative. CT spine + Foraminal stenosis- MRI without acute findings. Psych meds adjusted per . Patient is medically stable for TULIO; follow-up with PCP within two weeks of discharge.    Patient is a 74 year old female with PMH of Panic Attacks, HTN, Depression, Anxiety, Fibromyalgia, Breast CA, HLD; presented to the ED with worsening weakness and poor po intake for several days. Patient requiring three night stay prior to TULIO; admitted to medicine for placement for generalized weakness. UA negative for UTI. CT head negative. CT spine + Foraminal stenosis- MRI without acute findings consistent with degenerative changes and previous post-surgical changes note. Psych meds adjusted per . Patient is medically stable for TULIO; follow-up with PCP within two weeks of discharge.

## 2024-09-17 ENCOUNTER — TRANSCRIPTION ENCOUNTER (OUTPATIENT)
Age: 74
End: 2024-09-17

## 2024-09-17 VITALS
HEART RATE: 85 BPM | SYSTOLIC BLOOD PRESSURE: 119 MMHG | RESPIRATION RATE: 19 BRPM | OXYGEN SATURATION: 93 % | DIASTOLIC BLOOD PRESSURE: 71 MMHG | TEMPERATURE: 98 F

## 2024-09-17 PROCEDURE — 99239 HOSP IP/OBS DSCHRG MGMT >30: CPT

## 2024-09-17 RX ORDER — LOSARTAN POTASSIUM 50 MG/1
1 TABLET ORAL
Qty: 0 | Refills: 0 | DISCHARGE
Start: 2024-09-17

## 2024-09-17 RX ORDER — TIZANIDINE 4 MG/1
2 TABLET ORAL THREE TIMES A DAY
Refills: 0 | Status: DISCONTINUED | OUTPATIENT
Start: 2024-09-17 | End: 2024-09-17

## 2024-09-17 RX ORDER — BUPROPION HYDROCHLORIDE 150 MG/1
1 TABLET ORAL
Qty: 0 | Refills: 0 | DISCHARGE
Start: 2024-09-17

## 2024-09-17 RX ORDER — ESCITALOPRAM OXALATE 10 MG/1
1 TABLET ORAL
Qty: 0 | Refills: 0 | DISCHARGE
Start: 2024-09-17

## 2024-09-17 RX ORDER — TIZANIDINE 4 MG/1
1 TABLET ORAL
Qty: 0 | Refills: 0 | DISCHARGE
Start: 2024-09-17

## 2024-09-17 RX ORDER — LITHIUM CARBONATE 150 MG/1
1 CAPSULE ORAL
Qty: 0 | Refills: 0 | DISCHARGE
Start: 2024-09-17

## 2024-09-17 RX ADMIN — LITHIUM CARBONATE 450 MILLIGRAM(S): 150 CAPSULE ORAL at 11:40

## 2024-09-17 RX ADMIN — Medication 100 MILLIGRAM(S): at 05:45

## 2024-09-17 RX ADMIN — Medication 0.25 MILLIGRAM(S): at 05:46

## 2024-09-17 RX ADMIN — LOSARTAN POTASSIUM 50 MILLIGRAM(S): 50 TABLET ORAL at 05:45

## 2024-09-17 RX ADMIN — ESCITALOPRAM OXALATE 20 MILLIGRAM(S): 10 TABLET ORAL at 11:40

## 2024-09-17 RX ADMIN — Medication 50 MILLIGRAM(S): at 11:39

## 2024-09-17 RX ADMIN — Medication 75 MILLIGRAM(S): at 11:40

## 2024-09-17 RX ADMIN — POLYETHYLENE GLYCOL 3350 17 GRAM(S): 17 POWDER, FOR SOLUTION ORAL at 11:40

## 2024-09-17 RX ADMIN — BUPROPION HYDROCHLORIDE 150 MILLIGRAM(S): 150 TABLET ORAL at 11:40

## 2024-09-17 NOTE — DISCHARGE NOTE NURSING/CASE MANAGEMENT/SOCIAL WORK - PATIENT PORTAL LINK FT
You can access the FollowMyHealth Patient Portal offered by [facility] by registering at the following website: [email or web address]. By joining [facility]’s FollowMyHealth portal, you will also be able to view your health information using other applications (apps) compatible with our system.

## 2024-09-17 NOTE — DISCHARGE NOTE NURSING/CASE MANAGEMENT/SOCIAL WORK - NSDCPEFALRISK_GEN_ALL_CORE
For information on Fall & Injury Prevention, visit: [email or web address] OR  [email or web address] OR  https://www.cdc.gov/steadi/patient.html

## 2024-09-29 PROCEDURE — 87086 URINE CULTURE/COLONY COUNT: CPT

## 2024-09-29 PROCEDURE — 87507 IADNA-DNA/RNA PROBE TQ 12-25: CPT

## 2024-09-29 PROCEDURE — 83735 ASSAY OF MAGNESIUM: CPT

## 2024-09-29 PROCEDURE — 80053 COMPREHEN METABOLIC PANEL: CPT

## 2024-09-29 PROCEDURE — 82607 VITAMIN B-12: CPT

## 2024-09-29 PROCEDURE — 85025 COMPLETE CBC W/AUTO DIFF WBC: CPT

## 2024-09-29 PROCEDURE — 84100 ASSAY OF PHOSPHORUS: CPT

## 2024-09-29 PROCEDURE — 72148 MRI LUMBAR SPINE W/O DYE: CPT | Mod: MC

## 2024-09-29 PROCEDURE — 82140 ASSAY OF AMMONIA: CPT

## 2024-09-29 PROCEDURE — 87637 SARSCOV2&INF A&B&RSV AMP PRB: CPT

## 2024-09-29 PROCEDURE — G0378: CPT

## 2024-09-29 PROCEDURE — 71045 X-RAY EXAM CHEST 1 VIEW: CPT

## 2024-09-29 PROCEDURE — 36415 COLL VENOUS BLD VENIPUNCTURE: CPT

## 2024-09-29 PROCEDURE — 80048 BASIC METABOLIC PNL TOTAL CA: CPT

## 2024-09-29 PROCEDURE — 85027 COMPLETE CBC AUTOMATED: CPT

## 2024-09-29 PROCEDURE — 80178 ASSAY OF LITHIUM: CPT

## 2024-09-29 PROCEDURE — 72131 CT LUMBAR SPINE W/O DYE: CPT | Mod: MC

## 2024-09-29 PROCEDURE — 70450 CT HEAD/BRAIN W/O DYE: CPT | Mod: MC

## 2024-09-29 PROCEDURE — 99285 EMERGENCY DEPT VISIT HI MDM: CPT | Mod: 25

## 2024-09-29 PROCEDURE — 81001 URINALYSIS AUTO W/SCOPE: CPT

## 2024-09-29 PROCEDURE — 84443 ASSAY THYROID STIM HORMONE: CPT

## 2025-01-07 ENCOUNTER — INPATIENT (INPATIENT)
Facility: HOSPITAL | Age: 75
LOS: 16 days | Discharge: NURSING, REHAB OR LONG-TERM CARE | DRG: 149 | End: 2025-01-24
Attending: STUDENT IN AN ORGANIZED HEALTH CARE EDUCATION/TRAINING PROGRAM | Admitting: INTERNAL MEDICINE
Payer: MEDICARE

## 2025-01-07 VITALS
TEMPERATURE: 98 F | DIASTOLIC BLOOD PRESSURE: 82 MMHG | OXYGEN SATURATION: 99 % | HEART RATE: 73 BPM | WEIGHT: 207.9 LBS | RESPIRATION RATE: 16 BRPM | SYSTOLIC BLOOD PRESSURE: 122 MMHG

## 2025-01-07 DIAGNOSIS — R42 DIZZINESS AND GIDDINESS: ICD-10-CM

## 2025-01-07 DIAGNOSIS — Z90.49 ACQUIRED ABSENCE OF OTHER SPECIFIED PARTS OF DIGESTIVE TRACT: Chronic | ICD-10-CM

## 2025-01-07 LAB
ALBUMIN SERPL ELPH-MCNC: 4.1 G/DL — SIGNIFICANT CHANGE UP (ref 3.3–5.2)
ALP SERPL-CCNC: 121 U/L — HIGH (ref 40–120)
ALT FLD-CCNC: 10 U/L — SIGNIFICANT CHANGE UP
ANION GAP SERPL CALC-SCNC: 16 MMOL/L — SIGNIFICANT CHANGE UP (ref 5–17)
APPEARANCE UR: CLEAR — SIGNIFICANT CHANGE UP
APTT BLD: 29.4 SEC — SIGNIFICANT CHANGE UP (ref 24.5–35.6)
AST SERPL-CCNC: 16 U/L — SIGNIFICANT CHANGE UP
BACTERIA # UR AUTO: ABNORMAL /HPF
BASOPHILS # BLD AUTO: 0.06 K/UL — SIGNIFICANT CHANGE UP (ref 0–0.2)
BASOPHILS NFR BLD AUTO: 0.5 % — SIGNIFICANT CHANGE UP (ref 0–2)
BILIRUB SERPL-MCNC: 0.6 MG/DL — SIGNIFICANT CHANGE UP (ref 0.4–2)
BILIRUB UR-MCNC: NEGATIVE — SIGNIFICANT CHANGE UP
BUN SERPL-MCNC: 23.6 MG/DL — HIGH (ref 8–20)
CALCIUM SERPL-MCNC: 10 MG/DL — SIGNIFICANT CHANGE UP (ref 8.4–10.5)
CAST: 2 /LPF — SIGNIFICANT CHANGE UP (ref 0–4)
CHLORIDE SERPL-SCNC: 99 MMOL/L — SIGNIFICANT CHANGE UP (ref 96–108)
CO2 SERPL-SCNC: 21 MMOL/L — LOW (ref 22–29)
COLOR SPEC: SIGNIFICANT CHANGE UP
CREAT SERPL-MCNC: 1.52 MG/DL — HIGH (ref 0.5–1.3)
DIFF PNL FLD: NEGATIVE — SIGNIFICANT CHANGE UP
EGFR: 36 ML/MIN/1.73M2 — LOW
EOSINOPHIL # BLD AUTO: 0.26 K/UL — SIGNIFICANT CHANGE UP (ref 0–0.5)
EOSINOPHIL NFR BLD AUTO: 2.1 % — SIGNIFICANT CHANGE UP (ref 0–6)
GLUCOSE SERPL-MCNC: 88 MG/DL — SIGNIFICANT CHANGE UP (ref 70–99)
GLUCOSE UR QL: NEGATIVE MG/DL — SIGNIFICANT CHANGE UP
HCT VFR BLD CALC: 42.2 % — SIGNIFICANT CHANGE UP (ref 34.5–45)
HGB BLD-MCNC: 13.8 G/DL — SIGNIFICANT CHANGE UP (ref 11.5–15.5)
IMM GRANULOCYTES NFR BLD AUTO: 0.5 % — SIGNIFICANT CHANGE UP (ref 0–0.9)
INR BLD: 1 RATIO — SIGNIFICANT CHANGE UP (ref 0.85–1.16)
KETONES UR-MCNC: 15 MG/DL
LEUKOCYTE ESTERASE UR-ACNC: ABNORMAL
LYMPHOCYTES # BLD AUTO: 1.43 K/UL — SIGNIFICANT CHANGE UP (ref 1–3.3)
LYMPHOCYTES # BLD AUTO: 11.8 % — LOW (ref 13–44)
MCHC RBC-ENTMCNC: 31.1 PG — SIGNIFICANT CHANGE UP (ref 27–34)
MCHC RBC-ENTMCNC: 32.7 G/DL — SIGNIFICANT CHANGE UP (ref 32–36)
MCV RBC AUTO: 95 FL — SIGNIFICANT CHANGE UP (ref 80–100)
MONOCYTES # BLD AUTO: 0.95 K/UL — HIGH (ref 0–0.9)
MONOCYTES NFR BLD AUTO: 7.8 % — SIGNIFICANT CHANGE UP (ref 2–14)
NEUTROPHILS # BLD AUTO: 9.38 K/UL — HIGH (ref 1.8–7.4)
NEUTROPHILS NFR BLD AUTO: 77.3 % — HIGH (ref 43–77)
NITRITE UR-MCNC: NEGATIVE — SIGNIFICANT CHANGE UP
PH UR: 5.5 — SIGNIFICANT CHANGE UP (ref 5–8)
PLATELET # BLD AUTO: 287 K/UL — SIGNIFICANT CHANGE UP (ref 150–400)
POTASSIUM SERPL-MCNC: 5.1 MMOL/L — SIGNIFICANT CHANGE UP (ref 3.5–5.3)
POTASSIUM SERPL-SCNC: 5.1 MMOL/L — SIGNIFICANT CHANGE UP (ref 3.5–5.3)
PROT SERPL-MCNC: 7.1 G/DL — SIGNIFICANT CHANGE UP (ref 6.6–8.7)
PROT UR-MCNC: 30 MG/DL
PROTHROM AB SERPL-ACNC: 11.6 SEC — SIGNIFICANT CHANGE UP (ref 9.9–13.4)
RBC # BLD: 4.44 M/UL — SIGNIFICANT CHANGE UP (ref 3.8–5.2)
RBC # FLD: 13.8 % — SIGNIFICANT CHANGE UP (ref 10.3–14.5)
RBC CASTS # UR COMP ASSIST: 2 /HPF — SIGNIFICANT CHANGE UP (ref 0–4)
SODIUM SERPL-SCNC: 135 MMOL/L — SIGNIFICANT CHANGE UP (ref 135–145)
SP GR SPEC: >1.03 — HIGH (ref 1–1.03)
SQUAMOUS # UR AUTO: 20 /HPF — HIGH (ref 0–5)
UROBILINOGEN FLD QL: 1 MG/DL — SIGNIFICANT CHANGE UP (ref 0.2–1)
WBC # BLD: 12.14 K/UL — HIGH (ref 3.8–10.5)
WBC # FLD AUTO: 12.14 K/UL — HIGH (ref 3.8–10.5)
WBC UR QL: 3 /HPF — SIGNIFICANT CHANGE UP (ref 0–5)

## 2025-01-07 PROCEDURE — 73564 X-RAY EXAM KNEE 4 OR MORE: CPT | Mod: 26,RT

## 2025-01-07 PROCEDURE — 74177 CT ABD & PELVIS W/CONTRAST: CPT | Mod: 26,MC

## 2025-01-07 PROCEDURE — 73552 X-RAY EXAM OF FEMUR 2/>: CPT | Mod: 26,RT

## 2025-01-07 PROCEDURE — 71260 CT THORAX DX C+: CPT | Mod: 26,MC

## 2025-01-07 PROCEDURE — 93010 ELECTROCARDIOGRAM REPORT: CPT

## 2025-01-07 PROCEDURE — 72125 CT NECK SPINE W/O DYE: CPT | Mod: 26,MC

## 2025-01-07 PROCEDURE — 71045 X-RAY EXAM CHEST 1 VIEW: CPT | Mod: 26

## 2025-01-07 PROCEDURE — 70450 CT HEAD/BRAIN W/O DYE: CPT | Mod: 26,MC

## 2025-01-07 PROCEDURE — 99285 EMERGENCY DEPT VISIT HI MDM: CPT | Mod: GC

## 2025-01-07 PROCEDURE — 72170 X-RAY EXAM OF PELVIS: CPT | Mod: 26

## 2025-01-07 PROCEDURE — 99223 1ST HOSP IP/OBS HIGH 75: CPT

## 2025-01-07 RX ORDER — ACETAMINOPHEN 160 MG/5ML
650 SUSPENSION ORAL EVERY 6 HOURS
Refills: 0 | Status: DISCONTINUED | OUTPATIENT
Start: 2025-01-07 | End: 2025-01-24

## 2025-01-07 RX ORDER — BACTERIOSTATIC SODIUM CHLORIDE 0.9 %
1000 VIAL (ML) INJECTION ONCE
Refills: 0 | Status: COMPLETED | OUTPATIENT
Start: 2025-01-07 | End: 2025-01-07

## 2025-01-07 RX ORDER — HEPARIN SODIUM,PORCINE 10000/ML
5000 VIAL (ML) INJECTION EVERY 12 HOURS
Refills: 0 | Status: DISCONTINUED | OUTPATIENT
Start: 2025-01-07 | End: 2025-01-24

## 2025-01-07 RX ORDER — ONDANSETRON 4 MG/1
4 TABLET, ORALLY DISINTEGRATING ORAL EVERY 8 HOURS
Refills: 0 | Status: DISCONTINUED | OUTPATIENT
Start: 2025-01-07 | End: 2025-01-24

## 2025-01-07 RX ORDER — METOCLOPRAMIDE 10 MG/1
10 TABLET ORAL ONCE
Refills: 0 | Status: COMPLETED | OUTPATIENT
Start: 2025-01-07 | End: 2025-01-07

## 2025-01-07 RX ORDER — MAGNESIUM, ALUMINUM HYDROXIDE 200-225/5
30 SUSPENSION, ORAL (FINAL DOSE FORM) ORAL EVERY 4 HOURS
Refills: 0 | Status: DISCONTINUED | OUTPATIENT
Start: 2025-01-07 | End: 2025-01-24

## 2025-01-07 RX ORDER — ACETAMINOPHEN, DIPHENHYDRAMINE HCL, PHENYLEPHRINE HCL 325; 25; 5 MG/1; MG/1; MG/1
3 TABLET ORAL AT BEDTIME
Refills: 0 | Status: DISCONTINUED | OUTPATIENT
Start: 2025-01-07 | End: 2025-01-24

## 2025-01-07 RX ADMIN — Medication 1000 MILLILITER(S): at 18:16

## 2025-01-07 RX ADMIN — Medication 50 MILLIGRAM(S): at 14:58

## 2025-01-07 RX ADMIN — METOCLOPRAMIDE 10 MILLIGRAM(S): 10 TABLET ORAL at 14:57

## 2025-01-07 NOTE — ED PROVIDER NOTE - CLINICAL SUMMARY MEDICAL DECISION MAKING FREE TEXT BOX
74 year old female with PMH of Panic Attacks, HTN, Depression, Anxiety, Fibromyalgia, Breast CA, HLD; presented to the ED s/p fall from bed.    Will obtain labs/UA/UC, ct panscan due to multiple areas of tenderness. Will treat dizziness.

## 2025-01-07 NOTE — ED ADULT NURSE REASSESSMENT NOTE - NS ED NURSE REASSESS COMMENT FT1
Report received from off-going RN at 19:00, VSS, pt resting comfortably in stretcher. Respirations even and unlabored. Patient safety maintained. pt linen changed.

## 2025-01-07 NOTE — ED ADULT TRIAGE NOTE - CHIEF COMPLAINT QUOTE
Pt BIBA from home c/o dizziness. Pt reports that she has been dizzy. Her  was cleaning her and she fell off of the bed.  was concerned because she wasn't able to get back up. She was recently release from [facility] for rehab to help with walking. Pt states that she doesn't walk and is mostly in bed. Poor historian but as per EMS her family stated this is her baseline.

## 2025-01-07 NOTE — H&P ADULT - NSHPLABSRESULTS_GEN_ALL_CORE
13.8   12.14 )-----------( 287      ( 07 Jan 2025 14:15 )             42.2     07 Jan 2025 14:15    135    |  99     |  23.6   ----------------------------<  88     5.1     |  21.0   |  1.52     Ca    10.0       07 Jan 2025 14:15    TPro  7.1    /  Alb  4.1    /  TBili  0.6    /  DBili  x      /  AST  16     /  ALT  10     /  AlkPhos  121    07 Jan 2025 14:15    PT/INR - ( 07 Jan 2025 14:15 )   PT: 11.6 sec;   INR: 1.00 ratio         PTT - ( 07 Jan 2025 14:15 )  PTT:29.4 sec  CAPILLARY BLOOD GLUCOSE        LIVER FUNCTIONS - ( 07 Jan 2025 14:15 )  Alb: 4.1 g/dL / Pro: 7.1 g/dL / ALK PHOS: 121 U/L / ALT: 10 U/L / AST: 16 U/L / GGT: x           Urinalysis Basic - ( 07 Jan 2025 16:29 )    Color: Dark Yellow / Appearance: Clear / SG: >1.030 / pH: x  Gluc: x / Ketone: 15 mg/dL  / Bili: Negative / Urobili: 1.0 mg/dL   Blood: x / Protein: 30 mg/dL / Nitrite: Negative   Leuk Esterase: Trace / RBC: 2 /HPF / WBC 3 /HPF   Sq Epi: x / Non Sq Epi: 20 /HPF / Bacteria: Moderate /HPF      `< from: CT Abdomen and Pelvis w/ IV Cont (01.07.25 @ 16:22) >    IMPRESSION:  No acute posttraumatic organ injury in the chest, abdomen and pelvis.    Age indeterminate wedge-shaped compression deformity of T11 is new since   prior study 2022. Correlate with clinical symptoms and point tenderness.    Incidental note of multinodular goiter      < end of copied text >    < from: CT Head No Cont (01.07.25 @ 16:11) >    IMPRESSION:    CT brain:  No hydrocephalus, acute intracranial hemorrhage, mass effect, or brain   edema.    No displaced calvarial fracture.    CT cervical spine:  No acute fracture or traumatic subluxation.  No prevertebral soft tissue swelling.  Degenerative changes. No high-grade spinal canal stenosis.    Right thyroid lobeis moderately enlarged, heterogeneous, and partially   visualized. Small calcifications are noted within the gland. Left thyroid   lobe is mild enlarged. Clinical and sonographic correlation is   recommended.    --- End of Report ---    < end of copied text >

## 2025-01-07 NOTE — H&P ADULT - NSHPPHYSICALEXAM_GEN_ALL_CORE
Vital Signs Last 24 Hrs  T(C): 36.7 (08 Jan 2025 03:19), Max: 37.1 (07 Jan 2025 19:30)  T(F): 98 (08 Jan 2025 03:19), Max: 98.7 (07 Jan 2025 19:30)  HR: 68 (08 Jan 2025 03:19) (68 - 95)  BP: 120/78 (08 Jan 2025 03:19) (109/54 - 131/77)  BP(mean): --  RR: 20 (08 Jan 2025 03:19) (16 - 20)  SpO2: 94% (08 Jan 2025 03:19) (94% - 99%)    Parameters below as of 07 Jan 2025 19:30  Patient On (Oxygen Delivery Method): room air    General: Age-appearing, in no acute distress  Head: Normocephalic, atraumatic  ENMT: EOMI, neck supple  Cardiovascular: +S1, S2; Regular rate and rhythm, no murmurs, rubs, gallops  Respiratory: CTA BL, no wheezes, rales, rhonchi  Gastrointestinal: Abdomen soft, non-tender, +BS in all 4 quadrants  Extremities: No clubbing, cyanosis, or edema  Vascular: 2+ pulses, cap refill < 2 seconds  Neuro: Non-focal, AAOx2-3 (states 2024 and president Dung), sensation intact BL  Musculoskeletal: Normal tone, no deformities; 4/5 BL UE, 5/5 BL LE  Skin: Warm, dry; no acute rash seen  Psych: Appropriate, cooperative

## 2025-01-07 NOTE — ED PROVIDER NOTE - OBJECTIVE STATEMENT
74 year old female with PMH of Panic Attacks, HTN, Depression, Anxiety, Fibromyalgia, Breast CA, HLD; presented to the ED s/p fall from bed. Pt has been having dizziness for 2 weeks and pt fell off the bed today while  was cleaning her.  was concerned because she was not able to get back up, though pt states she does not walk at baseline and is mostly in bed. +headstrike -LOC -blood thinners Denies HA, neck pain, CP, SOB, abd pain, n/v/d, urinary sxs, LE swelling/pain, back pain.

## 2025-01-07 NOTE — H&P ADULT - ASSESSMENT
75 yo female with pmhx Panic Attacks, HTN, Depression, Anxiety, Fibromyalgia, Breast CA, HLD presented to the ED after a fall from bed today.    Dizziness and Fall, r/o CVA      HTN, HLD      Depression, Anxiety, Panic Attacks      Fibromyalgia      VTEppx:  GOC:  Dispo: Likely to TULIO pending PT Eval  73 yo female with pmhx Panic Attacks, HTN, Depression, Anxiety, Fibromyalgia, Breast CA, HLD presented to the ED after a fall from bed today.    Dizziness and Fall, r/o CVA  -Admit to medicine  -Patient reports doing well at rehab, but now decline since being home, has not yet resumed PT services  -Fall out of bed today, denies LOC but + headstrike  -Also reporting dizziness; will check MRI brain to r/o brainstem stroke; though, less likely as patient symptoms have been ongoing for multiple weeks and negative CT   -EKG NSR @ 75 bpm, unchanged from prior   -Monitor on tele  -Q4 neurochecks   -PT consult    HTN, HLD      Depression, Anxiety, Panic Attacks      Fibromyalgia      VTEppx:  GOC:  Dispo: Likely to TULIO pending PT Eval  73 yo female with pmhx Panic Attacks, HTN, Depression, Anxiety, Fibromyalgia, Breast CA, HLD presented to the ED after a fall from bed today.    Dizziness and Fall, r/o CVA  -Admit to medicine  -Patient reports doing well at rehab, but now decline since being home, has not yet resumed PT services  -Fall out of bed today, denies LOC but + headstrike  -Also reporting dizziness; will check MRI brain to r/o brainstem stroke; though, less likely as patient symptoms have been ongoing for multiple weeks and negative CT   -EKG NSR @ 75 bpm, unchanged from prior   -Monitor on tele  -Q4 neurochecks   -PT consult    HTN, HLD  -Continue Losartan 50 mg daily  -Continue Atorvastatin 20 mg qhs  -Continue Plavix 75mcg daily    Bipolar disorder with Depression, Anxiety, Panic Attacks  -Continue Lithium 450 mg daily   -Continue Seroquel  50 am 100 pm  Continue Buproprion 150 mg daily     Medrec  -Patient cannot recite medications, states her ex- has med list  -Requested we wait until day to call  on the [place]    VTEppx: Heparin  Dispo: Likely to TULIO pending PT Eval  73 yo female with pmhx Panic Attacks, HTN, Depression, Anxiety, Fibromyalgia, Breast CA, HLD presented to the ED after a fall from bed today.    Dizziness and Fall, r/o CVA  -Admit to medicine  -Patient reports doing well at rehab, but now decline since being home, has not yet resumed PT services  -Fall out of bed today, denies LOC but + headstrike  -Also reporting dizziness; will check MRI brain to r/o brainstem stroke; though, less likely as patient symptoms have been ongoing for multiple weeks and negative CT   -EKG NSR @ 75 bpm, unchanged from prior   -Monitor on tele  -Q4 neurochecks   -PT consult    T11 Compression Deformity   -Visualized on CT   -Patient without any tenderness to palpation of the spine, focal neurological symptoms, less likely acute   -Will check MRI T spine  -Will obtain spine consult pending MRI findings  -PT    HTN, HLD  -Continue Losartan 50 mg daily  -Continue Atorvastatin 20 mg qhs  -Continue Plavix 75mcg daily    Bipolar disorder with Depression, Anxiety, Panic Attacks  -Continue Lithium 450 mg daily   -Continue Seroquel  50 am 100 pm  Continue Buproprion 150 mg daily     Medrec  -Patient cannot recite medications, states her ex- has med list  -Requested we wait until day to call  on the [place]    VTEppx: Heparin  Dispo: Likely to TULIO pending PT Brent

## 2025-01-07 NOTE — ED ADULT NURSE NOTE - OBJECTIVE STATEMENT
Patient is A&O X 3 unlabored and even breathing, patient is on room air. Patient states she fell at home and feels dizzy at this moment. Patient states she has a headache 6/10. Patient denies LOC, and denies beimg on blood thinners. Patient currently lives with ex-. No laceration is noted Patient is A&O X 3 unlabored and even breathing, patient is on room air. Patient states she fell at home and felt dizzy. Patient states she has a headache 6/10. Patient denies LOC, Patient has a history of depression and has felt depressed this week but denies self harm and harming others. Patient currently lives with ex-. No laceration is noted Patient is A&O X 3. Came in w/ c/o "fell down at home" c/o of headache 6/10, dizziness . Denies LOC, blurry vision, head struck and taking blood thinners. Patient states she has been depressed from last week but denies self harm towards self and others. RR even and unlabored, safety maintained.

## 2025-01-07 NOTE — ED ADULT NURSE NOTE - NSFALLRISKINTERV_ED_ALL_ED

## 2025-01-07 NOTE — H&P ADULT - HISTORY OF PRESENT ILLNESS
73 yo female with pmhx Panic Attacks, HTN, Depression, Anxiety, Fibromyalgia, Breast CA, HLD presented to the ED after a fall from bed today. Patient was unable to get up, which caused the patients  to be concerned and bring her in. Patient, of note, reports that she does not walk much at baseline.  75 yo female with pmhx Panic Attacks, HTN, Depression, Anxiety, Fibromyalgia, Breast CA, HLD presented to the ED after a fall from bed today. Patient was unable to get up, which caused the patients  to be concerned and bring her in. Patient, of note, reports that she ambulated with a walker since returning home from [facility] approximately 4 weeks ago. Over this time, she has also reported intermittent dizziness that she described as lightheaded/off-balance feeling. She is cared for by her ex-, Sagar, who helps manage medication. She denies facial droop, slurred speech. Has generalized weakness, bur denies any focal sxs.

## 2025-01-07 NOTE — ED PROVIDER NOTE - ATTENDING CONTRIBUTION TO CARE
I, Nader Valenzuela MD, personally saw the patient with the resident, and completed the key components of the history and physical exam. I then discussed the management plan with the resident.  Patient with a past medical history of hypertension, depression and anxiety, hyperlipidemia, recent admission for generalized weakness and fall requiring subacute rehab placement is presenting from home with a fall and mild dizziness.  Patient reports that she fell out of bed today and is currently having back pain with a headache.  No nausea or vomiting.  All endorsing right hip pain.  No reported fevers.  States that she is supposed to get around at home with a walker but does not ambulate much.  She is not reportedly on anticoagulation.  On exam here she has no external signs of trauma.  She does have some right hip tenderness palpation.  No bruising noted.  No evidence of head trauma.  She is alert and oriented x 2, had trouble with the year.  Able to move all extremities still has generalized weakness in the lower extremities.  Patient here with concern for dizziness and fall.  Dizziness possibly due to positional changes in bed.  Will check imaging in the setting of fall to evaluate for trauma.  Will screen with ECG as well to evaluate for arrhythmia.

## 2025-01-07 NOTE — ED ADULT NURSE NOTE - CHIEF COMPLAINT QUOTE
Pt BIBA from home c/o dizziness. Pt reports that she has been dizzy. Her  was cleaning her and she fell off of the bed.  was concerned because she wasn't able to get back up. She was recently release from [facility] for rehab to help with walking. Pt states that she doesn't walk and is mostly in bed. Poor historian but as per EMS her family stated this is her baseline.

## 2025-01-07 NOTE — ED PROVIDER NOTE - PHYSICAL EXAMINATION
General: Awake, alert, lying in bed in NAD  HEENT: Normocephalic, atraumatic. No scleral icterus or conjunctival injection. EOMI. Moist mucous membranes. Oropharynx clear.   Neck:. Soft and supple. No cervical spine tenderness.  Cardiac: RRR, Peripheral pulses 2+ and symmetric. No LE edema.  Resp: Lungs CTAB. No accessory muscle use  Abd: Soft, non-tender, non-distended. No guarding, rebound, or rigidity.  Back: TTP along lower thoracic, upper lumbar spine. No step offs.   MSK: TTP along R hip/ R knee.   Skin: No rashes, abrasions, or lacerations.  Neuro: AO x 4. No focal deficits. Motor and sensation intact/ at baseline.  Psych: Appropriate mood and affect

## 2025-01-08 LAB
A1C WITH ESTIMATED AVERAGE GLUCOSE RESULT: 5 % — SIGNIFICANT CHANGE UP (ref 4–5.6)
ANION GAP SERPL CALC-SCNC: 15 MMOL/L — SIGNIFICANT CHANGE UP (ref 5–17)
BASOPHILS # BLD AUTO: 0.07 K/UL — SIGNIFICANT CHANGE UP (ref 0–0.2)
BASOPHILS NFR BLD AUTO: 0.6 % — SIGNIFICANT CHANGE UP (ref 0–2)
BUN SERPL-MCNC: 20.3 MG/DL — HIGH (ref 8–20)
CALCIUM SERPL-MCNC: 9.5 MG/DL — SIGNIFICANT CHANGE UP (ref 8.4–10.5)
CHLORIDE SERPL-SCNC: 101 MMOL/L — SIGNIFICANT CHANGE UP (ref 96–108)
CHOLEST SERPL-MCNC: 128 MG/DL — SIGNIFICANT CHANGE UP
CO2 SERPL-SCNC: 20 MMOL/L — LOW (ref 22–29)
CREAT SERPL-MCNC: 1.37 MG/DL — HIGH (ref 0.5–1.3)
CULTURE RESULTS: SIGNIFICANT CHANGE UP
EGFR: 41 ML/MIN/1.73M2 — LOW
EOSINOPHIL # BLD AUTO: 0.31 K/UL — SIGNIFICANT CHANGE UP (ref 0–0.5)
EOSINOPHIL NFR BLD AUTO: 2.5 % — SIGNIFICANT CHANGE UP (ref 0–6)
ESTIMATED AVERAGE GLUCOSE: 97 MG/DL — SIGNIFICANT CHANGE UP (ref 68–114)
GLUCOSE SERPL-MCNC: 79 MG/DL — SIGNIFICANT CHANGE UP (ref 70–99)
HCT VFR BLD CALC: 38 % — SIGNIFICANT CHANGE UP (ref 34.5–45)
HDLC SERPL-MCNC: 46 MG/DL — LOW
HGB BLD-MCNC: 12.3 G/DL — SIGNIFICANT CHANGE UP (ref 11.5–15.5)
IMM GRANULOCYTES NFR BLD AUTO: 0.4 % — SIGNIFICANT CHANGE UP (ref 0–0.9)
LIPID PNL WITH DIRECT LDL SERPL: 68 MG/DL — SIGNIFICANT CHANGE UP
LITHIUM SERPL-MCNC: 1.48 MMOL/L — SIGNIFICANT CHANGE UP (ref 0.5–1.5)
LYMPHOCYTES # BLD AUTO: 19 % — SIGNIFICANT CHANGE UP (ref 13–44)
LYMPHOCYTES # BLD AUTO: 2.35 K/UL — SIGNIFICANT CHANGE UP (ref 1–3.3)
MCHC RBC-ENTMCNC: 30.9 PG — SIGNIFICANT CHANGE UP (ref 27–34)
MCHC RBC-ENTMCNC: 32.4 G/DL — SIGNIFICANT CHANGE UP (ref 32–36)
MCV RBC AUTO: 95.5 FL — SIGNIFICANT CHANGE UP (ref 80–100)
MONOCYTES # BLD AUTO: 1.2 K/UL — HIGH (ref 0–0.9)
MONOCYTES NFR BLD AUTO: 9.7 % — SIGNIFICANT CHANGE UP (ref 2–14)
NEUTROPHILS # BLD AUTO: 8.36 K/UL — HIGH (ref 1.8–7.4)
NEUTROPHILS NFR BLD AUTO: 67.8 % — SIGNIFICANT CHANGE UP (ref 43–77)
NON HDL CHOLESTEROL: 82 MG/DL — SIGNIFICANT CHANGE UP
PLATELET # BLD AUTO: 258 K/UL — SIGNIFICANT CHANGE UP (ref 150–400)
POTASSIUM SERPL-MCNC: 4.4 MMOL/L — SIGNIFICANT CHANGE UP (ref 3.5–5.3)
POTASSIUM SERPL-SCNC: 4.4 MMOL/L — SIGNIFICANT CHANGE UP (ref 3.5–5.3)
RBC # BLD: 3.98 M/UL — SIGNIFICANT CHANGE UP (ref 3.8–5.2)
RBC # FLD: 13.6 % — SIGNIFICANT CHANGE UP (ref 10.3–14.5)
SODIUM SERPL-SCNC: 136 MMOL/L — SIGNIFICANT CHANGE UP (ref 135–145)
SPECIMEN SOURCE: SIGNIFICANT CHANGE UP
TRIGL SERPL-MCNC: 70 MG/DL — SIGNIFICANT CHANGE UP
WBC # BLD: 12.34 K/UL — HIGH (ref 3.8–10.5)
WBC # FLD AUTO: 12.34 K/UL — HIGH (ref 3.8–10.5)

## 2025-01-08 PROCEDURE — 99233 SBSQ HOSP IP/OBS HIGH 50: CPT

## 2025-01-08 RX ORDER — QUETIAPINE FUMARATE 300 MG/1
100 TABLET ORAL AT BEDTIME
Refills: 0 | Status: DISCONTINUED | OUTPATIENT
Start: 2025-01-08 | End: 2025-01-24

## 2025-01-08 RX ORDER — BACTERIOSTATIC SODIUM CHLORIDE 0.9 %
1000 VIAL (ML) INJECTION
Refills: 0 | Status: DISCONTINUED | OUTPATIENT
Start: 2025-01-08 | End: 2025-01-09

## 2025-01-08 RX ORDER — BUPROPION HYDROCHLORIDE 150 MG/1
150 TABLET, EXTENDED RELEASE ORAL DAILY
Refills: 0 | Status: DISCONTINUED | OUTPATIENT
Start: 2025-01-08 | End: 2025-01-24

## 2025-01-08 RX ORDER — GABAPENTIN 800 MG/1
100 TABLET ORAL
Refills: 0 | Status: DISCONTINUED | OUTPATIENT
Start: 2025-01-08 | End: 2025-01-14

## 2025-01-08 RX ORDER — QUETIAPINE FUMARATE 300 MG/1
50 TABLET ORAL DAILY
Refills: 0 | Status: DISCONTINUED | OUTPATIENT
Start: 2025-01-08 | End: 2025-01-14

## 2025-01-08 RX ORDER — ESCITALOPRAM 10 MG/1
20 TABLET, FILM COATED ORAL DAILY
Refills: 0 | Status: DISCONTINUED | OUTPATIENT
Start: 2025-01-08 | End: 2025-01-24

## 2025-01-08 RX ORDER — ALBUTEROL 90 MCG
1 AEROSOL REFILL (GRAM) INHALATION
Refills: 0 | Status: DISCONTINUED | OUTPATIENT
Start: 2025-01-08 | End: 2025-01-24

## 2025-01-08 RX ORDER — LITHIUM CARBONATE 300 MG
450 CAPSULE ORAL DAILY
Refills: 0 | Status: DISCONTINUED | OUTPATIENT
Start: 2025-01-08 | End: 2025-01-08

## 2025-01-08 RX ORDER — LOSARTAN POTASSIUM 100 MG
50 TABLET ORAL DAILY
Refills: 0 | Status: DISCONTINUED | OUTPATIENT
Start: 2025-01-08 | End: 2025-01-24

## 2025-01-08 RX ORDER — ATORVASTATIN CALCIUM 80 MG/1
20 TABLET, FILM COATED ORAL AT BEDTIME
Refills: 0 | Status: DISCONTINUED | OUTPATIENT
Start: 2025-01-08 | End: 2025-01-24

## 2025-01-08 RX ADMIN — Medication 75 MILLIGRAM(S): at 11:45

## 2025-01-08 RX ADMIN — QUETIAPINE FUMARATE 50 MILLIGRAM(S): 300 TABLET ORAL at 11:52

## 2025-01-08 RX ADMIN — Medication 450 MILLIGRAM(S): at 11:45

## 2025-01-08 RX ADMIN — BUPROPION HYDROCHLORIDE 150 MILLIGRAM(S): 150 TABLET, EXTENDED RELEASE ORAL at 11:51

## 2025-01-08 RX ADMIN — GABAPENTIN 100 MILLIGRAM(S): 800 TABLET ORAL at 05:41

## 2025-01-08 RX ADMIN — QUETIAPINE FUMARATE 100 MILLIGRAM(S): 300 TABLET ORAL at 21:24

## 2025-01-08 RX ADMIN — Medication 5000 UNIT(S): at 17:02

## 2025-01-08 RX ADMIN — ESCITALOPRAM 20 MILLIGRAM(S): 10 TABLET, FILM COATED ORAL at 11:47

## 2025-01-08 RX ADMIN — Medication 100 MILLILITER(S): at 17:03

## 2025-01-08 RX ADMIN — Medication 50 MILLIGRAM(S): at 05:41

## 2025-01-08 RX ADMIN — Medication 5000 UNIT(S): at 05:41

## 2025-01-08 RX ADMIN — GABAPENTIN 100 MILLIGRAM(S): 800 TABLET ORAL at 17:01

## 2025-01-08 RX ADMIN — ATORVASTATIN CALCIUM 20 MILLIGRAM(S): 80 TABLET, FILM COATED ORAL at 21:24

## 2025-01-08 RX ADMIN — Medication 100 MILLILITER(S): at 21:24

## 2025-01-08 NOTE — PROGRESS NOTE ADULT - SUBJECTIVE AND OBJECTIVE BOX
[facility] Division of Hospital Medicine    Chief Complaint:  WEakness    SUBJECTIVE / OVERNIGHT EVENTS:  Pt examined delilah in bed  d/w ex  who is her care giver  States she does not eat or drink and mostly just stays in bed even though when she had left [facility] she was ambulating with assisatnce. Also mentions the tremors and dizziness which no one has been able to give an adeqaute answer about   The pt herself denies any dizziness at present,  denies chest pain, SOB, abd pain, N/V, fever, chills, dysuria or any other complaints. All remainder ROS negative.     MEDICATIONS  (STANDING):  atorvastatin 20 milliGRAM(s) Oral at bedtime  buPROPion XL (24-Hour) . 150 milliGRAM(s) Oral daily  clopidogrel Tablet 75 milliGRAM(s) Oral daily  escitalopram 20 milliGRAM(s) Oral daily  gabapentin 100 milliGRAM(s) Oral two times a day  heparin   Injectable 5000 Unit(s) SubCutaneous every 12 hours  lithium CR (ESKALITH-CR) 450 milliGRAM(s) Oral daily  losartan 50 milliGRAM(s) Oral daily  QUEtiapine 100 milliGRAM(s) Oral at bedtime  QUEtiapine 50 milliGRAM(s) Oral daily    MEDICATIONS  (PRN):  acetaminophen     Tablet .. 650 milliGRAM(s) Oral every 6 hours PRN Temp greater or equal to 38C (100.4F), Mild Pain (1 - 3)  albuterol    90 MICROgram(s) HFA Inhaler 1 Puff(s) Inhalation two times a day PRN for shortness of breath and/or wheezing  aluminum hydroxide/magnesium hydroxide/simethicone Suspension 30 milliLiter(s) Oral every 4 hours PRN Dyspepsia  melatonin 3 milliGRAM(s) Oral at bedtime PRN Insomnia  ondansetron Injectable 4 milliGRAM(s) IV Push every 8 hours PRN Nausea and/or Vomiting  simethicone 80 milliGRAM(s) Chew two times a day PRN gas        I&O's Summary      PHYSICAL EXAM:  Vital Signs Last 24 Hrs  T(C): 36.8 (08 Jan 2025 15:00), Max: 37.1 (07 Jan 2025 19:30)  T(F): 98.2 (08 Jan 2025 15:00), Max: 98.7 (07 Jan 2025 19:30)  HR: 73 (08 Jan 2025 15:00) (68 - 76)  BP: 124/76 (08 Jan 2025 15:00) (120/78 - 131/77)  BP(mean): --  RR: 18 (08 Jan 2025 15:00) (18 - 20)  SpO2: 94% (08 Jan 2025 15:00) (94% - 95%)    Parameters below as of 08 Jan 2025 15:00  Patient On (Oxygen Delivery Method): room air            CONSTITUTIONAL: Non toxic appearing, lying in bed  ENMT: Moist oral mucosa, no pharyngeal injection or exudates; normal dentition  RESPIRATORY: Normal respiratory effort; lungs are clear to auscultation bilaterally  CARDIOVASCULAR: Regular rate and rhythm, normal S1 and S2, no murmur/rub/gallop; No lower extremity edema; Peripheral pulses are 2+ bilaterally  ABDOMEN: Nontender to palpation, normoactive bowel sounds, no rebound/guarding; No hepatosplenomegaly  MUSCLOSKELETAL:   no clubbing or cyanosis of digits; no joint swelling or tenderness to palpation  PSYCH: A+O 3, flat affect   NEUROLOGY: CN 2-12 are intact and symmetric; no gross sensory deficits, fine tremors of UE b/l;   SKIN: No rashes; no palpable lesions    LABS:                        12.3   12.34 )-----------( 258      ( 08 Jan 2025 05:25 )             38.0     01-08    136  |  101  |  20.3[H]  ----------------------------<  79  4.4   |  20.0[L]  |  1.37[H]    Ca    9.5      08 Jan 2025 05:25    TPro  7.1  /  Alb  4.1  /  TBili  0.6  /  DBili  x   /  AST  16  /  ALT  10  /  AlkPhos  121[H]  01-07    PT/INR - ( 07 Jan 2025 14:15 )   PT: 11.6 sec;   INR: 1.00 ratio         PTT - ( 07 Jan 2025 14:15 )  PTT:29.4 sec      Urinalysis Basic - ( 08 Jan 2025 05:25 )    Color: x / Appearance: x / SG: x / pH: x  Gluc: 79 mg/dL / Ketone: x  / Bili: x / Urobili: x   Blood: x / Protein: x / Nitrite: x   Leuk Esterase: x / RBC: x / WBC x   Sq Epi: x / Non Sq Epi: x / Bacteria: x        CAPILLARY BLOOD GLUCOSE            MICRO:        RADIOLOGY & ADDITIONAL TESTS:

## 2025-01-08 NOTE — PROGRESS NOTE ADULT - ASSESSMENT
75 yo female with Panic Attacks, HTN, Depression, Anxiety, Fibromyalgia, Breast CA, HLD admitted with progressively worsening weakness and intermittent dizziness utimnately leading to a fall PTA     Dizziness and Fall,  Continue Telemetry monitoring   MRI brain pending to r/o CVA (brainstem)  dc Lithium and send a STAT level now (tremors, weakness, CELENA and confusion)   Will d/w Renal/Psych if levels are out of therapeutic window   Monitor on tele  Q4 neurochecks   PT consult    CELENA  Poor PO intake , additionally pending lithium levels   Improving, continue IVF and hold lithium for now     T11 Compression Deformity   Visualized on CT   Patient without any tenderness to palpation of the spine, focal neurological symptoms, less likely acute   Will check MRI T spine  Will obtain spine consult pending MRI findings  PT    HTN, HLD  Continue Losartan 50 mg daily  Continue Atorvastatin 20 mg qhs  Continue Plavix 75mcg daily    Bipolar disorder with Depression, Anxiety, Panic Attacks  HOLD Lithium 450 mg as above until levels return   Continue Seroquel  50 am 100 pm  Continue Buproprion 150 mg daily       VTEppx: Heparin  Dispo: Likely to [facility] pending PT Brent

## 2025-01-08 NOTE — POST DISCHARGE NOTE - NOTIFICATION:
Notified Dr. Julio Prieto of patient's CT findings.  Please contact [email or web address] or [phone] or select “Cancer Care Direct” on the discharge disposition sheet when the patient is close to discharge for assistance in outpatient care.

## 2025-01-09 LAB
ALBUMIN SERPL ELPH-MCNC: 3.7 G/DL — SIGNIFICANT CHANGE UP (ref 3.3–5.2)
ALP SERPL-CCNC: 107 U/L — SIGNIFICANT CHANGE UP (ref 40–120)
ALT FLD-CCNC: 9 U/L — SIGNIFICANT CHANGE UP
ANION GAP SERPL CALC-SCNC: 11 MMOL/L — SIGNIFICANT CHANGE UP (ref 5–17)
AST SERPL-CCNC: 14 U/L — SIGNIFICANT CHANGE UP
BILIRUB SERPL-MCNC: 0.4 MG/DL — SIGNIFICANT CHANGE UP (ref 0.4–2)
BUN SERPL-MCNC: 13.9 MG/DL — SIGNIFICANT CHANGE UP (ref 8–20)
CALCIUM SERPL-MCNC: 9.7 MG/DL — SIGNIFICANT CHANGE UP (ref 8.4–10.5)
CHLORIDE SERPL-SCNC: 104 MMOL/L — SIGNIFICANT CHANGE UP (ref 96–108)
CO2 SERPL-SCNC: 21 MMOL/L — LOW (ref 22–29)
CREAT SERPL-MCNC: 1.14 MG/DL — SIGNIFICANT CHANGE UP (ref 0.5–1.3)
EGFR: 51 ML/MIN/1.73M2 — LOW
GLUCOSE SERPL-MCNC: 75 MG/DL — SIGNIFICANT CHANGE UP (ref 70–99)
HCT VFR BLD CALC: 41.1 % — SIGNIFICANT CHANGE UP (ref 34.5–45)
HGB BLD-MCNC: 13.3 G/DL — SIGNIFICANT CHANGE UP (ref 11.5–15.5)
MCHC RBC-ENTMCNC: 31.1 PG — SIGNIFICANT CHANGE UP (ref 27–34)
MCHC RBC-ENTMCNC: 32.4 G/DL — SIGNIFICANT CHANGE UP (ref 32–36)
MCV RBC AUTO: 96.3 FL — SIGNIFICANT CHANGE UP (ref 80–100)
PLATELET # BLD AUTO: 258 K/UL — SIGNIFICANT CHANGE UP (ref 150–400)
POTASSIUM SERPL-MCNC: 4.3 MMOL/L — SIGNIFICANT CHANGE UP (ref 3.5–5.3)
POTASSIUM SERPL-SCNC: 4.3 MMOL/L — SIGNIFICANT CHANGE UP (ref 3.5–5.3)
PROT SERPL-MCNC: 6.3 G/DL — LOW (ref 6.6–8.7)
RBC # BLD: 4.27 M/UL — SIGNIFICANT CHANGE UP (ref 3.8–5.2)
RBC # FLD: 14 % — SIGNIFICANT CHANGE UP (ref 10.3–14.5)
SODIUM SERPL-SCNC: 136 MMOL/L — SIGNIFICANT CHANGE UP (ref 135–145)
WBC # BLD: 10.93 K/UL — HIGH (ref 3.8–10.5)
WBC # FLD AUTO: 10.93 K/UL — HIGH (ref 3.8–10.5)

## 2025-01-09 PROCEDURE — 99233 SBSQ HOSP IP/OBS HIGH 50: CPT

## 2025-01-09 RX ORDER — LITHIUM CARBONATE 300 MG
450 CAPSULE ORAL DAILY
Refills: 0 | Status: DISCONTINUED | OUTPATIENT
Start: 2025-01-09 | End: 2025-01-09

## 2025-01-09 RX ORDER — LITHIUM CARBONATE 300 MG
450 CAPSULE ORAL DAILY
Refills: 0 | Status: DISCONTINUED | OUTPATIENT
Start: 2025-01-09 | End: 2025-01-14

## 2025-01-09 RX ADMIN — ESCITALOPRAM 20 MILLIGRAM(S): 10 TABLET, FILM COATED ORAL at 11:53

## 2025-01-09 RX ADMIN — Medication 100 MILLILITER(S): at 04:27

## 2025-01-09 RX ADMIN — QUETIAPINE FUMARATE 100 MILLIGRAM(S): 300 TABLET ORAL at 21:51

## 2025-01-09 RX ADMIN — Medication 5000 UNIT(S): at 05:38

## 2025-01-09 RX ADMIN — Medication 5000 UNIT(S): at 17:50

## 2025-01-09 RX ADMIN — GABAPENTIN 100 MILLIGRAM(S): 800 TABLET ORAL at 05:38

## 2025-01-09 RX ADMIN — BUPROPION HYDROCHLORIDE 150 MILLIGRAM(S): 150 TABLET, EXTENDED RELEASE ORAL at 11:53

## 2025-01-09 RX ADMIN — GABAPENTIN 100 MILLIGRAM(S): 800 TABLET ORAL at 17:50

## 2025-01-09 RX ADMIN — ACETAMINOPHEN 650 MILLIGRAM(S): 160 SUSPENSION ORAL at 00:38

## 2025-01-09 RX ADMIN — QUETIAPINE FUMARATE 50 MILLIGRAM(S): 300 TABLET ORAL at 11:54

## 2025-01-09 RX ADMIN — Medication 50 MILLIGRAM(S): at 05:38

## 2025-01-09 RX ADMIN — Medication 75 MILLIGRAM(S): at 11:53

## 2025-01-09 RX ADMIN — ATORVASTATIN CALCIUM 20 MILLIGRAM(S): 80 TABLET, FILM COATED ORAL at 21:51

## 2025-01-09 NOTE — PROGRESS NOTE ADULT - ASSESSMENT
Patient is a 74 year old female who is being managed as an inpatient for dizziness and tremors. She has a past medical history of bipolar disorder, MDD, MINDY, HTN, HLD, fibromyalgia, and breast cancer. She was admitted because her symptoms eventually resulted in a fall. Patient continues to require inpatient level care for imaging.    Dizziness and fall  - Possibly due to polypharmacy vs T11 compression deformity  - Continue telemetry monitoring and neurochecks  - Follow up MRI brain and thoracic spine (pending)  - Follow up PT recommendations    Acute kidney injury  - Now resolved  - Holding home lithium  - Discontinued fluids  - Monitor renal function in daily lab work    Bipolar disorder  Major depressive disorder  Generalized anxiety disorder  - Continue albuterol PRN for shortness of breath  - Continue bupropion 150mg daily  - Continue escitalopram 20mg daily  - Continue gabapentin 100mg BID  - Continue Seroquel 50mg AM and 100mg HS  - Holding lithium for now (levels slightly elevated at 1.48; toxic levels generally considered >1.2)  - Follow up BH recommendations    Hypertension  Hyperlipidemia  - Continue Plavix 75mg daily  - Continue atorvastatin 20mg HS  - Continue losartan 50mg daily      DVT/GI ppx: Heparin  Diet: DASH  Code Status: Full code  Dispo: Pending clinical course, likely 1-2 days, pending PT evaluation Patient is a 74 year old female who is being managed as an inpatient for dizziness and tremors. She has a past medical history of bipolar disorder, MDD, MINDY, HTN, HLD, fibromyalgia, and breast cancer. She was admitted because her symptoms eventually resulted in a fall. Patient continues to require inpatient level care for imaging.    Dizziness and fall  - Possibly due to polypharmacy vs T11 compression deformity  - Continue telemetry monitoring and neurochecks  - Follow up MRI brain and thoracic spine (pending)  - Follow up PT recommendations    Acute kidney injury  - Now resolved  - Discontinued fluids  - Monitor renal function in daily lab work    Bipolar disorder  Major depressive disorder  Generalized anxiety disorder  - Continue albuterol PRN for shortness of breath  - Continue bupropion 150mg daily  - Continue escitalopram 20mg daily  - Continue gabapentin 100mg BID  - Continue Seroquel 50mg AM and 100mg HS  - Continue lithium for now (levels slightly elevated at 1.48 but drawn midday; toxic levels generally considered >1.2)  - Follow up repeat lithium level in the morning  - Consider  consult if level is again elevated    Hypertension  Hyperlipidemia  - Continue Plavix 75mg daily  - Continue atorvastatin 20mg HS  - Continue losartan 50mg daily      DVT/GI ppx: Heparin  Diet: DASH  Code Status: Full code  Dispo: Pending clinical course, likely 1-2 days, pending PT evaluation

## 2025-01-09 NOTE — PROGRESS NOTE ADULT - SUBJECTIVE AND OBJECTIVE BOX
[facility] Division of Hospital Medicine  Tejas Shipley,   I'm reachable on [employer]    Patient is a 74y old  Female who presents with a chief complaint of Dizziness, fall; r/o CVA (08 Jan 2025 16:14)      SUBJECTIVE / OVERNIGHT EVENTS:  Patient was seen and examined at bedside. She is in no acute distress. She says that she is now feeling a bit better but continues to worry about her dizziness and tremors. She is pending imaging.    MEDICATIONS  (STANDING):  atorvastatin 20 milliGRAM(s) Oral at bedtime  buPROPion XL (24-Hour) . 150 milliGRAM(s) Oral daily  clopidogrel Tablet 75 milliGRAM(s) Oral daily  escitalopram 20 milliGRAM(s) Oral daily  gabapentin 100 milliGRAM(s) Oral two times a day  heparin   Injectable 5000 Unit(s) SubCutaneous every 12 hours  losartan 50 milliGRAM(s) Oral daily  QUEtiapine 100 milliGRAM(s) Oral at bedtime  QUEtiapine 50 milliGRAM(s) Oral daily  sodium chloride 0.9%. 1000 milliLiter(s) (100 mL/Hr) IV Continuous <Continuous>    MEDICATIONS  (PRN):  acetaminophen     Tablet .. 650 milliGRAM(s) Oral every 6 hours PRN Temp greater or equal to 38C (100.4F), Mild Pain (1 - 3)  albuterol    90 MICROgram(s) HFA Inhaler 1 Puff(s) Inhalation two times a day PRN for shortness of breath and/or wheezing  aluminum hydroxide/magnesium hydroxide/simethicone Suspension 30 milliLiter(s) Oral every 4 hours PRN Dyspepsia  melatonin 3 milliGRAM(s) Oral at bedtime PRN Insomnia  ondansetron Injectable 4 milliGRAM(s) IV Push every 8 hours PRN Nausea and/or Vomiting  simethicone 80 milliGRAM(s) Chew two times a day PRN gas    CAPILLARY BLOOD GLUCOSE        I&O's Summary      PHYSICAL EXAM:  Vital Signs Last 24 Hrs  T(C): 36.7 (09 Jan 2025 11:41), Max: 36.8 (08 Jan 2025 15:00)  T(F): 98 (09 Jan 2025 11:41), Max: 98.3 (08 Jan 2025 19:05)  HR: 71 (09 Jan 2025 11:41) (65 - 73)  BP: 122/75 (09 Jan 2025 11:41) (118/72 - 128/64)  BP(mean): --  RR: 20 (09 Jan 2025 11:41) (18 - 20)  SpO2: 95% (09 Jan 2025 11:41) (91% - 95%)    Parameters below as of 09 Jan 2025 11:41  Patient On (Oxygen Delivery Method): room air        CONSTITUTIONAL: NAD, well-developed, well-groomed  EYES:  EOMI, conjunctiva and sclera clear  ENMT: Moist oral mucosa  NECK: Supple, no JVD  RESPIRATORY: Normal respiratory effort; lungs are clear to auscultation bilaterally  CARDIOVASCULAR: Regular rate and rhythm, normal S1 and S2  ABDOMEN: normoactive bowel sounds, soft, nontender to palpation, no distension   MUSCULOSKELETAL:  no clubbing or cyanosis of digits; no joint swelling or tenderness to palpation  PSYCH: A+O x3; affect appropriate, calm and cooperative  NEUROLOGY: CN 2-12 are intact and symmetric; no gross sensory deficits, tremors improved    LABS:                        13.3   10.93 )-----------( 258      ( 09 Jan 2025 06:24 )             41.1     01-09    136  |  104  |  13.9  ----------------------------<  75  4.3   |  21.0[L]  |  1.14    Ca    9.7      09 Jan 2025 06:24    TPro  6.3[L]  /  Alb  3.7  /  TBili  0.4  /  DBili  x   /  AST  14  /  ALT  9   /  AlkPhos  107  01-09    PT/INR - ( 07 Jan 2025 14:15 )   PT: 11.6 sec;   INR: 1.00 ratio         PTT - ( 07 Jan 2025 14:15 )  PTT:29.4 sec      Urinalysis Basic - ( 09 Jan 2025 06:24 )    Color: x / Appearance: x / SG: x / pH: x  Gluc: 75 mg/dL / Ketone: x  / Bili: x / Urobili: x   Blood: x / Protein: x / Nitrite: x   Leuk Esterase: x / RBC: x / WBC x   Sq Epi: x / Non Sq Epi: x / Bacteria: x        Culture - Urine (collected 07 Jan 2025 16:29)  Source: Clean Catch Clean Catch (Midstream)  Final Report (08 Jan 2025 22:17):    <10,000 CFU/mL Normal Urogenital [place]

## 2025-01-09 NOTE — PATIENT PROFILE ADULT - PATIENT'S GENDER IDENTITY
Female Bed in lowest position, wheels locked, appropriate side rails in place/Call bell, personal items and telephone in reach/Instruct patient to call for assistance before getting out of bed or chair/Non-slip footwear when patient is out of bed/[place] to call system/Physically safe environment - no spills, clutter or unnecessary equipment/Purposeful Proactive Rounding/Room/bathroom lighting operational, light cord in reach

## 2025-01-09 NOTE — PATIENT PROFILE ADULT - FALL HARM RISK - HARM RISK INTERVENTIONS

## 2025-01-10 LAB
ANION GAP SERPL CALC-SCNC: 11 MMOL/L — SIGNIFICANT CHANGE UP (ref 5–17)
BUN SERPL-MCNC: 13 MG/DL — SIGNIFICANT CHANGE UP (ref 8–20)
CALCIUM SERPL-MCNC: 9.5 MG/DL — SIGNIFICANT CHANGE UP (ref 8.4–10.5)
CHLORIDE SERPL-SCNC: 104 MMOL/L — SIGNIFICANT CHANGE UP (ref 96–108)
CO2 SERPL-SCNC: 22 MMOL/L — SIGNIFICANT CHANGE UP (ref 22–29)
CREAT SERPL-MCNC: 0.99 MG/DL — SIGNIFICANT CHANGE UP (ref 0.5–1.3)
EGFR: 61 ML/MIN/1.73M2 — SIGNIFICANT CHANGE UP
GLUCOSE BLDC GLUCOMTR-MCNC: 96 MG/DL — SIGNIFICANT CHANGE UP (ref 70–99)
GLUCOSE SERPL-MCNC: 89 MG/DL — SIGNIFICANT CHANGE UP (ref 70–99)
HCT VFR BLD CALC: 37.8 % — SIGNIFICANT CHANGE UP (ref 34.5–45)
HGB BLD-MCNC: 12.1 G/DL — SIGNIFICANT CHANGE UP (ref 11.5–15.5)
LITHIUM SERPL-MCNC: 0.84 MMOL/L — SIGNIFICANT CHANGE UP (ref 0.5–1.5)
MCHC RBC-ENTMCNC: 30.3 PG — SIGNIFICANT CHANGE UP (ref 27–34)
MCHC RBC-ENTMCNC: 32 G/DL — SIGNIFICANT CHANGE UP (ref 32–36)
MCV RBC AUTO: 94.7 FL — SIGNIFICANT CHANGE UP (ref 80–100)
PLATELET # BLD AUTO: 272 K/UL — SIGNIFICANT CHANGE UP (ref 150–400)
POTASSIUM SERPL-MCNC: 4 MMOL/L — SIGNIFICANT CHANGE UP (ref 3.5–5.3)
POTASSIUM SERPL-SCNC: 4 MMOL/L — SIGNIFICANT CHANGE UP (ref 3.5–5.3)
RBC # BLD: 3.99 M/UL — SIGNIFICANT CHANGE UP (ref 3.8–5.2)
RBC # FLD: 13.8 % — SIGNIFICANT CHANGE UP (ref 10.3–14.5)
SODIUM SERPL-SCNC: 137 MMOL/L — SIGNIFICANT CHANGE UP (ref 135–145)
WBC # BLD: 11.18 K/UL — HIGH (ref 3.8–10.5)
WBC # FLD AUTO: 11.18 K/UL — HIGH (ref 3.8–10.5)

## 2025-01-10 PROCEDURE — 99232 SBSQ HOSP IP/OBS MODERATE 35: CPT

## 2025-01-10 RX ADMIN — ATORVASTATIN CALCIUM 20 MILLIGRAM(S): 80 TABLET, FILM COATED ORAL at 22:40

## 2025-01-10 RX ADMIN — Medication 5000 UNIT(S): at 17:28

## 2025-01-10 RX ADMIN — GABAPENTIN 100 MILLIGRAM(S): 800 TABLET ORAL at 17:28

## 2025-01-10 RX ADMIN — Medication 75 MILLIGRAM(S): at 13:10

## 2025-01-10 RX ADMIN — GABAPENTIN 100 MILLIGRAM(S): 800 TABLET ORAL at 06:15

## 2025-01-10 RX ADMIN — QUETIAPINE FUMARATE 100 MILLIGRAM(S): 300 TABLET ORAL at 22:40

## 2025-01-10 RX ADMIN — Medication 50 MILLIGRAM(S): at 06:12

## 2025-01-10 RX ADMIN — QUETIAPINE FUMARATE 50 MILLIGRAM(S): 300 TABLET ORAL at 13:10

## 2025-01-10 RX ADMIN — BUPROPION HYDROCHLORIDE 150 MILLIGRAM(S): 150 TABLET, EXTENDED RELEASE ORAL at 13:10

## 2025-01-10 RX ADMIN — Medication 450 MILLIGRAM(S): at 13:10

## 2025-01-10 RX ADMIN — Medication 5000 UNIT(S): at 06:12

## 2025-01-10 RX ADMIN — ESCITALOPRAM 20 MILLIGRAM(S): 10 TABLET, FILM COATED ORAL at 13:10

## 2025-01-10 NOTE — PROGRESS NOTE ADULT - SUBJECTIVE AND OBJECTIVE BOX
Patient is a 74y old  Female who presents with a chief complaint of Dizziness (09 Jan 2025 13:51)    SUBJECTIVE:   BRIEF HOSPITAL COURSE: 73 y/o F PMH of panic attack, HTN, Bipolar Disorder, Depression, MINDY, fibromyalgia, breast cancer, HLD presents to the ED after fall from bed. Patient was unable to get up on her own and was brought to ED by ex- whom she is domiciled with. Patient was completed [facility] 4-weeks ago. Pt ambulates with walker. Patient complaining of intermittent dizziness, lightheadedness. CT-AB/P shows Age indeterminate wedge-shaped compression deformity of T11 is new since prior study 2022. Correlate with clinical symptoms and point tenderness. Patient pending MRI.     OVERNIGHT EVENTS/INTERVAL HPI: No overnight events. Patient reports difficulty sleeping. Patient denies acute complaints. Patient is verbal, following commands, eating and drinking. Pending MRI.     MEDICATIONS  (STANDING):  atorvastatin 20 milliGRAM(s) Oral at bedtime  buPROPion XL (24-Hour) . 150 milliGRAM(s) Oral daily  clopidogrel Tablet 75 milliGRAM(s) Oral daily  escitalopram 20 milliGRAM(s) Oral daily  gabapentin 100 milliGRAM(s) Oral two times a day  heparin   Injectable 5000 Unit(s) SubCutaneous every 12 hours  lithium CR (ESKALITH-CR) 450 milliGRAM(s) Oral daily  losartan 50 milliGRAM(s) Oral daily  QUEtiapine 100 milliGRAM(s) Oral at bedtime  QUEtiapine 50 milliGRAM(s) Oral daily    MEDICATIONS  (PRN):  acetaminophen     Tablet .. 650 milliGRAM(s) Oral every 6 hours PRN Temp greater or equal to 38C (100.4F), Mild Pain (1 - 3)  albuterol    90 MICROgram(s) HFA Inhaler 1 Puff(s) Inhalation two times a day PRN for shortness of breath and/or wheezing  aluminum hydroxide/magnesium hydroxide/simethicone Suspension 30 milliLiter(s) Oral every 4 hours PRN Dyspepsia  melatonin 3 milliGRAM(s) Oral at bedtime PRN Insomnia  ondansetron Injectable 4 milliGRAM(s) IV Push every 8 hours PRN Nausea and/or Vomiting  simethicone 80 milliGRAM(s) Chew two times a day PRN gas      Allergies    Demerol HCl (Stomach Upset; Vomiting)  Cipro (Stomach Upset; Vomiting)    Intolerances        REVIEW OF SYSTEMS:  OBJECTIVE:  Vital Signs Last 24 Hrs  T(C): 36.6 (10 Tony 2025 08:53), Max: 37.3 (09 Jan 2025 23:50)  T(F): 97.8 (10 Tony 2025 08:53), Max: 99.1 (09 Jan 2025 23:50)  HR: 68 (10 Tony 2025 08:53) (68 - 77)  BP: 122/70 (10 Tony 2025 08:53) (102/67 - 126/62)  BP(mean): 72 (09 Jan 2025 23:50) (72 - 72)  RR: 18 (10 Tony 2025 08:53) (18 - 19)  SpO2: 94% (10 Tony 2025 08:53) (92% - 98%)    Parameters below as of 10 Tony 2025 08:53  Patient On (Oxygen Delivery Method): room air    ROS:    CONSTITUTIONAL: No weakness, fevers or chills  EYES/ENT: No visual changes;  No vertigo or throat pain   NECK: No pain or stiffness  RESPIRATORY: No cough, wheezing, hemoptysis; No shortness of breath  CARDIOVASCULAR: No chest pain or palpitations  GASTROINTESTINAL: No abdominal or epigastric pain. No nausea, vomiting, or hematemesis; No diarrhea or constipation. No melena or hematochezia.  MSK: Patient endorses back pain 5/10 on pain scale.   GENITOURINARY: No dysuria, frequency or hematuria  NEUROLOGICAL: No numbness or weakness  SKIN: No itching, rashes    PHYSICAL EXAM:  GENERAL: no acute distress, comfortably in bed  HEENT: Atraumatic, normocephalic, non-icteric, no JVD  NEURO: No focal deficits, moving all extremities spontaneously, A&Ox3, no dysarthria, CN II-XII grossly intact  PSYCH: Normal affect, calm, appropriate insight and judgment, fluent speech  LUNGS: CTAB, no wrr, non-labored breathing  HEART: RRR, no murmur appreciated  ABD: Soft, non-tender, non-distended, no organomegaly, no appreciable masses, +bs all 4 quadrants  MSK: - Back tenderness, no joint pain, no joint stiffness  EXTREMITIES: Nontender, no clubbing, cyanosis, or edema  SKIN: No rashes or lesions    LABS:                        12.1   11.18 )-----------( 272      ( 10 Tony 2025 05:20 )             37.8     01-10    137  |  104  |  13.0  ----------------------------<  89  4.0   |  22.0  |  0.99    Ca    9.5      10 Toyn 2025 05:20    TPro  6.3[L]  /  Alb  3.7  /  TBili  0.4  /  DBili  x   /  AST  14  /  ALT  9   /  AlkPhos  107  01-09      Urinalysis Basic - ( 10 Tony 2025 05:20 )    Color: x / Appearance: x / SG: x / pH: x  Gluc: 89 mg/dL / Ketone: x  / Bili: x / Urobili: x   Blood: x / Protein: x / Nitrite: x   Leuk Esterase: x / RBC: x / WBC x   Sq Epi: x / Non Sq Epi: x / Bacteria: x      CAPILLARY BLOOD GLUCOSE          RADIOLOGY & ADDITIONAL TESTS:

## 2025-01-10 NOTE — PROGRESS NOTE ADULT - ASSESSMENT
Patient is a 74 year old female who is being managed as an inpatient for dizziness and tremors. She has a past medical history of bipolar disorder, MDD, MINDY, HTN, HLD, fibromyalgia, and breast cancer. She was admitted because her symptoms eventually resulted in a fall. Patient continues to require inpatient level care for imaging.     # Dizziness and fall  - Possibly due to polypharmacy vs T11 compression deformity  - Continue telemetry monitoring and neurochecks  - Follow up MRI brain and thoracic spine, pending  - Follow up PT recommendations    Acute kidney injury  - Now resolved  - s/p fluids   - Monitor renal function in daily lab work    Bipolar disorder  Major depressive disorder  Generalized anxiety disorder  - Continue albuterol PRN for shortness of breath  - Continue bupropion 150mg daily  - Continue escitalopram 20mg daily  - Continue gabapentin 100mg BID  - Continue Seroquel 50mg AM and 100mg HS  - Continue lithium   - repeat lithium 0.84 , lithium level downtrending 1.48 --> 0.84  - Consider  consult if level is again elevated    Hypertension  Hyperlipidemia  - Continue Plavix 75mg daily  - Continue atorvastatin 20mg HS  - Continue losartan 50mg daily      DVT/GI ppx: Heparin  Diet: DASH  Code Status: Full code  Dispo: Pending clinical course, likely 1-2 days, pending PT evaluation

## 2025-01-11 LAB — GLUCOSE BLDC GLUCOMTR-MCNC: 128 MG/DL — HIGH (ref 70–99)

## 2025-01-11 PROCEDURE — 99233 SBSQ HOSP IP/OBS HIGH 50: CPT

## 2025-01-11 RX ADMIN — QUETIAPINE FUMARATE 100 MILLIGRAM(S): 300 TABLET ORAL at 22:16

## 2025-01-11 RX ADMIN — QUETIAPINE FUMARATE 50 MILLIGRAM(S): 300 TABLET ORAL at 12:22

## 2025-01-11 RX ADMIN — Medication 5000 UNIT(S): at 17:47

## 2025-01-11 RX ADMIN — GABAPENTIN 100 MILLIGRAM(S): 800 TABLET ORAL at 17:47

## 2025-01-11 RX ADMIN — BUPROPION HYDROCHLORIDE 150 MILLIGRAM(S): 150 TABLET, EXTENDED RELEASE ORAL at 12:23

## 2025-01-11 RX ADMIN — ESCITALOPRAM 20 MILLIGRAM(S): 10 TABLET, FILM COATED ORAL at 12:22

## 2025-01-11 RX ADMIN — ATORVASTATIN CALCIUM 20 MILLIGRAM(S): 80 TABLET, FILM COATED ORAL at 22:16

## 2025-01-11 RX ADMIN — GABAPENTIN 100 MILLIGRAM(S): 800 TABLET ORAL at 07:37

## 2025-01-11 RX ADMIN — Medication 75 MILLIGRAM(S): at 12:22

## 2025-01-11 RX ADMIN — Medication 50 MILLIGRAM(S): at 07:38

## 2025-01-11 RX ADMIN — Medication 450 MILLIGRAM(S): at 12:22

## 2025-01-11 RX ADMIN — Medication 5000 UNIT(S): at 07:37

## 2025-01-11 NOTE — PROGRESS NOTE ADULT - SUBJECTIVE AND OBJECTIVE BOX
Patient is a 74y old  Female who presents with a chief complaint of Dizziness, fall; r/o CVA (10 Tony 2025 12:27)    SUBJECTIVE:  BRIEF HOSPITAL COURSE: 73 y/o F PMH of panic attack, HTN, Bipolar Disorder, Depression, MINDY, fibromyalgia, breast cancer, HLD presents to the ED after fall from bed. Patient was unable to get up on her own and was brought to ED by ex- whom she is domiciled with. Patient was completed [facility] 4-weeks ago. Pt ambulates with walker. Patient complaining of intermittent dizziness, lightheadedness. CT-AB/P shows Age indeterminate wedge-shaped compression deformity of T11 is new since prior study 2022. Correlate with clinical symptoms and point tenderness. Patient pending MRI.     OVERNIGHT EVENTS/INTERVAL HPI: No overnight events. Patient reports eating and drinking well. Improve sleep from last night. Patient continues to experience back pain. Nontender to palpation. Patient pending MRI of brain and spine.     MEDICATIONS  (STANDING):  atorvastatin 20 milliGRAM(s) Oral at bedtime  buPROPion XL (24-Hour) . 150 milliGRAM(s) Oral daily  clopidogrel Tablet 75 milliGRAM(s) Oral daily  escitalopram 20 milliGRAM(s) Oral daily  gabapentin 100 milliGRAM(s) Oral two times a day  heparin   Injectable 5000 Unit(s) SubCutaneous every 12 hours  lithium CR (ESKALITH-CR) 450 milliGRAM(s) Oral daily  losartan 50 milliGRAM(s) Oral daily  QUEtiapine 100 milliGRAM(s) Oral at bedtime  QUEtiapine 50 milliGRAM(s) Oral daily    MEDICATIONS  (PRN):  acetaminophen     Tablet .. 650 milliGRAM(s) Oral every 6 hours PRN Temp greater or equal to 38C (100.4F), Mild Pain (1 - 3)  albuterol    90 MICROgram(s) HFA Inhaler 1 Puff(s) Inhalation two times a day PRN for shortness of breath and/or wheezing  aluminum hydroxide/magnesium hydroxide/simethicone Suspension 30 milliLiter(s) Oral every 4 hours PRN Dyspepsia  melatonin 3 milliGRAM(s) Oral at bedtime PRN Insomnia  ondansetron Injectable 4 milliGRAM(s) IV Push every 8 hours PRN Nausea and/or Vomiting  simethicone 80 milliGRAM(s) Chew two times a day PRN gas      Allergies    Demerol HCl (Stomach Upset; Vomiting)  Cipro (Stomach Upset; Vomiting)    Intolerances    REVIEW OF SYSTEMS:    CONSTITUTIONAL: No weakness, fevers or chills  EYES/ENT: No visual changes;  No vertigo or throat pain   NECK: No pain or stiffness  RESPIRATORY: No cough, wheezing, hemoptysis; No shortness of breath  CARDIOVASCULAR: No chest pain or palpitations  GASTROINTESTINAL: No abdominal or epigastric pain. No nausea, vomiting, or hematemesis; No diarrhea or constipation. No melena or hematochezia.  GENITOURINARY: No dysuria, frequency or hematuria  MSK: + Back pain (6/10), no joint pain, joint stiffness  NEUROLOGICAL: No numbness or weakness  SKIN: No itching, rashes    OBJECTIVE:  Vital Signs Last 24 Hrs  T(C): 36.4 (11 Jan 2025 09:35), Max: 37.1 (11 Jan 2025 06:45)  T(F): 97.5 (11 Jan 2025 09:35), Max: 98.8 (11 Jan 2025 06:45)  HR: 68 (11 Jan 2025 09:35) (68 - 79)  BP: 98/62 (11 Jan 2025 09:35) (98/62 - 115/74)  BP(mean): --  RR: 18 (11 Jan 2025 09:35) (18 - 18)  SpO2: 95% (11 Jan 2025 09:35) (95% - 98%)    Parameters below as of 11 Jan 2025 09:35  Patient On (Oxygen Delivery Method): room air        PHYSICAL EXAM:  GENERAL: no acute distress, comfortably in bed  HEENT: Atraumatic, normocephalic, non-icteric, no JVD  NEURO: No focal deficits, moving all extremities spontaneously, A&Ox3, no dysarthria, CN II-XII grossly intact  PSYCH: Normal affect, calm, appropriate insight and judgment, fluent speech  LUNGS: CTAB, no wrr, non-labored breathing  HEART: RRR, no murmur appreciated  ABD: Soft, non-tender, non-distended, no organomegaly, no appreciable masses, +bs all 4 quadrants  EXTREMITIES: Nontender, no clubbing, cyanosis, or edema  MSK: Back nontender to palpation.  SKIN: No rashes or lesions    LABS:                        12.1   11.18 )-----------( 272      ( 10 Tony 2025 05:20 )             37.8     01-10    137  |  104  |  13.0  ----------------------------<  89  4.0   |  22.0  |  0.99    Ca    9.5      10 Tony 2025 05:20        Urinalysis Basic - ( 10 Tony 2025 05:20 )    Color: x / Appearance: x / SG: x / pH: x  Gluc: 89 mg/dL / Ketone: x  / Bili: x / Urobili: x   Blood: x / Protein: x / Nitrite: x   Leuk Esterase: x / RBC: x / WBC x   Sq Epi: x / Non Sq Epi: x / Bacteria: x      CAPILLARY BLOOD GLUCOSE      POCT Blood Glucose.: 96 mg/dL (10 Tony 2025 22:08)      RADIOLOGY & ADDITIONAL TESTS:

## 2025-01-12 PROCEDURE — 99233 SBSQ HOSP IP/OBS HIGH 50: CPT

## 2025-01-12 RX ADMIN — BUPROPION HYDROCHLORIDE 150 MILLIGRAM(S): 150 TABLET, EXTENDED RELEASE ORAL at 11:51

## 2025-01-12 RX ADMIN — QUETIAPINE FUMARATE 50 MILLIGRAM(S): 300 TABLET ORAL at 11:50

## 2025-01-12 RX ADMIN — ESCITALOPRAM 20 MILLIGRAM(S): 10 TABLET, FILM COATED ORAL at 11:51

## 2025-01-12 RX ADMIN — GABAPENTIN 100 MILLIGRAM(S): 800 TABLET ORAL at 17:05

## 2025-01-12 RX ADMIN — Medication 450 MILLIGRAM(S): at 11:51

## 2025-01-12 RX ADMIN — Medication 5000 UNIT(S): at 17:05

## 2025-01-12 RX ADMIN — Medication 50 MILLIGRAM(S): at 06:53

## 2025-01-12 RX ADMIN — QUETIAPINE FUMARATE 100 MILLIGRAM(S): 300 TABLET ORAL at 21:40

## 2025-01-12 RX ADMIN — Medication 75 MILLIGRAM(S): at 11:50

## 2025-01-12 RX ADMIN — Medication 5000 UNIT(S): at 06:48

## 2025-01-12 RX ADMIN — ATORVASTATIN CALCIUM 20 MILLIGRAM(S): 80 TABLET, FILM COATED ORAL at 21:40

## 2025-01-12 RX ADMIN — GABAPENTIN 100 MILLIGRAM(S): 800 TABLET ORAL at 06:48

## 2025-01-12 NOTE — PROGRESS NOTE ADULT - SUBJECTIVE AND OBJECTIVE BOX
FAYE VILLANUEVA  74y  Female      Patient is a 74y old  Female who presents with a chief complaint of Dizziness, fall; r/o CVA (11 Jan 2025 12:50)      INTERVAL HPI/OVERNIGHT EVENTS:  Seen and examined. Refused MR juan manuel 2. Discussed previously and patient reports she didnt refuse however does admit to refusing last night. States it was "too spooky". She does not wish for MRI even if medicated for anxiety at this time     MR brain and T spine therefore cancelled  Pending PT eval      REVIEW OF SYSTEMS:  CONSTITUTIONAL: No fever, weight loss, or fatigue  EYES: No eye pain, visual disturbances, or discharge  ENMT:  No difficulty hearing, tinnitus, vertigo; No sinus or throat pain  NECK: No pain or stiffness  BREASTS: No pain, masses, or nipple discharge  RESPIRATORY: No cough, wheezing, chills or hemoptysis; No shortness of breath  CARDIOVASCULAR: No chest pain, palpitations, dizziness, or leg swelling  GASTROINTESTINAL: No abdominal or epigastric pain. No nausea, vomiting, or hematemesis; No diarrhea or constipation. No melena or hematochezia.  GENITOURINARY: No dysuria, frequency, hematuria, or incontinence  NEUROLOGICAL: No headaches, memory loss, loss of strength, numbness, or tremors  SKIN: No itching, burning, rashes, or lesions   LYMPH NODES: No enlarged glands  ENDOCRINE: No heat or cold intolerance; No hair loss  MUSCULOSKELETAL: No joint pain or swelling; No muscle, back, or extremity pain  PSYCHIATRIC: No depression, anxiety, mood swings, or difficulty sleeping  HEME/LYMPH: No easy bruising, or bleeding gums  ALLERY AND IMMUNOLOGIC: No hives or eczema    T(C): 36.7 (01-12-25 @ 08:10), Max: 36.8 (01-12-25 @ 04:33)  HR: 65 (01-12-25 @ 08:10) (62 - 92)  BP: 109/68 (01-12-25 @ 08:10) (109/67 - 118/87)  RR: 18 (01-12-25 @ 08:10) (18 - 18)  SpO2: 94% (01-12-25 @ 08:10) (93% - 95%)  Wt(kg): --Vital Signs Last 24 Hrs  T(C): 36.7 (12 Jan 2025 08:10), Max: 36.8 (12 Jan 2025 04:33)  T(F): 98.1 (12 Jan 2025 08:10), Max: 98.2 (12 Jan 2025 04:33)  HR: 65 (12 Jan 2025 08:10) (62 - 92)  BP: 109/68 (12 Jan 2025 08:10) (109/67 - 118/87)  BP(mean): --  RR: 18 (12 Jan 2025 08:10) (18 - 18)  SpO2: 94% (12 Jan 2025 08:10) (93% - 95%)    Parameters below as of 12 Jan 2025 08:10  Patient On (Oxygen Delivery Method): room air        PHYSICAL EXAM:  GENERAL: no acute distress, comfortably in bed  HEENT: Atraumatic, normocephalic, non-icteric, no JVD  NEURO: No focal deficits, moving all extremities spontaneously, A&Ox3, no dysarthria, CN II-XII grossly intact  PSYCH: Normal affect, calm, appropriate insight and judgment, fluent speech  LUNGS: CTAB, no wrr, non-labored breathing  HEART: RRR, no murmur appreciated  ABD: Soft, non-tender, non-distended, no organomegaly, no appreciable masses, +bs all 4 quadrants  EXTREMITIES: Nontender, no clubbing, cyanosis, or edema  MSK: Back nontender to palpation.  SKIN: No rashes or lesions    Consultant(s) Notes Reviewed:  [x ] YES  [ ] NO  Care Discussed with Consultants/Other Providers [ x] YES  [ ] NO    LABS:              CAPILLARY BLOOD GLUCOSE      POCT Blood Glucose.: 128 mg/dL (11 Jan 2025 23:19)            RADIOLOGY & ADDITIONAL TESTS:    Imaging Personally Reviewed:  [ ] YES  [ ] NO    HEALTH ISSUES - PROBLEM Dx:

## 2025-01-12 NOTE — PHYSICAL THERAPY INITIAL EVALUATION ADULT - LEVEL OF INDEPENDENCE: SIT/STAND, REHAB EVAL
pt with pain in right thigh and reported dizziness upon sitting. Requested to lay in bed/unable to perform

## 2025-01-12 NOTE — PHYSICAL THERAPY INITIAL EVALUATION ADULT - LEVEL OF INDEPENDENCE: GAIT, REHAB EVAL
unable to perform Posterior Auricular Interpolation Flap Text: A decision was made to reconstruct the defect utilizing an interpolation axial flap and a staged reconstruction.  A telfa template was made of the defect.  This telfa template was then used to outline the posterior auricular interpolation flap.  The donor area for the pedicle flap was then injected with anesthesia.  The flap was excised through the skin and subcutaneous tissue down to the layer of the underlying musculature.  The pedicle flap was carefully excised within this deep plane to maintain its blood supply.  The edges of the donor site were undermined.   The donor site was closed in a primary fashion.  The pedicle was then rotated into position and sutured.  Once the tube was sutured into place, adequate blood supply was confirmed with blanching and refill.  The pedicle was then wrapped with xeroform gauze and dressed appropriately with a telfa and gauze bandage to ensure continued blood supply and protect the attached pedicle.

## 2025-01-12 NOTE — PROGRESS NOTE ADULT - ASSESSMENT
· Assessment	  Patient is a 74 year old female who is being managed as an inpatient for dizziness and tremors. She has a past medical history of bipolar disorder, MDD, MINDY, HTN, HLD, fibromyalgia, and breast cancer. She was admitted because her symptoms eventually resulted in a fall. Patient continues to require inpatient level care for imaging.     # Dizziness and fall  -  MRI brain and thoracic spine cancelled as patient refusing. Low suspicion based on CT results (not acute, age indeterminate- also does not correlate w physical exam)  - Follow up PT recommendations  -Anticipate [facility] vs [facility]    Acute kidney injury  - Now resolved  - s/p fluids   - Monitor renal function in daily lab work    Bipolar disorder  Major depressive disorder  Generalized anxiety disorder  - Continue albuterol PRN for shortness of breath  - Continue bupropion 150mg daily  - Continue escitalopram 20mg daily  - Continue gabapentin 100mg BID  - Continue Seroquel 50mg AM and 100mg HS  - Continue lithium   - repeat lithium 0.84 , lithium level downtrending 1.48 --> 0.84  - Consider  consult if level is again elevated    Hypertension  Hyperlipidemia  - Continue Plavix 75mg daily  - Continue atorvastatin 20mg HS  - Continue losartan 50mg daily      DVT/GI ppx: Heparin  Diet: DASH  Code Status: Full code  Dispo: Pending clinical course, likely 1-2 days, pending PT evaluation

## 2025-01-12 NOTE — PHYSICAL THERAPY INITIAL EVALUATION ADULT - GENERAL OBSERVATIONS, REHAB EVAL
Pt received in bed with bed alarm, purewick, cardiac monitor, , NAD. Pt with BUE/BLE tremors (has been experiencing for about 6 months). Reported pain in back and right thigh, dizzy upon sitting EOB. Denied numbness and tingling in BLE.

## 2025-01-12 NOTE — PHYSICAL THERAPY INITIAL EVALUATION ADULT - MANUAL MUSCLE TESTING RESULTS, REHAB EVAL
Left knee extension/flexion 4/5, left hip flexion 4-/5. Attempted to perform MMT on RLE but pt stated pain in right thigh upon lifting leg and requested to return to bed

## 2025-01-12 NOTE — PHYSICAL THERAPY INITIAL EVALUATION ADULT - ADDITIONAL COMMENTS
as per pt, lives in condo with ex- and son, has 13STE with HR, independent prior but would occasionally use RW

## 2025-01-12 NOTE — PHYSICAL THERAPY INITIAL EVALUATION ADULT - PERTINENT HX OF CURRENT PROBLEM, REHAB EVAL
Patient is a 74 year old female who is being managed as an inpatient for dizziness and tremors. She has a past medical history of bipolar disorder, MDD, MINDY, HTN, HLD, fibromyalgia, and breast cancer. She was admitted because her symptoms eventually resulted in a fall. Patient continues to require inpatient level care for imaging. MRs cancelled, pt cleared for PT (progress note on 1/12 14:37), also discussed with medical team.

## 2025-01-13 PROCEDURE — 99233 SBSQ HOSP IP/OBS HIGH 50: CPT

## 2025-01-13 RX ADMIN — QUETIAPINE FUMARATE 50 MILLIGRAM(S): 300 TABLET ORAL at 11:51

## 2025-01-13 RX ADMIN — Medication 5000 UNIT(S): at 17:48

## 2025-01-13 RX ADMIN — Medication 5000 UNIT(S): at 05:52

## 2025-01-13 RX ADMIN — ESCITALOPRAM 20 MILLIGRAM(S): 10 TABLET, FILM COATED ORAL at 11:51

## 2025-01-13 RX ADMIN — Medication 50 MILLIGRAM(S): at 05:52

## 2025-01-13 RX ADMIN — BUPROPION HYDROCHLORIDE 150 MILLIGRAM(S): 150 TABLET, EXTENDED RELEASE ORAL at 11:51

## 2025-01-13 RX ADMIN — GABAPENTIN 100 MILLIGRAM(S): 800 TABLET ORAL at 17:48

## 2025-01-13 RX ADMIN — ATORVASTATIN CALCIUM 20 MILLIGRAM(S): 80 TABLET, FILM COATED ORAL at 21:53

## 2025-01-13 RX ADMIN — Medication 75 MILLIGRAM(S): at 11:51

## 2025-01-13 RX ADMIN — Medication 450 MILLIGRAM(S): at 11:52

## 2025-01-13 RX ADMIN — GABAPENTIN 100 MILLIGRAM(S): 800 TABLET ORAL at 05:53

## 2025-01-13 RX ADMIN — QUETIAPINE FUMARATE 100 MILLIGRAM(S): 300 TABLET ORAL at 21:53

## 2025-01-13 NOTE — DISCHARGE NOTE PROVIDER - NSDCFUADDAPPT_GEN_ALL_CORE_FT
APPTS ARE READY TO BE MADE: [X] YES    Best Family or Patient Contact (if needed):    Additional Information about above appointments (if needed):    1: Psychiatry for depression/bipolar in 2 weeks   2: Neurology for dizziness/possible MRI in 2 weeks  APPTS ARE READY TO BE MADE: [X] YES    Best Family or Patient Contact (if needed):    Additional Information about above appointments (if needed):    1: Psychiatry for depression/bipolar in 2 weeks   2: Neurology for dizziness/possible MRI in 2 weeks     Patient is being discharged to rehab. Caregiver will arrange follow up.

## 2025-01-13 NOTE — PROGRESS NOTE ADULT - ASSESSMENT
74F PMHx Panic Attacks, HTN, Depression, Anxiety, Fibromyalgia, Breast CA, HLD presented to the ED after a fall from bed. Patient was unable to get up, which caused the patients  to be concerned and bring her in. Patient admitted for CVA.     Plan:   Dizziness and fall  -  MRI brain and thoracic spine cancelled as patient refusing. Low suspicion based on CT results (not acute, age indeterminate- also does not correlate w physical exam)  - Clopidogrel 75 mg PO qd   - PT, [facility]    Acute kidney injury  - Now resolved  - s/p fluids   - Monitor renal function in daily lab work    Bipolar disorder  Major depressive disorder  Generalized anxiety disorder  - Continue albuterol PRN for shortness of breath  - Continue bupropion 150mg daily  - Continue escitalopram 20mg daily  - Continue gabapentin 100mg BID  - Continue Seroquel 50mg AM and 100mg HS  - Continue lithium   - repeat lithium 0.84 , lithium level downtrending 1.48 --> 0.84  - Consider  consult if level is again elevated    Hypertension  Hyperlipidemia  - Continue Plavix 75mg daily  - Continue atorvastatin 20mg HS  - Continue losartan 50mg daily      DVT/GI ppx: Heparin  Diet: DASH  GI: None   Dispo: Pending MR, DC to [facility]      74F PMHx Panic Attacks, HTN, Depression, Anxiety, Fibromyalgia, Breast CA, HLD presented to the ED after a fall from bed. Patient was unable to get up, which caused the patients  to be concerned and bring her in. Patient admitted for CVA.     Plan:   Dizziness and fall  -  MRI brain and thoracic spine cancelled as patient refusing. Low suspicion based on CT results (not acute, age indeterminate- also does not correlate w physical exam)  - Clopidogrel 75 mg PO qd   - PT, [facility]    Acute kidney injury  - Now resolved  - s/p fluids   - Monitor renal function in daily lab work    Bipolar disorder  Major depressive disorder  Generalized anxiety disorder  - Continue albuterol PRN for shortness of breath  - Continue bupropion 150mg daily  - Continue escitalopram 20mg daily  - Continue gabapentin 100mg BID  - Continue Seroquel 50mg AM and 100mg HS  - Continue lithium   - repeat lithium 0.84 , lithium level downtrending 1.48 --> 0.84  - Consider  consult if level is again elevated    Hypertension  Hyperlipidemia  - Continue Plavix 75mg daily  - Continue atorvastatin 20mg HS  - Continue losartan 50mg daily      DVT/GI ppx: Heparin  Diet: DASH  GI: None   Dispo: DC to [facility]

## 2025-01-13 NOTE — DISCHARGE NOTE PROVIDER - NSDCMRMEDTOKEN_GEN_ALL_CORE_FT
Albuterol (Eqv-ProAir HFA) 90 mcg/inh inhalation aerosol: 1 puff(s) inhaled 2 times a day as needed for  shortness of breath and/or wheezing  atorvastatin 20 mg oral tablet: 1 tab(s) orally once a day (at bedtime)  Bicarsim 80 mg oral tablet, chewable: 1 tab(s) orally 2 times a day as needed for gas  buPROPion 150 mg/24 hours (XL) oral tablet, extended release: 1 tab(s) orally once a day  escitalopram 20 mg oral tablet: 1 tab(s) orally once a day  gabapentin 100 mg oral capsule: 1 cap(s) orally 2 times a day  lithium 450 mg oral tablet, extended release: 1 tab(s) orally once a day  loperamide 2 mg oral capsule: 1 cap(s) orally 3 times a day As needed Diarrhea  losartan 50 mg oral tablet: 1 tab(s) orally once a day  Plavix 75 mg oral tablet: 1 tab(s) orally once a day  QUEtiapine 100 mg oral tablet: 1 tab(s) orally once a day (at bedtime)  QUEtiapine 50 mg oral tablet: 1 tab(s) orally once a day   Albuterol (Eqv-ProAir HFA) 90 mcg/inh inhalation aerosol: 1 puff(s) inhaled 2 times a day as needed for  shortness of breath and/or wheezing  atorvastatin 20 mg oral tablet: 1 tab(s) orally once a day (at bedtime)  Bicarsim 80 mg oral tablet, chewable: 1 tab(s) orally 2 times a day as needed for gas  buPROPion 150 mg/24 hours (XL) oral tablet, extended release: 1 tab(s) orally once a day  escitalopram 20 mg oral tablet: 1 tab(s) orally once a day  loperamide 2 mg oral capsule: 1 cap(s) orally 3 times a day As needed Diarrhea  losartan 50 mg oral tablet: 1 tab(s) orally once a day  Plavix 75 mg oral tablet: 1 tab(s) orally once a day  QUEtiapine 100 mg oral tablet: 1 tab(s) orally once a day (at bedtime)

## 2025-01-13 NOTE — DISCHARGE NOTE PROVIDER - NSDCCPCAREPLAN_GEN_ALL_CORE_FT
PRINCIPAL DISCHARGE DIAGNOSIS  Diagnosis: Dizziness  Assessment and Plan of Treatment: CT revealed T11 compression deformity. MR brain and T spine has been ordered since 1/8 but patient has refused x2 for various reasons. Follow up outpatient for rescheduling of MRI.      SECONDARY DISCHARGE DIAGNOSES  Diagnosis: CELENA (acute kidney injury)  Assessment and Plan of Treatment: REsolved, follow up with PCP for further monitoring    Diagnosis: Bipolar disorder  Assessment and Plan of Treatment: Continue home meds    Diagnosis: Major depressive disorder  Assessment and Plan of Treatment: continue home meds    Diagnosis: Generalized anxiety disorder  Assessment and Plan of Treatment: Continue home meds    Diagnosis: Hypertension  Assessment and Plan of Treatment: continue home meds    Diagnosis: Hyperlipidemia  Assessment and Plan of Treatment: continue home meds     PRINCIPAL DISCHARGE DIAGNOSIS  Diagnosis: Dizziness  Assessment and Plan of Treatment: CT revealed T11 compression deformity. MR brain and T spine has been ordered since 1/8 but patient has refused x2 for various reasons. Follow up outpatient with neurology.      SECONDARY DISCHARGE DIAGNOSES  Diagnosis: CELENA (acute kidney injury)  Assessment and Plan of Treatment: REsolved, follow up with PCP for further monitoring    Diagnosis: Bipolar disorder  Assessment and Plan of Treatment: Discontinued Lithium  Follow up with Psychiatry    Diagnosis: Major depressive disorder  Assessment and Plan of Treatment: continue home meds  Follow up with psychiatry    Diagnosis: Generalized anxiety disorder  Assessment and Plan of Treatment: Discontinued Klonopin    Diagnosis: Hypertension  Assessment and Plan of Treatment: continue home meds    Diagnosis: Hyperlipidemia  Assessment and Plan of Treatment: continue home meds

## 2025-01-13 NOTE — DISCHARGE NOTE PROVIDER - HOSPITAL COURSE
Hospital Course:  74F PMHx Panic Attacks, HTN, Depression, Anxiety, Fibromyalgia, Breast CA, HLD presented to the ED after a fall from bed. Patient was unable to get up, which caused the patients  to be concerned and bring her in. Patient, of note, reports that she ambulated with a walker since returning home from [facility] and [facility] approximately 4 weeks ago. Over this time, she has also reported intermittent dizziness that she described as lightheaded/off-balance feeling. She is cared for by her ex-, Sagar, who helps manage medication. In ED She denied facial droop, slurred speech, focal neuro deficits. CT revealed T11 compression deformity. MR brain and T spine has been ordered since 1/8 but patient has refused x2 for various reasons. PT evaluated patient, recommending [facility]. Patient medically stable for discharge to [facility] with follow up with PCP/Neurology for repeat scheduling of MRI head and t-spine w/o contrast.     Important Medication Changes and Reason:  Continue all other medications as seen in medication reconciliation.    Active or Pending Issues Requiring Follow-up:  Follow up with PCP and neurology for management of chronic issues and rescheduling of MRI     Discharge diagnosis:  #Dizziness and fall r/o CVA  #Acute kidney injury  #Bipolar disorder  #Major depressive disorder  #Generalized anxiety disorder  #Hypertension  #Hyperlipidemia         Hospital Course:  74F PMHx Panic Attacks, HTN, Depression, Anxiety, Fibromyalgia, Breast CA, HLD presented to the ED after a fall from bed. Patient was unable to get up, which caused the patients  to be concerned and bring her in. Patient, of note, reports that she ambulated with a walker since returning home from [facility] and [facility] approximately 4 weeks ago. Over this time, she has also reported intermittent dizziness that she described as lightheaded/off-balance feeling. She is cared for by her ex-, Sagar, who helps manage medication. In ED She denied facial droop, slurred speech, focal neuro deficits. CT revealed T11 compression deformity. MR brain and T spine has been ordered since 1/8 but patient has refused x2 for various reasons. PT evaluated patient, recommending [facility]. PT had an extended stay due to the requirement of Level II psych clearance. Patient weaned off lithium, Klonopin, gabapentin, and Seroquel morning administration. Dizziness syncope deemed to be a component of personality disorders, deconditioning, and polypharmacy. Patient medically stable for discharge to [facility] with follow up with PCP/Neurology for re-evaluation of a need for MRI head and t-spine w/o contrast.     Important Medication Changes and Reason:  Continue all other medications as seen in medication reconciliation.    Active or Pending Issues Requiring Follow-up:  Follow up with PCP and neurology for management of chronic issues and possible rescheduling of MRI     Discharge diagnosis:  #Dizziness and fall r/o CVA  #Acute kidney injury  #Bipolar disorder  #Major depressive disorder  #Generalized anxiety disorder  #Hypertension  #Hyperlipidemia

## 2025-01-13 NOTE — PROGRESS NOTE ADULT - SUBJECTIVE AND OBJECTIVE BOX
FAYE VILLANUEVA 74y Female  MRN#: [id number]       SUBJECTIVE  Patient is a 74y old Female who presents with a chief complaint of Dizziness, fall; r/o CVA (12 Jan 2025 14:37)  Currently admitted to medicine with the primary diagnosis of Dizziness    HPI:  74F PMHx Panic Attacks, HTN, Depression, Anxiety, Fibromyalgia, Breast CA, HLD presented to the ED after a fall from bed. Patient was unable to get up, which caused the patients  to be concerned and bring her in. Patient, of note, reports that she ambulated with a walker since returning home from [facility] approximately 4 weeks ago. Over this time, she has also reported intermittent dizziness that she described as lightheaded/off-balance feeling. She is cared for by her ex-, Sagar, who helps manage medication. In ED She denied facial droop, slurred speech, focal neuro deficits. CT revealed T11 compression deformity. MR brain and T spine has been ordered since 1/8 but patient has refused x2 for various reasons. PT evaluated patient, recommending [facility].       Today is hospital day 6d, and this morning she is _________ and reports no overnight events.       OBJECTIVE  PAST MEDICAL & SURGICAL HISTORY  Depressed    Anxiety    Breast CA    HLD (hyperlipidemia)    S/P cholecystectomy      Home Meds:  Albuterol (Eqv-ProAir HFA) 90 mcg/inh inhalation aerosol: 1 puff(s) inhaled 2 times a day as needed for  shortness of breath and/or wheezing  atorvastatin 20 mg oral tablet: 1 tab(s) orally once a day (at bedtime)  Bicarsim 80 mg oral tablet, chewable: 1 tab(s) orally 2 times a day as needed for gas  buPROPion 150 mg/24 hours (XL) oral tablet, extended release: 1 tab(s) orally once a day  escitalopram 20 mg oral tablet: 1 tab(s) orally once a day  gabapentin 100 mg oral capsule: 1 cap(s) orally 2 times a day  lithium 450 mg oral tablet, extended release: 1 tab(s) orally once a day  loperamide 2 mg oral capsule: 1 cap(s) orally 3 times a day As needed Diarrhea  losartan 50 mg oral tablet: 1 tab(s) orally once a day  Plavix 75 mg oral tablet: 1 tab(s) orally once a day  QUEtiapine 100 mg oral tablet: 1 tab(s) orally once a day (at bedtime)  QUEtiapine 50 mg oral tablet: 1 tab(s) orally once a day    ALLERGIES:  Demerol HCl (Stomach Upset; Vomiting)  Cipro (Stomach Upset; Vomiting)    MEDICATIONS:  STANDING MEDICATIONS  atorvastatin 20 milliGRAM(s) Oral at bedtime  buPROPion XL (24-Hour) . 150 milliGRAM(s) Oral daily  clopidogrel Tablet 75 milliGRAM(s) Oral daily  escitalopram 20 milliGRAM(s) Oral daily  gabapentin 100 milliGRAM(s) Oral two times a day  heparin   Injectable 5000 Unit(s) SubCutaneous every 12 hours  lithium CR (ESKALITH-CR) 450 milliGRAM(s) Oral daily  losartan 50 milliGRAM(s) Oral daily  QUEtiapine 100 milliGRAM(s) Oral at bedtime  QUEtiapine 50 milliGRAM(s) Oral daily    PRN MEDICATIONS  acetaminophen     Tablet .. 650 milliGRAM(s) Oral every 6 hours PRN  albuterol    90 MICROgram(s) HFA Inhaler 1 Puff(s) Inhalation two times a day PRN  aluminum hydroxide/magnesium hydroxide/simethicone Suspension 30 milliLiter(s) Oral every 4 hours PRN  melatonin 3 milliGRAM(s) Oral at bedtime PRN  ondansetron Injectable 4 milliGRAM(s) IV Push every 8 hours PRN  simethicone 80 milliGRAM(s) Chew two times a day PRN      VITAL SIGNS: Last 24 Hours  T(C): 37 (13 Jan 2025 04:34), Max: 37.8 (12 Jan 2025 21:37)  T(F): 98.6 (13 Jan 2025 04:34), Max: 100 (12 Jan 2025 21:37)  HR: 77 (13 Jan 2025 04:34) (65 - 81)  BP: 106/64 (13 Jan 2025 04:34) (105/62 - 110/57)  BP(mean): --  RR: 18 (13 Jan 2025 04:34) (18 - 18)  SpO2: 94% (13 Jan 2025 04:34) (92% - 95%)    LABS:    Reviewed     RADIOLOGY:  Reviewed     PHYSICAL EXAM:  General: NAD, AAOx3  HEENT: atraumatic, normocephalic  Pulmonary: clear to auscultation bilaterally; No wheeze  Cardiovascular: Regular rate and rhythm; no murmurs, rubs or gallops. Normal S1S2  Gastrointestinal: Soft, nontender, nondistended; bowel sounds present  Musculoskeletal: 2+ peripheral pulses, no clubbing, cyanosis or edema  Neurology: Pt. alert and oriented, fluent speech, able to move all extremities  Skin: no rashes or lesions             FAYE VILLANUEVA 74y Female  MRN#: [id number]       SUBJECTIVE  Patient is a 74y old Female who presents with a chief complaint of Dizziness, fall; r/o CVA (12 Jan 2025 14:37)  Currently admitted to medicine with the primary diagnosis of Dizziness    HPI:  74F PMHx Panic Attacks, HTN, Depression, Anxiety, Fibromyalgia, Breast CA, HLD presented to the ED after a fall from bed. Patient was unable to get up, which caused the patients  to be concerned and bring her in. Patient, of note, reports that she ambulated with a walker since returning home from [facility] and [facility] approximately 4 weeks ago. Over this time, she has also reported intermittent dizziness that she described as lightheaded/off-balance feeling. She is cared for by her ex-, Sagar, who helps manage medication. In ED She denied facial droop, slurred speech, focal neuro deficits. CT revealed T11 compression deformity. MR brain and T spine has been ordered since 1/8 but patient has refused x2 for various reasons. PT evaluated patient, recommending [facility].       Today is hospital day 6d, and this morning she is complaining of malaise and reports no overnight events. Patient still refusing MRI. Discharge to [facility].       OBJECTIVE  PAST MEDICAL & SURGICAL HISTORY  Depressed    Anxiety    Breast CA    HLD (hyperlipidemia)    S/P cholecystectomy      Home Meds:  Albuterol (Eqv-ProAir HFA) 90 mcg/inh inhalation aerosol: 1 puff(s) inhaled 2 times a day as needed for  shortness of breath and/or wheezing  atorvastatin 20 mg oral tablet: 1 tab(s) orally once a day (at bedtime)  Bicarsim 80 mg oral tablet, chewable: 1 tab(s) orally 2 times a day as needed for gas  buPROPion 150 mg/24 hours (XL) oral tablet, extended release: 1 tab(s) orally once a day  escitalopram 20 mg oral tablet: 1 tab(s) orally once a day  gabapentin 100 mg oral capsule: 1 cap(s) orally 2 times a day  lithium 450 mg oral tablet, extended release: 1 tab(s) orally once a day  loperamide 2 mg oral capsule: 1 cap(s) orally 3 times a day As needed Diarrhea  losartan 50 mg oral tablet: 1 tab(s) orally once a day  Plavix 75 mg oral tablet: 1 tab(s) orally once a day  QUEtiapine 100 mg oral tablet: 1 tab(s) orally once a day (at bedtime)  QUEtiapine 50 mg oral tablet: 1 tab(s) orally once a day    ALLERGIES:  Demerol HCl (Stomach Upset; Vomiting)  Cipro (Stomach Upset; Vomiting)    MEDICATIONS:  STANDING MEDICATIONS  atorvastatin 20 milliGRAM(s) Oral at bedtime  buPROPion XL (24-Hour) . 150 milliGRAM(s) Oral daily  clopidogrel Tablet 75 milliGRAM(s) Oral daily  escitalopram 20 milliGRAM(s) Oral daily  gabapentin 100 milliGRAM(s) Oral two times a day  heparin   Injectable 5000 Unit(s) SubCutaneous every 12 hours  lithium CR (ESKALITH-CR) 450 milliGRAM(s) Oral daily  losartan 50 milliGRAM(s) Oral daily  QUEtiapine 100 milliGRAM(s) Oral at bedtime  QUEtiapine 50 milliGRAM(s) Oral daily    PRN MEDICATIONS  acetaminophen     Tablet .. 650 milliGRAM(s) Oral every 6 hours PRN  albuterol    90 MICROgram(s) HFA Inhaler 1 Puff(s) Inhalation two times a day PRN  aluminum hydroxide/magnesium hydroxide/simethicone Suspension 30 milliLiter(s) Oral every 4 hours PRN  melatonin 3 milliGRAM(s) Oral at bedtime PRN  ondansetron Injectable 4 milliGRAM(s) IV Push every 8 hours PRN  simethicone 80 milliGRAM(s) Chew two times a day PRN      VITAL SIGNS: Last 24 Hours  T(C): 37 (13 Jan 2025 04:34), Max: 37.8 (12 Jan 2025 21:37)  T(F): 98.6 (13 Jan 2025 04:34), Max: 100 (12 Jan 2025 21:37)  HR: 77 (13 Jan 2025 04:34) (65 - 81)  BP: 106/64 (13 Jan 2025 04:34) (105/62 - 110/57)  BP(mean): --  RR: 18 (13 Jan 2025 04:34) (18 - 18)  SpO2: 94% (13 Jan 2025 04:34) (92% - 95%)    LABS:    Reviewed     RADIOLOGY:  Reviewed     PHYSICAL EXAM:  General: NAD, AAOx3  HEENT: atraumatic, normocephalic  Pulmonary: clear to auscultation bilaterally; No wheeze  Cardiovascular: Regular rate and rhythm; no murmurs, rubs or gallops. Normal S1S2  Gastrointestinal: Soft, nontender, nondistended; bowel sounds present  Musculoskeletal: 2+ peripheral pulses, no clubbing, cyanosis or edema  Neurology: Pt. alert and oriented, fluent speech, able to move all extremities  Skin: no rashes or lesions

## 2025-01-13 NOTE — DISCHARGE NOTE PROVIDER - ATTENDING DISCHARGE PHYSICAL EXAMINATION:
VITALS:   T(C): 37.1 (01-13-25 @ 09:15), Max: 37.8 (01-12-25 @ 21:37)  HR: 76 (01-13-25 @ 09:15) (74 - 81)  BP: 100/62 (01-13-25 @ 09:15) (100/62 - 110/57)  RR: 18 (01-13-25 @ 09:15) (18 - 18)  SpO2: 91% (01-13-25 @ 09:15) (91% - 95%)    GENERAL: NAD, lying in bed comfortably  HEAD:  Atraumatic, normocephalic  EYES: EOMI, PERRLA, conjunctiva and sclera clear  ENT: Moist mucous membranes  NECK: Supple, no JVD  HEART: Regular rate and rhythm, no murmurs, rubs, or gallops  LUNGS: Unlabored respirations.  Clear to auscultation bilaterally, no crackles, wheezing, or rhonchi  ABDOMEN: Soft, nontender, nondistended, +BS  EXTREMITIES: 2+ peripheral pulses bilaterally. No clubbing, cyanosis, or edema  NERVOUS SYSTEM:  A&Ox3, no focal deficits   SKIN: No rashes or lesions VITALS:   T(C): 36.7 (01-24-25 @ 08:29), Max: 36.7 (01-24-25 @ 08:29)  HR: 82 (01-24-25 @ 08:29) (71 - 82)  BP: 101/69 (01-24-25 @ 08:29) (101/69 - 128/65)  RR: 19 (01-24-25 @ 08:29) (18 - 19)  SpO2: 95% (01-24-25 @ 08:29) (95% - 96%)    GENERAL: NAD, lying in bed comfortably, obese  HEAD:  Atraumatic, normocephalic  EYES: EOMI, PERRLA, conjunctiva and sclera clear  ENT: Moist mucous membranes  NECK: Supple, no JVD  HEART: Regular rate and rhythm, no murmurs, rubs, or gallops  LUNGS: Unlabored respirations.  Clear to auscultation bilaterally, no crackles, wheezing, or rhonchi  ABDOMEN: Soft, nontender, nondistended, +BS  EXTREMITIES: 2+ peripheral pulses bilaterally. No clubbing, cyanosis, or edema  NERVOUS SYSTEM:  A&Ox3, no focal deficits   SKIN: No rashes or lesions

## 2025-01-13 NOTE — DISCHARGE NOTE PROVIDER - CARE PROVIDER_API CALL
ETHAN PASCUAL, RIVKA  [id number]   [place] [id number]  Phone: [phone]  Fax: [phone]  Established Patient  Follow Up Time: 1 week

## 2025-01-14 LAB — LITHIUM SERPL-MCNC: 0.89 MMOL/L — SIGNIFICANT CHANGE UP (ref 0.5–1.5)

## 2025-01-14 PROCEDURE — 99233 SBSQ HOSP IP/OBS HIGH 50: CPT

## 2025-01-14 PROCEDURE — 99223 1ST HOSP IP/OBS HIGH 75: CPT

## 2025-01-14 RX ADMIN — Medication 75 MILLIGRAM(S): at 11:44

## 2025-01-14 RX ADMIN — Medication 5000 UNIT(S): at 06:20

## 2025-01-14 RX ADMIN — BUPROPION HYDROCHLORIDE 150 MILLIGRAM(S): 150 TABLET, EXTENDED RELEASE ORAL at 11:44

## 2025-01-14 RX ADMIN — ESCITALOPRAM 20 MILLIGRAM(S): 10 TABLET, FILM COATED ORAL at 11:45

## 2025-01-14 RX ADMIN — Medication 5000 UNIT(S): at 17:26

## 2025-01-14 RX ADMIN — Medication 50 MILLIGRAM(S): at 06:20

## 2025-01-14 RX ADMIN — GABAPENTIN 100 MILLIGRAM(S): 800 TABLET ORAL at 06:20

## 2025-01-14 RX ADMIN — QUETIAPINE FUMARATE 100 MILLIGRAM(S): 300 TABLET ORAL at 21:38

## 2025-01-14 RX ADMIN — ATORVASTATIN CALCIUM 20 MILLIGRAM(S): 80 TABLET, FILM COATED ORAL at 21:38

## 2025-01-14 RX ADMIN — Medication 450 MILLIGRAM(S): at 11:45

## 2025-01-14 NOTE — BH CONSULTATION LIAISON ASSESSMENT NOTE - CURRENT MEDICATION
MEDICATIONS  (STANDING):  atorvastatin 20 milliGRAM(s) Oral at bedtime  buPROPion XL (24-Hour) . 150 milliGRAM(s) Oral daily  clopidogrel Tablet 75 milliGRAM(s) Oral daily  escitalopram 20 milliGRAM(s) Oral daily  gabapentin 100 milliGRAM(s) Oral two times a day  heparin   Injectable 5000 Unit(s) SubCutaneous every 12 hours  lithium CR (ESKALITH-CR) 450 milliGRAM(s) Oral daily  losartan 50 milliGRAM(s) Oral daily  QUEtiapine 100 milliGRAM(s) Oral at bedtime    MEDICATIONS  (PRN):  acetaminophen     Tablet .. 650 milliGRAM(s) Oral every 6 hours PRN Temp greater or equal to 38C (100.4F), Mild Pain (1 - 3)  albuterol    90 MICROgram(s) HFA Inhaler 1 Puff(s) Inhalation two times a day PRN for shortness of breath and/or wheezing  aluminum hydroxide/magnesium hydroxide/simethicone Suspension 30 milliLiter(s) Oral every 4 hours PRN Dyspepsia  melatonin 3 milliGRAM(s) Oral at bedtime PRN Insomnia  ondansetron Injectable 4 milliGRAM(s) IV Push every 8 hours PRN Nausea and/or Vomiting  simethicone 80 milliGRAM(s) Chew two times a day PRN gas

## 2025-01-14 NOTE — BH CONSULTATION LIAISON ASSESSMENT NOTE - NSBHCHARTREVIEWVS_PSY_A_CORE FT
Vital Signs Last 24 Hrs  T(C): 37.1 (14 Jan 2025 09:28), Max: 37.2 (13 Jan 2025 16:39)  T(F): 98.8 (14 Jan 2025 09:28), Max: 98.9 (13 Jan 2025 16:39)  HR: 77 (14 Jan 2025 09:28) (72 - 81)  BP: 95/50 (14 Jan 2025 09:28) (95/50 - 116/79)  BP(mean): --  RR: 18 (14 Jan 2025 09:28) (18 - 18)  SpO2: 91% (14 Jan 2025 09:28) (91% - 97%)    Parameters below as of 14 Jan 2025 09:28  Patient On (Oxygen Delivery Method): room air

## 2025-01-14 NOTE — BH CONSULTATION LIAISON ASSESSMENT NOTE - NSSUICRSKFACTOR_PSY_ALL_CORE
Current and Past Psychiatric Diagnoses/Presenting Symptoms Normal rate, regular rhythm.  Heart sounds S1, S2.  No murmurs, rubs or gallops.

## 2025-01-14 NOTE — PROGRESS NOTE ADULT - SUBJECTIVE AND OBJECTIVE BOX
FAYE VILLANUEVA 74y Female  MRN#: [id number]         SUBJECTIVE  Patient is a 74y old Female who presents with a chief complaint of Dizziness, fall; r/o CVA (13 Jan 2025 11:34)  Currently admitted to medicine with the primary diagnosis of Dizziness      Today is hospital day 7d, and this morning she is _________ and reports no overnight events.     OBJECTIVE  PAST MEDICAL & SURGICAL HISTORY  Depressed    Anxiety    Breast CA    HLD (hyperlipidemia)    S/P cholecystectomy      Home Meds:  Albuterol (Eqv-ProAir HFA) 90 mcg/inh inhalation aerosol: 1 puff(s) inhaled 2 times a day as needed for  shortness of breath and/or wheezing  atorvastatin 20 mg oral tablet: 1 tab(s) orally once a day (at bedtime)  Bicarsim 80 mg oral tablet, chewable: 1 tab(s) orally 2 times a day as needed for gas  buPROPion 150 mg/24 hours (XL) oral tablet, extended release: 1 tab(s) orally once a day  escitalopram 20 mg oral tablet: 1 tab(s) orally once a day  gabapentin 100 mg oral capsule: 1 cap(s) orally 2 times a day  lithium 450 mg oral tablet, extended release: 1 tab(s) orally once a day  loperamide 2 mg oral capsule: 1 cap(s) orally 3 times a day As needed Diarrhea  losartan 50 mg oral tablet: 1 tab(s) orally once a day  Plavix 75 mg oral tablet: 1 tab(s) orally once a day  QUEtiapine 100 mg oral tablet: 1 tab(s) orally once a day (at bedtime)  QUEtiapine 50 mg oral tablet: 1 tab(s) orally once a day    ALLERGIES:  Demerol HCl (Stomach Upset; Vomiting)  Cipro (Stomach Upset; Vomiting)    MEDICATIONS:  STANDING MEDICATIONS  atorvastatin 20 milliGRAM(s) Oral at bedtime  buPROPion XL (24-Hour) . 150 milliGRAM(s) Oral daily  clopidogrel Tablet 75 milliGRAM(s) Oral daily  escitalopram 20 milliGRAM(s) Oral daily  gabapentin 100 milliGRAM(s) Oral two times a day  heparin   Injectable 5000 Unit(s) SubCutaneous every 12 hours  lithium CR (ESKALITH-CR) 450 milliGRAM(s) Oral daily  losartan 50 milliGRAM(s) Oral daily  QUEtiapine 100 milliGRAM(s) Oral at bedtime  QUEtiapine 50 milliGRAM(s) Oral daily    PRN MEDICATIONS  acetaminophen     Tablet .. 650 milliGRAM(s) Oral every 6 hours PRN  albuterol    90 MICROgram(s) HFA Inhaler 1 Puff(s) Inhalation two times a day PRN  aluminum hydroxide/magnesium hydroxide/simethicone Suspension 30 milliLiter(s) Oral every 4 hours PRN  melatonin 3 milliGRAM(s) Oral at bedtime PRN  ondansetron Injectable 4 milliGRAM(s) IV Push every 8 hours PRN  simethicone 80 milliGRAM(s) Chew two times a day PRN      VITAL SIGNS: Last 24 Hours  T(C): 37 (14 Jan 2025 04:34), Max: 37.2 (13 Jan 2025 16:39)  T(F): 98.6 (14 Jan 2025 04:34), Max: 98.9 (13 Jan 2025 16:39)  HR: 75 (14 Jan 2025 04:34) (72 - 81)  BP: 116/79 (14 Jan 2025 04:34) (100/62 - 116/79)  BP(mean): --  RR: 18 (14 Jan 2025 04:34) (18 - 18)  SpO2: 97% (14 Jan 2025 04:34) (91% - 97%)    LABS:    Reviewed       RADIOLOGY:  Reviewed     PHYSICAL EXAM:  General: NAD, AAOx3  HEENT: atraumatic, normocephalic  Pulmonary: clear to auscultation bilaterally; No wheeze  Cardiovascular: Regular rate and rhythm; no murmurs, rubs or gallops. Normal S1S2  Gastrointestinal: Soft, nontender, nondistended; bowel sounds present  Musculoskeletal: 2+ peripheral pulses, no clubbing, cyanosis or edema  Neurology: Pt. alert and oriented, fluent speech, able to move all extremities  Skin: no rashes or lesions             FAYE VILLANUEVA 74y Female  MRN#: [id number]         SUBJECTIVE  Patient is a 74y old Female who presents with a chief complaint of Dizziness, fall; r/o CVA (13 Jan 2025 11:34)  Currently admitted to medicine with the primary diagnosis of Dizziness      Today is hospital day 7d, and this morning she is excited to go home, wants to be tapered off some medications and reports no overnight events. Working with  to get her home with home care as patient no longer has [facility] days.     OBJECTIVE  PAST MEDICAL & SURGICAL HISTORY  Depressed    Anxiety    Breast CA    HLD (hyperlipidemia)    S/P cholecystectomy      Home Meds:  Albuterol (Eqv-ProAir HFA) 90 mcg/inh inhalation aerosol: 1 puff(s) inhaled 2 times a day as needed for  shortness of breath and/or wheezing  atorvastatin 20 mg oral tablet: 1 tab(s) orally once a day (at bedtime)  Bicarsim 80 mg oral tablet, chewable: 1 tab(s) orally 2 times a day as needed for gas  buPROPion 150 mg/24 hours (XL) oral tablet, extended release: 1 tab(s) orally once a day  escitalopram 20 mg oral tablet: 1 tab(s) orally once a day  gabapentin 100 mg oral capsule: 1 cap(s) orally 2 times a day  lithium 450 mg oral tablet, extended release: 1 tab(s) orally once a day  loperamide 2 mg oral capsule: 1 cap(s) orally 3 times a day As needed Diarrhea  losartan 50 mg oral tablet: 1 tab(s) orally once a day  Plavix 75 mg oral tablet: 1 tab(s) orally once a day  QUEtiapine 100 mg oral tablet: 1 tab(s) orally once a day (at bedtime)  QUEtiapine 50 mg oral tablet: 1 tab(s) orally once a day    ALLERGIES:  Demerol HCl (Stomach Upset; Vomiting)  Cipro (Stomach Upset; Vomiting)    MEDICATIONS:  STANDING MEDICATIONS  atorvastatin 20 milliGRAM(s) Oral at bedtime  buPROPion XL (24-Hour) . 150 milliGRAM(s) Oral daily  clopidogrel Tablet 75 milliGRAM(s) Oral daily  escitalopram 20 milliGRAM(s) Oral daily  gabapentin 100 milliGRAM(s) Oral two times a day  heparin   Injectable 5000 Unit(s) SubCutaneous every 12 hours  lithium CR (ESKALITH-CR) 450 milliGRAM(s) Oral daily  losartan 50 milliGRAM(s) Oral daily  QUEtiapine 100 milliGRAM(s) Oral at bedtime  QUEtiapine 50 milliGRAM(s) Oral daily    PRN MEDICATIONS  acetaminophen     Tablet .. 650 milliGRAM(s) Oral every 6 hours PRN  albuterol    90 MICROgram(s) HFA Inhaler 1 Puff(s) Inhalation two times a day PRN  aluminum hydroxide/magnesium hydroxide/simethicone Suspension 30 milliLiter(s) Oral every 4 hours PRN  melatonin 3 milliGRAM(s) Oral at bedtime PRN  ondansetron Injectable 4 milliGRAM(s) IV Push every 8 hours PRN  simethicone 80 milliGRAM(s) Chew two times a day PRN      VITAL SIGNS: Last 24 Hours  T(C): 37 (14 Jan 2025 04:34), Max: 37.2 (13 Jan 2025 16:39)  T(F): 98.6 (14 Jan 2025 04:34), Max: 98.9 (13 Jan 2025 16:39)  HR: 75 (14 Jan 2025 04:34) (72 - 81)  BP: 116/79 (14 Jan 2025 04:34) (100/62 - 116/79)  BP(mean): --  RR: 18 (14 Jan 2025 04:34) (18 - 18)  SpO2: 97% (14 Jan 2025 04:34) (91% - 97%)    LABS:    Reviewed       RADIOLOGY:  Reviewed     PHYSICAL EXAM:  General: NAD, AAOx3  HEENT: atraumatic, normocephalic  Pulmonary: clear to auscultation bilaterally; No wheeze  Cardiovascular: Regular rate and rhythm; no murmurs, rubs or gallops. Normal S1S2  Gastrointestinal: Soft, nontender, nondistended; bowel sounds present  Musculoskeletal: 2+ peripheral pulses, no clubbing, cyanosis or edema  Neurology: Pt. alert and oriented, fluent speech, able to move all extremities  Skin: no rashes or lesions

## 2025-01-14 NOTE — DIETITIAN INITIAL EVALUATION ADULT - OTHER INFO
4F PMHx Panic Attacks, HTN, Depression, Anxiety, Fibromyalgia, Breast CA, HLD presented to the ED after a fall from bed. Patient was unable to get up, which caused the patients  to be concerned and bring her in. Patient admitted for CVA.

## 2025-01-14 NOTE — DIETITIAN INITIAL EVALUATION ADULT - PERTINENT LABORATORY DATA
A1C with Estimated Average Glucose Result: 5.0 % (01-08-25 @ 05:25)  A1C with Estimated Average Glucose Result: 5.4 % (05-30-24 @ 09:10)

## 2025-01-14 NOTE — PROGRESS NOTE ADULT - ASSESSMENT
74F PMHx Panic Attacks, HTN, Depression, Anxiety, Fibromyalgia, Breast CA, HLD presented to the ED after a fall from bed. Patient was unable to get up, which caused the patients  to be concerned and bring her in. Patient admitted for CVA.     Plan:   Dizziness and fall  -  MRI brain and thoracic spine cancelled as patient refusing. Low suspicion based on CT results (not acute, age indeterminate- also does not correlate w physical exam)  - Clopidogrel 75 mg PO qd   - PT, [facility]    Acute kidney injury  - Now resolved  - s/p fluids   - Monitor renal function in daily lab work    Bipolar disorder  Major depressive disorder  Generalized anxiety disorder  - Continue albuterol PRN for shortness of breath  - Continue bupropion 150mg daily  - Continue escitalopram 20mg daily  - Continue gabapentin 100mg BID  - Continue Seroquel 50mg AM and 100mg HS  - Continue lithium   - repeat lithium 0.84 , lithium level downtrending 1.48 --> 0.84 --> 0.89 this AM   - Consider  consult if level is again elevated    Hypertension  Hyperlipidemia  - Continue Plavix 75mg daily  - Continue atorvastatin 20mg HS  - Continue losartan 50mg daily      DVT/GI ppx: Heparin  Diet: DASH  GI: None   Dispo: DC to home with home care     74F PMHx Panic Attacks, HTN, Depression, Anxiety, Fibromyalgia, Breast CA, HLD presented to the ED after a fall from bed. Patient was unable to get up, which caused the patients  to be concerned and bring her in. Patient admitted for CVA.     Plan:   Dizziness and fall  -  MRI brain and thoracic spine cancelled as patient refusing. Low suspicion based on CT results (not acute, age indeterminate- also does not correlate w physical exam)  - Clopidogrel 75 mg PO qd   - PT, [facility]    Acute kidney injury  - Now resolved  - s/p fluids   - Monitor renal function in daily lab work    Bipolar disorder  Major depressive disorder  Generalized anxiety disorder  - Continue albuterol PRN for shortness of breath  - Continue bupropion 150mg daily  - Continue escitalopram 20mg daily  - Continue gabapentin 100mg BID  - Continue Seroquel 50mg AM and 100mg HS --> DC'd AM dose   - Continue lithium   - repeat lithium 0.84 , lithium level downtrending 1.48 --> 0.84 --> 0.89 this AM   - Consider  consult if level is again elevated    Hypertension  Hyperlipidemia  - Continue Plavix 75mg daily  - Continue atorvastatin 20mg HS  - Continue losartan 50mg daily      DVT/GI ppx: Heparin  Diet: DASH  GI: None   Dispo: DC to home with home care

## 2025-01-14 NOTE — BH CONSULTATION LIAISON ASSESSMENT NOTE - NS ED BHA SUICIDALITY PRESENT CURRENT PASSIVE IDEATION
Hospital Medicine History & Physical      PCP: Celia Cedeño MD    Date of Admission: 7/29/2019    Date of Service: Pt seen/examined on 7/29/2019 and Admitted to Inpatient from podiatry office. Chief Complaint:  Left foot pain and swelling. History Of Present Illness: The patient is a 66 y.o. female w hx of long standing (31years) and well controlled DMII, insulin dependent, who presents to [facility] with left foot pain and swelling. Pt reports about 2 weeks ago Thursday she was at a beach on vacation with the rest of her family when she hurt the bottom of her foot and over the next few days developed progressively worsening pain. She is not sure if she stepped on a shell. With worsening pain she want to the ED here. Started on clindamycin and was referred to see a podiatrist.   Mitra Soares to see her PCP. He continued clinda and encouraged podiatry follow up. She went to see a podiatrist today. Had the bottom of her foot bulla lanced and referred to see Dr Jaleel Taylor. She was concerned about systemic symptoms of subjective fever, weakness, flu like symptoms. With concerns for probable deep soft tissue infection and constitutional symptoms she was referred to be direct admitted for IV Abx and further eval.            Past Medical History:        Diagnosis Date    Diabetes mellitus type 1 ([address]     Hyperlipidemia     Thyroid disease        Past Surgical History:        Procedure Laterality Date    BLADDER SUSPENSION      CHOLECYSTECTOMY  october 1991    HYSTERECTOMY  november 1998    partial    KNEE ARTHROSCOPY      right    OVARY REMOVAL      right    SINUS SURGERY         Medications Prior to Admission:    Prior to Admission medications    Medication Sig Start Date End Date Taking?  Authorizing Provider   clindamycin (CLEOCIN) 150 MG
Yes

## 2025-01-14 NOTE — BH CONSULTATION LIAISON ASSESSMENT NOTE - SUMMARY
Patient is a 74 year old, female; domiciled with her adult son and ex-;  (2003); noncaregiver; unemployed on SSI; past psychiatric history of bipolar depression and anxiety; recently under the care of [facility], now at [facility] x 1 visit with ROXY Mason, 3  prior psychiatric hospitalizations  last at  in June 2024,  [facility] and [place] over 10 years ago; no known suicide attempts; no known history of violence or arrests; no active substance abuse or known history of complicated withdrawal; [facility] of arthritis, fibromyalgia; on Plavix, brought in by EMS; called by ex- due to weakness.  Psych initially was consulted for TULIO clearance.  Pt seen in ED in bed, presents lethargic but cooperative and able to engage in interview.  She stated she is very depressed and was tearful during eval.  Pt oriented to month and person, but does not know date, stated year is 2000 and claimed to be at [facility].  She reports she has been very depressed for approx 2 weeks without known precipitant. claims is compliant with meds and  reportedly manages her meds.   No evidence of psychosis or verenice.  Decline Voluntary psych admission.  No acute psych condition which requires involuntary psych admission.   Pt to be admitted medically for 3 days as required prior to TULIO placement.  Psych to follow.  Recommend lowering Seroquel to 50 qam and 100 hs.  Psych to follow. Patient is a 74 year old, female; domiciled with her adult son and ex-;  (2003); noncaregiver; unemployed on SSI; past psychiatric history of bipolar depression and anxiety; previously followed by [facility] and briefly followed up with [facility] but is currently without psychiatric follow up as she has not seen a psychiatrist for at least ~3-4 months now, 3 prior psychiatric hospitalizations last at  in June 2024 (also followed by [facility] in September when she was medically admitted for generalized weakness, [facility] and [place] over 10 years ago; no known suicide attempts; no known history of violence or arrests; no active substance abuse or known history of complicated withdrawal; PMH of arthritis, fibromyalgia; on Plavix, brought in by EMS; called by ex- due to a fall at home. She was admitted medically to evaluate for CVA and workup has proved to be unremarkable and patient is close to approaching discharge at this time. Psychiatry was consulted for medication management as patient and her  are concerned that she is "overmedicated."    Patient presents with chronic depression, personality traits (dependent?) and perhaps some elements of neurocognitive decline (she was A&OX3 on exam with me today but chart review indicates history of fluctuating mentation). There may even be a component of Munchausen syndrome vs malingering as she has a recurrent history of presenting with generalized weakness/vague complaints; her hospital stays typically get extended and does not participate much in terms of treatment as she will refuse care (eg has been refusing her MRI with somewhat valid concerns of "it takes way too long and I get claustrophobic" and she reportedly did not participate in TULIO at all when she was at [facility] rehab for ~3 months). Her ex-'s involvement in her care also seems to complicate care as his expectations appear to be unrealistic at times (eg in my discussion with him today, he reports "she has been like this for years, she cannot come home like this") though his concern for her does seem valid.    Patient's care going forward will require complex coordination; at this time she is a candidate for TULIO placement, however she has exhausted her days in [facility] and currently care coordinators are submitting referrals to rehabs outside of [facility] which seem to be showing some promise at this time. I would recommend to avoid sending the patient to another institution and instead suggest home PT given that this may bolster her routine at home instead of an artificial environment and thus she may fare better long term this way. If home health aides can be coordinated as well, this could also help her with a sense of engagement and fulfillment on the day to day to deal with her chronic depression, however will defer to SW in this regard.    In terms of medication management, it's possible that the patient's Lithium is too high (considering her high normal level of Lithium at admission which has now normalized, likely was due to poor hydration/PO intake) and that she may not need the medication at all as there is no evidence of verenice on exam and there is not a robust history of verenice either. She reports her tremors worsened over the last couple of months which is following the time her Lithium was increased this past summer when she was psychiatrically hospitalized at [facility]. For the sake of consolidating her medication regimen and addressing her tremor, I recommend discontinuing the Lithium altogether as well as the Gabapentin as it does not seem to be providing any therapeutic benefit at this time. Both the Lithium and the Gabapentin carry sedation as a side effect so eliminating these medications may provide her with more energy to participate in her care and ADLs going forward. Discontinuing the lithium is also likely to eliminate the increased tremor that she has been complaining of. Would also recommend discontinuing the AM dose of seroquel 50 mg (unclear if this was given to her as a one time dose here or if she has been taking it at home) to minimize sedation. It's possible that the Wellbutrin could be aggravating her tremor as well but considering the benefit that Wellbutrin has as an activating antidepressant, I am inclined to keep this in her medication regimen.    At this time, there is no acute indication for psychiatric intervention at this time as patient is not a risk to herself or others so involuntary hospitalization is not warranted and patient is declining voluntary admission as well (which I do not think would provide the patient much benefit as it is). Would recommend establishing outpatient follow up for psychiatry - she states she would prefer in person appointments as "zoom does not work for me" and that she not be re-established with [facility] as "they didn't do much for me" when she was following up with them last summer. Her medication regimen going forward should consist only of Lexapro 20 mg daily, Wellbutrin  mg daily and Seroquel 100 mg qhs.

## 2025-01-14 NOTE — DIETITIAN INITIAL EVALUATION ADULT - ORAL INTAKE PTA/DIET HISTORY
Met with pt who reports 200# is UBW and with good po intake. As per flow sheets pt eating poor to fair. Psych also in to see pt today.

## 2025-01-14 NOTE — DIETITIAN INITIAL EVALUATION ADULT - PERTINENT MEDS FT
MEDICATIONS  (STANDING):      MEDICATIONS  (PRN)  melatonin 3 milliGRAM(s) Oral at bedtime PRN Insomnia  ondansetron Injectable 4 milliGRAM(s) IV Push every 8 hours PRN Nausea and/or Vomiting  simethicone 80 milliGRAM(s) Chew two times a day PRN gas

## 2025-01-14 NOTE — BH CONSULTATION LIAISON ASSESSMENT NOTE - HPI (INCLUDE ILLNESS QUALITY, SEVERITY, DURATION, TIMING, CONTEXT, MODIFYING FACTORS, ASSOCIATED SIGNS AND SYMPTOMS)
Patient is a 74 year old, female; domiciled with her adult son and ex-;  (2003); noncaregiver; unemployed on SSI; past psychiatric history of bipolar depression and anxiety; previously followed by [facility] and briefly followed up with [facility] but is currently without psychiatric follow up as she has not seen a psychiatrist for at least ~3-4 months now, 3 prior psychiatric hospitalizations last at  in June 2024 (also followed by [facility] in September when she was medically admitted for generalized weakness, [facility] and [place] over 10 years ago; no known suicide attempts; no known history of violence or arrests; no active substance abuse or known history of complicated withdrawal; PMH of arthritis, fibromyalgia; on Plavix, brought in by EMS; called by ex- due to a fall at home. She was admitted medically to evaluate for CVA and workup has proved to be unremarkable and patient is close to approaching discharge at this time. Psychiatry was consulted for medication management as patient and her  are concerned that she is "overmedicated."    Pt seen in inpatient bed, presents relatively alert, cooperative and able to engage in interview.  She stated she is doing "okay" but thinks that she has a cold. In terms of her mood, she admits to chronic depression marked primarily by loneliness, boredom and overall lack of fulfillment. Pt A&OX3.  She claims she is compliant with meds and  reportedly manages her meds.  Pt reports sleep is good and per reports spends a lot of time in bed.  Pt states she is able to participate with ADLs and cooking which is inconsistent with collateral. Denies SIIP, including passive SI, and no h/o self harm, denies HI, AVH and other perceptual disturbances.  Patient reports that she hasn't followed up with a psychiatrist, was following up with [facility] but did not like her provider after 3 appointments and has not seen them since. She reports her day to day as fairly uneventful.    Spoke with  who reports Pt was overmedicated (complains of tremor that patient has which makes it difficult for her to communicate with others and walk around the house).   concerns she is weak and cannot walk and notes that "she has been like this for years, she needs to get better". Discussed with  that while his concerns are valid of having extra help at home to assist with day to day functioning, expectations should be kept realistic given the nature of her baseline functioning currently and that continued medical and/or psychiatric admission and/or institutional admission (eg Rehab) is not likely to significantly improve functioning to which he was at least superficially receptive.     Patient was psychiatrically admitted in June 2024 at [facility], discharge documentation as below:    "Patient was admitted from [facility] involuntarily. She was admitted at [facility] for 10 days with no change of Mental Status. On admission at , she was anxious, unable to ambulate, preferred to lay in bed all the time. She was disorganized to the point that she did not shower for days due to lack of motivation. She was on Lithium 150 mg HS on admission and later Lithium was increased to 450 mg HS and Wellbutrin was added from 37.5 mg daily to Wellbutrin  mg daily, She was on Seroquel 150 mg BID and it seems that she was partly in bed due to Seroquel effect on sedation, so Seroquel was adjusted to Seroquel 100 mg AM and Seroquel 200 mg HS which helped with sleep and mood. To bolster her mood Lexapro 10 mg was added on top of Wellbutrin  mg and later Lexapro was further increased to Lexapro 20 mg daily. She was also started on Klonopin 0.5 mg BID to help decrease her anxiety and vocal sound which significantly helped with her ongoing anxiety with associated vocalization of Ah.... She was on Neurontin 100 mg TID, later Neurontin was decreased to Neurontin 100 mg BID. Patient is a candidate for ECT, discussed ECT options with her EX and patient and they feel that it's too harsh and was advised to stay on meds for stability. Discussed the options that ECT would be helpful for fast speedy recovery, but the family continued to stay on Meds rather than any intervention. She is not suicidal or homicidal. She has limited support which may be an issues for proper discharge planning as she needs supervision to help ease her transition. Her Ex has to work in AM so she is alone in the house. HHA was coordinated but it would take long time as per  referral. PT was also called in for her ambulation and was able to ambulate with assistance and walker and also able to go up a flight of stairs here at the hospital. She has been referred for Home PT for further assistance."    Final recs from [facility] note when patient was medically admitted in September 2024:    "Recs:  Continue Wellbutrin to 150mg Qd. Plan to taper off  Continue Seroquel to 50mg qd/100mg qHS  Continue Klonopin 0.25mg BID, avoid increase.  Continue Lexapro 20mg qd  Continue lithium 450mg Qhs   Continue gabapentin 100mg BID  -In the case of severe agitation, consider Zyprexa IM prn q6 PRN. Or po option of Seroquel 25mg prn q6 PRN   -Maintain delirium precautions   -Avoid anticholinergic agents, benzos, opioid as they can further perpetuate confusion   -Frequent re orientation, Hydration, try to avoid restraints and if possible, mobilize patient and PT involvement  -please reconsult if needed."     Patient is a 74 year old, female; domiciled with her adult son and ex-;  (2003); noncaregiver; unemployed on SSI; past psychiatric history of bipolar depression and anxiety; previously followed by [facility] and briefly followed up with [facility] but is currently without psychiatric follow up as she has not seen a psychiatrist for at least ~3-4 months now, 3 prior psychiatric hospitalizations last at  in June 2024 (also followed by [facility] in September when she was medically admitted for generalized weakness, [facility] and [place] over 10 years ago; no known suicide attempts; no known history of violence or arrests; no active substance abuse or known history of complicated withdrawal; PMH of arthritis, fibromyalgia; on Plavix, brought in by EMS; called by ex- due to a fall at home. She was admitted medically to evaluate for CVA and workup has proved to be unremarkable and patient is close to approaching discharge at this time. Psychiatry was consulted for medication management as patient and her  are concerned that she is "overmedicated."    Pt seen in inpatient bed, presents relatively alert, cooperative and able to engage in interview.  She stated she is doing "okay" but thinks that she has a cold. In terms of her mood, she admits to chronic depression marked primarily by loneliness, boredom and overall lack of fulfillment. Pt A&OX3.  She claims she is compliant with meds and  reportedly manages her meds.  Pt reports sleep is good and per reports spends a lot of time in bed.  Pt states she is able to participate with ADLs and cooking which is inconsistent with collateral. Denies SIIP, including passive SI, and no h/o self harm, denies HI, AVH and other perceptual disturbances.  Patient reports that she hasn't followed up with a psychiatrist, was following up with [facility] but did not like her provider after 3 appointments and has not seen them since. She reports her day to day as fairly uneventful.    Spoke with  who reports Pt was overmedicated (complains of tremor that patient has which makes it difficult for her to communicate with others and walk around the house); patient reports that tremor has gotten worse over the course of the last couple of months.   concerns she is weak and cannot walk and notes that "she has been like this for years, she needs to get better". Discussed with  that while his concerns are valid of having extra help at home to assist with day to day functioning, expectations should be kept realistic given the nature of her baseline functioning currently and that continued medical and/or psychiatric admission and/or institutional admission (eg Rehab) is not likely to significantly improve functioning to which he was at least superficially receptive.     Patient was psychiatrically admitted in June 2024 at [facility], discharge documentation as below:    "Patient was admitted from [facility] involuntarily. She was admitted at [facility] for 10 days with no change of Mental Status. On admission at , she was anxious, unable to ambulate, preferred to lay in bed all the time. She was disorganized to the point that she did not shower for days due to lack of motivation. She was on Lithium 150 mg HS on admission and later Lithium was increased to 450 mg HS and Wellbutrin was added from 37.5 mg daily to Wellbutrin  mg daily, She was on Seroquel 150 mg BID and it seems that she was partly in bed due to Seroquel effect on sedation, so Seroquel was adjusted to Seroquel 100 mg AM and Seroquel 200 mg HS which helped with sleep and mood. To bolster her mood Lexapro 10 mg was added on top of Wellbutrin  mg and later Lexapro was further increased to Lexapro 20 mg daily. She was also started on Klonopin 0.5 mg BID to help decrease her anxiety and vocal sound which significantly helped with her ongoing anxiety with associated vocalization of Ah.... She was on Neurontin 100 mg TID, later Neurontin was decreased to Neurontin 100 mg BID. Patient is a candidate for ECT, discussed ECT options with her EX and patient and they feel that it's too harsh and was advised to stay on meds for stability. Discussed the options that ECT would be helpful for fast speedy recovery, but the family continued to stay on Meds rather than any intervention. She is not suicidal or homicidal. She has limited support which may be an issues for proper discharge planning as she needs supervision to help ease her transition. Her Ex has to work in AM so she is alone in the house. HHA was coordinated but it would take long time as per  referral. PT was also called in for her ambulation and was able to ambulate with assistance and walker and also able to go up a flight of stairs here at the hospital. She has been referred for Home PT for further assistance."    Final recs from [facility] note when patient was medically admitted in September 2024:    "Recs:  Continue Wellbutrin to 150mg Qd. Plan to taper off  Continue Seroquel to 50mg qd/100mg qHS  Continue Klonopin 0.25mg BID, avoid increase.  Continue Lexapro 20mg qd  Continue lithium 450mg Qhs   Continue gabapentin 100mg BID  -In the case of severe agitation, consider Zyprexa IM prn q6 PRN. Or po option of Seroquel 25mg prn q6 PRN   -Maintain delirium precautions   -Avoid anticholinergic agents, benzos, opioid as they can further perpetuate confusion   -Frequent re orientation, Hydration, try to avoid restraints and if possible, mobilize patient and PT involvement  -please reconsult if needed."

## 2025-01-14 NOTE — BH CONSULTATION LIAISON ASSESSMENT NOTE - NSSUICPROTFACT_PSY_ALL_CORE
Responsibility to children, family, or others/Identifies reasons for living/Supportive social network of family or friends/Positive therapeutic relationships Responsibility to children, family, or others/Identifies reasons for living/Supportive social network of family or friends/Fear of death or the actual act of killing self/Positive therapeutic relationships/Alevism beliefs

## 2025-01-15 LAB
ANION GAP SERPL CALC-SCNC: 14 MMOL/L — SIGNIFICANT CHANGE UP (ref 5–17)
BUN SERPL-MCNC: 17 MG/DL — SIGNIFICANT CHANGE UP (ref 8–20)
CALCIUM SERPL-MCNC: 9.5 MG/DL — SIGNIFICANT CHANGE UP (ref 8.4–10.5)
CHLORIDE SERPL-SCNC: 100 MMOL/L — SIGNIFICANT CHANGE UP (ref 96–108)
CO2 SERPL-SCNC: 22 MMOL/L — SIGNIFICANT CHANGE UP (ref 22–29)
CREAT SERPL-MCNC: 1.13 MG/DL — SIGNIFICANT CHANGE UP (ref 0.5–1.3)
EGFR: 52 ML/MIN/1.73M2 — LOW
GLUCOSE SERPL-MCNC: 102 MG/DL — HIGH (ref 70–99)
HCT VFR BLD CALC: 37.1 % — SIGNIFICANT CHANGE UP (ref 34.5–45)
HGB BLD-MCNC: 12.3 G/DL — SIGNIFICANT CHANGE UP (ref 11.5–15.5)
MAGNESIUM SERPL-MCNC: 2.5 MG/DL — SIGNIFICANT CHANGE UP (ref 1.8–2.6)
MCHC RBC-ENTMCNC: 31.1 PG — SIGNIFICANT CHANGE UP (ref 27–34)
MCHC RBC-ENTMCNC: 33.2 G/DL — SIGNIFICANT CHANGE UP (ref 32–36)
MCV RBC AUTO: 93.7 FL — SIGNIFICANT CHANGE UP (ref 80–100)
PHOSPHATE SERPL-MCNC: 3.5 MG/DL — SIGNIFICANT CHANGE UP (ref 2.4–4.7)
PLATELET # BLD AUTO: 282 K/UL — SIGNIFICANT CHANGE UP (ref 150–400)
POTASSIUM SERPL-MCNC: 4.4 MMOL/L — SIGNIFICANT CHANGE UP (ref 3.5–5.3)
POTASSIUM SERPL-SCNC: 4.4 MMOL/L — SIGNIFICANT CHANGE UP (ref 3.5–5.3)
RBC # BLD: 3.96 M/UL — SIGNIFICANT CHANGE UP (ref 3.8–5.2)
RBC # FLD: 14.4 % — SIGNIFICANT CHANGE UP (ref 10.3–14.5)
SODIUM SERPL-SCNC: 136 MMOL/L — SIGNIFICANT CHANGE UP (ref 135–145)
WBC # BLD: 8.04 K/UL — SIGNIFICANT CHANGE UP (ref 3.8–10.5)
WBC # FLD AUTO: 8.04 K/UL — SIGNIFICANT CHANGE UP (ref 3.8–10.5)

## 2025-01-15 PROCEDURE — 99233 SBSQ HOSP IP/OBS HIGH 50: CPT

## 2025-01-15 RX ADMIN — ATORVASTATIN CALCIUM 20 MILLIGRAM(S): 80 TABLET, FILM COATED ORAL at 21:19

## 2025-01-15 RX ADMIN — QUETIAPINE FUMARATE 100 MILLIGRAM(S): 300 TABLET ORAL at 21:19

## 2025-01-15 RX ADMIN — Medication 50 MILLIGRAM(S): at 05:26

## 2025-01-15 RX ADMIN — BUPROPION HYDROCHLORIDE 150 MILLIGRAM(S): 150 TABLET, EXTENDED RELEASE ORAL at 12:54

## 2025-01-15 RX ADMIN — ESCITALOPRAM 20 MILLIGRAM(S): 10 TABLET, FILM COATED ORAL at 12:54

## 2025-01-15 RX ADMIN — Medication 5000 UNIT(S): at 17:50

## 2025-01-15 RX ADMIN — Medication 5000 UNIT(S): at 05:25

## 2025-01-15 RX ADMIN — Medication 75 MILLIGRAM(S): at 12:54

## 2025-01-15 NOTE — BH CONSULTATION LIAISON PROGRESS NOTE - NSBHFUPINTERVALHXFT_PSY_A_CORE
No acute events overnight, no PRNs required. Patient appears brighter today, reports okay mood. Denies SI/HI, AVH and other perceptual disturbances. Denies side effects from discontinuing Gabapentin, Seroquel or Lithium. Does not want to go to [facility], wants to go home.

## 2025-01-15 NOTE — PROGRESS NOTE ADULT - SUBJECTIVE AND OBJECTIVE BOX
FAYE VILLANUEVA 74y Female  MRN#: [id number]         SUBJECTIVE  Patient is a 74y old Female who presents with a chief complaint of Dizziness and giddiness     (14 Jan 2025 13:39)  Currently admitted to medicine with the primary diagnosis of Dizziness      Today is hospital day 8d, and this morning she is _________ and reports no overnight events.       OBJECTIVE  PAST MEDICAL & SURGICAL HISTORY  Depressed    Anxiety    Breast CA    HLD (hyperlipidemia)    S/P cholecystectomy      Home Meds:  Albuterol (Eqv-ProAir HFA) 90 mcg/inh inhalation aerosol: 1 puff(s) inhaled 2 times a day as needed for  shortness of breath and/or wheezing  atorvastatin 20 mg oral tablet: 1 tab(s) orally once a day (at bedtime)  Bicarsim 80 mg oral tablet, chewable: 1 tab(s) orally 2 times a day as needed for gas  buPROPion 150 mg/24 hours (XL) oral tablet, extended release: 1 tab(s) orally once a day  escitalopram 20 mg oral tablet: 1 tab(s) orally once a day  gabapentin 100 mg oral capsule: 1 cap(s) orally 2 times a day  lithium 450 mg oral tablet, extended release: 1 tab(s) orally once a day  loperamide 2 mg oral capsule: 1 cap(s) orally 3 times a day As needed Diarrhea  losartan 50 mg oral tablet: 1 tab(s) orally once a day  Plavix 75 mg oral tablet: 1 tab(s) orally once a day  QUEtiapine 100 mg oral tablet: 1 tab(s) orally once a day (at bedtime)  QUEtiapine 50 mg oral tablet: 1 tab(s) orally once a day    ALLERGIES:  Demerol HCl (Stomach Upset; Vomiting)  Cipro (Stomach Upset; Vomiting)    MEDICATIONS:  STANDING MEDICATIONS  atorvastatin 20 milliGRAM(s) Oral at bedtime  buPROPion XL (24-Hour) . 150 milliGRAM(s) Oral daily  clopidogrel Tablet 75 milliGRAM(s) Oral daily  escitalopram 20 milliGRAM(s) Oral daily  heparin   Injectable 5000 Unit(s) SubCutaneous every 12 hours  losartan 50 milliGRAM(s) Oral daily  QUEtiapine 100 milliGRAM(s) Oral at bedtime    PRN MEDICATIONS  acetaminophen     Tablet .. 650 milliGRAM(s) Oral every 6 hours PRN  albuterol    90 MICROgram(s) HFA Inhaler 1 Puff(s) Inhalation two times a day PRN  aluminum hydroxide/magnesium hydroxide/simethicone Suspension 30 milliLiter(s) Oral every 4 hours PRN  melatonin 3 milliGRAM(s) Oral at bedtime PRN  ondansetron Injectable 4 milliGRAM(s) IV Push every 8 hours PRN  simethicone 80 milliGRAM(s) Chew two times a day PRN      VITAL SIGNS: Last 24 Hours  T(C): 37.5 (15 Tony 2025 04:20), Max: 37.5 (15 Tony 2025 04:20)  T(F): 99.5 (15 Tony 2025 04:20), Max: 99.5 (15 Tony 2025 04:20)  HR: 77 (15 Tony 2025 04:20) (74 - 77)  BP: 110/70 (15 Tony 2025 04:20) (95/50 - 111/63)  BP(mean): --  RR: 18 (15 Tony 2025 04:20) (18 - 19)  SpO2: 92% (15 Tony 2025 04:20) (91% - 96%)    LABS:                        12.3   8.04  )-----------( 282      ( 15 Tony 2025 05:30 )             37.1     01-15    136  |  100  |  17.0  ----------------------------<  102[H]  4.4   |  22.0  |  1.13    Ca    9.5      15 Tony 2025 05:30  Phos  3.5     01-15  Mg     2.5     01-15      RADIOLOGY:  Reviewed     PHYSICAL EXAM:  General: NAD, AAOx3  HEENT: atraumatic, normocephalic  Pulmonary: clear to auscultation bilaterally; No wheeze  Cardiovascular: Regular rate and rhythm; no murmurs, rubs or gallops. Normal S1S2  Gastrointestinal: Soft, nontender, nondistended; bowel sounds present  Musculoskeletal: 2+ peripheral pulses, no clubbing, cyanosis or edema  Neurology: Pt. alert and oriented, fluent speech, able to move all extremities  Skin: no rashes or lesions             FAYE VILLANUEVA 74y Female  MRN#: [id number]         SUBJECTIVE  Patient is a 74y old Female who presents with a chief complaint of Dizziness and giddiness     (14 Jan 2025 13:39)  Currently admitted to medicine with the primary diagnosis of Dizziness      Today is hospital day 8d and this morning she is fidgety, wanting to go home, does not want to go to [facility], and reports no overnight events.       OBJECTIVE  PAST MEDICAL & SURGICAL HISTORY  Depressed    Anxiety    Breast CA    HLD (hyperlipidemia)    S/P cholecystectomy      Home Meds:  Albuterol (Eqv-ProAir HFA) 90 mcg/inh inhalation aerosol: 1 puff(s) inhaled 2 times a day as needed for  shortness of breath and/or wheezing  atorvastatin 20 mg oral tablet: 1 tab(s) orally once a day (at bedtime)  Bicarsim 80 mg oral tablet, chewable: 1 tab(s) orally 2 times a day as needed for gas  buPROPion 150 mg/24 hours (XL) oral tablet, extended release: 1 tab(s) orally once a day  escitalopram 20 mg oral tablet: 1 tab(s) orally once a day  gabapentin 100 mg oral capsule: 1 cap(s) orally 2 times a day  lithium 450 mg oral tablet, extended release: 1 tab(s) orally once a day  loperamide 2 mg oral capsule: 1 cap(s) orally 3 times a day As needed Diarrhea  losartan 50 mg oral tablet: 1 tab(s) orally once a day  Plavix 75 mg oral tablet: 1 tab(s) orally once a day  QUEtiapine 100 mg oral tablet: 1 tab(s) orally once a day (at bedtime)  QUEtiapine 50 mg oral tablet: 1 tab(s) orally once a day    ALLERGIES:  Demerol HCl (Stomach Upset; Vomiting)  Cipro (Stomach Upset; Vomiting)    MEDICATIONS:  STANDING MEDICATIONS  atorvastatin 20 milliGRAM(s) Oral at bedtime  buPROPion XL (24-Hour) . 150 milliGRAM(s) Oral daily  clopidogrel Tablet 75 milliGRAM(s) Oral daily  escitalopram 20 milliGRAM(s) Oral daily  heparin   Injectable 5000 Unit(s) SubCutaneous every 12 hours  losartan 50 milliGRAM(s) Oral daily  QUEtiapine 100 milliGRAM(s) Oral at bedtime    PRN MEDICATIONS  acetaminophen     Tablet .. 650 milliGRAM(s) Oral every 6 hours PRN  albuterol    90 MICROgram(s) HFA Inhaler 1 Puff(s) Inhalation two times a day PRN  aluminum hydroxide/magnesium hydroxide/simethicone Suspension 30 milliLiter(s) Oral every 4 hours PRN  melatonin 3 milliGRAM(s) Oral at bedtime PRN  ondansetron Injectable 4 milliGRAM(s) IV Push every 8 hours PRN  simethicone 80 milliGRAM(s) Chew two times a day PRN      VITAL SIGNS: Last 24 Hours  T(C): 37.5 (15 Tony 2025 04:20), Max: 37.5 (15 Tony 2025 04:20)  T(F): 99.5 (15 Tony 2025 04:20), Max: 99.5 (15 Tony 2025 04:20)  HR: 77 (15 Tony 2025 04:20) (74 - 77)  BP: 110/70 (15 Tony 2025 04:20) (95/50 - 111/63)  BP(mean): --  RR: 18 (15 Tony 2025 04:20) (18 - 19)  SpO2: 92% (15 Tony 2025 04:20) (91% - 96%)    LABS:                        12.3   8.04  )-----------( 282      ( 15 Tony 2025 05:30 )             37.1     01-15    136  |  100  |  17.0  ----------------------------<  102[H]  4.4   |  22.0  |  1.13    Ca    9.5      15 Tony 2025 05:30  Phos  3.5     01-15  Mg     2.5     01-15      RADIOLOGY:  Reviewed     PHYSICAL EXAM:  General: NAD, AAOx3  HEENT: atraumatic, normocephalic  Pulmonary: clear to auscultation bilaterally; No wheeze  Cardiovascular: Regular rate and rhythm; no murmurs, rubs or gallops. Normal S1S2  Gastrointestinal: Soft, nontender, nondistended; bowel sounds present  Musculoskeletal: 2+ peripheral pulses, no clubbing, cyanosis or edema  Neurology: Pt. alert and oriented, fluent speech, able to move all extremities  Skin: no rashes or lesions

## 2025-01-15 NOTE — PROGRESS NOTE ADULT - ASSESSMENT
74F PMHx Panic Attacks, HTN, Depression, Anxiety, Fibromyalgia, Breast CA, HLD presented to the ED after a fall from bed. Patient was unable to get up, which caused the patients  to be concerned and bring her in. Patient admitted for CVA.     Plan:   Dizziness and fall  -  MRI brain and thoracic spine cancelled as patient refusing. Low suspicion based on CT results (not acute, age indeterminate- also does not correlate w physical exam)  - Clopidogrel 75 mg PO qd   - PT, [facility]    Acute kidney injury  - Now resolved  - s/p fluids   - Monitor renal function in daily lab work    Bipolar disorder  Major depressive disorder  Generalized anxiety disorder  - Continue albuterol PRN for shortness of breath  - Continue bupropion 150mg daily --> slow taper outpatient   - Continue escitalopram 20mg daily --> slow taper outpatient   - Continue gabapentin 100mg BID --> DC   - Continue Seroquel 50mg AM and 100mg HS --> DC AM dose   - Continue lithium --> DC  - repeat lithium 0.84 , lithium level downtrending 1.48 --> 0.84 --> 0.89 this AM   - Consider  consult if level is again elevated    Hypertension  Hyperlipidemia  - Continue Plavix 75mg daily  - Continue atorvastatin 20mg HS  - Continue losartan 50mg daily      DVT/GI ppx: Heparin  Diet: DASH  GI: None   Dispo: DC to home with home care or [facility] if possible

## 2025-01-15 NOTE — BH CONSULTATION LIAISON PROGRESS NOTE - NSBHASSESSMENTFT_PSY_ALL_CORE
Patient is a 74 year old, female; domiciled with her adult son and ex-;  (2003); noncaregiver; unemployed on SSI; past psychiatric history of bipolar depression and anxiety; previously followed by [facility] and briefly followed up with [facility] but is currently without psychiatric follow up as she has not seen a psychiatrist for at least ~3-4 months now, 3 prior psychiatric hospitalizations last at  in June 2024 (also followed by [facility] in September when she was medically admitted for generalized weakness, [facility] and [place] over 10 years ago; no known suicide attempts; no known history of violence or arrests; no active substance abuse or known history of complicated withdrawal; PMH of arthritis, fibromyalgia; on Plavix, brought in by EMS; called by ex- due to a fall at home. She was admitted medically to evaluate for CVA and workup has proved to be unremarkable and patient is close to approaching discharge at this time. Psychiatry was consulted for medication management as patient and her  are concerned that she is "overmedicated."    Patient presents with chronic depression, personality traits (dependent?) and perhaps some elements of neurocognitive decline (she was A&OX3 on exam with me today but chart review indicates history of fluctuating mentation). There may even be a component of Munchausen syndrome vs malingering as she has a recurrent history of presenting with generalized weakness/vague complaints; her hospital stays typically get extended and does not participate much in terms of treatment as she will refuse care (eg has been refusing her MRI with somewhat valid concerns of "it takes way too long and I get claustrophobic" and she reportedly did not participate in [facility] at all when she was at [facility] rehab for ~3 months). Her ex-'s involvement in her care also seems to complicate care as his expectations appear to be unrealistic at times (eg in my discussion with him today, he reports "she has been like this for years, she cannot come home like this") though his concern for her does seem valid.    Patient's care going forward will require complex coordination; at this time she is a candidate for [facility] placement, however she has exhausted her days in [facility] and currently care coordinators are submitting referrals to rehabs outside of [facility] which seem to be showing some promise at this time. I would recommend to avoid sending the patient to another institution and instead suggest home PT given that this may bolster her routine at home instead of an artificial environment and thus she may fare better long term this way. If home health aides can be coordinated as well, this could also help her with a sense of engagement and fulfillment on the day to day to deal with her chronic depression, however will defer to SW in this regard.    In terms of medication management, it's possible that the patient's Lithium is too high (considering her high normal level of Lithium at admission which has now normalized, likely was due to poor hydration/PO intake) and that she may not need the medication at all as there is no evidence of verenice on exam and there is not a robust history of verenice either. She reports her tremors worsened over the last couple of months which is following the time her Lithium was increased this past summer when she was psychiatrically hospitalized at [facility]. For the sake of consolidating her medication regimen and addressing her tremor, I recommend discontinuing the Lithium altogether as well as the Gabapentin as it does not seem to be providing any therapeutic benefit at this time. Both the Lithium and the Gabapentin carry sedation as a side effect so eliminating these medications may provide her with more energy to participate in her care and ADLs going forward. Discontinuing the lithium is also likely to eliminate the increased tremor that she has been complaining of. Would also recommend discontinuing the AM dose of seroquel 50 mg (unclear if this was given to her as a one time dose here or if she has been taking it at home) to minimize sedation. It's possible that the Wellbutrin could be aggravating her tremor as well but considering the benefit that Wellbutrin has as an activating antidepressant, I am inclined to keep this in her medication regimen.    At this time, there is no acute indication for psychiatric intervention at this time as patient is not a risk to herself or others so involuntary hospitalization is not warranted and patient is declining voluntary admission as well (which I do not think would provide the patient much benefit as it is). Would recommend establishing outpatient follow up for psychiatry - she states she would prefer in person appointments as "zoom does not work for me" and that she not be re-established with [facility] as "they didn't do much for me" when she was following up with them last summer. Her medication regimen going forward should consist only of Lexapro 20 mg daily, Wellbutrin  mg daily and Seroquel 100 mg qhs.

## 2025-01-16 PROCEDURE — 99233 SBSQ HOSP IP/OBS HIGH 50: CPT

## 2025-01-16 RX ADMIN — Medication 75 MILLIGRAM(S): at 12:37

## 2025-01-16 RX ADMIN — Medication 50 MILLIGRAM(S): at 05:57

## 2025-01-16 RX ADMIN — QUETIAPINE FUMARATE 100 MILLIGRAM(S): 300 TABLET ORAL at 20:41

## 2025-01-16 RX ADMIN — Medication 5000 UNIT(S): at 17:09

## 2025-01-16 RX ADMIN — Medication 5000 UNIT(S): at 05:57

## 2025-01-16 RX ADMIN — BUPROPION HYDROCHLORIDE 150 MILLIGRAM(S): 150 TABLET, EXTENDED RELEASE ORAL at 12:37

## 2025-01-16 RX ADMIN — ESCITALOPRAM 20 MILLIGRAM(S): 10 TABLET, FILM COATED ORAL at 12:37

## 2025-01-16 RX ADMIN — ATORVASTATIN CALCIUM 20 MILLIGRAM(S): 80 TABLET, FILM COATED ORAL at 20:41

## 2025-01-16 NOTE — PROGRESS NOTE ADULT - ASSESSMENT
· Assessment	  74F PMHx Panic Attacks, HTN, Depression, Anxiety, Fibromyalgia, Breast CA, HLD presented to the ED after a fall from bed. Patient was unable to get up, which caused the patients  to be concerned and bring her in. Patient admitted for CVA.     Plan:   Dizziness and fall  -  MRI brain and thoracic spine cancelled as patient refusing. Low suspicion based on CT results (not acute, age indeterminate- also does not correlate w physical exam)  - Clopidogrel 75 mg PO qd   - PT, [facility]    Acute kidney injury  - Now resolved  - s/p fluids   - Monitor renal function in daily lab work    Bipolar disorder  Major depressive disorder  Generalized anxiety disorder  - Continue albuterol PRN for shortness of breath  - Continue bupropion 150mg daily --> slow taper outpatient   - Continue escitalopram 20mg daily --> slow taper outpatient   - Continue gabapentin 100mg BID --> DC   - Continue Seroquel 50mg AM and 100mg HS --> DC AM dose   - Continue lithium --> DC  - repeat lithium 0.84 , lithium level downtrending 1.48 --> 0.84 --> 0.89 this AM   - Consider  consult if level is again elevated    Hypertension  Hyperlipidemia  - Continue Plavix 75mg daily  - Continue atorvastatin 20mg HS  - Continue losartan 50mg daily      DVT/GI ppx: Heparin  Diet: DASH  GI: None   Dispo: DC to home with home care or [facility] if possible

## 2025-01-16 NOTE — PROGRESS NOTE ADULT - SUBJECTIVE AND OBJECTIVE BOX
FAYE VILLANUEVA  74y  Female      Patient is a 74y old  Female who presents with a chief complaint of Dizziness, fall; r/o CVA (15 Tony 2025 07:34)      INTERVAL HPI/OVERNIGHT EVENTS:  Seen and examined, resting comfortably  Encouraged ambulation, even just sitting in bed or chair. Patient reports she will try      REVIEW OF SYSTEMS:  CONSTITUTIONAL: No fever, weight loss, or fatigue  EYES: No eye pain, visual disturbances, or discharge  ENMT:  No difficulty hearing, tinnitus, vertigo; No sinus or throat pain  NECK: No pain or stiffness  BREASTS: No pain, masses, or nipple discharge  RESPIRATORY: No cough, wheezing, chills or hemoptysis; No shortness of breath  CARDIOVASCULAR: No chest pain, palpitations, dizziness, or leg swelling  GASTROINTESTINAL: No abdominal or epigastric pain. No nausea, vomiting, or hematemesis; No diarrhea or constipation. No melena or hematochezia.  GENITOURINARY: No dysuria, frequency, hematuria, or incontinence  NEUROLOGICAL: No headaches, memory loss, loss of strength, numbness, or tremors  SKIN: No itching, burning, rashes, or lesions   LYMPH NODES: No enlarged glands  ENDOCRINE: No heat or cold intolerance; No hair loss  MUSCULOSKELETAL: No joint pain or swelling; No muscle, back, or extremity pain  PSYCHIATRIC: No depression, anxiety, mood swings, or difficulty sleeping  HEME/LYMPH: No easy bruising, or bleeding gums  ALLERY AND IMMUNOLOGIC: No hives or eczema    T(C): 36.6 (01-16-25 @ 08:26), Max: 37.1 (01-16-25 @ 04:37)  HR: 66 (01-16-25 @ 08:26) (65 - 71)  BP: 111/63 (01-16-25 @ 08:26) (109/69 - 117/75)  RR: 18 (01-16-25 @ 08:26) (18 - 19)  SpO2: 94% (01-16-25 @ 08:26) (94% - 95%)  Wt(kg): --Vital Signs Last 24 Hrs  T(C): 36.6 (16 Jan 2025 08:26), Max: 37.1 (16 Jan 2025 04:37)  T(F): 97.8 (16 Jan 2025 08:26), Max: 98.8 (16 Jan 2025 04:37)  HR: 66 (16 Jan 2025 08:26) (65 - 71)  BP: 111/63 (16 Jan 2025 08:26) (109/69 - 117/75)  BP(mean): --  RR: 18 (16 Jan 2025 08:26) (18 - 19)  SpO2: 94% (16 Jan 2025 08:26) (94% - 95%)    Parameters below as of 16 Jan 2025 08:26  Patient On (Oxygen Delivery Method): room air        PHYSICAL EXAM:  GENERAL: NAD, well-groomed, well-developed  HEAD:  Atraumatic, Normocephalic  EYES: EOMI, PERRLA, conjunctiva and sclera clear  ENMT: No tonsillar erythema, exudates, or enlargement; Moist mucous membranes, Good dentition, No lesions  NECK: Supple, No JVD, Normal thyroid  NERVOUS SYSTEM:  Alert & Oriented X3, Good concentration; Motor Strength 5/5 B/L upper and lower extremities; DTRs 2+ intact and symmetric  CHEST/LUNG: Clear to percussion bilaterally; No rales, rhonchi, wheezing, or rubs  HEART: Regular rate and rhythm; No murmurs, rubs, or gallops  ABDOMEN: Soft, Nontender, Nondistended; Bowel sounds present  EXTREMITIES:  2+ Peripheral Pulses, No clubbing, cyanosis, or edema  LYMPH: No lymphadenopathy noted  SKIN: No rashes or lesions    Consultant(s) Notes Reviewed:  [x ] YES  [ ] NO  Care Discussed with Consultants/Other Providers [ x] YES  [ ] NO    LABS:                        12.3   8.04  )-----------( 282      ( 15 Tony 2025 05:30 )             37.1     01-15    136  |  100  |  17.0  ----------------------------<  102[H]  4.4   |  22.0  |  1.13    Ca    9.5      15 Tony 2025 05:30  Phos  3.5     01-15  Mg     2.5     01-15        Urinalysis Basic - ( 15 Tony 2025 05:30 )    Color: x / Appearance: x / SG: x / pH: x  Gluc: 102 mg/dL / Ketone: x  / Bili: x / Urobili: x   Blood: x / Protein: x / Nitrite: x   Leuk Esterase: x / RBC: x / WBC x   Sq Epi: x / Non Sq Epi: x / Bacteria: x      CAPILLARY BLOOD GLUCOSE            Urinalysis Basic - ( 15 Tony 2025 05:30 )    Color: x / Appearance: x / SG: x / pH: x  Gluc: 102 mg/dL / Ketone: x  / Bili: x / Urobili: x   Blood: x / Protein: x / Nitrite: x   Leuk Esterase: x / RBC: x / WBC x   Sq Epi: x / Non Sq Epi: x / Bacteria: x        RADIOLOGY & ADDITIONAL TESTS:    Imaging Personally Reviewed:  [ ] YES  [ ] NO    HEALTH ISSUES - PROBLEM Dx:

## 2025-01-16 NOTE — PROGRESS NOTE ADULT - TIME BILLING
Time spent reviewing the chart documentation, reviewing labs and imaging studies, evaluating the patient, discussing the plan of care with the consultants & medical team, and documenting.
Time spent reviewing patient's chart, labwork, orders, documenting care, coordinating care with consultants, and discussing patient's care with family members.
Time spent reviewing the chart documentation, reviewing labs and imaging studies, evaluating the patient, discussing the plan of care with the consultants & medical team, and documenting.

## 2025-01-17 DIAGNOSIS — F34.1 DYSTHYMIC DISORDER: ICD-10-CM

## 2025-01-17 PROCEDURE — 93010 ELECTROCARDIOGRAM REPORT: CPT

## 2025-01-17 PROCEDURE — 99232 SBSQ HOSP IP/OBS MODERATE 35: CPT

## 2025-01-17 RX ORDER — OLANZAPINE 10 MG/1
5 TABLET, FILM COATED ORAL ONCE
Refills: 0 | Status: COMPLETED | OUTPATIENT
Start: 2025-01-17 | End: 2025-01-17

## 2025-01-17 RX ORDER — OLANZAPINE 10 MG/1
5 TABLET, FILM COATED ORAL ONCE
Refills: 0 | Status: DISCONTINUED | OUTPATIENT
Start: 2025-01-17 | End: 2025-01-17

## 2025-01-17 RX ADMIN — QUETIAPINE FUMARATE 100 MILLIGRAM(S): 300 TABLET ORAL at 21:08

## 2025-01-17 RX ADMIN — Medication 5000 UNIT(S): at 05:38

## 2025-01-17 RX ADMIN — Medication 50 MILLIGRAM(S): at 05:38

## 2025-01-17 RX ADMIN — ONDANSETRON 4 MILLIGRAM(S): 4 TABLET, ORALLY DISINTEGRATING ORAL at 08:20

## 2025-01-17 RX ADMIN — ESCITALOPRAM 20 MILLIGRAM(S): 10 TABLET, FILM COATED ORAL at 11:45

## 2025-01-17 RX ADMIN — OLANZAPINE 5 MILLIGRAM(S): 10 TABLET, FILM COATED ORAL at 19:09

## 2025-01-17 RX ADMIN — BUPROPION HYDROCHLORIDE 150 MILLIGRAM(S): 150 TABLET, EXTENDED RELEASE ORAL at 11:45

## 2025-01-17 RX ADMIN — ATORVASTATIN CALCIUM 20 MILLIGRAM(S): 80 TABLET, FILM COATED ORAL at 21:08

## 2025-01-17 RX ADMIN — Medication 5000 UNIT(S): at 17:18

## 2025-01-17 RX ADMIN — Medication 75 MILLIGRAM(S): at 11:45

## 2025-01-17 NOTE — BH CONSULTATION LIAISON PROGRESS NOTE - CURRENT MEDICATION
MEDICATIONS  (STANDING):  atorvastatin 20 milliGRAM(s) Oral at bedtime  buPROPion XL (24-Hour) . 150 milliGRAM(s) Oral daily  clopidogrel Tablet 75 milliGRAM(s) Oral daily  escitalopram 20 milliGRAM(s) Oral daily  heparin   Injectable 5000 Unit(s) SubCutaneous every 12 hours  losartan 50 milliGRAM(s) Oral daily  QUEtiapine 100 milliGRAM(s) Oral at bedtime    MEDICATIONS  (PRN):  acetaminophen     Tablet .. 650 milliGRAM(s) Oral every 6 hours PRN Temp greater or equal to 38C (100.4F), Mild Pain (1 - 3)  albuterol    90 MICROgram(s) HFA Inhaler 1 Puff(s) Inhalation two times a day PRN for shortness of breath and/or wheezing  aluminum hydroxide/magnesium hydroxide/simethicone Suspension 30 milliLiter(s) Oral every 4 hours PRN Dyspepsia  melatonin 3 milliGRAM(s) Oral at bedtime PRN Insomnia  ondansetron Injectable 4 milliGRAM(s) IV Push every 8 hours PRN Nausea and/or Vomiting  simethicone 80 milliGRAM(s) Chew two times a day PRN gas  
MEDICATIONS  (STANDING):  atorvastatin 20 milliGRAM(s) Oral at bedtime  buPROPion XL (24-Hour) . 150 milliGRAM(s) Oral daily  clopidogrel Tablet 75 milliGRAM(s) Oral daily  escitalopram 20 milliGRAM(s) Oral daily  heparin   Injectable 5000 Unit(s) SubCutaneous every 12 hours  losartan 50 milliGRAM(s) Oral daily  QUEtiapine 100 milliGRAM(s) Oral at bedtime    MEDICATIONS  (PRN):  acetaminophen     Tablet .. 650 milliGRAM(s) Oral every 6 hours PRN Temp greater or equal to 38C (100.4F), Mild Pain (1 - 3)  albuterol    90 MICROgram(s) HFA Inhaler 1 Puff(s) Inhalation two times a day PRN for shortness of breath and/or wheezing  aluminum hydroxide/magnesium hydroxide/simethicone Suspension 30 milliLiter(s) Oral every 4 hours PRN Dyspepsia  melatonin 3 milliGRAM(s) Oral at bedtime PRN Insomnia  ondansetron Injectable 4 milliGRAM(s) IV Push every 8 hours PRN Nausea and/or Vomiting  simethicone 80 milliGRAM(s) Chew two times a day PRN gas

## 2025-01-17 NOTE — BH CONSULTATION LIAISON PROGRESS NOTE - NSBHCHARTREVIEWINVESTIGATE_PSY_A_CORE FT
Vital Signs Last 24 Hrs  T(C): 36.8 (17 Jan 2025 08:18), Max: 37 (17 Jan 2025 04:22)  T(F): 98.3 (17 Jan 2025 08:18), Max: 98.6 (17 Jan 2025 04:22)  HR: 67 (17 Jan 2025 08:18) (62 - 68)  BP: 111/60 (17 Jan 2025 08:18) (102/67 - 112/60)  BP(mean): --  RR: 18 (17 Jan 2025 08:18) (18 - 18)  SpO2: 93% (17 Jan 2025 08:18) (93% - 98%)    Parameters below as of 17 Jan 2025 08:18  Patient On (Oxygen Delivery Method): room air

## 2025-01-17 NOTE — PROGRESS NOTE ADULT - SUBJECTIVE AND OBJECTIVE BOX
FAYE VILLANUEVA 74y Female  MRN#: [id number]         SUBJECTIVE  Patient is a 74y old Female who presents with a chief complaint of Dizziness, fall; r/o CVA (16 Jan 2025 11:43)  Currently admitted to medicine with the primary diagnosis of Dizziness      Today is hospital day 10d, and this morning she wants to go home and reports no overnight events.       OBJECTIVE  PAST MEDICAL & SURGICAL HISTORY  Depressed    Anxiety    Breast CA    HLD (hyperlipidemia)    S/P cholecystectomy      Home Meds:  Albuterol (Eqv-ProAir HFA) 90 mcg/inh inhalation aerosol: 1 puff(s) inhaled 2 times a day as needed for  shortness of breath and/or wheezing  atorvastatin 20 mg oral tablet: 1 tab(s) orally once a day (at bedtime)  Bicarsim 80 mg oral tablet, chewable: 1 tab(s) orally 2 times a day as needed for gas  buPROPion 150 mg/24 hours (XL) oral tablet, extended release: 1 tab(s) orally once a day  escitalopram 20 mg oral tablet: 1 tab(s) orally once a day  gabapentin 100 mg oral capsule: 1 cap(s) orally 2 times a day  lithium 450 mg oral tablet, extended release: 1 tab(s) orally once a day  loperamide 2 mg oral capsule: 1 cap(s) orally 3 times a day As needed Diarrhea  losartan 50 mg oral tablet: 1 tab(s) orally once a day  Plavix 75 mg oral tablet: 1 tab(s) orally once a day  QUEtiapine 100 mg oral tablet: 1 tab(s) orally once a day (at bedtime)  QUEtiapine 50 mg oral tablet: 1 tab(s) orally once a day    ALLERGIES:  Demerol HCl (Stomach Upset; Vomiting)  Cipro (Stomach Upset; Vomiting)    MEDICATIONS:  STANDING MEDICATIONS  atorvastatin 20 milliGRAM(s) Oral at bedtime  buPROPion XL (24-Hour) . 150 milliGRAM(s) Oral daily  clopidogrel Tablet 75 milliGRAM(s) Oral daily  escitalopram 20 milliGRAM(s) Oral daily  heparin   Injectable 5000 Unit(s) SubCutaneous every 12 hours  losartan 50 milliGRAM(s) Oral daily  QUEtiapine 100 milliGRAM(s) Oral at bedtime    PRN MEDICATIONS  acetaminophen     Tablet .. 650 milliGRAM(s) Oral every 6 hours PRN  albuterol    90 MICROgram(s) HFA Inhaler 1 Puff(s) Inhalation two times a day PRN  aluminum hydroxide/magnesium hydroxide/simethicone Suspension 30 milliLiter(s) Oral every 4 hours PRN  melatonin 3 milliGRAM(s) Oral at bedtime PRN  ondansetron Injectable 4 milliGRAM(s) IV Push every 8 hours PRN  simethicone 80 milliGRAM(s) Chew two times a day PRN      VITAL SIGNS: Last 24 Hours  T(C): 36.8 (17 Jan 2025 08:18), Max: 37 (17 Jan 2025 04:22)  T(F): 98.3 (17 Jan 2025 08:18), Max: 98.6 (17 Jan 2025 04:22)  HR: 67 (17 Jan 2025 08:18) (62 - 68)  BP: 111/60 (17 Jan 2025 08:18) (102/67 - 112/60)  BP(mean): --  RR: 18 (17 Jan 2025 08:18) (18 - 18)  SpO2: 93% (17 Jan 2025 08:18) (93% - 98%)    LABS:    Reviewed     RADIOLOGY:  Reviewed     PHYSICAL EXAM:  General: NAD, AAOx3  HEENT: atraumatic, normocephalic  Pulmonary: clear to auscultation bilaterally; No wheeze  Cardiovascular: Regular rate and rhythm; no murmurs, rubs or gallops. Normal S1S2  Gastrointestinal: Soft, nontender, nondistended; bowel sounds present  Musculoskeletal: 2+ peripheral pulses, no clubbing, cyanosis or edema  Neurology: Pt. alert and oriented, fluent speech, able to move all extremities  Skin: no rashes or lesions

## 2025-01-17 NOTE — BH CONSULTATION LIAISON PROGRESS NOTE - NSBHATTESTCOMMENTATTENDFT_PSY_A_CORE
For now, patients do not appear to have acute symptoms of psychosis, verenice or depression. She has not demonstrated any behaviors that would give a sucidal concern.   At this time, patient is not considered an imminent danger to herself or others and does not need inpatient psychiatric hospitalization. We are agreeable with the plan that patient needs a higher level of care and she can follow up with the psychiatrist and therapist at the subacute rehab.

## 2025-01-17 NOTE — BH CONSULTATION LIAISON PROGRESS NOTE - NSBHCHARTREVIEWVS_PSY_A_CORE FT
Vital Signs Last 24 Hrs  T(C): 36.8 (15 Tony 2025 08:54), Max: 37.5 (15 Tony 2025 04:20)  T(F): 98.2 (15 Tony 2025 08:54), Max: 99.5 (15 Tony 2025 04:20)  HR: 80 (15 Tony 2025 08:54) (74 - 80)  BP: 112/73 (15 Tony 2025 08:54) (109/69 - 112/73)  BP(mean): --  RR: 18 (15 Tony 2025 08:54) (18 - 19)  SpO2: 95% (15 Tony 2025 08:54) (92% - 96%)    Parameters below as of 15 Tony 2025 08:54  Patient On (Oxygen Delivery Method): room air    
Vital Signs Last 24 Hrs  T(C): 36.8 (17 Jan 2025 08:18), Max: 37 (17 Jan 2025 04:22)  T(F): 98.3 (17 Jan 2025 08:18), Max: 98.6 (17 Jan 2025 04:22)  HR: 67 (17 Jan 2025 08:18) (62 - 68)  BP: 111/60 (17 Jan 2025 08:18) (102/67 - 112/60)  BP(mean): --  RR: 18 (17 Jan 2025 08:18) (18 - 18)  SpO2: 93% (17 Jan 2025 08:18) (93% - 98%)    Parameters below as of 17 Jan 2025 08:18  Patient On (Oxygen Delivery Method): room air

## 2025-01-17 NOTE — BH CONSULTATION LIAISON PROGRESS NOTE - NSBHASSESSMENTFT_PSY_ALL_CORE
Patient is a 74 year old, female; domiciled with her adult son and ex-;  (2003); noncaregiver; unemployed on SSI; past psychiatric history of bipolar depression and anxiety; previously followed by [facility] and briefly followed up with [facility] but is currently without psychiatric follow up as she has not seen a psychiatrist for at least ~3-4 months now, 3 prior psychiatric hospitalizations last at  in June 2024 (also followed by [facility] in September when she was medically admitted for generalized weakness, [facility] and [place] over 10 years ago; no known suicide attempts; no known history of violence or arrests; no active substance abuse or known history of complicated withdrawal; PMH of arthritis, fibromyalgia; on Plavix, brought in by EMS; called by ex- due to a fall at home. She was admitted medically to evaluate for CVA and workup has proved to be unremarkable and patient is close to approaching discharge at this time. Psychiatry was consulted for medication management as patient and her  are concerned that she is "overmedicated."    At this time, there is no acute indication for psychiatric intervention at this time as patient is not a risk to herself or others so involuntary hospitalization is not warranted. Agree to [facility] for continuation of care. Recommend continuing current medication regimen, no side effects reported and no more tremors.     Plan:  - Patient does not meet criteria for inpatient admission  - Continue lexapro 20mg qd  - Continue wellbutrin XL 150mg qd  - Continue seroquel 100mg qHS   Patient is a 74 year old, female; domiciled with her adult son and ex-;  (2003); noncaregiver; unemployed on SSI; past psychiatric history of bipolar depression and anxiety; previously followed by [facility] and briefly followed up with [facility] but is currently without psychiatric follow up as she has not seen a psychiatrist for at least ~3-4 months now, 3 prior psychiatric hospitalizations last at  in June 2024 (also followed by [facility] in September when she was medically admitted for generalized weakness, [facility] and [place] over 10 years ago; no known suicide attempts; no known history of violence or arrests; no active substance abuse or known history of complicated withdrawal; PMH of arthritis, fibromyalgia; on Plavix, brought in by EMS; called by ex- due to a fall at home. She was admitted medically to evaluate for CVA and workup has proved to be unremarkable and patient is close to approaching discharge at this time. Psychiatry was consulted for medication management as patient and her  are concerned that she is "overmedicated."  Patient does not appear to have acute symptoms of psychosis , verenice or depression. She does not have suicidal ideations. She seems to be psychiatrically stable on her current medication regimen.   At this time, there is no acute indication for psychiatric intervention at this time as patient is not a risk to herself or others so involuntary hospitalization is not warranted. We agree to [facility] for continuation of care sine she is unable to take care of herself . We recommend continuing current medication regimen, no side effects reported and no more tremors.     Plan:  - Patient does not meet criteria for inpatient admission  - Continue lexapro 20mg qd  - Continue wellbutrin XL 150mg qd  - Continue seroquel 100mg qHS  - Agree to [facility] for continuation of care.  - Psychiatry will sign off , please recall as needed

## 2025-01-17 NOTE — BH CONSULTATION LIAISON PROGRESS NOTE - NSBHCONSULTFOLLOWAFTERCARE_PSY_A_CORE FT
recommend f/u with outpatient psychiatrist  recommend f/u with  psychiatrist and therapist at the [facility]

## 2025-01-17 NOTE — PROGRESS NOTE ADULT - ASSESSMENT
74F PMHx Panic Attacks, HTN, Depression, Anxiety, Fibromyalgia, Breast CA, HLD presented to the ED after a fall from bed. Patient was unable to get up, which caused the patients  to be concerned and bring her in. Patient admitted for CVA.     Plan:   Dizziness and fall  -  MRI brain and thoracic spine cancelled as patient refusing. Low suspicion based on CT results (not acute, age indeterminate- also does not correlate w physical exam)  - Clopidogrel 75 mg PO qd   - PT, [facility]    Acute kidney injury  - Now resolved  - s/p fluids   - Monitor renal function in daily lab work    Bipolar disorder  Major depressive disorder  Generalized anxiety disorder  - Continue albuterol PRN for shortness of breath  - Continue bupropion 150mg daily --> slow taper outpatient   - Continue escitalopram 20mg daily --> slow taper outpatient   - Continue gabapentin 100mg BID --> DC   - Continue Seroquel 50mg AM and 100mg HS --> DC AM dose   - Continue lithium --> DC  - repeat lithium 0.84 , lithium level downtrending 1.48 --> 0.84 --> 0.89 this AM   - Consider  consult if level is again elevated    Hypertension  Hyperlipidemia  - Continue Plavix 75mg daily  - Continue atorvastatin 20mg HS  - Continue losartan 50mg daily      DVT/GI ppx: Heparin  Diet: DASH  GI: None   Dispo: DC to [facility], pending auth

## 2025-01-17 NOTE — BH CONSULTATION LIAISON PROGRESS NOTE - NSBHFUPINTERVALHXFT_PSY_A_CORE
Patient is a 74 year old, female; domiciled with her adult son and ex-;  (2003); noncaregiver; unemployed on SSI; past psychiatric history of bipolar depression and anxiety; ...    Patient seen and assessed for follow up, seen laying in bed, cooperative with encounter. Patient understood plan for [facility], denies any acute physical complaints today. Patient reports her tremors have improved, likely 2/2 lithium which has been discontinued. Patient appeared to have what appeared to be some vocal tics, but stopped during encounter without issue. Otherwise patient reports some nervousness but denies SI/HI/AVH. No acute overnight events. No side effects reported to current medications. Patient seen and assessed for follow up, seen laying in bed, cooperative with encounter.   Patient understood plan for [facility], denies any acute physical complaints today. Patient reports her tremors have improved, likely 2/2 lithium which has been discontinued. Patient appeared to have what appeared to be some vocal tics, but stopped during encounter without issue. Otherwise patient reports some nervousness but denies SI/HI/AVH. No acute overnight events. No side effects reported to current medications.

## 2025-01-18 PROCEDURE — 99232 SBSQ HOSP IP/OBS MODERATE 35: CPT

## 2025-01-18 RX ADMIN — QUETIAPINE FUMARATE 100 MILLIGRAM(S): 300 TABLET ORAL at 21:04

## 2025-01-18 RX ADMIN — Medication 5000 UNIT(S): at 05:06

## 2025-01-18 RX ADMIN — Medication 50 MILLIGRAM(S): at 05:06

## 2025-01-18 RX ADMIN — Medication 5000 UNIT(S): at 17:18

## 2025-01-18 RX ADMIN — ACETAMINOPHEN, DIPHENHYDRAMINE HCL, PHENYLEPHRINE HCL 3 MILLIGRAM(S): 325; 25; 5 TABLET ORAL at 21:04

## 2025-01-18 RX ADMIN — Medication 75 MILLIGRAM(S): at 12:32

## 2025-01-18 RX ADMIN — ESCITALOPRAM 20 MILLIGRAM(S): 10 TABLET, FILM COATED ORAL at 12:32

## 2025-01-18 RX ADMIN — BUPROPION HYDROCHLORIDE 150 MILLIGRAM(S): 150 TABLET, EXTENDED RELEASE ORAL at 12:32

## 2025-01-18 RX ADMIN — ATORVASTATIN CALCIUM 20 MILLIGRAM(S): 80 TABLET, FILM COATED ORAL at 21:04

## 2025-01-18 NOTE — PROGRESS NOTE ADULT - SUBJECTIVE AND OBJECTIVE BOX
FAYE VILLANUEVA 74y Female  MRN#: [id number]         SUBJECTIVE  Patient is a 74y old Female who presents with a chief complaint of Dizziness, fall; r/o CVA (17 Jan 2025 13:38)  Currently admitted to medicine with the primary diagnosis of Dizziness    Hospital course has been complicated by difficult [facility] placement, complex social work case.   Today is hospital day 11d, and this morning she is _________ and reports no overnight events.     OBJECTIVE  PAST MEDICAL & SURGICAL HISTORY  Depressed    Anxiety    Breast CA    HLD (hyperlipidemia)    S/P cholecystectomy      Home Meds:  Albuterol (Eqv-ProAir HFA) 90 mcg/inh inhalation aerosol: 1 puff(s) inhaled 2 times a day as needed for  shortness of breath and/or wheezing  atorvastatin 20 mg oral tablet: 1 tab(s) orally once a day (at bedtime)  Bicarsim 80 mg oral tablet, chewable: 1 tab(s) orally 2 times a day as needed for gas  buPROPion 150 mg/24 hours (XL) oral tablet, extended release: 1 tab(s) orally once a day  escitalopram 20 mg oral tablet: 1 tab(s) orally once a day  gabapentin 100 mg oral capsule: 1 cap(s) orally 2 times a day  lithium 450 mg oral tablet, extended release: 1 tab(s) orally once a day  loperamide 2 mg oral capsule: 1 cap(s) orally 3 times a day As needed Diarrhea  losartan 50 mg oral tablet: 1 tab(s) orally once a day  Plavix 75 mg oral tablet: 1 tab(s) orally once a day  QUEtiapine 100 mg oral tablet: 1 tab(s) orally once a day (at bedtime)  QUEtiapine 50 mg oral tablet: 1 tab(s) orally once a day    ALLERGIES:  Demerol HCl (Stomach Upset; Vomiting)  Cipro (Stomach Upset; Vomiting)    MEDICATIONS:  STANDING MEDICATIONS  atorvastatin 20 milliGRAM(s) Oral at bedtime  buPROPion XL (24-Hour) . 150 milliGRAM(s) Oral daily  clopidogrel Tablet 75 milliGRAM(s) Oral daily  escitalopram 20 milliGRAM(s) Oral daily  heparin   Injectable 5000 Unit(s) SubCutaneous every 12 hours  losartan 50 milliGRAM(s) Oral daily  QUEtiapine 100 milliGRAM(s) Oral at bedtime    PRN MEDICATIONS  acetaminophen     Tablet .. 650 milliGRAM(s) Oral every 6 hours PRN  albuterol    90 MICROgram(s) HFA Inhaler 1 Puff(s) Inhalation two times a day PRN  aluminum hydroxide/magnesium hydroxide/simethicone Suspension 30 milliLiter(s) Oral every 4 hours PRN  melatonin 3 milliGRAM(s) Oral at bedtime PRN  ondansetron Injectable 4 milliGRAM(s) IV Push every 8 hours PRN  simethicone 80 milliGRAM(s) Chew two times a day PRN      VITAL SIGNS: Last 24 Hours  T(C): 36.4 (18 Jan 2025 04:50), Max: 36.9 (17 Jan 2025 20:55)  T(F): 97.5 (18 Jan 2025 04:50), Max: 98.5 (17 Jan 2025 20:55)  HR: 64 (18 Jan 2025 04:50) (64 - 88)  BP: 114/57 (18 Jan 2025 04:50) (106/63 - 114/57)  BP(mean): --  RR: 19 (18 Jan 2025 04:50) (18 - 19)  SpO2: 94% (18 Jan 2025 04:50) (93% - 98%)    LABS:  Reviewed     RADIOLOGY:  Reviewed     PHYSICAL EXAM:  General: NAD, AAOx3  HEENT: atraumatic, normocephalic  Pulmonary: clear to auscultation bilaterally; No wheeze  Cardiovascular: Regular rate and rhythm; no murmurs, rubs or gallops. Normal S1S2  Gastrointestinal: Soft, nontender, nondistended; bowel sounds present  Musculoskeletal: 2+ peripheral pulses, no clubbing, cyanosis or edema  Neurology: Pt. alert and oriented, fluent speech, able to move all extremities  Skin: no rashes or lesions             FAYE VILLANUEVA 74y Female  MRN#: [id number]         SUBJECTIVE  Patient is a 74y old Female who presents with a chief complaint of Dizziness, fall; r/o CVA (17 Jan 2025 13:38)  Currently admitted to medicine with the primary diagnosis of Dizziness    Hospital course has been complicated by difficult [facility] placement, complex social work case.   Today is hospital day 11d, and this morning she is sleeping, does not want to talk, and reports no overnight events.     OBJECTIVE  PAST MEDICAL & SURGICAL HISTORY  Depressed    Anxiety    Breast CA    HLD (hyperlipidemia)    S/P cholecystectomy      Home Meds:  Albuterol (Eqv-ProAir HFA) 90 mcg/inh inhalation aerosol: 1 puff(s) inhaled 2 times a day as needed for  shortness of breath and/or wheezing  atorvastatin 20 mg oral tablet: 1 tab(s) orally once a day (at bedtime)  Bicarsim 80 mg oral tablet, chewable: 1 tab(s) orally 2 times a day as needed for gas  buPROPion 150 mg/24 hours (XL) oral tablet, extended release: 1 tab(s) orally once a day  escitalopram 20 mg oral tablet: 1 tab(s) orally once a day  gabapentin 100 mg oral capsule: 1 cap(s) orally 2 times a day  lithium 450 mg oral tablet, extended release: 1 tab(s) orally once a day  loperamide 2 mg oral capsule: 1 cap(s) orally 3 times a day As needed Diarrhea  losartan 50 mg oral tablet: 1 tab(s) orally once a day  Plavix 75 mg oral tablet: 1 tab(s) orally once a day  QUEtiapine 100 mg oral tablet: 1 tab(s) orally once a day (at bedtime)  QUEtiapine 50 mg oral tablet: 1 tab(s) orally once a day    ALLERGIES:  Demerol HCl (Stomach Upset; Vomiting)  Cipro (Stomach Upset; Vomiting)    MEDICATIONS:  STANDING MEDICATIONS  atorvastatin 20 milliGRAM(s) Oral at bedtime  buPROPion XL (24-Hour) . 150 milliGRAM(s) Oral daily  clopidogrel Tablet 75 milliGRAM(s) Oral daily  escitalopram 20 milliGRAM(s) Oral daily  heparin   Injectable 5000 Unit(s) SubCutaneous every 12 hours  losartan 50 milliGRAM(s) Oral daily  QUEtiapine 100 milliGRAM(s) Oral at bedtime    PRN MEDICATIONS  acetaminophen     Tablet .. 650 milliGRAM(s) Oral every 6 hours PRN  albuterol    90 MICROgram(s) HFA Inhaler 1 Puff(s) Inhalation two times a day PRN  aluminum hydroxide/magnesium hydroxide/simethicone Suspension 30 milliLiter(s) Oral every 4 hours PRN  melatonin 3 milliGRAM(s) Oral at bedtime PRN  ondansetron Injectable 4 milliGRAM(s) IV Push every 8 hours PRN  simethicone 80 milliGRAM(s) Chew two times a day PRN      VITAL SIGNS: Last 24 Hours  T(C): 36.4 (18 Jan 2025 04:50), Max: 36.9 (17 Jan 2025 20:55)  T(F): 97.5 (18 Jan 2025 04:50), Max: 98.5 (17 Jan 2025 20:55)  HR: 64 (18 Jan 2025 04:50) (64 - 88)  BP: 114/57 (18 Jan 2025 04:50) (106/63 - 114/57)  BP(mean): --  RR: 19 (18 Jan 2025 04:50) (18 - 19)  SpO2: 94% (18 Jan 2025 04:50) (93% - 98%)    LABS:  Reviewed     RADIOLOGY:  Reviewed     PHYSICAL EXAM:  General: NAD, sleeping   HEENT: atraumatic, normocephalic  Pulmonary: clear to auscultation bilaterally; No wheeze  Cardiovascular: Regular rate and rhythm; no murmurs, rubs or gallops. Normal S1S2  Gastrointestinal: Soft, nontender, nondistended; bowel sounds present  Musculoskeletal: 2+ peripheral pulses, no clubbing, cyanosis or edema  Neurology: Arousable to voice, did not want to talk or comply with exam   Skin: no rashes or lesions

## 2025-01-18 NOTE — PROGRESS NOTE ADULT - ASSESSMENT
74F PMHx Panic Attacks, HTN, Depression, Anxiety, Fibromyalgia, Breast CA, HLD presented to the ED after a fall from bed. Patient was unable to get up, which caused the patients  to be concerned and bring her in. Patient admitted for CVA.     Plan:   Dizziness and fall  -  MRI brain and thoracic spine cancelled as patient refusing. Low suspicion based on CT results (not acute, age indeterminate- also does not correlate w physical exam)  - Clopidogrel 75 mg PO qd   - PT, [facility]    Acute kidney injury  - Now resolved  - s/p fluids     Bipolar disorder  Major depressive disorder  Generalized anxiety disorder  - Continue albuterol PRN for shortness of breath  - Continue bupropion 150mg daily --> slow taper outpatient   - Continue escitalopram 20mg daily --> slow taper outpatient   - On admission DC'd gabapentin 100mg BID, AM 50 mg dose of seroquel, lithium    - Continue Seroquel 100mg HS   - BH followed, no further recs     Hypertension  Hyperlipidemia  - Continue Plavix 75mg daily  - Continue atorvastatin 20mg HS  - Continue losartan 50mg daily      DVT/GI ppx: Heparin  Diet: DASH  GI: None   Dispo: DC to [facility], pending placement  74F PMHx Panic Attacks, HTN, Depression, Anxiety, Fibromyalgia, Breast CA, HLD presented to the ED after a fall from bed. Patient was unable to get up, which caused the patients  to be concerned and bring her in. Patient admitted for CVA.     Plan:   Dizziness and fall  Polypharmacy   Deconditioning   -  MRI brain and thoracic spine cancelled as patient refusing. Low suspicion based on CT results (not acute, age indeterminate- also does not correlate w physical exam)  - Clopidogrel 75 mg PO qd   - PT, [facility]    Acute kidney injury  - Now resolved  - s/p fluids     Bipolar disorder  Major depressive disorder  Generalized anxiety disorder  - Continue albuterol PRN for shortness of breath  - Continue bupropion 150mg daily --> slow taper outpatient   - Continue escitalopram 20mg daily --> slow taper outpatient   - On admission DC'd gabapentin 100mg BID, AM 50 mg dose of seroquel, lithium    - Continue Seroquel 100mg HS   - BH followed, no further recs     Hypertension  Hyperlipidemia  - Continue Plavix 75mg daily  - Continue atorvastatin 20mg HS  - Continue losartan 50mg daily      DVT/GI ppx: Heparin  Diet: DASH  GI: None   Dispo: DC to [facility] but pending Level 2 psych clearance due to psych admissions/non-compliance with PT at [facility], will take a week or more for this process to occur.

## 2025-01-19 PROCEDURE — 99232 SBSQ HOSP IP/OBS MODERATE 35: CPT

## 2025-01-19 RX ORDER — OLANZAPINE 10 MG/1
2.5 TABLET, FILM COATED ORAL EVERY 6 HOURS
Refills: 0 | Status: DISCONTINUED | OUTPATIENT
Start: 2025-01-19 | End: 2025-01-24

## 2025-01-19 RX ADMIN — Medication 75 MILLIGRAM(S): at 11:47

## 2025-01-19 RX ADMIN — BUPROPION HYDROCHLORIDE 150 MILLIGRAM(S): 150 TABLET, EXTENDED RELEASE ORAL at 11:47

## 2025-01-19 RX ADMIN — ESCITALOPRAM 20 MILLIGRAM(S): 10 TABLET, FILM COATED ORAL at 11:47

## 2025-01-19 RX ADMIN — Medication 5000 UNIT(S): at 05:37

## 2025-01-19 RX ADMIN — Medication 5000 UNIT(S): at 17:06

## 2025-01-19 RX ADMIN — OLANZAPINE 2.5 MILLIGRAM(S): 10 TABLET, FILM COATED ORAL at 17:06

## 2025-01-19 RX ADMIN — QUETIAPINE FUMARATE 100 MILLIGRAM(S): 300 TABLET ORAL at 21:30

## 2025-01-19 RX ADMIN — ATORVASTATIN CALCIUM 20 MILLIGRAM(S): 80 TABLET, FILM COATED ORAL at 21:31

## 2025-01-19 NOTE — PROGRESS NOTE ADULT - ASSESSMENT
74F PMHx Panic Attacks, HTN, Depression, Anxiety, Fibromyalgia, Breast CA, HLD presented to the ED after a fall from bed. Patient was unable to get up, which caused the patients  to be concerned and bring her in. Patient admitted for CVA.     Plan:   Dizziness and fall  Polypharmacy   Deconditioning   - Clopidogrel 75 mg PO qd   - PT, [facility]    Acute kidney injury  - Now resolved  - s/p fluids     Bipolar disorder  Major depressive disorder  Generalized anxiety disorder  - Continue albuterol PRN for shortness of breath  - Continue bupropion 150mg daily --> slow taper outpatient   - Continue escitalopram 20mg daily --> slow taper outpatient   - On admission DC'd gabapentin 100mg BID, AM 50 mg dose of seroquel, lithium    - Continue Seroquel 100mg HS   - BH followed, no further recs     Hypertension  Hyperlipidemia  - Continue Plavix 75mg daily  - Continue atorvastatin 20mg HS  - Continue losartan 50mg daily      DVT/GI ppx: Heparin  Diet: DASH  GI: None   Dispo: DC to [facility] but pending Level 2 psych clearance due to psych admissions/non-compliance with PT at [facility],

## 2025-01-19 NOTE — PROGRESS NOTE ADULT - SUBJECTIVE AND OBJECTIVE BOX
[facility] Division of Hospital Medicine    SUBJECTIVE / OVERNIGHT EVENTS:  No events    Patient denies chest pain, SOB, abd pain, N/V, fever, chills, dysuria or any other complaints. All remainder ROS negative.     MEDICATIONS  (STANDING):  atorvastatin 20 milliGRAM(s) Oral at bedtime  buPROPion XL (24-Hour) . 150 milliGRAM(s) Oral daily  clopidogrel Tablet 75 milliGRAM(s) Oral daily  escitalopram 20 milliGRAM(s) Oral daily  heparin   Injectable 5000 Unit(s) SubCutaneous every 12 hours  losartan 50 milliGRAM(s) Oral daily  QUEtiapine 100 milliGRAM(s) Oral at bedtime    MEDICATIONS  (PRN):  acetaminophen     Tablet .. 650 milliGRAM(s) Oral every 6 hours PRN Temp greater or equal to 38C (100.4F), Mild Pain (1 - 3)  albuterol    90 MICROgram(s) HFA Inhaler 1 Puff(s) Inhalation two times a day PRN for shortness of breath and/or wheezing  aluminum hydroxide/magnesium hydroxide/simethicone Suspension 30 milliLiter(s) Oral every 4 hours PRN Dyspepsia  melatonin 3 milliGRAM(s) Oral at bedtime PRN Insomnia  ondansetron Injectable 4 milliGRAM(s) IV Push every 8 hours PRN Nausea and/or Vomiting  simethicone 80 milliGRAM(s) Chew two times a day PRN gas        I&O's Summary    18 Jan 2025 07:01  -  19 Jan 2025 07:00  --------------------------------------------------------  IN: 0 mL / OUT: 400 mL / NET: -400 mL        PHYSICAL EXAM:  Vital Signs Last 24 Hrs  T(C): 36.5 (19 Jan 2025 09:31), Max: 37.3 (18 Jan 2025 17:00)  T(F): 97.7 (19 Jan 2025 09:31), Max: 99.1 (18 Jan 2025 17:00)  HR: 73 (19 Jan 2025 09:31) (64 - 84)  BP: 97/62 (19 Jan 2025 09:31) (93/53 - 101/55)  BP(mean): --  RR: 18 (19 Jan 2025 09:31) (18 - 19)  SpO2: 93% (19 Jan 2025 09:31) (92% - 97%)    Parameters below as of 19 Jan 2025 09:31  Patient On (Oxygen Delivery Method): room air            CONSTITUTIONAL: NAD, appears stated age  ENMT: Moist oral mucosa, no pharyngeal injection or exudates; normal dentition  RESPIRATORY: Normal respiratory effort; clear to auscultation bilaterally  CARDIOVASCULAR: Regular rate and rhythm, normal S1 and S2, no murmur/rub/gallop; Peripheral pulses are 2+ bilaterally  ABDOMEN: Nontender to palpation, normoactive bowel sounds, no rebound/guarding;   MUSCLOSKELETAL:  No clubbing or cyanosis of digits; no joint swelling or tenderness to palpation  PSYCH: A+O to person, place, and time; affect appropriate  NEUROLOGY: CN 2-12 are intact and symmetric; no gross sensory deficits;   SKIN: No rashes; no palpable lesions    LABS:                    CAPILLARY BLOOD GLUCOSE            RADIOLOGY & ADDITIONAL TESTS:  Results Reviewed:   Imaging Personally Reviewed:  Electrocardiogram Personally Reviewed:

## 2025-01-20 PROCEDURE — 99232 SBSQ HOSP IP/OBS MODERATE 35: CPT

## 2025-01-20 RX ADMIN — QUETIAPINE FUMARATE 100 MILLIGRAM(S): 300 TABLET ORAL at 21:44

## 2025-01-20 RX ADMIN — Medication 50 MILLIGRAM(S): at 05:33

## 2025-01-20 RX ADMIN — Medication 5000 UNIT(S): at 05:33

## 2025-01-20 RX ADMIN — Medication 5000 UNIT(S): at 17:01

## 2025-01-20 RX ADMIN — ESCITALOPRAM 20 MILLIGRAM(S): 10 TABLET, FILM COATED ORAL at 10:39

## 2025-01-20 RX ADMIN — ATORVASTATIN CALCIUM 20 MILLIGRAM(S): 80 TABLET, FILM COATED ORAL at 21:44

## 2025-01-20 RX ADMIN — Medication 75 MILLIGRAM(S): at 10:39

## 2025-01-20 RX ADMIN — BUPROPION HYDROCHLORIDE 150 MILLIGRAM(S): 150 TABLET, EXTENDED RELEASE ORAL at 10:39

## 2025-01-20 RX ADMIN — ACETAMINOPHEN, DIPHENHYDRAMINE HCL, PHENYLEPHRINE HCL 3 MILLIGRAM(S): 325; 25; 5 TABLET ORAL at 21:44

## 2025-01-20 NOTE — PROGRESS NOTE ADULT - SUBJECTIVE AND OBJECTIVE BOX
FAYE VILLANUEVA 74y Female  MRN#: [id number]         SUBJECTIVE  Patient is a 74y old Female who presents with a chief complaint of Dizziness, fall; r/o CVA (19 Jan 2025 13:16)  Currently admitted to medicine with the primary diagnosis of Dizziness      Today is hospital day 13d, and this morning she is _________ and reports no overnight events.       OBJECTIVE  PAST MEDICAL & SURGICAL HISTORY  Depressed    Anxiety    Breast CA    HLD (hyperlipidemia)    S/P cholecystectomy      Home Meds:  Albuterol (Eqv-ProAir HFA) 90 mcg/inh inhalation aerosol: 1 puff(s) inhaled 2 times a day as needed for  shortness of breath and/or wheezing  atorvastatin 20 mg oral tablet: 1 tab(s) orally once a day (at bedtime)  Bicarsim 80 mg oral tablet, chewable: 1 tab(s) orally 2 times a day as needed for gas  buPROPion 150 mg/24 hours (XL) oral tablet, extended release: 1 tab(s) orally once a day  escitalopram 20 mg oral tablet: 1 tab(s) orally once a day  gabapentin 100 mg oral capsule: 1 cap(s) orally 2 times a day  lithium 450 mg oral tablet, extended release: 1 tab(s) orally once a day  loperamide 2 mg oral capsule: 1 cap(s) orally 3 times a day As needed Diarrhea  losartan 50 mg oral tablet: 1 tab(s) orally once a day  Plavix 75 mg oral tablet: 1 tab(s) orally once a day  QUEtiapine 100 mg oral tablet: 1 tab(s) orally once a day (at bedtime)  QUEtiapine 50 mg oral tablet: 1 tab(s) orally once a day    ALLERGIES:  Demerol HCl (Stomach Upset; Vomiting)  Cipro (Stomach Upset; Vomiting)    MEDICATIONS:  STANDING MEDICATIONS  atorvastatin 20 milliGRAM(s) Oral at bedtime  buPROPion XL (24-Hour) . 150 milliGRAM(s) Oral daily  clopidogrel Tablet 75 milliGRAM(s) Oral daily  escitalopram 20 milliGRAM(s) Oral daily  heparin   Injectable 5000 Unit(s) SubCutaneous every 12 hours  losartan 50 milliGRAM(s) Oral daily  QUEtiapine 100 milliGRAM(s) Oral at bedtime    PRN MEDICATIONS  acetaminophen     Tablet .. 650 milliGRAM(s) Oral every 6 hours PRN  albuterol    90 MICROgram(s) HFA Inhaler 1 Puff(s) Inhalation two times a day PRN  aluminum hydroxide/magnesium hydroxide/simethicone Suspension 30 milliLiter(s) Oral every 4 hours PRN  melatonin 3 milliGRAM(s) Oral at bedtime PRN  OLANZapine 2.5 milliGRAM(s) Oral every 6 hours PRN  ondansetron Injectable 4 milliGRAM(s) IV Push every 8 hours PRN  simethicone 80 milliGRAM(s) Chew two times a day PRN      VITAL SIGNS: Last 24 Hours  T(C): 36.4 (20 Jan 2025 05:15), Max: 37.2 (19 Jan 2025 19:49)  T(F): 97.6 (20 Jan 2025 05:15), Max: 98.9 (19 Jan 2025 19:49)  HR: 64 (20 Jan 2025 05:15) (64 - 73)  BP: 119/60 (20 Jan 2025 05:15) (97/62 - 119/60)  RR: 18 (20 Jan 2025 05:15) (18 - 19)  SpO2: 96% (20 Jan 2025 05:15) (93% - 96%)    LABS:    Reviewed                     RADIOLOGY:      PHYSICAL EXAM:  General: NAD, AAOx3  HEENT: atraumatic, normocephalic  Pulmonary: clear to auscultation bilaterally; No wheeze  Cardiovascular: Regular rate and rhythm; no murmurs, rubs or gallops. Normal S1S2  Gastrointestinal: Soft, nontender, nondistended; bowel sounds present  Musculoskeletal: 2+ peripheral pulses, no clubbing, cyanosis or edema  Neurology: Pt. alert and oriented, fluent speech, able to move all extremities  Skin: no rashes or lesions             FAYE VILLANUEVA 74y Female  MRN#: [id number]         SUBJECTIVE  Patient is a 74y old Female who presents with a chief complaint of Dizziness, fall; r/o CVA (19 Jan 2025 13:16)  Currently admitted to medicine with the primary diagnosis of Dizziness      Today is hospital day 13d, and this morning she is sleeping, does not want to eat breakfast, and reports no overnight events.       OBJECTIVE  PAST MEDICAL & SURGICAL HISTORY  Depressed    Anxiety    Breast CA    HLD (hyperlipidemia)    S/P cholecystectomy      Home Meds:  Albuterol (Eqv-ProAir HFA) 90 mcg/inh inhalation aerosol: 1 puff(s) inhaled 2 times a day as needed for  shortness of breath and/or wheezing  atorvastatin 20 mg oral tablet: 1 tab(s) orally once a day (at bedtime)  Bicarsim 80 mg oral tablet, chewable: 1 tab(s) orally 2 times a day as needed for gas  buPROPion 150 mg/24 hours (XL) oral tablet, extended release: 1 tab(s) orally once a day  escitalopram 20 mg oral tablet: 1 tab(s) orally once a day  gabapentin 100 mg oral capsule: 1 cap(s) orally 2 times a day  lithium 450 mg oral tablet, extended release: 1 tab(s) orally once a day  loperamide 2 mg oral capsule: 1 cap(s) orally 3 times a day As needed Diarrhea  losartan 50 mg oral tablet: 1 tab(s) orally once a day  Plavix 75 mg oral tablet: 1 tab(s) orally once a day  QUEtiapine 100 mg oral tablet: 1 tab(s) orally once a day (at bedtime)  QUEtiapine 50 mg oral tablet: 1 tab(s) orally once a day    ALLERGIES:  Demerol HCl (Stomach Upset; Vomiting)  Cipro (Stomach Upset; Vomiting)    MEDICATIONS:  STANDING MEDICATIONS  atorvastatin 20 milliGRAM(s) Oral at bedtime  buPROPion XL (24-Hour) . 150 milliGRAM(s) Oral daily  clopidogrel Tablet 75 milliGRAM(s) Oral daily  escitalopram 20 milliGRAM(s) Oral daily  heparin   Injectable 5000 Unit(s) SubCutaneous every 12 hours  losartan 50 milliGRAM(s) Oral daily  QUEtiapine 100 milliGRAM(s) Oral at bedtime    PRN MEDICATIONS  acetaminophen     Tablet .. 650 milliGRAM(s) Oral every 6 hours PRN  albuterol    90 MICROgram(s) HFA Inhaler 1 Puff(s) Inhalation two times a day PRN  aluminum hydroxide/magnesium hydroxide/simethicone Suspension 30 milliLiter(s) Oral every 4 hours PRN  melatonin 3 milliGRAM(s) Oral at bedtime PRN  OLANZapine 2.5 milliGRAM(s) Oral every 6 hours PRN  ondansetron Injectable 4 milliGRAM(s) IV Push every 8 hours PRN  simethicone 80 milliGRAM(s) Chew two times a day PRN      VITAL SIGNS: Last 24 Hours  T(C): 36.4 (20 Jan 2025 05:15), Max: 37.2 (19 Jan 2025 19:49)  T(F): 97.6 (20 Jan 2025 05:15), Max: 98.9 (19 Jan 2025 19:49)  HR: 64 (20 Jan 2025 05:15) (64 - 73)  BP: 119/60 (20 Jan 2025 05:15) (97/62 - 119/60)  RR: 18 (20 Jan 2025 05:15) (18 - 19)  SpO2: 96% (20 Jan 2025 05:15) (93% - 96%)    LABS:    Reviewed                     RADIOLOGY:      PHYSICAL EXAM:  General: NAD, sleeping   HEENT: atraumatic, normocephalic  Pulmonary: clear to auscultation bilaterally; No wheeze  Cardiovascular: Regular rate and rhythm; no murmurs, rubs or gallops. Normal S1S2  Gastrointestinal: Soft, nontender, nondistended; bowel sounds present  Musculoskeletal: 2+ peripheral pulses, no clubbing, cyanosis or edema  Neurology: does  not want to comply with exam  Skin: no rashes or lesions

## 2025-01-20 NOTE — PROGRESS NOTE ADULT - ASSESSMENT
74F PMHx Panic Attacks, HTN, Depression, Anxiety, Fibromyalgia, Breast CA, HLD presented to the ED after a fall from bed. Patient was unable to get up, which caused the patients  to be concerned and bring her in. Patient admitted for CVA.     Plan:   Dizziness and fall  Polypharmacy   Deconditioning   - Clopidogrel 75 mg PO qd   - PT, [facility]    Acute kidney injury  - Now resolved  - s/p fluids     Bipolar disorder  Major depressive disorder  Generalized anxiety disorder  - Continue albuterol PRN for shortness of breath  - Continue bupropion 150mg daily and escitalopram 20mg daily --> slow taper outpatient   - On admission DC'd gabapentin 100mg BID, AM 50 mg dose of seroquel, lithium    - Continue Seroquel 100mg HS   - BH followed, no further recs     Hypertension  Hyperlipidemia  - Continue Plavix 75mg daily  - Continue atorvastatin 20mg HS  - Continue losartan 50mg daily      DVT/GI ppx: Heparin  Diet: DASH  GI: None   Dispo: DC to [facility] but pending Level 2 psych clearance due to previous psych admissions/non-compliance with PT at [facility],

## 2025-01-21 PROCEDURE — 99232 SBSQ HOSP IP/OBS MODERATE 35: CPT

## 2025-01-21 RX ADMIN — Medication 50 MILLIGRAM(S): at 05:24

## 2025-01-21 RX ADMIN — Medication 75 MILLIGRAM(S): at 14:09

## 2025-01-21 RX ADMIN — ESCITALOPRAM 20 MILLIGRAM(S): 10 TABLET, FILM COATED ORAL at 14:09

## 2025-01-21 RX ADMIN — ATORVASTATIN CALCIUM 20 MILLIGRAM(S): 80 TABLET, FILM COATED ORAL at 22:55

## 2025-01-21 RX ADMIN — Medication 5000 UNIT(S): at 05:24

## 2025-01-21 RX ADMIN — ACETAMINOPHEN, DIPHENHYDRAMINE HCL, PHENYLEPHRINE HCL 3 MILLIGRAM(S): 325; 25; 5 TABLET ORAL at 22:55

## 2025-01-21 RX ADMIN — QUETIAPINE FUMARATE 100 MILLIGRAM(S): 300 TABLET ORAL at 22:55

## 2025-01-21 RX ADMIN — BUPROPION HYDROCHLORIDE 150 MILLIGRAM(S): 150 TABLET, EXTENDED RELEASE ORAL at 14:09

## 2025-01-21 NOTE — PROGRESS NOTE ADULT - ASSESSMENT
74F PMHx Panic Attacks, HTN, Depression, Anxiety, Fibromyalgia, Breast CA, HLD presented to the ED after a fall from bed. Patient was unable to get up, which caused the patients  to be concerned and bring her in. Patient admitted for CVA.     Plan:   Dizziness and fall  Polypharmacy   Deconditioning   - Clopidogrel 75 mg PO qd   - PT, [facility]    Acute kidney injury  - Now resolved  - s/p fluids     Bipolar disorder  Major depressive disorder  Generalized anxiety disorder  - Continue albuterol PRN for shortness of breath  - Continue bupropion 150mg daily and escitalopram 20mg daily --> slow taper outpatient   - On admission DC'd gabapentin 100mg BID, AM 50 mg dose of seroquel, lithium    - Continue Seroquel 100mg HS   - BH followed, no further recs     Hypertension  Hyperlipidemia  - Continue Plavix 75mg daily  - Continue atorvastatin 20mg HS  - Continue losartan 50mg daily      DVT/GI ppx: Heparin  Diet: DASH  GI: None   Dispo: DC to [facility] but pending Level 2 psych clearance due to previous psych admissions

## 2025-01-21 NOTE — PROGRESS NOTE ADULT - SUBJECTIVE AND OBJECTIVE BOX
FAYE VILLANUEVA 74y Female  MRN#: [id number]         SUBJECTIVE  Patient is a 74y old Female who presents with a chief complaint of Dizziness, fall; r/o CVA (20 Jan 2025 07:23)  Currently admitted to medicine with the primary diagnosis of Dizziness      Today is hospital day 14d, and this morning she is _________ and reports no overnight events.       OBJECTIVE  PAST MEDICAL & SURGICAL HISTORY  Depressed    Anxiety    Breast CA    HLD (hyperlipidemia)    S/P cholecystectomy      Home Meds:  Albuterol (Eqv-ProAir HFA) 90 mcg/inh inhalation aerosol: 1 puff(s) inhaled 2 times a day as needed for  shortness of breath and/or wheezing  atorvastatin 20 mg oral tablet: 1 tab(s) orally once a day (at bedtime)  Bicarsim 80 mg oral tablet, chewable: 1 tab(s) orally 2 times a day as needed for gas  buPROPion 150 mg/24 hours (XL) oral tablet, extended release: 1 tab(s) orally once a day  escitalopram 20 mg oral tablet: 1 tab(s) orally once a day  gabapentin 100 mg oral capsule: 1 cap(s) orally 2 times a day  lithium 450 mg oral tablet, extended release: 1 tab(s) orally once a day  loperamide 2 mg oral capsule: 1 cap(s) orally 3 times a day As needed Diarrhea  losartan 50 mg oral tablet: 1 tab(s) orally once a day  Plavix 75 mg oral tablet: 1 tab(s) orally once a day  QUEtiapine 100 mg oral tablet: 1 tab(s) orally once a day (at bedtime)  QUEtiapine 50 mg oral tablet: 1 tab(s) orally once a day    ALLERGIES:  Demerol HCl (Stomach Upset; Vomiting)  Cipro (Stomach Upset; Vomiting)    MEDICATIONS:  STANDING MEDICATIONS  atorvastatin 20 milliGRAM(s) Oral at bedtime  buPROPion XL (24-Hour) . 150 milliGRAM(s) Oral daily  clopidogrel Tablet 75 milliGRAM(s) Oral daily  escitalopram 20 milliGRAM(s) Oral daily  heparin   Injectable 5000 Unit(s) SubCutaneous every 12 hours  losartan 50 milliGRAM(s) Oral daily  QUEtiapine 100 milliGRAM(s) Oral at bedtime    PRN MEDICATIONS  acetaminophen     Tablet .. 650 milliGRAM(s) Oral every 6 hours PRN  albuterol    90 MICROgram(s) HFA Inhaler 1 Puff(s) Inhalation two times a day PRN  aluminum hydroxide/magnesium hydroxide/simethicone Suspension 30 milliLiter(s) Oral every 4 hours PRN  melatonin 3 milliGRAM(s) Oral at bedtime PRN  OLANZapine 2.5 milliGRAM(s) Oral every 6 hours PRN  ondansetron Injectable 4 milliGRAM(s) IV Push every 8 hours PRN  simethicone 80 milliGRAM(s) Chew two times a day PRN      VITAL SIGNS: Last 24 Hours  T(C): 36.3 (21 Jan 2025 04:25), Max: 37.4 (20 Jan 2025 16:32)  T(F): 97.3 (21 Jan 2025 04:25), Max: 99.3 (20 Jan 2025 16:32)  HR: 67 (21 Jan 2025 04:25) (67 - 73)  BP: 121/61 (21 Jan 2025 04:25) (105/60 - 121/61)  BP(mean): --  RR: 18 (21 Jan 2025 04:25) (18 - 19)  SpO2: 91% (21 Jan 2025 04:25) (91% - 95%)    LABS:    reviewed     RADIOLOGY:    reviewed    PHYSICAL EXAM:  General: NAD, AAOx3  HEENT: atraumatic, normocephalic  Pulmonary: clear to auscultation bilaterally; No wheeze  Cardiovascular: Regular rate and rhythm; no murmurs, rubs or gallops. Normal S1S2  Gastrointestinal: Soft, nontender, nondistended; bowel sounds present  Musculoskeletal: 2+ peripheral pulses, no clubbing, cyanosis or edema  Neurology: Pt. alert and oriented, fluent speech, able to move all extremities  Skin: no rashes or lesions             FAYE VILLANUEVA 74y Female  MRN#: [id number]         SUBJECTIVE  Patient is a 74y old Female who presents with a chief complaint of Dizziness, fall; r/o CVA (20 Jan 2025 07:23)  Currently admitted to medicine with the primary diagnosis of Dizziness      Today is hospital day 14d, and this morning she is sleeping in bed, not wanting to answer questions, and reports no overnight events.       OBJECTIVE  PAST MEDICAL & SURGICAL HISTORY  Depressed    Anxiety    Breast CA    HLD (hyperlipidemia)    S/P cholecystectomy      Home Meds:  Albuterol (Eqv-ProAir HFA) 90 mcg/inh inhalation aerosol: 1 puff(s) inhaled 2 times a day as needed for  shortness of breath and/or wheezing  atorvastatin 20 mg oral tablet: 1 tab(s) orally once a day (at bedtime)  Bicarsim 80 mg oral tablet, chewable: 1 tab(s) orally 2 times a day as needed for gas  buPROPion 150 mg/24 hours (XL) oral tablet, extended release: 1 tab(s) orally once a day  escitalopram 20 mg oral tablet: 1 tab(s) orally once a day  gabapentin 100 mg oral capsule: 1 cap(s) orally 2 times a day  lithium 450 mg oral tablet, extended release: 1 tab(s) orally once a day  loperamide 2 mg oral capsule: 1 cap(s) orally 3 times a day As needed Diarrhea  losartan 50 mg oral tablet: 1 tab(s) orally once a day  Plavix 75 mg oral tablet: 1 tab(s) orally once a day  QUEtiapine 100 mg oral tablet: 1 tab(s) orally once a day (at bedtime)  QUEtiapine 50 mg oral tablet: 1 tab(s) orally once a day    ALLERGIES:  Demerol HCl (Stomach Upset; Vomiting)  Cipro (Stomach Upset; Vomiting)    MEDICATIONS:  STANDING MEDICATIONS  atorvastatin 20 milliGRAM(s) Oral at bedtime  buPROPion XL (24-Hour) . 150 milliGRAM(s) Oral daily  clopidogrel Tablet 75 milliGRAM(s) Oral daily  escitalopram 20 milliGRAM(s) Oral daily  heparin   Injectable 5000 Unit(s) SubCutaneous every 12 hours  losartan 50 milliGRAM(s) Oral daily  QUEtiapine 100 milliGRAM(s) Oral at bedtime    PRN MEDICATIONS  acetaminophen     Tablet .. 650 milliGRAM(s) Oral every 6 hours PRN  albuterol    90 MICROgram(s) HFA Inhaler 1 Puff(s) Inhalation two times a day PRN  aluminum hydroxide/magnesium hydroxide/simethicone Suspension 30 milliLiter(s) Oral every 4 hours PRN  melatonin 3 milliGRAM(s) Oral at bedtime PRN  OLANZapine 2.5 milliGRAM(s) Oral every 6 hours PRN  ondansetron Injectable 4 milliGRAM(s) IV Push every 8 hours PRN  simethicone 80 milliGRAM(s) Chew two times a day PRN      VITAL SIGNS: Last 24 Hours  T(C): 36.3 (21 Jan 2025 04:25), Max: 37.4 (20 Jan 2025 16:32)  T(F): 97.3 (21 Jan 2025 04:25), Max: 99.3 (20 Jan 2025 16:32)  HR: 67 (21 Jan 2025 04:25) (67 - 73)  BP: 121/61 (21 Jan 2025 04:25) (105/60 - 121/61)  BP(mean): --  RR: 18 (21 Jan 2025 04:25) (18 - 19)  SpO2: 91% (21 Jan 2025 04:25) (91% - 95%)    LABS:    reviewed     RADIOLOGY:    reviewed    PHYSICAL EXAM:  General: NAD, sleeping  HEENT: atraumatic, normocephalic  Pulmonary: clear to auscultation bilaterally; No wheeze  Cardiovascular: Regular rate and rhythm; no murmurs, rubs or gallops. Normal S1S2  Gastrointestinal: Soft, nontender, nondistended; bowel sounds present  Musculoskeletal: 2+ peripheral pulses, no clubbing, cyanosis or edema  Neurology: Pt. alert and oriented, fluent speech, able to move all extremities  Skin: no rashes or lesions

## 2025-01-22 PROCEDURE — 99232 SBSQ HOSP IP/OBS MODERATE 35: CPT

## 2025-01-22 RX ADMIN — ESCITALOPRAM 20 MILLIGRAM(S): 10 TABLET, FILM COATED ORAL at 11:31

## 2025-01-22 RX ADMIN — QUETIAPINE FUMARATE 100 MILLIGRAM(S): 300 TABLET ORAL at 21:33

## 2025-01-22 RX ADMIN — Medication 75 MILLIGRAM(S): at 11:31

## 2025-01-22 RX ADMIN — Medication 5000 UNIT(S): at 05:23

## 2025-01-22 RX ADMIN — Medication 50 MILLIGRAM(S): at 05:23

## 2025-01-22 RX ADMIN — BUPROPION HYDROCHLORIDE 150 MILLIGRAM(S): 150 TABLET, EXTENDED RELEASE ORAL at 11:31

## 2025-01-22 RX ADMIN — ATORVASTATIN CALCIUM 20 MILLIGRAM(S): 80 TABLET, FILM COATED ORAL at 21:33

## 2025-01-22 NOTE — PROGRESS NOTE ADULT - SUBJECTIVE AND OBJECTIVE BOX
FAYE VILLANUEVA 74y Female  MRN#: [id number]         SUBJECTIVE  Patient is a 74y old Female who presents with a chief complaint of Dizziness, fall; r/o CVA (21 Jan 2025 07:05)  Currently admitted to medicine with the primary diagnosis of Dizziness      Today is hospital day 15d, and this morning she is _________ and reports no overnight events.       OBJECTIVE  PAST MEDICAL & SURGICAL HISTORY  Depressed    Anxiety    Breast CA    HLD (hyperlipidemia)    S/P cholecystectomy      Home Meds:  Albuterol (Eqv-ProAir HFA) 90 mcg/inh inhalation aerosol: 1 puff(s) inhaled 2 times a day as needed for  shortness of breath and/or wheezing  atorvastatin 20 mg oral tablet: 1 tab(s) orally once a day (at bedtime)  Bicarsim 80 mg oral tablet, chewable: 1 tab(s) orally 2 times a day as needed for gas  buPROPion 150 mg/24 hours (XL) oral tablet, extended release: 1 tab(s) orally once a day  escitalopram 20 mg oral tablet: 1 tab(s) orally once a day  gabapentin 100 mg oral capsule: 1 cap(s) orally 2 times a day  lithium 450 mg oral tablet, extended release: 1 tab(s) orally once a day  loperamide 2 mg oral capsule: 1 cap(s) orally 3 times a day As needed Diarrhea  losartan 50 mg oral tablet: 1 tab(s) orally once a day  Plavix 75 mg oral tablet: 1 tab(s) orally once a day  QUEtiapine 100 mg oral tablet: 1 tab(s) orally once a day (at bedtime)  QUEtiapine 50 mg oral tablet: 1 tab(s) orally once a day    ALLERGIES:  Demerol HCl (Stomach Upset; Vomiting)  Cipro (Stomach Upset; Vomiting)    MEDICATIONS:  STANDING MEDICATIONS  atorvastatin 20 milliGRAM(s) Oral at bedtime  buPROPion XL (24-Hour) . 150 milliGRAM(s) Oral daily  clopidogrel Tablet 75 milliGRAM(s) Oral daily  escitalopram 20 milliGRAM(s) Oral daily  heparin   Injectable 5000 Unit(s) SubCutaneous every 12 hours  losartan 50 milliGRAM(s) Oral daily  QUEtiapine 100 milliGRAM(s) Oral at bedtime    PRN MEDICATIONS  acetaminophen     Tablet .. 650 milliGRAM(s) Oral every 6 hours PRN  albuterol    90 MICROgram(s) HFA Inhaler 1 Puff(s) Inhalation two times a day PRN  aluminum hydroxide/magnesium hydroxide/simethicone Suspension 30 milliLiter(s) Oral every 4 hours PRN  melatonin 3 milliGRAM(s) Oral at bedtime PRN  OLANZapine 2.5 milliGRAM(s) Oral every 6 hours PRN  ondansetron Injectable 4 milliGRAM(s) IV Push every 8 hours PRN  simethicone 80 milliGRAM(s) Chew two times a day PRN      VITAL SIGNS: Last 24 Hours  T(C): 36.9 (22 Jan 2025 04:10), Max: 36.9 (22 Jan 2025 04:10)  T(F): 98.5 (22 Jan 2025 04:10), Max: 98.5 (22 Jan 2025 04:10)  HR: 70 (22 Jan 2025 04:10) (70 - 75)  BP: 107/67 (22 Jan 2025 05:18) (100/63 - 123/79)  BP(mean): --  RR: 19 (22 Jan 2025 04:10) (18 - 19)  SpO2: 94% (22 Jan 2025 04:10) (93% - 95%)    LABS:    Reviewed       RADIOLOGY:  Reviewed     PHYSICAL EXAM:  General: NAD, AAOx3  HEENT: atraumatic, normocephalic  Pulmonary: clear to auscultation bilaterally; No wheeze  Cardiovascular: Regular rate and rhythm; no murmurs, rubs or gallops. Normal S1S2  Gastrointestinal: Soft, nontender, nondistended; bowel sounds present  Musculoskeletal: 2+ peripheral pulses, no clubbing, cyanosis or edema  Neurology: Pt. alert and oriented, fluent speech, able to move all extremities  Skin: no rashes or lesions             FAYE VILLANUEVA 74y Female  MRN#: [id number]         SUBJECTIVE  Patient is a 74y old Female who presents with a chief complaint of Dizziness, fall; r/o CVA (21 Jan 2025 07:05)  Currently admitted to medicine with the primary diagnosis of Dizziness      Today is hospital day 15d, and this morning she is sleeping, does not want to talk, and reports no overnight events.       OBJECTIVE  PAST MEDICAL & SURGICAL HISTORY  Depressed    Anxiety    Breast CA    HLD (hyperlipidemia)    S/P cholecystectomy      Home Meds:  Albuterol (Eqv-ProAir HFA) 90 mcg/inh inhalation aerosol: 1 puff(s) inhaled 2 times a day as needed for  shortness of breath and/or wheezing  atorvastatin 20 mg oral tablet: 1 tab(s) orally once a day (at bedtime)  Bicarsim 80 mg oral tablet, chewable: 1 tab(s) orally 2 times a day as needed for gas  buPROPion 150 mg/24 hours (XL) oral tablet, extended release: 1 tab(s) orally once a day  escitalopram 20 mg oral tablet: 1 tab(s) orally once a day  gabapentin 100 mg oral capsule: 1 cap(s) orally 2 times a day  lithium 450 mg oral tablet, extended release: 1 tab(s) orally once a day  loperamide 2 mg oral capsule: 1 cap(s) orally 3 times a day As needed Diarrhea  losartan 50 mg oral tablet: 1 tab(s) orally once a day  Plavix 75 mg oral tablet: 1 tab(s) orally once a day  QUEtiapine 100 mg oral tablet: 1 tab(s) orally once a day (at bedtime)  QUEtiapine 50 mg oral tablet: 1 tab(s) orally once a day    ALLERGIES:  Demerol HCl (Stomach Upset; Vomiting)  Cipro (Stomach Upset; Vomiting)    MEDICATIONS:  STANDING MEDICATIONS  atorvastatin 20 milliGRAM(s) Oral at bedtime  buPROPion XL (24-Hour) . 150 milliGRAM(s) Oral daily  clopidogrel Tablet 75 milliGRAM(s) Oral daily  escitalopram 20 milliGRAM(s) Oral daily  heparin   Injectable 5000 Unit(s) SubCutaneous every 12 hours  losartan 50 milliGRAM(s) Oral daily  QUEtiapine 100 milliGRAM(s) Oral at bedtime    PRN MEDICATIONS  acetaminophen     Tablet .. 650 milliGRAM(s) Oral every 6 hours PRN  albuterol    90 MICROgram(s) HFA Inhaler 1 Puff(s) Inhalation two times a day PRN  aluminum hydroxide/magnesium hydroxide/simethicone Suspension 30 milliLiter(s) Oral every 4 hours PRN  melatonin 3 milliGRAM(s) Oral at bedtime PRN  OLANZapine 2.5 milliGRAM(s) Oral every 6 hours PRN  ondansetron Injectable 4 milliGRAM(s) IV Push every 8 hours PRN  simethicone 80 milliGRAM(s) Chew two times a day PRN      VITAL SIGNS: Last 24 Hours  T(C): 36.9 (22 Jan 2025 04:10), Max: 36.9 (22 Jan 2025 04:10)  T(F): 98.5 (22 Jan 2025 04:10), Max: 98.5 (22 Jan 2025 04:10)  HR: 70 (22 Jan 2025 04:10) (70 - 75)  BP: 107/67 (22 Jan 2025 05:18) (100/63 - 123/79)  BP(mean): --  RR: 19 (22 Jan 2025 04:10) (18 - 19)  SpO2: 94% (22 Jan 2025 04:10) (93% - 95%)    LABS:    Reviewed       RADIOLOGY:  Reviewed     PHYSICAL EXAM:  Refused

## 2025-01-22 NOTE — PROGRESS NOTE ADULT - ASSESSMENT
74F PMHx Panic Attacks, HTN, Depression, Anxiety, Fibromyalgia, Breast CA, HLD presented to the ED after a fall from bed. Patient was unable to get up, which caused the patients  to be concerned and bring her in. Patient admitted for CVA.     Plan:   Dizziness and fall  Polypharmacy   Deconditioning   - Clopidogrel 75 mg PO qd   - PT, TULIO    Acute kidney injury  - Now resolved  - s/p fluids     Bipolar disorder  Major depressive disorder  Generalized anxiety disorder  - Continue albuterol PRN for shortness of breath  - Continue bupropion 150mg daily and escitalopram 20mg daily --> slow taper outpatient   - On admission DC'd gabapentin 100mg BID, AM 50 mg dose of seroquel, lithium    - Continue Seroquel 100mg HS   - BH followed, no further recs     Hypertension  Hyperlipidemia  - Continue Plavix 75mg daily  - Continue atorvastatin 20mg HS  - Continue losartan 50mg daily      DVT/GI ppx: Heparin  Diet: DASH  GI: None   Dispo: DC to [facility] but pending Level 2 psych clearance due to previous psych admissions

## 2025-01-23 PROCEDURE — 99232 SBSQ HOSP IP/OBS MODERATE 35: CPT

## 2025-01-23 RX ORDER — POLYETHYLENE GLYCOL 3350 17 G/17G
17 POWDER, FOR SOLUTION ORAL DAILY
Refills: 0 | Status: DISCONTINUED | OUTPATIENT
Start: 2025-01-23 | End: 2025-01-23

## 2025-01-23 RX ORDER — LACTULOSE 10 G/15 ML
10 SOLUTION, ORAL ORAL ONCE
Refills: 0 | Status: COMPLETED | OUTPATIENT
Start: 2025-01-23 | End: 2025-01-23

## 2025-01-23 RX ORDER — CLONAZEPAM 2 MG
0.5 TABLET ORAL ONCE
Refills: 0 | Status: DISCONTINUED | OUTPATIENT
Start: 2025-01-23 | End: 2025-01-23

## 2025-01-23 RX ORDER — POLYETHYLENE GLYCOL 3350 17 G/17G
17 POWDER, FOR SOLUTION ORAL ONCE
Refills: 0 | Status: COMPLETED | OUTPATIENT
Start: 2025-01-23 | End: 2025-01-23

## 2025-01-23 RX ORDER — SENNOSIDES 8.6 MG
2 TABLET ORAL AT BEDTIME
Refills: 0 | Status: DISCONTINUED | OUTPATIENT
Start: 2025-01-23 | End: 2025-01-24

## 2025-01-23 RX ADMIN — Medication 50 MILLIGRAM(S): at 05:09

## 2025-01-23 RX ADMIN — ACETAMINOPHEN 650 MILLIGRAM(S): 160 SUSPENSION ORAL at 05:11

## 2025-01-23 RX ADMIN — BUPROPION HYDROCHLORIDE 150 MILLIGRAM(S): 150 TABLET, EXTENDED RELEASE ORAL at 11:22

## 2025-01-23 RX ADMIN — QUETIAPINE FUMARATE 100 MILLIGRAM(S): 300 TABLET ORAL at 21:14

## 2025-01-23 RX ADMIN — Medication 5000 UNIT(S): at 05:08

## 2025-01-23 RX ADMIN — Medication 5000 UNIT(S): at 17:35

## 2025-01-23 RX ADMIN — Medication 0.5 MILLIGRAM(S): at 21:14

## 2025-01-23 RX ADMIN — OLANZAPINE 2.5 MILLIGRAM(S): 10 TABLET, FILM COATED ORAL at 11:22

## 2025-01-23 RX ADMIN — ESCITALOPRAM 20 MILLIGRAM(S): 10 TABLET, FILM COATED ORAL at 11:22

## 2025-01-23 RX ADMIN — POLYETHYLENE GLYCOL 3350 17 GRAM(S): 17 POWDER, FOR SOLUTION ORAL at 02:19

## 2025-01-23 RX ADMIN — ATORVASTATIN CALCIUM 20 MILLIGRAM(S): 80 TABLET, FILM COATED ORAL at 21:13

## 2025-01-23 RX ADMIN — ACETAMINOPHEN 650 MILLIGRAM(S): 160 SUSPENSION ORAL at 06:11

## 2025-01-23 RX ADMIN — Medication 10 GRAM(S): at 20:45

## 2025-01-23 RX ADMIN — Medication 2 TABLET(S): at 21:14

## 2025-01-23 RX ADMIN — Medication 75 MILLIGRAM(S): at 11:22

## 2025-01-23 NOTE — PROGRESS NOTE ADULT - ATTENDING SUPERVISION STATEMENT
Resident
Resident/Student
Student

## 2025-01-23 NOTE — PROGRESS NOTE ADULT - ATTENDING COMMENTS
Agree with above   Patient seen and examined together with medical residents   Vital signs,  labs and imaging  reviewed   Assesment and plan discussed     Dizziness/Fall  CELENA - resolved  Bipolar/MDD/Anxiety  T11 deformity on CT    Pending MR Brain/Spine - Was re-ordered as initial orders cancelled by MRI. MRI reported that patient refused however patient denies this .
Dizziness/Fall  CELENA    Pending Level 2 Psych Eval and TULIO placement .
Dizziness/Fall  CELENA    Pending TULIO placement
Dizziness/Fall  CELENA - resolved  Bipolar/MDD/Anxiety  T11 deformity on CT    Pending MR Brain/Spine - Was re-ordered as initial orders cancelled by MRI. MRI reported that patient refused however patient denies this
Agree with above   Patient seen and examined together with medical residents   Vital signs,  labs and imaging  reviewed   Assesment and plan discussed     Medically cleared for dc, patient not motivated to get out of bed, move around  Psych eval appreciated. DC Lithium, Gabapentin  Medications optimized.   Dispo-TULIO. D/w cm    Time-based billing (NON-critical care).     55 minutes spent on total encounter. The necessity of the time spent during the encounter on this date of service was due to:     Time spent reviewing the chart documentation, reviewing labs and imaging studies, evaluating the patient, discussing the plan of care with the consultants & medical team, and documenting.
Dizziness/Fall  CELENA    Pending Level 2 Psych Eval and  TULIO placement .
Dizziness/Fall  CELENA    Pending Level 2 Psych Eval and TULIO placement
Dizziness/Fall  CELENA    Pending Level 2 Psych Eval and TULIO placement .
Dizziness/Fall  CELENA    Pending TULIO placement .
Agree with above   Patient seen and examined together with medical residents   Vital signs,  labs and imaging  reviewed   Assesment and plan discussed       Seen and examined, mood labile.  Encouraged patient to sit up in bed/ in chair, attempt ambulation however patient not motivated at this time  Medically optimized at this time, no further workup planned  Continue to monitor clinically, will request BH assessment to optimize Psychiatric mediations as may be contributing to lethargy and or mood disturbances  Dispo- TBD, likely home w home care
Agree with above   Patient seen and examined together with medical residents   Vital signs,  labs and imaging  reviewed   Assesment and plan discussed     Medically cleared for dc, patient not motivated to get out of bed, move around  Psych eval appreciated. DC Lithium, Gabapentin  Medicaitons optimized.   Dispo- Likely home w home PT

## 2025-01-23 NOTE — PROGRESS NOTE ADULT - SUBJECTIVE AND OBJECTIVE BOX
FAYE VILLANUEVA 74y Female  MRN#: [id number]       SUBJECTIVE  Patient is a 74y old Female who presents with a chief complaint of Dizziness, fall; r/o CVA (22 Jan 2025 06:58)  Currently admitted to medicine with the primary diagnosis of Dizziness    Hospital course has been complicated by complex social work case.   Today is hospital day 16d, and this morning she is sleeping, does not want to talk, and reports no overnight events.          OBJECTIVE  PAST MEDICAL & SURGICAL HISTORY  Depressed    Anxiety    Breast CA    HLD (hyperlipidemia)    S/P cholecystectomy      Home Meds:  Albuterol (Eqv-ProAir HFA) 90 mcg/inh inhalation aerosol: 1 puff(s) inhaled 2 times a day as needed for  shortness of breath and/or wheezing  atorvastatin 20 mg oral tablet: 1 tab(s) orally once a day (at bedtime)  Bicarsim 80 mg oral tablet, chewable: 1 tab(s) orally 2 times a day as needed for gas  buPROPion 150 mg/24 hours (XL) oral tablet, extended release: 1 tab(s) orally once a day  escitalopram 20 mg oral tablet: 1 tab(s) orally once a day  gabapentin 100 mg oral capsule: 1 cap(s) orally 2 times a day  lithium 450 mg oral tablet, extended release: 1 tab(s) orally once a day  loperamide 2 mg oral capsule: 1 cap(s) orally 3 times a day As needed Diarrhea  losartan 50 mg oral tablet: 1 tab(s) orally once a day  Plavix 75 mg oral tablet: 1 tab(s) orally once a day  QUEtiapine 100 mg oral tablet: 1 tab(s) orally once a day (at bedtime)  QUEtiapine 50 mg oral tablet: 1 tab(s) orally once a day    ALLERGIES:  Demerol HCl (Stomach Upset; Vomiting)  Cipro (Stomach Upset; Vomiting)    MEDICATIONS:  STANDING MEDICATIONS  atorvastatin 20 milliGRAM(s) Oral at bedtime  buPROPion XL (24-Hour) . 150 milliGRAM(s) Oral daily  clopidogrel Tablet 75 milliGRAM(s) Oral daily  escitalopram 20 milliGRAM(s) Oral daily  heparin   Injectable 5000 Unit(s) SubCutaneous every 12 hours  losartan 50 milliGRAM(s) Oral daily  QUEtiapine 100 milliGRAM(s) Oral at bedtime  senna 2 Tablet(s) Oral at bedtime    PRN MEDICATIONS  acetaminophen     Tablet .. 650 milliGRAM(s) Oral every 6 hours PRN  albuterol    90 MICROgram(s) HFA Inhaler 1 Puff(s) Inhalation two times a day PRN  aluminum hydroxide/magnesium hydroxide/simethicone Suspension 30 milliLiter(s) Oral every 4 hours PRN  melatonin 3 milliGRAM(s) Oral at bedtime PRN  OLANZapine 2.5 milliGRAM(s) Oral every 6 hours PRN  ondansetron Injectable 4 milliGRAM(s) IV Push every 8 hours PRN  simethicone 80 milliGRAM(s) Chew two times a day PRN      VITAL SIGNS: Last 24 Hours  T(C): 36.7 (23 Jan 2025 08:18), Max: 37.4 (22 Jan 2025 20:49)  T(F): 98 (23 Jan 2025 08:18), Max: 99.3 (22 Jan 2025 20:49)  HR: 72 (23 Jan 2025 08:18) (61 - 76)  BP: 113/58 (23 Jan 2025 08:18) (102/60 - 118/65)  BP(mean): 83 (22 Jan 2025 17:13) (83 - 83)  RR: 19 (23 Jan 2025 08:18) (18 - 19)  SpO2: 93% (23 Jan 2025 08:18) (93% - 98%)    LABS:  Reviewed       RADIOLOGY:  REviewed     PHYSICAL EXAM:  General: NAD, AAOx3  HEENT: atraumatic, normocephalic  Pulmonary: clear to auscultation bilaterally; No wheeze  Cardiovascular: Regular rate and rhythm; no murmurs, rubs or gallops. Normal S1S2  Gastrointestinal: Soft, nontender, nondistended; bowel sounds present  Musculoskeletal: 2+ peripheral pulses, no clubbing, cyanosis or edema  Neurology: Pt. alert and oriented, fluent speech, able to move all extremities  Skin: no rashes or lesions

## 2025-01-23 NOTE — PROGRESS NOTE ADULT - ASSESSMENT
74F PMHx Panic Attacks, HTN, Depression, Anxiety, Fibromyalgia, Breast CA, HLD presented to the ED after a fall from bed. Patient was unable to get up, which caused the patients  to be concerned and bring her in. Patient admitted for CVA.     Plan:   Dizziness and fall  Polypharmacy   Deconditioning   - Clopidogrel 75 mg PO qd   - PT, TULIO    Acute kidney injury  - Now resolved  - s/p fluids     Bipolar disorder  Major depressive disorder  Generalized anxiety disorder  - Continue albuterol PRN for shortness of breath  - Continue bupropion 150mg daily and escitalopram 20mg daily --> slow taper outpatient   - On admission DC'd gabapentin 100mg BID, AM 50 mg dose of seroquel, lithium    - Continue Seroquel 100mg HS   - BH followed, no further recs     Hypertension  Hyperlipidemia  - Continue Plavix 75mg daily  - Continue atorvastatin 20mg HS  - Continue losartan 50mg daily      DVT/GI ppx: Heparin  Diet: DASH  GI: None   Dispo: DC to [facility] but pending Level 2 psych clearance due to previous psych admissions

## 2025-01-24 ENCOUNTER — TRANSCRIPTION ENCOUNTER (OUTPATIENT)
Age: 75
End: 2025-01-24

## 2025-01-24 VITALS
DIASTOLIC BLOOD PRESSURE: 69 MMHG | HEART RATE: 73 BPM | OXYGEN SATURATION: 97 % | RESPIRATION RATE: 19 BRPM | TEMPERATURE: 99 F | SYSTOLIC BLOOD PRESSURE: 110 MMHG

## 2025-01-24 PROCEDURE — 93005 ELECTROCARDIOGRAM TRACING: CPT

## 2025-01-24 PROCEDURE — 80053 COMPREHEN METABOLIC PANEL: CPT

## 2025-01-24 PROCEDURE — 85027 COMPLETE CBC AUTOMATED: CPT

## 2025-01-24 PROCEDURE — 85025 COMPLETE CBC W/AUTO DIFF WBC: CPT

## 2025-01-24 PROCEDURE — 73552 X-RAY EXAM OF FEMUR 2/>: CPT

## 2025-01-24 PROCEDURE — 70450 CT HEAD/BRAIN W/O DYE: CPT | Mod: MC

## 2025-01-24 PROCEDURE — 96374 THER/PROPH/DIAG INJ IV PUSH: CPT

## 2025-01-24 PROCEDURE — 81001 URINALYSIS AUTO W/SCOPE: CPT

## 2025-01-24 PROCEDURE — 97163 PT EVAL HIGH COMPLEX 45 MIN: CPT

## 2025-01-24 PROCEDURE — 80048 BASIC METABOLIC PNL TOTAL CA: CPT

## 2025-01-24 PROCEDURE — 85730 THROMBOPLASTIN TIME PARTIAL: CPT

## 2025-01-24 PROCEDURE — 36415 COLL VENOUS BLD VENIPUNCTURE: CPT

## 2025-01-24 PROCEDURE — 83036 HEMOGLOBIN GLYCOSYLATED A1C: CPT

## 2025-01-24 PROCEDURE — 87086 URINE CULTURE/COLONY COUNT: CPT

## 2025-01-24 PROCEDURE — 73564 X-RAY EXAM KNEE 4 OR MORE: CPT

## 2025-01-24 PROCEDURE — 99239 HOSP IP/OBS DSCHRG MGMT >30: CPT

## 2025-01-24 PROCEDURE — 85610 PROTHROMBIN TIME: CPT

## 2025-01-24 PROCEDURE — 72170 X-RAY EXAM OF PELVIS: CPT

## 2025-01-24 PROCEDURE — 74177 CT ABD & PELVIS W/CONTRAST: CPT | Mod: MC

## 2025-01-24 PROCEDURE — 82962 GLUCOSE BLOOD TEST: CPT

## 2025-01-24 PROCEDURE — 84100 ASSAY OF PHOSPHORUS: CPT

## 2025-01-24 PROCEDURE — 80061 LIPID PANEL: CPT

## 2025-01-24 PROCEDURE — 99285 EMERGENCY DEPT VISIT HI MDM: CPT | Mod: 25

## 2025-01-24 PROCEDURE — 80178 ASSAY OF LITHIUM: CPT

## 2025-01-24 PROCEDURE — 71045 X-RAY EXAM CHEST 1 VIEW: CPT

## 2025-01-24 PROCEDURE — 83735 ASSAY OF MAGNESIUM: CPT

## 2025-01-24 PROCEDURE — 72125 CT NECK SPINE W/O DYE: CPT | Mod: MC

## 2025-01-24 PROCEDURE — 71260 CT THORAX DX C+: CPT | Mod: MC

## 2025-01-24 RX ORDER — BISACODYL 5 MG
10 TABLET, DELAYED RELEASE (ENTERIC COATED) ORAL ONCE
Refills: 0 | Status: COMPLETED | OUTPATIENT
Start: 2025-01-24 | End: 2025-01-24

## 2025-01-24 RX ADMIN — ESCITALOPRAM 20 MILLIGRAM(S): 10 TABLET, FILM COATED ORAL at 11:00

## 2025-01-24 RX ADMIN — Medication 5000 UNIT(S): at 06:27

## 2025-01-24 RX ADMIN — Medication 10 MILLIGRAM(S): at 10:59

## 2025-01-24 RX ADMIN — Medication 50 MILLIGRAM(S): at 06:27

## 2025-01-24 RX ADMIN — BUPROPION HYDROCHLORIDE 150 MILLIGRAM(S): 150 TABLET, EXTENDED RELEASE ORAL at 11:00

## 2025-01-24 RX ADMIN — Medication 75 MILLIGRAM(S): at 11:00

## 2025-01-24 NOTE — DISCHARGE NOTE NURSING/CASE MANAGEMENT/SOCIAL WORK - FINANCIAL ASSISTANCE
[facility] provides services at a reduced cost to those who are determined to be eligible through [facility]’s financial assistance program. Information regarding [facility]’s financial assistance program can be found by going to [email or web address] or by calling [phone].

## 2025-01-24 NOTE — PROGRESS NOTE ADULT - SUBJECTIVE AND OBJECTIVE BOX
FAYE VILLANUEVA 74y Female  MRN#: [id number]       SUBJECTIVE  Patient is a 74y old Female who presents with a chief complaint of Dizziness, fall; r/o CVA (23 Jan 2025 13:11)  Currently admitted to medicine with the primary diagnosis of Dizziness    Today is hospital day 17d, and this morning she is _________ and reports no overnight events.       OBJECTIVE  PAST MEDICAL & SURGICAL HISTORY  Depressed    Anxiety    Breast CA    HLD (hyperlipidemia)    S/P cholecystectomy      Home Meds:  Albuterol (Eqv-ProAir HFA) 90 mcg/inh inhalation aerosol: 1 puff(s) inhaled 2 times a day as needed for  shortness of breath and/or wheezing  atorvastatin 20 mg oral tablet: 1 tab(s) orally once a day (at bedtime)  Bicarsim 80 mg oral tablet, chewable: 1 tab(s) orally 2 times a day as needed for gas  buPROPion 150 mg/24 hours (XL) oral tablet, extended release: 1 tab(s) orally once a day  escitalopram 20 mg oral tablet: 1 tab(s) orally once a day  gabapentin 100 mg oral capsule: 1 cap(s) orally 2 times a day  lithium 450 mg oral tablet, extended release: 1 tab(s) orally once a day  loperamide 2 mg oral capsule: 1 cap(s) orally 3 times a day As needed Diarrhea  losartan 50 mg oral tablet: 1 tab(s) orally once a day  Plavix 75 mg oral tablet: 1 tab(s) orally once a day  QUEtiapine 100 mg oral tablet: 1 tab(s) orally once a day (at bedtime)  QUEtiapine 50 mg oral tablet: 1 tab(s) orally once a day    ALLERGIES:  Demerol HCl (Stomach Upset; Vomiting)  Cipro (Stomach Upset; Vomiting)    MEDICATIONS:  STANDING MEDICATIONS  atorvastatin 20 milliGRAM(s) Oral at bedtime  buPROPion XL (24-Hour) . 150 milliGRAM(s) Oral daily  clopidogrel Tablet 75 milliGRAM(s) Oral daily  escitalopram 20 milliGRAM(s) Oral daily  heparin   Injectable 5000 Unit(s) SubCutaneous every 12 hours  losartan 50 milliGRAM(s) Oral daily  QUEtiapine 100 milliGRAM(s) Oral at bedtime  senna 2 Tablet(s) Oral at bedtime    PRN MEDICATIONS  acetaminophen     Tablet .. 650 milliGRAM(s) Oral every 6 hours PRN  albuterol    90 MICROgram(s) HFA Inhaler 1 Puff(s) Inhalation two times a day PRN  aluminum hydroxide/magnesium hydroxide/simethicone Suspension 30 milliLiter(s) Oral every 4 hours PRN  melatonin 3 milliGRAM(s) Oral at bedtime PRN  OLANZapine 2.5 milliGRAM(s) Oral every 6 hours PRN  ondansetron Injectable 4 milliGRAM(s) IV Push every 8 hours PRN  simethicone 80 milliGRAM(s) Chew two times a day PRN      VITAL SIGNS: Last 24 Hours  T(C): 36.6 (24 Jan 2025 04:53), Max: 36.7 (23 Jan 2025 08:18)  T(F): 97.8 (24 Jan 2025 04:53), Max: 98 (23 Jan 2025 08:18)  HR: 74 (24 Jan 2025 04:53) (71 - 74)  BP: 113/68 (24 Jan 2025 04:53) (113/58 - 128/65)  RR: 18 (24 Jan 2025 04:53) (18 - 19)  SpO2: 96% (24 Jan 2025 04:53) (93% - 96%)    LABS:    reviewed       RADIOLOGY:  reviewed     PHYSICAL EXAM:  General: NAD, AAOx3  HEENT: atraumatic, normocephalic  Pulmonary: clear to auscultation bilaterally; No wheeze  Cardiovascular: Regular rate and rhythm; no murmurs, rubs or gallops. Normal S1S2  Gastrointestinal: Soft, nontender, nondistended; bowel sounds present  Musculoskeletal: 2+ peripheral pulses, no clubbing, cyanosis or edema  Neurology: Pt. alert and oriented, fluent speech, able to move all extremities  Skin: no rashes or lesions             FAYE VILLANUEVA 74y Female  MRN#: [id number]       SUBJECTIVE  Patient is a 74y old Female who presents with a chief complaint of Dizziness, fall; r/o CVA (23 Jan 2025 13:11)  Currently admitted to medicine with the primary diagnosis of Dizziness    Today is hospital day 17d, and this morning she is sleeping, says she is constipated, last BM 2 days ago, has not touched breakfast on table and reports no overnight events.       OBJECTIVE  PAST MEDICAL & SURGICAL HISTORY  Depressed    Anxiety    Breast CA    HLD (hyperlipidemia)    S/P cholecystectomy      Home Meds:  Albuterol (Eqv-ProAir HFA) 90 mcg/inh inhalation aerosol: 1 puff(s) inhaled 2 times a day as needed for  shortness of breath and/or wheezing  atorvastatin 20 mg oral tablet: 1 tab(s) orally once a day (at bedtime)  Bicarsim 80 mg oral tablet, chewable: 1 tab(s) orally 2 times a day as needed for gas  buPROPion 150 mg/24 hours (XL) oral tablet, extended release: 1 tab(s) orally once a day  escitalopram 20 mg oral tablet: 1 tab(s) orally once a day  gabapentin 100 mg oral capsule: 1 cap(s) orally 2 times a day  lithium 450 mg oral tablet, extended release: 1 tab(s) orally once a day  loperamide 2 mg oral capsule: 1 cap(s) orally 3 times a day As needed Diarrhea  losartan 50 mg oral tablet: 1 tab(s) orally once a day  Plavix 75 mg oral tablet: 1 tab(s) orally once a day  QUEtiapine 100 mg oral tablet: 1 tab(s) orally once a day (at bedtime)  QUEtiapine 50 mg oral tablet: 1 tab(s) orally once a day    ALLERGIES:  Demerol HCl (Stomach Upset; Vomiting)  Cipro (Stomach Upset; Vomiting)    MEDICATIONS:  STANDING MEDICATIONS  atorvastatin 20 milliGRAM(s) Oral at bedtime  buPROPion XL (24-Hour) . 150 milliGRAM(s) Oral daily  clopidogrel Tablet 75 milliGRAM(s) Oral daily  escitalopram 20 milliGRAM(s) Oral daily  heparin   Injectable 5000 Unit(s) SubCutaneous every 12 hours  losartan 50 milliGRAM(s) Oral daily  QUEtiapine 100 milliGRAM(s) Oral at bedtime  senna 2 Tablet(s) Oral at bedtime    PRN MEDICATIONS  acetaminophen     Tablet .. 650 milliGRAM(s) Oral every 6 hours PRN  albuterol    90 MICROgram(s) HFA Inhaler 1 Puff(s) Inhalation two times a day PRN  aluminum hydroxide/magnesium hydroxide/simethicone Suspension 30 milliLiter(s) Oral every 4 hours PRN  melatonin 3 milliGRAM(s) Oral at bedtime PRN  OLANZapine 2.5 milliGRAM(s) Oral every 6 hours PRN  ondansetron Injectable 4 milliGRAM(s) IV Push every 8 hours PRN  simethicone 80 milliGRAM(s) Chew two times a day PRN      VITAL SIGNS: Last 24 Hours  T(C): 36.6 (24 Jan 2025 04:53), Max: 36.7 (23 Jan 2025 08:18)  T(F): 97.8 (24 Jan 2025 04:53), Max: 98 (23 Jan 2025 08:18)  HR: 74 (24 Jan 2025 04:53) (71 - 74)  BP: 113/68 (24 Jan 2025 04:53) (113/58 - 128/65)  RR: 18 (24 Jan 2025 04:53) (18 - 19)  SpO2: 96% (24 Jan 2025 04:53) (93% - 96%)    LABS:    reviewed       RADIOLOGY:  reviewed     PHYSICAL EXAM:  General: NAD, AAOx3  HEENT: atraumatic, normocephalic  Pulmonary: clear to auscultation bilaterally; No wheeze  Cardiovascular: Regular rate and rhythm; no murmurs, rubs or gallops. Normal S1S2  Gastrointestinal: Soft, nontender, nondistended; bowel sounds present  Musculoskeletal: 2+ peripheral pulses, no clubbing, cyanosis or edema  Neurology: Pt. alert and oriented, fluent speech, able to move all extremities  Skin: no rashes or lesions

## 2025-01-24 NOTE — DISCHARGE NOTE NURSING/CASE MANAGEMENT/SOCIAL WORK - NSDCPEFALRISK_GEN_ALL_CORE
For information on Fall & Injury Prevention, visit: [email or web address] OR  [email or web address] OR  https://www.cdc.gov/steadi/patient.html

## 2025-01-24 NOTE — DISCHARGE NOTE NURSING/CASE MANAGEMENT/SOCIAL WORK - NURSING SECTION COMPLETE
POST-OP DIAGNOSIS:  Left carpal tunnel syndrome 29-Aug-2023 11:44:55  Agapito Weinberg   Patient/Caregiver provided printed discharge information.

## 2025-01-24 NOTE — PROGRESS NOTE ADULT - ASSESSMENT
74F PMHx Panic Attacks, HTN, Depression, Anxiety, Fibromyalgia, Breast CA, HLD presented to the ED after a fall from bed. Patient was unable to get up, which caused the patients  to be concerned and bring her in. Patient admitted for CVA.     Plan:   Dizziness and fall  Polypharmacy   Deconditioning   - Clopidogrel 75 mg PO qd   - PT, rec TULIO    Acute kidney injury  - Now resolved  - s/p fluids     Bipolar disorder  Major depressive disorder  Generalized anxiety disorder  - Continue albuterol PRN for shortness of breath  - Continue bupropion 150mg daily and escitalopram 20mg daily --> slow taper outpatient   - On admission DC'd gabapentin 100mg BID, AM 50 mg dose of seroquel, lithium    - Continue Seroquel 100mg HS   - BH followed, no further recs     Hypertension  Hyperlipidemia  - Continue Plavix 75mg daily  - Continue atorvastatin 20mg HS  - Continue losartan 50mg daily      DVT/GI ppx: Heparin  Diet: DASH  GI: None   Dispo: DC to [facility] but pending Level 2 psych clearance due to previous psych admissions

## 2025-01-24 NOTE — PROGRESS NOTE ADULT - PROVIDER SPECIALTY LIST ADULT
Hospitalist
Internal Medicine
Hospitalist
Internal Medicine
Hospitalist
Hospitalist

## 2025-01-24 NOTE — DISCHARGE NOTE NURSING/CASE MANAGEMENT/SOCIAL WORK - PATIENT PORTAL LINK FT
You can access the FollowMyHealth Patient Portal offered by [facility] by registering at the following website: [email or web address]. By joining [employer]’s FollowMyHealth portal, you will also be able to view your health information using other applications (apps) compatible with our system.

## 2025-01-24 NOTE — PROGRESS NOTE ADULT - REASON FOR ADMISSION
Dizziness, fall; r/o CVA
Dizziness
Dizziness, fall; r/o CVA

## 2025-01-24 NOTE — CHART NOTE - NSCHARTNOTEFT_GEN_A_CORE
Nutrition Note:     As per IDR's today pt medically cleared and now has level 2 clearance to be able to be discharged.   Po intake is poor to fair.    Continue to monitor as needed.

## 2025-02-10 NOTE — H&P ADULT - NSHPLABSRESULTS_GEN_ALL_CORE
[Lumbar Spine] : lumbar spine [MRI] : MRI [Cervical Spine] : cervical spine [Report was reviewed and noted in the chart] : The report was reviewed and noted in the chart [I independently reviewed and interpreted images and report] : I independently reviewed and interpreted images and report [[email or web address]] : 1/4/2025 MRI Lumbar Spine (STAND UP) INTERPRETATION: MRI of the thoracic spine was performed on 02/18/2015. T12-L1 - L5-S1 disc degenerative changes are noted with loss of disc space height and signal,  anterior hypertrophic change, and anterior disc extension with Modic type II endplate signal  changes noted at L5-S1. Modic type II endplate signal changes are also suggested anteriorly at  L1-2. At L5-S1, disc herniation is noted with grade 1 retrolisthesis deforming the anterior epidural fat  abutting the proximal right S1 nerve root approaching the proximal left S1 nerve root with  bilateral neural foraminal extension abutting the exiting L5 nerve roots. At L4-5, disc herniation is noted deforming the thecal sac abutting the proximal L5 nerve roots  bilaterally with bilateral neural foraminal extension, right greater than left, abutting the exiting  right L4 nerve root. At L3-4, disc bulge is noted with bilateral inferior neural foraminal extension. At L2-3, disc bulge is noted with bilateral inferior neural foraminal extension. At L1-2, there is no evidence of herniated disc, midline thecal sac deformity, or neural foraminal  stenosis. At T12-L1, there is no evidence of herniated disc, spinal canal compromise, or neural foraminal  stenosis.  There is no evidence of lumbar vertebral body compression fracture. There is no evidence of  bone marrow infiltrative disorder. There is no evidence of spondylolisthesis. There is no  evidence of signal elevation within the conus medullaris which is located at the approximate  T12-L1 disc space level. Lumbar spine curvature is noted with leftward convexity. Nonspecific dependent soft tissue edema is noted within the posterior subcutaneous tissues. Examination is compared to previous MRI study of the lumbar spine dated 10/14/2022. There is  no significant interval change. IMPRESSION: - L4-5 and L5-S1 disc herniations with grade 1 retrolisthesis deforming the anterior  epidural fat abutting the proximal right S1 nerve root approaching the proximal left S1  nerve root, L4-5 abutment of the proximal L5 nerve roots bilaterally, L4-5 and L5-S1  bilateral neural foraminal extension abutting the exiting L5 nerve roots bilaterally at L5- S1 and abutting the exiting right L4 nerve root at L4-5. - L2-3 and L3-4 disc bulges with bilateral inferior neural foraminal extension. - Lumbar spine curvature with leftward convexity. - T12-L1 - L5-S1 disc degenerative changes. - No significant change compared to previous MRI study dated 10/14/2022.  10/28/2022 MRI Cervical Spine (STAND UP) INTERPRETATION: At the C2-3 disc space level, a disc bulge is noted deforming the thecal  sac, contributing to mild central spinal stenosis in conjunction with posterior ligamentous  hypertrophy. There is no evidence of neural foraminal stenosis. There is loss of disc signal with  preservation of disc space height. At C3-4, disc herniation is noted deforming the thecal sac, contributing to moderate central spinal stenosis in conjunction with posterior ligamentous hypertrophy with bilateral neural  foraminal narrowing in conjunction with facet and uncinate hypertrophic changes. There is loss  of disc signal with preservation of disc space height. At C4-5, a disc bulge is noted deforming the thecal sac. Bilateral neural foraminal narrowing is  noted in conjunction with facet and uncinate hypertrophic changes. There is loss of disc signal  with preservation of disc space height. At C5-6, disc herniation is noted deforming the thecal sac. Bilateral neural foraminal narrowing  is noted in conjunction with facet and uncinate hypertrophic changes. There is loss of disc signal  with preservation of disc space height. At C6-7, disc herniation is noted deforming the thecal sac, abutting the spinal cord. Bilateral  neural foraminal narrowing is noted in conjunction with facet and uncinate hypertrophic  changes. There is loss of disc signal with preservation of disc space height with anterior disc  extension. At C7-T1, a left paracentral disc bulge is noted deforming the thecal sac. There is no evidence of  neural foraminal stenosis. Loss of disc signal is noted with preservation of disc space height. There is only limited assessment provided of the T1-2 - T3-4 disc space levels at the peripheral  margin of the included field of view. MRI study of the thoracic spine was previously performed  on 02/18/2015. Cervical spine straightening is noted, a nonspecific finding which meets criteria for muscle  spasm. C3 - C7 chronic wedge compression deformities are noted with approximately 50% loss of  height at C6 and approximately 20% loss of height at C3, C4, C5, and C7. There is no evidence of spondylolisthesis. There is no evidence of mass at the craniocervical  junction. There is no atlantoaxial subluxation. There is no prevertebral soft tissue edema. There  is no syringohydromyelia. Hemangioma is noted within the C7 vertebral body. Examination is compared to the previous MRI study dated 02/16/2015. C4-5 bilateral neural  foraminal narrowing represents interval development compared to the prior study. IMPRESSION: - C3-4, C5-6, and C6-7 disc herniations deforming the thecal sac, with C6-7 cord  abutment; C3-4, C5-6, and C6-7 bilateral neural foraminal narrowing in conjunction with facet and uncinate hypertrophic changes; and C3-4 moderate central spinal stenosis in  conjunction with posterior ligamentous hypertrophy. - C2-3, C4-5, and C7-T1 disc bulges with C7-T1 left paracentral orientation, C4-5 bilateral  neural foraminal narrowing in conjunction with facet and uncinate hypertrophic changes,  and C2-3 mild central spinal stenosis in conjunction with posterior ligamentous  hypertrophy. - Cervical spine straightening. - C3 - C7 chronic wedge compression deformities. 10.9   17.77 )-----------( 202      ( 26 Jan 2022 06:30 )             33.8       01-26    136  |  104  |  17.9  ----------------------------<  131<H>  4.8   |  18.0<L>  |  0.61    Ca    8.5<L>      26 Jan 2022 06:30  Mg     2.5     01-24    TPro  6.3<L>  /  Alb  2.8<L>  /  TBili  0.6  /  DBili  x   /  AST  51<H>  /  ALT  31  /  AlkPhos  115  01-26      < from: CT Angio Chest PE Protocol w/ IV Cont (01.26.22 @ 00:17) >    IMPRESSION:    No pulmonary embolism.  Bibasilar streaky atelectasis.    In the left upper quadrant, there is inflammatory change around the colon   suggesting colitis.  Redemonstration of an enlarged heterogeneous multinodular thyroid goiter.      --- End of Report ---      DAVID ROSARIO MD; Attending Radiologist  This document has been electronically signed. Jan 26 2022 12:41AM    < end of copied text >

## 2025-02-15 NOTE — ED PROVIDER NOTE - NSICDXNOPASTSURGICALHX_GEN_ALL_ED
darkened lights/meal provided/plan of care explained/side rails up/treatment delay explained/wait time explained/warm blanket provided
<-- Click to add NO significant Past Surgical History

## 2025-03-05 NOTE — ED CDU PROVIDER SUBSEQUENT DAY NOTE - CONSTITUTIONAL NEGATIVE STATEMENT, MLM
Abnormal Test Results    Lab Tech Name: Benjamin    Lab Tech Phone Number:  [phone]    Ordering Provider: Dr. Braydon Pal    Test Date/Time: 3/4/25 1000    Room # if drawn while inpatient:  outpatient    Test Name and Value: GI pathogen panel. Positive for norovirus    Test Normal Range: negative    -------------------------------------------------------  Is the patient having symptoms?: No  (Use .ECOINR for INR labs)    Medical advice is: wash hands and go to ER if feeling sick    MD on call paged and advised.  No further action requested.    Patient/Caller agrees to follow recommendations. and Patient/Caller encouraged to call back if any questions or concerns.    RN called pt back and         
no fever and no chills.

## 2025-03-17 NOTE — ED ADULT TRIAGE NOTE - MEANS OF ARRIVAL
ANAND Vargas    Could you please change her referral to Dr Rodriguez so it will fall in [facility]  Their office pulls all records from [facility]      Thanks    stretcher

## 2025-06-16 ENCOUNTER — APPOINTMENT (OUTPATIENT)
Dept: ENDOCRINOLOGY | Facility: CLINIC | Age: 75
End: 2025-06-16

## 2025-07-08 ENCOUNTER — APPOINTMENT (OUTPATIENT)
Dept: ORTHOPEDIC SURGERY | Facility: CLINIC | Age: 75
End: 2025-07-08
Payer: MEDICARE

## 2025-07-08 VITALS
SYSTOLIC BLOOD PRESSURE: 102 MMHG | HEIGHT: 62 IN | HEART RATE: 77 BPM | BODY MASS INDEX: 39.93 KG/M2 | WEIGHT: 217 LBS | DIASTOLIC BLOOD PRESSURE: 64 MMHG

## 2025-07-08 PROCEDURE — 20610 DRAIN/INJ JOINT/BURSA W/O US: CPT | Mod: RT

## 2025-07-08 PROCEDURE — 73564 X-RAY EXAM KNEE 4 OR MORE: CPT | Mod: RT

## 2025-07-08 PROCEDURE — 99204 OFFICE O/P NEW MOD 45 MIN: CPT | Mod: 25

## 2025-07-08 RX ORDER — HYALURONATE SODIUM 30 MG/2 ML
30 SYRINGE (ML) INTRAARTICULAR
Qty: 3 | Refills: 0 | Status: ACTIVE | OUTPATIENT
Start: 2025-07-08

## 2025-07-09 ENCOUNTER — APPOINTMENT (OUTPATIENT)
Dept: ENDOCRINOLOGY | Facility: CLINIC | Age: 75
End: 2025-07-09
Payer: MEDICARE

## 2025-07-09 VITALS — OXYGEN SATURATION: 96 % | HEART RATE: 74 BPM | BODY MASS INDEX: 32.76 KG/M2 | HEIGHT: 62 IN | WEIGHT: 178 LBS

## 2025-07-09 VITALS — DIASTOLIC BLOOD PRESSURE: 60 MMHG | SYSTOLIC BLOOD PRESSURE: 120 MMHG

## 2025-07-09 PROCEDURE — 99214 OFFICE O/P EST MOD 30 MIN: CPT

## 2025-07-16 ENCOUNTER — APPOINTMENT (OUTPATIENT)
Dept: ORTHOPEDIC SURGERY | Facility: CLINIC | Age: 75
End: 2025-07-16

## 2025-07-19 ENCOUNTER — EMERGENCY (EMERGENCY)
Facility: HOSPITAL | Age: 75
LOS: 1 days | End: 2025-07-19
Attending: STUDENT IN AN ORGANIZED HEALTH CARE EDUCATION/TRAINING PROGRAM
Payer: MEDICARE

## 2025-07-19 VITALS
DIASTOLIC BLOOD PRESSURE: 73 MMHG | RESPIRATION RATE: 20 BRPM | HEIGHT: 62 IN | OXYGEN SATURATION: 98 % | TEMPERATURE: 98 F | SYSTOLIC BLOOD PRESSURE: 167 MMHG | WEIGHT: 175.05 LBS | HEART RATE: 77 BPM

## 2025-07-19 VITALS
TEMPERATURE: 99 F | HEART RATE: 77 BPM | SYSTOLIC BLOOD PRESSURE: 122 MMHG | DIASTOLIC BLOOD PRESSURE: 58 MMHG | OXYGEN SATURATION: 97 % | RESPIRATION RATE: 17 BRPM

## 2025-07-19 DIAGNOSIS — Z90.49 ACQUIRED ABSENCE OF OTHER SPECIFIED PARTS OF DIGESTIVE TRACT: Chronic | ICD-10-CM

## 2025-07-19 LAB
ALBUMIN SERPL ELPH-MCNC: 3.8 G/DL — SIGNIFICANT CHANGE UP (ref 3.3–5.2)
ALP SERPL-CCNC: 104 U/L — SIGNIFICANT CHANGE UP (ref 40–120)
ALT FLD-CCNC: 12 U/L — SIGNIFICANT CHANGE UP
ANION GAP SERPL CALC-SCNC: 12 MMOL/L — SIGNIFICANT CHANGE UP (ref 5–17)
APPEARANCE UR: CLEAR — SIGNIFICANT CHANGE UP
AST SERPL-CCNC: 16 U/L — SIGNIFICANT CHANGE UP
BACTERIA # UR AUTO: ABNORMAL /HPF
BASOPHILS # BLD AUTO: 0.03 K/UL — SIGNIFICANT CHANGE UP (ref 0–0.2)
BASOPHILS NFR BLD AUTO: 0.4 % — SIGNIFICANT CHANGE UP (ref 0–2)
BILIRUB SERPL-MCNC: 0.4 MG/DL — SIGNIFICANT CHANGE UP (ref 0.4–2)
BILIRUB UR-MCNC: NEGATIVE — SIGNIFICANT CHANGE UP
BUN SERPL-MCNC: 15.3 MG/DL — SIGNIFICANT CHANGE UP (ref 8–20)
CALCIUM SERPL-MCNC: 9.3 MG/DL — SIGNIFICANT CHANGE UP (ref 8.4–10.5)
CAST: 1 /LPF — SIGNIFICANT CHANGE UP (ref 0–4)
CHLORIDE SERPL-SCNC: 103 MMOL/L — SIGNIFICANT CHANGE UP (ref 96–108)
CO2 SERPL-SCNC: 22 MMOL/L — SIGNIFICANT CHANGE UP (ref 22–29)
COLOR SPEC: YELLOW — SIGNIFICANT CHANGE UP
CREAT SERPL-MCNC: 1.1 MG/DL — SIGNIFICANT CHANGE UP (ref 0.5–1.3)
DIFF PNL FLD: NEGATIVE — SIGNIFICANT CHANGE UP
EGFR: 53 ML/MIN/1.73M2 — LOW
EGFR: 53 ML/MIN/1.73M2 — LOW
EOSINOPHIL # BLD AUTO: 0.07 K/UL — SIGNIFICANT CHANGE UP (ref 0–0.5)
EOSINOPHIL NFR BLD AUTO: 0.8 % — SIGNIFICANT CHANGE UP (ref 0–6)
GLUCOSE SERPL-MCNC: 115 MG/DL — HIGH (ref 70–99)
GLUCOSE UR QL: NEGATIVE MG/DL — SIGNIFICANT CHANGE UP
HCT VFR BLD CALC: 37.7 % — SIGNIFICANT CHANGE UP (ref 34.5–45)
HGB BLD-MCNC: 12.6 G/DL — SIGNIFICANT CHANGE UP (ref 11.5–15.5)
IMM GRANULOCYTES # BLD AUTO: 0.03 K/UL — SIGNIFICANT CHANGE UP (ref 0–0.07)
IMM GRANULOCYTES NFR BLD AUTO: 0.4 % — SIGNIFICANT CHANGE UP (ref 0–0.9)
KETONES UR QL: ABNORMAL MG/DL
LEUKOCYTE ESTERASE UR-ACNC: ABNORMAL
LIDOCAIN IGE QN: 14 U/L — LOW (ref 22–51)
LYMPHOCYTES # BLD AUTO: 2.09 K/UL — SIGNIFICANT CHANGE UP (ref 1–3.3)
LYMPHOCYTES NFR BLD AUTO: 24.9 % — SIGNIFICANT CHANGE UP (ref 13–44)
MCHC RBC-ENTMCNC: 30.8 PG — SIGNIFICANT CHANGE UP (ref 27–34)
MCHC RBC-ENTMCNC: 33.4 G/DL — SIGNIFICANT CHANGE UP (ref 32–36)
MCV RBC AUTO: 92.2 FL — SIGNIFICANT CHANGE UP (ref 80–100)
MONOCYTES # BLD AUTO: 0.76 K/UL — SIGNIFICANT CHANGE UP (ref 0–0.9)
MONOCYTES NFR BLD AUTO: 9.1 % — SIGNIFICANT CHANGE UP (ref 2–14)
NEUTROPHILS # BLD AUTO: 5.4 K/UL — SIGNIFICANT CHANGE UP (ref 1.8–7.4)
NEUTROPHILS NFR BLD AUTO: 64.4 % — SIGNIFICANT CHANGE UP (ref 43–77)
NITRITE UR-MCNC: NEGATIVE — SIGNIFICANT CHANGE UP
NRBC # BLD AUTO: 0 K/UL — SIGNIFICANT CHANGE UP (ref 0–0)
NRBC # FLD: 0 K/UL — SIGNIFICANT CHANGE UP (ref 0–0)
NRBC BLD AUTO-RTO: 0 /100 WBCS — SIGNIFICANT CHANGE UP (ref 0–0)
PH UR: 6 — SIGNIFICANT CHANGE UP (ref 5–8)
PLATELET # BLD AUTO: 265 K/UL — SIGNIFICANT CHANGE UP (ref 150–400)
PMV BLD: 9.7 FL — SIGNIFICANT CHANGE UP (ref 7–13)
POTASSIUM SERPL-MCNC: 4 MMOL/L — SIGNIFICANT CHANGE UP (ref 3.5–5.3)
POTASSIUM SERPL-SCNC: 4 MMOL/L — SIGNIFICANT CHANGE UP (ref 3.5–5.3)
PROT SERPL-MCNC: 6.8 G/DL — SIGNIFICANT CHANGE UP (ref 6.6–8.7)
PROT UR-MCNC: SIGNIFICANT CHANGE UP MG/DL
RBC # BLD: 4.09 M/UL — SIGNIFICANT CHANGE UP (ref 3.8–5.2)
RBC # FLD: 13.2 % — SIGNIFICANT CHANGE UP (ref 10.3–14.5)
RBC CASTS # UR COMP ASSIST: 2 /HPF — SIGNIFICANT CHANGE UP (ref 0–4)
SODIUM SERPL-SCNC: 137 MMOL/L — SIGNIFICANT CHANGE UP (ref 135–145)
SP GR SPEC: >1.03 — HIGH (ref 1–1.03)
SQUAMOUS # UR AUTO: 15 /HPF — HIGH (ref 0–5)
UROBILINOGEN FLD QL: 1 MG/DL — SIGNIFICANT CHANGE UP (ref 0.2–1)
WBC # BLD: 8.38 K/UL — SIGNIFICANT CHANGE UP (ref 3.8–10.5)
WBC # FLD AUTO: 8.38 K/UL — SIGNIFICANT CHANGE UP (ref 3.8–10.5)
WBC UR QL: 16 /HPF — HIGH (ref 0–5)

## 2025-07-19 PROCEDURE — 99284 EMERGENCY DEPT VISIT MOD MDM: CPT | Mod: 25

## 2025-07-19 PROCEDURE — 83690 ASSAY OF LIPASE: CPT

## 2025-07-19 PROCEDURE — 73502 X-RAY EXAM HIP UNI 2-3 VIEWS: CPT

## 2025-07-19 PROCEDURE — 87086 URINE CULTURE/COLONY COUNT: CPT

## 2025-07-19 PROCEDURE — 85025 COMPLETE CBC W/AUTO DIFF WBC: CPT

## 2025-07-19 PROCEDURE — 74177 CT ABD & PELVIS W/CONTRAST: CPT | Mod: 26

## 2025-07-19 PROCEDURE — 81001 URINALYSIS AUTO W/SCOPE: CPT

## 2025-07-19 PROCEDURE — 36415 COLL VENOUS BLD VENIPUNCTURE: CPT

## 2025-07-19 PROCEDURE — 73502 X-RAY EXAM HIP UNI 2-3 VIEWS: CPT | Mod: 26,RT

## 2025-07-19 PROCEDURE — 74177 CT ABD & PELVIS W/CONTRAST: CPT

## 2025-07-19 PROCEDURE — 80053 COMPREHEN METABOLIC PANEL: CPT

## 2025-07-19 PROCEDURE — 99285 EMERGENCY DEPT VISIT HI MDM: CPT

## 2025-07-19 PROCEDURE — 96374 THER/PROPH/DIAG INJ IV PUSH: CPT | Mod: XU

## 2025-07-19 PROCEDURE — 72131 CT LUMBAR SPINE W/O DYE: CPT | Mod: 26

## 2025-07-19 RX ORDER — CEFTRIAXONE 500 MG/1
1000 INJECTION, POWDER, FOR SOLUTION INTRAMUSCULAR; INTRAVENOUS ONCE
Refills: 0 | Status: DISCONTINUED | OUTPATIENT
Start: 2025-07-19 | End: 2025-07-19

## 2025-07-19 RX ORDER — CEFTRIAXONE 500 MG/1
1000 INJECTION, POWDER, FOR SOLUTION INTRAMUSCULAR; INTRAVENOUS ONCE
Refills: 0 | Status: COMPLETED | OUTPATIENT
Start: 2025-07-19 | End: 2025-07-19

## 2025-07-19 RX ORDER — CEPHALEXIN 250 MG/1
1 CAPSULE ORAL
Qty: 21 | Refills: 0
Start: 2025-07-19 | End: 2025-07-25

## 2025-07-19 RX ADMIN — CEFTRIAXONE 1000 MILLIGRAM(S): 500 INJECTION, POWDER, FOR SOLUTION INTRAMUSCULAR; INTRAVENOUS at 19:50

## 2025-07-21 LAB
CULTURE RESULTS: SIGNIFICANT CHANGE UP
SPECIMEN SOURCE: SIGNIFICANT CHANGE UP

## 2025-07-24 ENCOUNTER — APPOINTMENT (OUTPATIENT)
Dept: ORTHOPEDIC SURGERY | Facility: CLINIC | Age: 75
End: 2025-07-24
Payer: MEDICARE

## 2025-07-24 PROCEDURE — 20610 DRAIN/INJ JOINT/BURSA W/O US: CPT | Mod: RT

## 2025-07-25 DIAGNOSIS — M54.50 LOW BACK PAIN, UNSPECIFIED: ICD-10-CM

## 2025-07-25 DIAGNOSIS — Z88.1 ALLERGY STATUS TO OTHER ANTIBIOTIC AGENTS: ICD-10-CM

## 2025-07-25 DIAGNOSIS — N39.0 URINARY TRACT INFECTION, SITE NOT SPECIFIED: ICD-10-CM

## 2025-07-25 DIAGNOSIS — M25.551 PAIN IN RIGHT HIP: ICD-10-CM

## 2025-07-25 DIAGNOSIS — Z88.8 ALLERGY STATUS TO OTHER DRUGS, MEDICAMENTS AND BIOLOGICAL SUBSTANCES: ICD-10-CM

## 2025-07-30 ENCOUNTER — APPOINTMENT (OUTPATIENT)
Dept: ORTHOPEDIC SURGERY | Facility: CLINIC | Age: 75
End: 2025-07-30
Payer: MEDICARE

## 2025-07-30 DIAGNOSIS — M17.11 UNILATERAL PRIMARY OSTEOARTHRITIS, RIGHT KNEE: ICD-10-CM

## 2025-07-30 PROCEDURE — 20610 DRAIN/INJ JOINT/BURSA W/O US: CPT | Mod: RT

## 2025-08-05 ENCOUNTER — APPOINTMENT (OUTPATIENT)
Dept: ORTHOPEDIC SURGERY | Facility: CLINIC | Age: 75
End: 2025-08-05
Payer: MEDICARE

## 2025-08-05 VITALS
SYSTOLIC BLOOD PRESSURE: 101 MMHG | WEIGHT: 178 LBS | BODY MASS INDEX: 32.76 KG/M2 | DIASTOLIC BLOOD PRESSURE: 67 MMHG | HEIGHT: 62 IN | HEART RATE: 79 BPM

## 2025-08-05 DIAGNOSIS — Z98.890 OTHER SPECIFIED POSTPROCEDURAL STATES: ICD-10-CM

## 2025-08-05 DIAGNOSIS — M17.0 BILATERAL PRIMARY OSTEOARTHRITIS OF KNEE: ICD-10-CM

## 2025-08-05 DIAGNOSIS — M47.816 SPONDYLOSIS W/OUT MYELOPATHY OR RADICULOPATHY, LUMBAR REGION: ICD-10-CM

## 2025-08-05 PROCEDURE — 72100 X-RAY EXAM L-S SPINE 2/3 VWS: CPT

## 2025-08-05 PROCEDURE — 99214 OFFICE O/P EST MOD 30 MIN: CPT

## 2025-08-12 ENCOUNTER — NON-APPOINTMENT (OUTPATIENT)
Age: 75
End: 2025-08-12

## 2025-08-12 ENCOUNTER — APPOINTMENT (OUTPATIENT)
Dept: PHYSICAL MEDICINE AND REHAB | Facility: CLINIC | Age: 75
End: 2025-08-12
Payer: MEDICARE

## 2025-08-12 VITALS
RESPIRATION RATE: 15 BRPM | HEART RATE: 81 BPM | SYSTOLIC BLOOD PRESSURE: 103 MMHG | DIASTOLIC BLOOD PRESSURE: 65 MMHG | WEIGHT: 175 LBS | HEIGHT: 62 IN | BODY MASS INDEX: 32.2 KG/M2

## 2025-08-12 DIAGNOSIS — M54.50 LOW BACK PAIN, UNSPECIFIED: ICD-10-CM

## 2025-08-12 DIAGNOSIS — M79.18 MYALGIA, OTHER SITE: ICD-10-CM

## 2025-08-12 PROCEDURE — 99204 OFFICE O/P NEW MOD 45 MIN: CPT

## 2025-08-18 ENCOUNTER — APPOINTMENT (OUTPATIENT)
Dept: ORTHOPEDIC SURGERY | Facility: CLINIC | Age: 75
End: 2025-08-18